# Patient Record
Sex: FEMALE | Race: WHITE | NOT HISPANIC OR LATINO | Employment: FULL TIME | URBAN - METROPOLITAN AREA
[De-identification: names, ages, dates, MRNs, and addresses within clinical notes are randomized per-mention and may not be internally consistent; named-entity substitution may affect disease eponyms.]

---

## 2017-05-10 ENCOUNTER — APPOINTMENT (OUTPATIENT)
Dept: WOMENS IMAGING | Facility: HOSPITAL | Age: 57
End: 2017-05-10

## 2017-05-10 PROCEDURE — 77067 SCR MAMMO BI INCL CAD: CPT | Performed by: RADIOLOGY

## 2018-05-30 ENCOUNTER — APPOINTMENT (OUTPATIENT)
Dept: WOMENS IMAGING | Facility: HOSPITAL | Age: 58
End: 2018-05-30

## 2018-05-30 PROCEDURE — 77063 BREAST TOMOSYNTHESIS BI: CPT | Performed by: RADIOLOGY

## 2018-05-30 PROCEDURE — 77067 SCR MAMMO BI INCL CAD: CPT | Performed by: RADIOLOGY

## 2019-06-06 ENCOUNTER — APPOINTMENT (OUTPATIENT)
Dept: WOMENS IMAGING | Facility: HOSPITAL | Age: 59
End: 2019-06-06

## 2019-06-06 PROCEDURE — 77067 SCR MAMMO BI INCL CAD: CPT | Performed by: RADIOLOGY

## 2019-06-06 PROCEDURE — 77063 BREAST TOMOSYNTHESIS BI: CPT | Performed by: RADIOLOGY

## 2019-06-17 ENCOUNTER — APPOINTMENT (OUTPATIENT)
Dept: WOMENS IMAGING | Facility: HOSPITAL | Age: 59
End: 2019-06-17

## 2019-06-17 PROCEDURE — 77066 DX MAMMO INCL CAD BI: CPT | Performed by: RADIOLOGY

## 2019-06-17 PROCEDURE — 76641 ULTRASOUND BREAST COMPLETE: CPT | Performed by: RADIOLOGY

## 2019-06-17 PROCEDURE — G0279 TOMOSYNTHESIS, MAMMO: HCPCS | Performed by: RADIOLOGY

## 2019-06-17 PROCEDURE — 77062 BREAST TOMOSYNTHESIS BI: CPT | Performed by: RADIOLOGY

## 2019-12-20 ENCOUNTER — APPOINTMENT (OUTPATIENT)
Dept: WOMENS IMAGING | Facility: HOSPITAL | Age: 59
End: 2019-12-20

## 2019-12-20 PROCEDURE — 76641 ULTRASOUND BREAST COMPLETE: CPT | Performed by: RADIOLOGY

## 2020-06-22 ENCOUNTER — APPOINTMENT (OUTPATIENT)
Dept: WOMENS IMAGING | Facility: HOSPITAL | Age: 60
End: 2020-06-22

## 2020-06-22 PROCEDURE — 77066 DX MAMMO INCL CAD BI: CPT | Performed by: RADIOLOGY

## 2020-06-22 PROCEDURE — 76641 ULTRASOUND BREAST COMPLETE: CPT | Performed by: RADIOLOGY

## 2020-06-22 PROCEDURE — G0279 TOMOSYNTHESIS, MAMMO: HCPCS | Performed by: RADIOLOGY

## 2020-06-22 PROCEDURE — 77062 BREAST TOMOSYNTHESIS BI: CPT | Performed by: RADIOLOGY

## 2020-07-09 ENCOUNTER — APPOINTMENT (OUTPATIENT)
Dept: WOMENS IMAGING | Facility: HOSPITAL | Age: 60
End: 2020-07-09

## 2020-11-25 ENCOUNTER — HOSPITAL ENCOUNTER (OUTPATIENT)
Dept: CARDIOLOGY | Facility: HOSPITAL | Age: 60
Discharge: HOME | End: 2020-11-25

## 2020-11-25 VITALS
HEIGHT: 67 IN | BODY MASS INDEX: 20.25 KG/M2 | DIASTOLIC BLOOD PRESSURE: 83 MMHG | WEIGHT: 129 LBS | TEMPERATURE: 97.7 F | OXYGEN SATURATION: 98 % | SYSTOLIC BLOOD PRESSURE: 130 MMHG | HEART RATE: 62 BPM

## 2020-11-25 DIAGNOSIS — I48.0 PAF (PAROXYSMAL ATRIAL FIBRILLATION) (HCC): ICD-10-CM

## 2020-11-25 DIAGNOSIS — R06.02 SHORTNESS OF BREATH: ICD-10-CM

## 2020-11-25 DIAGNOSIS — R07.9 CHEST PAIN, UNSPECIFIED TYPE: Primary | ICD-10-CM

## 2020-11-25 DIAGNOSIS — R00.2 PALPITATIONS: ICD-10-CM

## 2020-11-25 DIAGNOSIS — R07.9 CHEST PAIN, UNSPECIFIED TYPE: ICD-10-CM

## 2020-11-25 DIAGNOSIS — I73.9 PAD (PERIPHERAL ARTERY DISEASE) (HCC): ICD-10-CM

## 2020-11-25 LAB
ALBUMIN SERPL-MCNC: 4.8 G/DL (ref 3.5–5.2)
ALBUMIN/GLOB SERPL: 1.8 G/DL
ALP SERPL-CCNC: 35 U/L (ref 39–117)
ALT SERPL W P-5'-P-CCNC: 26 U/L (ref 1–33)
ANION GAP SERPL CALCULATED.3IONS-SCNC: 13.4 MMOL/L (ref 5–15)
AST SERPL-CCNC: 28 U/L (ref 1–32)
BASOPHILS # BLD AUTO: 0.08 10*3/MM3 (ref 0–0.2)
BASOPHILS NFR BLD AUTO: 0.9 % (ref 0–1.5)
BILIRUB SERPL-MCNC: 0.7 MG/DL (ref 0–1.2)
BUN SERPL-MCNC: 18 MG/DL (ref 8–23)
BUN/CREAT SERPL: 21.2 (ref 7–25)
CALCIUM SPEC-SCNC: 9.5 MG/DL (ref 8.6–10.5)
CHLORIDE SERPL-SCNC: 101 MMOL/L (ref 98–107)
CO2 SERPL-SCNC: 22.6 MMOL/L (ref 22–29)
CREAT SERPL-MCNC: 0.85 MG/DL (ref 0.57–1)
D DIMER PPP FEU-MCNC: 0.38 MCGFEU/ML (ref 0–0.49)
DEPRECATED RDW RBC AUTO: 45.7 FL (ref 37–54)
EOSINOPHIL # BLD AUTO: 0.12 10*3/MM3 (ref 0–0.4)
EOSINOPHIL NFR BLD AUTO: 1.3 % (ref 0.3–6.2)
ERYTHROCYTE [DISTWIDTH] IN BLOOD BY AUTOMATED COUNT: 12 % (ref 12.3–15.4)
GFR SERPL CREATININE-BSD FRML MDRD: 68 ML/MIN/1.73
GLOBULIN UR ELPH-MCNC: 2.6 GM/DL
GLUCOSE SERPL-MCNC: 99 MG/DL (ref 65–99)
HCT VFR BLD AUTO: 50.3 % (ref 34–46.6)
HGB BLD-MCNC: 16.7 G/DL (ref 12–15.9)
IMM GRANULOCYTES # BLD AUTO: 0.03 10*3/MM3 (ref 0–0.05)
IMM GRANULOCYTES NFR BLD AUTO: 0.3 % (ref 0–0.5)
LYMPHOCYTES # BLD AUTO: 1.92 10*3/MM3 (ref 0.7–3.1)
LYMPHOCYTES NFR BLD AUTO: 21.5 % (ref 19.6–45.3)
MCH RBC QN AUTO: 34.1 PG (ref 26.6–33)
MCHC RBC AUTO-ENTMCNC: 33.2 G/DL (ref 31.5–35.7)
MCV RBC AUTO: 102.7 FL (ref 79–97)
MONOCYTES # BLD AUTO: 0.75 10*3/MM3 (ref 0.1–0.9)
MONOCYTES NFR BLD AUTO: 8.4 % (ref 5–12)
NEUTROPHILS NFR BLD AUTO: 6.02 10*3/MM3 (ref 1.7–7)
NEUTROPHILS NFR BLD AUTO: 67.6 % (ref 42.7–76)
NRBC BLD AUTO-RTO: 0 /100 WBC (ref 0–0.2)
NT-PROBNP SERPL-MCNC: 478.5 PG/ML (ref 0–900)
PLATELET # BLD AUTO: 249 10*3/MM3 (ref 140–450)
PMV BLD AUTO: 10.6 FL (ref 6–12)
POTASSIUM SERPL-SCNC: 4.1 MMOL/L (ref 3.5–5.2)
PROT SERPL-MCNC: 7.4 G/DL (ref 6–8.5)
RBC # BLD AUTO: 4.9 10*6/MM3 (ref 3.77–5.28)
SODIUM SERPL-SCNC: 137 MMOL/L (ref 136–145)
T-UPTAKE NFR SERPL: 1.15 TBI (ref 0.8–1.3)
T4 SERPL-MCNC: 5.64 MCG/DL (ref 4.5–11.7)
TROPONIN T SERPL-MCNC: <0.01 NG/ML (ref 0–0.03)
TSH SERPL DL<=0.05 MIU/L-ACNC: 3.93 UIU/ML (ref 0.27–4.2)
WBC # BLD AUTO: 8.92 10*3/MM3 (ref 3.4–10.8)

## 2020-11-25 PROCEDURE — 93352 ADMIN ECG CONTRAST AGENT: CPT | Performed by: INTERNAL MEDICINE

## 2020-11-25 PROCEDURE — 83880 ASSAY OF NATRIURETIC PEPTIDE: CPT | Performed by: INTERNAL MEDICINE

## 2020-11-25 PROCEDURE — 93320 DOPPLER ECHO COMPLETE: CPT

## 2020-11-25 PROCEDURE — 93325 DOPPLER ECHO COLOR FLOW MAPG: CPT

## 2020-11-25 PROCEDURE — 93320 DOPPLER ECHO COMPLETE: CPT | Performed by: INTERNAL MEDICINE

## 2020-11-25 PROCEDURE — 25010000002 PERFLUTREN (DEFINITY) 8.476 MG IN SODIUM CHLORIDE 0.9 % 10 ML INJECTION: Performed by: INTERNAL MEDICINE

## 2020-11-25 PROCEDURE — 93017 CV STRESS TEST TRACING ONLY: CPT

## 2020-11-25 PROCEDURE — 93350 STRESS TTE ONLY: CPT | Performed by: INTERNAL MEDICINE

## 2020-11-25 PROCEDURE — 99244 OFF/OP CNSLTJ NEW/EST MOD 40: CPT | Performed by: INTERNAL MEDICINE

## 2020-11-25 PROCEDURE — 93005 ELECTROCARDIOGRAM TRACING: CPT | Performed by: INTERNAL MEDICINE

## 2020-11-25 PROCEDURE — 84436 ASSAY OF TOTAL THYROXINE: CPT

## 2020-11-25 PROCEDURE — 93350 STRESS TTE ONLY: CPT

## 2020-11-25 PROCEDURE — 93010 ELECTROCARDIOGRAM REPORT: CPT | Performed by: INTERNAL MEDICINE

## 2020-11-25 PROCEDURE — 94760 N-INVAS EAR/PLS OXIMETRY 1: CPT

## 2020-11-25 PROCEDURE — 93325 DOPPLER ECHO COLOR FLOW MAPG: CPT | Performed by: INTERNAL MEDICINE

## 2020-11-25 PROCEDURE — 85025 COMPLETE CBC W/AUTO DIFF WBC: CPT

## 2020-11-25 PROCEDURE — 93018 CV STRESS TEST I&R ONLY: CPT | Performed by: INTERNAL MEDICINE

## 2020-11-25 PROCEDURE — 85379 FIBRIN DEGRADATION QUANT: CPT | Performed by: INTERNAL MEDICINE

## 2020-11-25 PROCEDURE — 80053 COMPREHEN METABOLIC PANEL: CPT

## 2020-11-25 PROCEDURE — 84484 ASSAY OF TROPONIN QUANT: CPT | Performed by: INTERNAL MEDICINE

## 2020-11-25 PROCEDURE — 84479 ASSAY OF THYROID (T3 OR T4): CPT

## 2020-11-25 PROCEDURE — 93016 CV STRESS TEST SUPVJ ONLY: CPT | Performed by: INTERNAL MEDICINE

## 2020-11-25 PROCEDURE — 84443 ASSAY THYROID STIM HORMONE: CPT

## 2020-11-25 PROCEDURE — 36415 COLL VENOUS BLD VENIPUNCTURE: CPT | Performed by: NURSE PRACTITIONER

## 2020-11-25 RX ORDER — ALBUTEROL SULFATE 90 UG/1
AEROSOL, METERED RESPIRATORY (INHALATION) AS NEEDED
COMMUNITY

## 2020-11-25 RX ORDER — GUAIFENESIN 600 MG/1
TABLET, EXTENDED RELEASE ORAL AS NEEDED
COMMUNITY

## 2020-11-25 RX ORDER — ALPHA-1-PROTEINASE INHIBITOR HUMAN 1000 MG
KIT INTRAVENOUS WEEKLY
COMMUNITY

## 2020-11-25 RX ORDER — NITROGLYCERIN 0.4 MG/1
0.4 TABLET SUBLINGUAL
Status: DISCONTINUED | OUTPATIENT
Start: 2020-11-25 | End: 2021-08-12

## 2020-11-25 RX ORDER — ESOMEPRAZOLE MAGNESIUM 40 MG/1
40 CAPSULE, DELAYED RELEASE ORAL DAILY
COMMUNITY

## 2020-11-25 RX ORDER — ESTRADIOL 0.07 MG/D
1 FILM, EXTENDED RELEASE TRANSDERMAL 2 TIMES WEEKLY
COMMUNITY

## 2020-11-25 RX ORDER — ASPIRIN 325 MG
325 TABLET ORAL DAILY
Qty: 30 TABLET | Refills: 11 | COMMUNITY
End: 2020-12-18

## 2020-11-25 RX ORDER — TRETINOIN 1 MG/G
CREAM TOPICAL AS NEEDED
COMMUNITY

## 2020-11-25 RX ORDER — DILTIAZEM HYDROCHLORIDE 120 MG/1
120 CAPSULE, COATED, EXTENDED RELEASE ORAL DAILY
Qty: 30 CAPSULE | Refills: 11 | Status: SHIPPED | OUTPATIENT
Start: 2020-11-25 | End: 2021-01-21 | Stop reason: SDUPTHER

## 2020-11-25 RX ORDER — EPINEPHRINE 0.3 MG/.3ML
INJECTION SUBCUTANEOUS AS NEEDED
COMMUNITY

## 2020-11-25 RX ORDER — MELOXICAM 15 MG/1
15 TABLET ORAL AS NEEDED
COMMUNITY
End: 2020-12-18

## 2020-11-25 RX ORDER — SODIUM CHLORIDE 0.9 % (FLUSH) 0.9 %
10 SYRINGE (ML) INJECTION AS NEEDED
Status: DISCONTINUED | OUTPATIENT
Start: 2020-11-25 | End: 2021-08-12

## 2020-11-25 RX ORDER — DESVENLAFAXINE SUCCINATE 50 MG/1
12.5 TABLET, EXTENDED RELEASE ORAL AS NEEDED
COMMUNITY

## 2020-11-25 RX ADMIN — PERFLUTREN 3 ML: 6.52 INJECTION, SUSPENSION INTRAVENOUS at 15:49

## 2020-11-30 LAB
BH CV ECHO MEAS - ACS: 1.6 CM
BH CV ECHO MEAS - AO MAX PG (FULL): 2.2 MMHG
BH CV ECHO MEAS - AO MAX PG: 7.1 MMHG
BH CV ECHO MEAS - AO MEAN PG (FULL): 1.9 MMHG
BH CV ECHO MEAS - AO MEAN PG: 4.4 MMHG
BH CV ECHO MEAS - AO ROOT AREA (BSA CORRECTED): 1.6
BH CV ECHO MEAS - AO ROOT AREA: 5.7 CM^2
BH CV ECHO MEAS - AO ROOT DIAM: 2.7 CM
BH CV ECHO MEAS - AO V2 MAX: 133 CM/SEC
BH CV ECHO MEAS - AO V2 MEAN: 100.5 CM/SEC
BH CV ECHO MEAS - AO V2 VTI: 32.9 CM
BH CV ECHO MEAS - AVA(I,A): 2.2 CM^2
BH CV ECHO MEAS - AVA(I,D): 2.2 CM^2
BH CV ECHO MEAS - AVA(V,A): 2.4 CM^2
BH CV ECHO MEAS - AVA(V,D): 2.4 CM^2
BH CV ECHO MEAS - BSA(HAYCOCK): 1.7 M^2
BH CV ECHO MEAS - BSA: 1.7 M^2
BH CV ECHO MEAS - BZI_BMI: 20.2 KILOGRAMS/M^2
BH CV ECHO MEAS - BZI_METRIC_HEIGHT: 170.2 CM
BH CV ECHO MEAS - BZI_METRIC_WEIGHT: 58.5 KG
BH CV ECHO MEAS - EDV(MOD-SP2): 48 ML
BH CV ECHO MEAS - EDV(MOD-SP4): 74 ML
BH CV ECHO MEAS - EDV(TEICH): 59.3 ML
BH CV ECHO MEAS - EF(CUBED): 76.8 %
BH CV ECHO MEAS - EF(MOD-BP): 71 %
BH CV ECHO MEAS - EF(MOD-SP2): 79.2 %
BH CV ECHO MEAS - EF(MOD-SP4): 78.4 %
BH CV ECHO MEAS - EF(TEICH): 69.7 %
BH CV ECHO MEAS - ESV(MOD-SP2): 10 ML
BH CV ECHO MEAS - ESV(MOD-SP4): 16 ML
BH CV ECHO MEAS - ESV(TEICH): 17.9 ML
BH CV ECHO MEAS - FS: 38.6 %
BH CV ECHO MEAS - IVS/LVPW: 1.1
BH CV ECHO MEAS - IVSD: 1.2 CM
BH CV ECHO MEAS - LAT PEAK E' VEL: 8.8 CM/SEC
BH CV ECHO MEAS - LV DIASTOLIC VOL/BSA (35-75): 44.1 ML/M^2
BH CV ECHO MEAS - LV MASS(C)D: 133.7 GRAMS
BH CV ECHO MEAS - LV MASS(C)DI: 79.7 GRAMS/M^2
BH CV ECHO MEAS - LV MAX PG: 4.9 MMHG
BH CV ECHO MEAS - LV MEAN PG: 2.5 MMHG
BH CV ECHO MEAS - LV SYSTOLIC VOL/BSA (12-30): 9.5 ML/M^2
BH CV ECHO MEAS - LV V1 MAX: 110.5 CM/SEC
BH CV ECHO MEAS - LV V1 MEAN: 73.5 CM/SEC
BH CV ECHO MEAS - LV V1 VTI: 24.6 CM
BH CV ECHO MEAS - LVIDD: 3.7 CM
BH CV ECHO MEAS - LVIDS: 2.3 CM
BH CV ECHO MEAS - LVLD AP2: 5.9 CM
BH CV ECHO MEAS - LVLD AP4: 6.8 CM
BH CV ECHO MEAS - LVLS AP2: 4.8 CM
BH CV ECHO MEAS - LVLS AP4: 5.3 CM
BH CV ECHO MEAS - LVOT AREA (M): 2.8 CM^2
BH CV ECHO MEAS - LVOT AREA: 2.9 CM^2
BH CV ECHO MEAS - LVOT DIAM: 1.9 CM
BH CV ECHO MEAS - LVPWD: 1.1 CM
BH CV ECHO MEAS - MED PEAK E' VEL: 6.9 CM/SEC
BH CV ECHO MEAS - MV A DUR: 0.1 SEC
BH CV ECHO MEAS - MV A MAX VEL: 55.1 CM/SEC
BH CV ECHO MEAS - MV DEC SLOPE: 493.4 CM/SEC^2
BH CV ECHO MEAS - MV DEC TIME: 0.18 SEC
BH CV ECHO MEAS - MV E MAX VEL: 79 CM/SEC
BH CV ECHO MEAS - MV E/A: 1.4
BH CV ECHO MEAS - MV MAX PG: 4 MMHG
BH CV ECHO MEAS - MV MEAN PG: 1.8 MMHG
BH CV ECHO MEAS - MV P1/2T MAX VEL: 95 CM/SEC
BH CV ECHO MEAS - MV P1/2T: 56.4 MSEC
BH CV ECHO MEAS - MV V2 MAX: 100.4 CM/SEC
BH CV ECHO MEAS - MV V2 MEAN: 64.5 CM/SEC
BH CV ECHO MEAS - MV V2 VTI: 33.2 CM
BH CV ECHO MEAS - MVA P1/2T LCG: 2.3 CM^2
BH CV ECHO MEAS - MVA(P1/2T): 3.9 CM^2
BH CV ECHO MEAS - MVA(VTI): 2.1 CM^2
BH CV ECHO MEAS - PA ACC TIME: 0.08 SEC
BH CV ECHO MEAS - PA MAX PG (FULL): 2.9 MMHG
BH CV ECHO MEAS - PA MAX PG: 5.6 MMHG
BH CV ECHO MEAS - PA PR(ACCEL): 41 MMHG
BH CV ECHO MEAS - PA V2 MAX: 118.1 CM/SEC
BH CV ECHO MEAS - PULM A REVS DUR: 0.1 SEC
BH CV ECHO MEAS - PULM A REVS VEL: 44.6 CM/SEC
BH CV ECHO MEAS - PULM DIAS VEL: 31.3 CM/SEC
BH CV ECHO MEAS - PULM S/D: 1.3
BH CV ECHO MEAS - PULM SYS VEL: 40.7 CM/SEC
BH CV ECHO MEAS - PVA(V,A): 2 CM^2
BH CV ECHO MEAS - PVA(V,D): 2 CM^2
BH CV ECHO MEAS - QP/QS: 0.8
BH CV ECHO MEAS - RAP SYSTOLE: 3 MMHG
BH CV ECHO MEAS - RV MAX PG: 2.7 MMHG
BH CV ECHO MEAS - RV MEAN PG: 1.6 MMHG
BH CV ECHO MEAS - RV V1 MAX: 82.3 CM/SEC
BH CV ECHO MEAS - RV V1 MEAN: 59.6 CM/SEC
BH CV ECHO MEAS - RV V1 VTI: 19.5 CM
BH CV ECHO MEAS - RVOT AREA: 2.9 CM^2
BH CV ECHO MEAS - RVOT DIAM: 1.9 CM
BH CV ECHO MEAS - RVSP: 26 MMHG
BH CV ECHO MEAS - SI(AO): 111.7 ML/M^2
BH CV ECHO MEAS - SI(CUBED): 23.8 ML/M^2
BH CV ECHO MEAS - SI(LVOT): 42.4 ML/M^2
BH CV ECHO MEAS - SI(MOD-SP2): 22.6 ML/M^2
BH CV ECHO MEAS - SI(MOD-SP4): 34.6 ML/M^2
BH CV ECHO MEAS - SI(TEICH): 24.6 ML/M^2
BH CV ECHO MEAS - SUP REN AO DIAM: 2.1 CM
BH CV ECHO MEAS - SV(AO): 187.5 ML
BH CV ECHO MEAS - SV(CUBED): 39.9 ML
BH CV ECHO MEAS - SV(LVOT): 71.2 ML
BH CV ECHO MEAS - SV(MOD-SP2): 38 ML
BH CV ECHO MEAS - SV(MOD-SP4): 58 ML
BH CV ECHO MEAS - SV(RVOT): 57.1 ML
BH CV ECHO MEAS - SV(TEICH): 41.3 ML
BH CV ECHO MEAS - TAPSE (>1.6): 2.5 CM
BH CV ECHO MEAS - TR MAX VEL: 238.9 CM/SEC
BH CV ECHO MEASUREMENTS AVERAGE E/E' RATIO: 10.06
BH CV STRESS BP STAGE 1: NORMAL
BH CV STRESS BP STAGE 2: NORMAL
BH CV STRESS BP STAGE 3: NORMAL
BH CV STRESS DURATION MIN STAGE 1: 3
BH CV STRESS DURATION MIN STAGE 2: 3
BH CV STRESS DURATION MIN STAGE 3: 2
BH CV STRESS DURATION SEC STAGE 1: 0
BH CV STRESS DURATION SEC STAGE 2: 0
BH CV STRESS DURATION SEC STAGE 3: 30
BH CV STRESS ECHO POST STRESS EJECTION FRACTION EF: 79 %
BH CV STRESS GRADE STAGE 1: 10
BH CV STRESS GRADE STAGE 2: 12
BH CV STRESS GRADE STAGE 3: 14
BH CV STRESS HR STAGE 1: 103
BH CV STRESS HR STAGE 2: 125
BH CV STRESS HR STAGE 3: 138
BH CV STRESS METS STAGE 1: 5
BH CV STRESS METS STAGE 2: 7.5
BH CV STRESS METS STAGE 3: 10
BH CV STRESS PROTOCOL 1: NORMAL
BH CV STRESS SPEED STAGE 1: 1.7
BH CV STRESS SPEED STAGE 2: 2.5
BH CV STRESS SPEED STAGE 3: 3.4
BH CV STRESS STAGE 1: 1
BH CV STRESS STAGE 2: 2
BH CV STRESS STAGE 3: 3
BH CV VAS BP RIGHT ARM: NORMAL MMHG
BH CV XLRA - RV BASE: 3.1 CM
BH CV XLRA - RV LENGTH: 7.6 CM
BH CV XLRA - RV MID: 2.8 CM
BH CV XLRA - TDI S': 11.1 CM/SEC
LEFT ATRIUM VOLUME INDEX: 15 ML/M2
LV EF 2D ECHO EST: 71 %
MAXIMAL PREDICTED HEART RATE: 158 BPM
PERCENT MAX PREDICTED HR: 87.34 %
SINUS: 2.6 CM
STJ: 2.9 CM
STRESS BASELINE BP: NORMAL MMHG
STRESS BASELINE HR: 75 BPM
STRESS PERCENT HR: 103 %
STRESS POST ESTIMATED WORKLOAD: 10 METS
STRESS POST EXERCISE DUR MIN: 8 MIN
STRESS POST EXERCISE DUR SEC: 30 SEC
STRESS POST PEAK BP: NORMAL MMHG
STRESS POST PEAK HR: 138 BPM
STRESS TARGET HR: 134 BPM

## 2020-12-17 ENCOUNTER — TELEPHONE (OUTPATIENT)
Dept: CARDIOLOGY | Facility: CLINIC | Age: 60
End: 2020-12-17

## 2020-12-17 NOTE — TELEPHONE ENCOUNTER
Per Dr Auguste-monitor showed afib early this AM.  She will need to start Eliquis 5mg BID.  Verify no abdominal pain, no bleeding, and no HA.  Have pt stop ASA, no NSAIDs and if any if issues please call.    Brigid pt and LMM re: call to speak with ON CALL about this matter.  Advised her she will need to start a blood thinner.

## 2020-12-18 RX ORDER — METOPROLOL SUCCINATE 25 MG/1
25 TABLET, EXTENDED RELEASE ORAL DAILY
Qty: 90 TABLET | Refills: 4 | Status: SHIPPED | OUTPATIENT
Start: 2020-12-18 | End: 2021-01-21

## 2020-12-18 RX ORDER — METOPROLOL SUCCINATE 25 MG/1
25 TABLET, EXTENDED RELEASE ORAL DAILY
Qty: 30 TABLET | Refills: 1 | Status: CANCELLED | OUTPATIENT
Start: 2020-12-18

## 2020-12-18 RX ORDER — METOPROLOL SUCCINATE 25 MG/1
25 TABLET, EXTENDED RELEASE ORAL DAILY
Qty: 30 TABLET | Refills: 11 | Status: SHIPPED | OUTPATIENT
Start: 2020-12-18 | End: 2020-12-18 | Stop reason: SDUPTHER

## 2020-12-18 NOTE — TELEPHONE ENCOUNTER
Zakiya can you help answer this question? Will there be any updated info from [employer] and if so when?  NS

## 2020-12-18 NOTE — TELEPHONE ENCOUNTER
Spoke with pt.  Verbalized understanding.  No abd pain, no CP, no HA, no bleeding, she is not taking ASA and no NSAIDs.  She said she does not check BP but does watch her Pulse which has been running in the 70s.  She is ok with starting Eliquis and Metoprolol.  Strongly advised her she needs to monitor BP 1 hour after taking the Metoprolol and she can call next week with an update to make sure no other adjustments were needed.      Anahi-Can you please send rx.  I will pending these if you can sign off.  Thank you!     Dr Auguste-JOSÉ MIGUEL HOGAN.  I have reached the pt.  (done)

## 2020-12-18 NOTE — TELEPHONE ENCOUNTER
Called multiple times to reach patient unsuccessfully and contacted Dr. Root who will try to reach the patient.  We will have her start Eliquis 5 mg twice daily and Toprol-XL 25 mg day.  We will also prevented us if she broke from the episode of atrial fibrillation dated 12/14/2020 11:04 PM and what was the duration.Bushra, please have pacemaker check with [facility] regarding this

## 2021-01-13 ENCOUNTER — TELEPHONE (OUTPATIENT)
Dept: CARDIOLOGY | Facility: CLINIC | Age: 61
End: 2021-01-13

## 2021-01-14 NOTE — TELEPHONE ENCOUNTER
Please let the pt know she had few episodes of atrial fibrillation and to keep appointment with reta in 2 weeks

## 2021-01-14 NOTE — TELEPHONE ENCOUNTER
Called,  No one answered, unable to leave a vm will continue to try and reach the pt  Thanks  Shelly Hawkins RN  Triage nurse

## 2021-01-15 NOTE — TELEPHONE ENCOUNTER
Health Maintenance Summary     Topic Due On Due Status Completed On    MAMMOGRAM - BREAST CANCER SCREENING Feb 4, 2019 Not Due Feb 4, 2017    Colorectal Cancer Screening - Colonoscopy Aug 4, 2021 Not Due Aug 4, 2011    Immunization - TDAP Pregnancy  Hidden     IMMUNIZATION - DTaP/Tdap/Td Aug 24, 2019 Not Due Aug 24, 2009    Immunization-Influenza  Completed Jan 8, 2018    Hepatitis C Screening Jun 23, 2007 Overdue           Patient is due for topics as listed above, she wishes to discuss with provider .           Several attempts to reach the pt. sending a letter  Thanks  Shelly Hawkins RN  Triage nurse

## 2021-01-16 NOTE — TELEPHONE ENCOUNTER
They are aware of atrial fib nit she needs to f/p. The pcp office knows how to contact her. Please let them knwo we hve been trying and can they reach out to her to call back to arrange f/p. carlotta

## 2021-01-19 NOTE — TELEPHONE ENCOUNTER
Patient notified of results and recommendations and verbalized understanding  Shelly Hawkins RN  Triage nurse

## 2021-01-21 ENCOUNTER — OFFICE VISIT (OUTPATIENT)
Dept: CARDIOLOGY | Facility: CLINIC | Age: 61
End: 2021-01-21

## 2021-01-21 VITALS
BODY MASS INDEX: 20.84 KG/M2 | DIASTOLIC BLOOD PRESSURE: 88 MMHG | HEIGHT: 67 IN | SYSTOLIC BLOOD PRESSURE: 140 MMHG | WEIGHT: 132.8 LBS | HEART RATE: 56 BPM

## 2021-01-21 DIAGNOSIS — Z79.01 CHRONIC ANTICOAGULATION: ICD-10-CM

## 2021-01-21 DIAGNOSIS — I48.0 PAF (PAROXYSMAL ATRIAL FIBRILLATION) (HCC): Primary | ICD-10-CM

## 2021-01-21 PROCEDURE — 93000 ELECTROCARDIOGRAM COMPLETE: CPT | Performed by: NURSE PRACTITIONER

## 2021-01-21 PROCEDURE — 99214 OFFICE O/P EST MOD 30 MIN: CPT | Performed by: NURSE PRACTITIONER

## 2021-01-21 RX ORDER — DILTIAZEM HYDROCHLORIDE 180 MG/1
180 CAPSULE, COATED, EXTENDED RELEASE ORAL DAILY
Qty: 30 CAPSULE | Refills: 0 | Status: SHIPPED | OUTPATIENT
Start: 2021-01-21 | End: 2021-01-21

## 2021-01-21 RX ORDER — DILTIAZEM HYDROCHLORIDE 180 MG/1
180 CAPSULE, COATED, EXTENDED RELEASE ORAL DAILY
Qty: 90 CAPSULE | Refills: 0 | Status: SHIPPED | OUTPATIENT
Start: 2021-01-21 | End: 2021-03-24

## 2021-01-21 RX ORDER — METOPROLOL SUCCINATE 25 MG/1
25 TABLET, EXTENDED RELEASE ORAL DAILY
Qty: 30 TABLET | Refills: 5 | Status: CANCELLED | OUTPATIENT
Start: 2021-01-21

## 2021-01-21 RX ORDER — DILTIAZEM HYDROCHLORIDE 120 MG/1
120 CAPSULE, COATED, EXTENDED RELEASE ORAL DAILY
Qty: 30 CAPSULE | Refills: 11 | Status: CANCELLED | OUTPATIENT
Start: 2021-01-21

## 2021-01-21 NOTE — PROGRESS NOTES
Date of Office Visit: 2021  Encounter Provider: Claudia Villalobos, ROSEMARY, APRN  Place of Service: [facility] CARDIOLOGY  Patient Name: Radha Block  :1960        Subjective:     Chief Complaint:  Paroxysmal atrial fibrillation, chronic anticoagulation      History of Present Illness:  Radha Block is a 60 y.o. female patient of Dr. Auguste.  This patient is new to me and I have reviewed her records.    Patient has a history of paroxysmal atrial fibrillation, chronic anticoagulation, leiden 5 deficiency, alpha protein deficiency, DVT.    Patient was seen in the office 2020 by Dr. Auguste for evaluation of palpitations lasting up to an hour in duration.  Sometimes it would wake her up from sleep.  At times she would notice rapid heart rate up to 160 bpm.  She has some readings on her apple watch suggesting atrial fibrillation and strips reviewed by MD during visit did appear to be atrial fibrillation.  She was consuming modest amounts of alcohol but no caffeine.  Stress echo showed normal LV systolic function with hyperdynamic EF at 71%, normal wall thickness, trace tricuspid regurgitation with normal RVSP, no EKG or echocardiographic evidence of myocardial ischemia considered normal stress test.  She was started on aspirin 325 mg daily and diltiazem.  Subsequent monitor study showed 2 episodes of sustained atrial fibrillation totaling 24 minutes.  She was changed to Eliquis 5 mg twice daily and aspirin was stopped.  She was also instructed to stop Mobic.      Patient presents to office today for follow-up appointment.  Patient reports she is doing well since last visit.  She stays active hiking and doing various other activities and denies exertional symptoms or concerns.  Denies chest pain or discomfort, shortness of breath, shortness of breath with exertion, lower extremity edema, dizziness, syncope, falls, or abnormal bleeding.  She gets a rare brief palpitation  and did have one episode of palpitations on and off the night of 1/10/2021 in the setting of dehydration and 2 glasses of iced tea which she does not usually drink caffeine but otherwise no sustained episodes.  Unfortunately the metoprolol is giving her a significant amount of fatigue as well as weird dreams.  We discussed trying to increase diltiazem dose instead to see if palpitations remain controlled with this.  She reports blood pressure has been a little high recently at home, about the same as today.  Discussed blood pressure and heart rate goals and monitoring.  She did get some vaginal bleeding after starting the Eliquis but this resolved and has not had any recurrence since.  She reports she has had this intermittently over the last few years but has been almost a year since the last episode and has an upcoming GYN appointment where she will discuss further.        Past Medical History:   Diagnosis Date   • Arrhythmia    • Clotting disorder (CMS/[facility])     Factor 5 Liden   • COPD (chronic obstructive pulmonary disease) (CMS/[facility])    • Deep vein thrombosis (CMS/[facility])    • Mitral valve prolapse    • PAF (paroxysmal atrial fibrillation) (CMS/[facility]) 11/25/2020     Past Surgical History:   Procedure Laterality Date   • HAND SURGERY      Carpal metacarpal hand surgery      Outpatient Medications Prior to Visit   Medication Sig Dispense Refill   • Acetaminophen 325 MG capsule Take 2 capsules by mouth 4 (Four) Times a Day As Needed.     • albuterol sulfate  (90 Base) MCG/ACT inhaler As Needed for Wheezing.     • alpha1-proteinase inhibitor (Zemaira) 1000 MG injection 1 (One) Time Per Week.     • apixaban (ELIQUIS) 5 MG tablet tablet Take 1 tablet by mouth Every 12 (Twelve) Hours. 60 tablet 11   • desvenlafaxine (Pristiq) 50 MG 24 hr tablet Take 12.5 mg by mouth As Needed.     • EPINEPHrine (EpiPen 2-Shashi) 0.3 MG/0.3ML solution auto-injector injection As Needed.     • esomeprazole (nexIUM) 40 MG capsule Take 40 mg  by mouth Daily.     • estradiol (Minivelle) 0.075 MG/24HR patch Place 1 patch on the skin as directed by provider 2 (Two) Times a Week.     • Fluticasone-Umeclidin-Vilant (Trelegy Ellipta) 100-62.5-25 MCG/INH aerosol powder  Inhale Daily.     • guaiFENesin (MUCINEX) 600 MG 12 hr tablet As Needed.     • progesterone (PROMETRIUM) 100 MG capsule Take 100 mg by mouth Daily.     • tretinoin (RETIN-A) 0.1 % cream As Needed.     • dilTIAZem CD (CARDIZEM CD) 120 MG 24 hr capsule Take 1 capsule by mouth Daily. 30 capsule 11   • metoprolol succinate XL (TOPROL-XL) 25 MG 24 hr tablet TAKE 1 TABLET BY MOUTH DAILY (Patient taking differently: Take 25 mg by mouth Daily. Currently taking 1/2 tab daily) 90 tablet 4     Facility-Administered Medications Prior to Visit   Medication Dose Route Frequency Provider Last Rate Last Admin   • nitroglycerin (NITROSTAT) SL tablet 0.4 mg  0.4 mg Sublingual Q5 Min PRN Ijeoma Auguste MD       • sodium chloride 0.9 % flush 10 mL  10 mL Intravenous PRN Ijeoma Auguste MD           Allergies as of 01/21/2021   • (No Known Allergies)     Social History     Socioeconomic History   • Marital status: Unknown     Spouse name: Not on file   • Number of children: Not on file   • Years of education: Not on file   • Highest education level: Not on file   Tobacco Use   • Smoking status: Never Smoker   • Smokeless tobacco: Never Used   Substance and Sexual Activity   • Alcohol use: Yes     Alcohol/week: 3.0 standard drinks     Types: 3 Glasses of wine per week     Comment: 2 times a week    • Drug use: Never   • Sexual activity: Defer     Family History   Adopted: Yes   Family history unknown: Yes       Review of Systems   Constitution: Negative for chills, fever, malaise/fatigue, weight gain and weight loss.   HENT: Negative for ear pain, hearing loss, nosebleeds and sore throat.    Eyes: Negative for blurred vision, double vision, redness, vision loss in left eye, vision loss in right eye and visual disturbance.  "  Cardiovascular:        SEE HPI   Respiratory: Negative for cough, shortness of breath, snoring and wheezing.    Endocrine: Negative for cold intolerance and heat intolerance.   Skin: Positive for itching. Negative for rash and suspicious lesions.   Musculoskeletal: Negative for joint pain, joint swelling and myalgias.   Gastrointestinal: Negative for abdominal pain, diarrhea, hematemesis, melena, nausea and vomiting.   Genitourinary: Negative for dysuria, frequency and hematuria.   Neurological: Negative for dizziness, headaches, numbness, paresthesias and seizures.   Psychiatric/Behavioral: Negative for altered mental status and depression. The patient is not nervous/anxious.           Objective:     Vitals:    01/21/21 1126   BP: 140/88   BP Location: Right arm   Cuff Size: Adult   Pulse: 56   Weight: 60.2 kg (132 lb 12.8 oz)   Height: 170.2 cm (67\")     Body mass index is 20.8 kg/m².      PHYSICAL EXAM:  Constitutional:       General: Not in acute distress.     Appearance: Well-developed. Not diaphoretic.   Eyes:      Pupils: Pupils are equal, round, and reactive to light.   HENT:      Head: Normocephalic and atraumatic.   Neck:      Musculoskeletal: Neck supple.      Vascular: No carotid bruit or JVD.   Pulmonary:      Effort: Pulmonary effort is normal. No respiratory distress.      Breath sounds: Normal breath sounds. No wheezing. No rales.   Cardiovascular:      Normal rate. Regular rhythm.      Murmurs: There is no murmur.      No gallop. No click. No rub.   Edema:     Peripheral edema absent.   Abdominal:      General: Bowel sounds are normal. There is no distension.      Palpations: Abdomen is soft.   Musculoskeletal:         General: No tenderness or deformity.   Skin:     General: Skin is warm and dry.      Findings: No erythema or rash.   Neurological:      Mental Status: Alert and oriented to person, place, and time.   Psychiatric:         Behavior: Behavior normal.         Judgment: Judgment normal. "             ECG 12 Lead    Date/Time: 1/21/2021 11:36 AM  Performed by: Claudia Villalobos DNP, KLARISSA  Authorized by: Claudia Villalobos DNP, KLARISSA   Comparison: compared with previous ECG from 11/25/2020  Rhythm: sinus rhythm  BPM: 56  Comments: No significant changes from previous EKG              Assessment:       Diagnosis Plan   1. PAF (paroxysmal atrial fibrillation) (CMS/[facility])     2. Chronic anticoagulation           Plan:     1. Paroxysmal atrial fibrillation: [id number] score of 3.  On diltiazem, metoprolol XL, and anticoagulated with Eliquis.  She feels that metoprolol XL is giving her a lot of fatigue, even with a half a tablet at night.  Instead we will try stopping this and increasing diltiazem dose and see if blood pressure and palpitations remain controlled with this.  She will call with an update in 1 week.  2. Elevated blood pressure reading: Blood pressure has been a little high outside the office recently, about the same as today.  She is going to avoid high salt foods, stay active, and will call if heart rate and blood pressure outside of desired ranges (parameters given) or if palpitations not well controlled on increased dose.  3. Chronic anticoagulation therapy: Denies falls.  Did have some vaginal bleeding after first starting Eliquis but this is something that she has had intermittently over the past few years.  Hard to tell if it was related to the Eliquis or not though had gone almost a year without bleeding prior to this most recent episode.  It lasted approximately 1 week and then resolved, was not overly heavy, and has not recurred since.  She has an upcoming appointment with GYN and she will discuss further.  She reports she has had a recent pelvic ultrasound as she continued to have uterine bleeding into her 60s and everything looked fine but regardless she will discuss further with GYN and notify office if she develops any abnormal bleeding.  4. History of DVT  5. Leiden 5  mutation  6. Alpha antitrypsin antibody deficiency  7. History of asthma    Patient to follow-up with Dr. Auguste in 6 months or sooner if needed for any new, recurrent, or worsening symptoms or other issues or concerns.        Greater than 30 minutes spent on office visit including more than half the visit spent counseling patient on blood pressure and heart rate goals and monitoring, medications and potential side effects, need to follow-up with our office in outside offices, as well as signs/symptoms that warrant sooner call or follow-up to the office or ER visit.  Records reviewed including but not limited to 11/2020 office note, 11/2020 event monitor, 11/2020 stress echo, 11/2020 EKG.           Your medication list          Accurate as of January 21, 2021 12:01 PM. If you have any questions, ask your nurse or doctor.            CHANGE how you take these medications      Instructions Last Dose Given Next Dose Due   dilTIAZem  MG 24 hr capsule  Commonly known as: CARDIZEM CD  What changed:   · medication strength  · how much to take  Changed by: Claudia Villalobos, DNP, APRN      Take 1 capsule by mouth Daily.          CONTINUE taking these medications      Instructions Last Dose Given Next Dose Due   Acetaminophen 325 MG capsule      Take 2 capsules by mouth 4 (Four) Times a Day As Needed.       albuterol sulfate  (90 Base) MCG/ACT inhaler  Commonly known as: PROVENTIL HFA;VENTOLIN HFA;PROAIR HFA      As Needed for Wheezing.       apixaban 5 MG tablet tablet  Commonly known as: ELIQUIS      Take 1 tablet by mouth Every 12 (Twelve) Hours.       EpiPen 2-Shashi 0.3 MG/0.3ML solution auto-injector injection  Generic drug: EPINEPHrine      As Needed.       guaiFENesin 600 MG 12 hr tablet  Commonly known as: MUCINEX      As Needed.       Minivelle 0.075 MG/24HR patch  Generic drug: estradiol      Place 1 patch on the skin as directed by provider 2 (Two) Times a Week.       nexIUM 40 MG capsule  Generic drug:  esomeprazole      Take 40 mg by mouth Daily.       Pristiq 50 MG 24 hr tablet  Generic drug: desvenlafaxine      Take 12.5 mg by mouth As Needed.       progesterone 100 MG capsule  Commonly known as: PROMETRIUM      Take 100 mg by mouth Daily.       Trelegy Ellipta 100-62.5-25 MCG/INH aerosol powder   Generic drug: Fluticasone-Umeclidin-Vilant      Inhale Daily.       tretinoin 0.1 % cream  Commonly known as: RETIN-A      As Needed.       Zemaira 1000 MG injection  Generic drug: alpha1-proteinase inhibitor      1 (One) Time Per Week.          STOP taking these medications    metoprolol succinate XL 25 MG 24 hr tablet  Commonly known as: TOPROL-XL  Stopped by: Claudia Villalobos DNP, APRN              Where to Get Your Medications      Information about where to get these medications is not yet available    Ask your nurse or doctor about these medications  · dilTIAZem  MG 24 hr capsule         The above medication changes may not have been made by this provider.  Patient's medication list was updated to reflect medications they are currently taking including medication changes made by other providers.            Thanks,    Claudia Villalobos DNP, APRN  01/21/2021       Dictated utilizing Dragon dictation

## 2021-03-24 RX ORDER — DILTIAZEM HYDROCHLORIDE 180 MG/1
CAPSULE, COATED, EXTENDED RELEASE ORAL
Qty: 60 CAPSULE | Refills: 0 | Status: SHIPPED | OUTPATIENT
Start: 2021-03-24 | End: 2021-06-21

## 2021-06-21 RX ORDER — DILTIAZEM HYDROCHLORIDE 180 MG/1
CAPSULE, COATED, EXTENDED RELEASE ORAL
Qty: 60 CAPSULE | Refills: 5 | Status: SHIPPED | OUTPATIENT
Start: 2021-06-21 | End: 2022-07-06

## 2021-08-03 ENCOUNTER — APPOINTMENT (OUTPATIENT)
Dept: WOMENS IMAGING | Facility: HOSPITAL | Age: 61
End: 2021-08-03

## 2021-08-04 ENCOUNTER — APPOINTMENT (OUTPATIENT)
Dept: WOMENS IMAGING | Facility: HOSPITAL | Age: 61
End: 2021-08-04

## 2021-08-04 PROCEDURE — 77063 BREAST TOMOSYNTHESIS BI: CPT | Performed by: RADIOLOGY

## 2021-08-04 PROCEDURE — 77067 SCR MAMMO BI INCL CAD: CPT | Performed by: RADIOLOGY

## 2021-08-12 ENCOUNTER — TELEPHONE (OUTPATIENT)
Dept: CARDIOLOGY | Facility: CLINIC | Age: 61
End: 2021-08-12

## 2021-08-12 ENCOUNTER — OFFICE VISIT (OUTPATIENT)
Dept: CARDIOLOGY | Facility: CLINIC | Age: 61
End: 2021-08-12

## 2021-08-12 VITALS
DIASTOLIC BLOOD PRESSURE: 78 MMHG | SYSTOLIC BLOOD PRESSURE: 130 MMHG | BODY MASS INDEX: 19.78 KG/M2 | HEART RATE: 49 BPM | WEIGHT: 126 LBS | HEIGHT: 67 IN

## 2021-08-12 DIAGNOSIS — I73.9 PAD (PERIPHERAL ARTERY DISEASE) (HCC): ICD-10-CM

## 2021-08-12 DIAGNOSIS — I48.0 PAF (PAROXYSMAL ATRIAL FIBRILLATION) (HCC): Primary | ICD-10-CM

## 2021-08-12 DIAGNOSIS — I10 ESSENTIAL HYPERTENSION: ICD-10-CM

## 2021-08-12 PROCEDURE — 93000 ELECTROCARDIOGRAM COMPLETE: CPT | Performed by: INTERNAL MEDICINE

## 2021-08-12 PROCEDURE — 99214 OFFICE O/P EST MOD 30 MIN: CPT | Performed by: INTERNAL MEDICINE

## 2021-08-12 NOTE — PROGRESS NOTES
Date of Office Visit: 2021  Encounter Provider: Ijeoma Auguste MD  Place of Service: [facility] CARDIOLOGY  Patient Name: Radha Block  :1960    Chief complaint  Paroxysmal atrial fibrillation    History of Present Illness  Patient is a 61-year-old female with history of COPD, of arterial disease, deep venous thrombosis, Leiden 5 deficiency, alpha protein deficiency and atrial fibrillation.  And on  she was noted to have palpitations with Apple Watch indicating episodes of atrial fibrillation.  She decreased the modest amounts of caffeinated use.  She had a stress echocardiogram that showed normal systolic function hyperdynamic left ventricle trivial valve regurgitation normal right-sided pressures.  There was no ischemia.  She was started on aspirin and diltiazem.  Subsequent Holter showed 2 episodes of atrial fibrillation totaling 24 minutes.  She was placed on Eliquis and aspirin was discontinued.  Follow-up in 2021 metoprolol was decreased due to fatigue.      Since the last visit she is walking 2 to 3 miles intermittently during the week also riding her bicycle and playing golf she is doing aerobic exercise 5-6 times a week.  She has had 2 episodes of palpitations in the past 4 months associated with Apple Watch findings of atrial fibrillation.  She has minimal dyspnea on exertion she has minimal dependent left ankle edema.  Her blood pressure at home has been lower than it is today typically in the 1 teens she has not had any further recurrent bleeding of any significance though she has had mild intermittent vaginal bleeding.  She had an abnormal ultrasound the imaging was difficult and she is to have a repeat help with ultrasound with her obstetrician in the near future    Past Medical History:   Diagnosis Date   • Arrhythmia    • Clotting disorder (CMS/[facility])     Factor 5 Liden   • COPD (chronic obstructive pulmonary disease) (CMS/[facility])    • Deep vein  thrombosis (CMS/[facility])    • Essential hypertension 8/24/2021   • Mitral valve prolapse    • PAD (peripheral artery disease) (CMS/[facility])    • PAF (paroxysmal atrial fibrillation) (CMS/[facility]) 11/25/2020     Past Surgical History:   Procedure Laterality Date   • HAND SURGERY      Carpal metacarpal hand surgery      Outpatient Medications Prior to Visit   Medication Sig Dispense Refill   • Acetaminophen 325 MG capsule Take 2 capsules by mouth 4 (Four) Times a Day As Needed.     • albuterol sulfate  (90 Base) MCG/ACT inhaler As Needed for Wheezing.     • alpha1-proteinase inhibitor (Zemaira) 1000 MG injection 1 (One) Time Per Week.     • apixaban (ELIQUIS) 5 MG tablet tablet Take 1 tablet by mouth Every 12 (Twelve) Hours. 60 tablet 11   • desvenlafaxine (Pristiq) 50 MG 24 hr tablet Take 12.5 mg by mouth As Needed.     • dilTIAZem CD (CARDIZEM CD) 180 MG 24 hr capsule TAKE ONE CAPSULE BY MOUTH ONCE DAILY 60 capsule 5   • EPINEPHrine (EpiPen 2-Shashi) 0.3 MG/0.3ML solution auto-injector injection As Needed.     • esomeprazole (nexIUM) 40 MG capsule Take 40 mg by mouth Daily.     • estradiol (Minivelle) 0.075 MG/24HR patch Place 1 patch on the skin as directed by provider 2 (Two) Times a Week.     • Fluticasone-Umeclidin-Vilant (Trelegy Ellipta) 100-62.5-25 MCG/INH aerosol powder  Inhale Daily.     • guaiFENesin (MUCINEX) 600 MG 12 hr tablet As Needed.     • progesterone (PROMETRIUM) 100 MG capsule Take 100 mg by mouth Daily.     • tretinoin (RETIN-A) 0.1 % cream As Needed.     • nitroglycerin (NITROSTAT) SL tablet 0.4 mg      • sodium chloride 0.9 % flush 10 mL        No facility-administered medications prior to visit.       Allergies as of 08/12/2021   • (No Known Allergies)     Social History     Socioeconomic History   • Marital status: Unknown     Spouse name: Not on file   • Number of children: Not on file   • Years of education: Not on file   • Highest education level: Not on file   Tobacco Use   • Smoking status:  "Never Smoker   • Smokeless tobacco: Never Used   Substance and Sexual Activity   • Alcohol use: Yes     Alcohol/week: 3.0 standard drinks     Types: 3 Glasses of wine per week     Comment: 2 times a week    • Drug use: Never   • Sexual activity: Defer     Family History   Adopted: Yes   Family history unknown: Yes     Review of Systems   Constitutional: Negative for chills, fever, weight gain and weight loss.   Cardiovascular: Positive for leg swelling.   Respiratory: Negative for cough, snoring and wheezing.    Hematologic/Lymphatic: Negative for bleeding problem. Bruises/bleeds easily.   Skin: Negative for color change.   Musculoskeletal: Positive for joint pain and myalgias. Negative for falls.   Gastrointestinal: Negative for melena.   Genitourinary: Negative for hematuria.   Neurological: Negative for excessive daytime sleepiness.   Psychiatric/Behavioral: Positive for depression. The patient is nervous/anxious.         Objective:     Vitals:    08/12/21 1230   BP: 130/78   Pulse: (!) 49   Weight: 57.2 kg (126 lb)   Height: 170.2 cm (67\")     Body mass index is 19.73 kg/m².    Vitals reviewed.   Constitutional:       Appearance: Well-developed.   Eyes:      General: No scleral icterus.        Right eye: No discharge.      Conjunctiva/sclera: Conjunctivae normal.      Pupils: Pupils are equal, round, and reactive to light.   HENT:      Head: Normocephalic.      Nose: Nose normal.   Neck:      Thyroid: No thyromegaly.      Vascular: No JVD.   Pulmonary:      Effort: Pulmonary effort is normal. No respiratory distress.      Breath sounds: Normal breath sounds. No wheezing. No rales.   Cardiovascular:      Normal rate. Regular rhythm. Normal S1. Normal S2.      Murmurs: There is no murmur.      No gallop.   Pulses:     Intact distal pulses.   Edema:     Peripheral edema absent.   Abdominal:      General: Bowel sounds are normal. There is no distension.      Palpations: Abdomen is soft.      Tenderness: There is no " abdominal tenderness. There is no rebound.   Musculoskeletal: Normal range of motion.         General: No tenderness.      Cervical back: Normal range of motion and neck supple. Skin:     General: Skin is warm and dry.      Findings: No erythema or rash.   Neurological:      Mental Status: Alert and oriented to person, place, and time.   Psychiatric:         Behavior: Behavior normal.         Thought Content: Thought content normal.         Judgment: Judgment normal.       Lab Review:     ECG 12 Lead    Date/Time: 8/12/2021 12:43 PM  Performed by: Ijeoma Auguste MD  Authorized by: Ijeoma Auguste MD   Comparison: compared with previous ECG   Similar to previous ECG  Rhythm: sinus rhythm  Other findings: non-specific ST-T wave changes    Clinical impression: abnormal EKG          Assessment:       Diagnosis Plan   1. PAF (paroxysmal atrial fibrillation) (CMS/HCC)  ECG 12 Lead   2. PAD (peripheral artery disease) (CMS/HCC)     3. Essential hypertension       Plan:       1.  Paroxysmal atrial fibrillation.  Maintaining sinus rhythm on diltiazem.  Would continue with Eliquis for now.  Her CHADs Vasc score is at least 2 in addition she has hypercoagulable state.  I once again reviewed with her the risks and benefits.  I reminded her to avoid all nonsteroidals including ibuprofen.  She is also wishing to take costochondritin and turmeric.  We will run this by pharmacy in terms of cross-reaction  2.  Hypertension, overall fairly well controlled   3.  Vaginal bleeding. Ultrasound and  last week. US small mass (poor visualization),  Blood work ok. Repeat US to be done in 4 weeks.  Additional recommendations per gynecology  4.  History of DVT with Leiden 5 mutation  5.  Alpha antitrypsin antibody deficiency,followed by Dr Ceja  6.  History of asthma    Time Spent: I spent 30 minutes caring for Radha on this date of service. This time includes time spent by me in the following activities: preparing for the visit,  reviewing tests, obtaining and/or reviewing a separately obtained history, performing a medically appropriate examination and/or evaluation, ordering medications, tests, or procedures, documenting information in the medical record and independently interpreting results and communicating that information with the patient/family/caregiver.   I spent 1 minutes on the separately reported service of ECG. This time is not included in the time used to support the E/M service also reported today.        Your medication list          Accurate as of August 12, 2021 11:59 PM. If you have any questions, ask your nurse or doctor.            CONTINUE taking these medications      Instructions Last Dose Given Next Dose Due   Acetaminophen 325 MG capsule      Take 2 capsules by mouth 4 (Four) Times a Day As Needed.       albuterol sulfate  (90 Base) MCG/ACT inhaler  Commonly known as: PROVENTIL HFA;VENTOLIN HFA;PROAIR HFA      As Needed for Wheezing.       apixaban 5 MG tablet tablet  Commonly known as: ELIQUIS      Take 1 tablet by mouth Every 12 (Twelve) Hours.       dilTIAZem  MG 24 hr capsule  Commonly known as: CARDIZEM CD      TAKE ONE CAPSULE BY MOUTH ONCE DAILY       EpiPen 2-Shashi 0.3 MG/0.3ML solution auto-injector injection  Generic drug: EPINEPHrine      As Needed.       guaiFENesin 600 MG 12 hr tablet  Commonly known as: MUCINEX      As Needed.       Minivelle 0.075 MG/24HR patch  Generic drug: estradiol      Place 1 patch on the skin as directed by provider 2 (Two) Times a Week.       nexIUM 40 MG capsule  Generic drug: esomeprazole      Take 40 mg by mouth Daily.       Pristiq 50 MG 24 hr tablet  Generic drug: desvenlafaxine      Take 12.5 mg by mouth As Needed.       progesterone 100 MG capsule  Commonly known as: PROMETRIUM      Take 100 mg by mouth Daily.       Trelegy Ellipta 100-62.5-25 MCG/INH inhaler  Generic drug: Fluticasone-Umeclidin-Vilant      Inhale Daily.       tretinoin 0.1 % cream  Commonly  known as: RETIN-A      As Needed.       Zemaira 1000 MG injection  Generic drug: alpha1-proteinase inhibitor      1 (One) Time Per Week.              Patient is no longer taking -.  I corrected the med list to reflect this.  I did not stop these medications.      Dictated utilizing Dragon dictation

## 2021-08-12 NOTE — TELEPHONE ENCOUNTER
Please check with pharmacy if ok to use costachondroitin or tuneric ok with Eliquis and please get me a copy of labs from Dr Santa last week (need renal labs and lipid panel).  carlotta

## 2021-08-18 NOTE — TELEPHONE ENCOUNTER
Spoke with Cheli Villar with [facility] @ [phone] re: Any intereaction with the pt using tumeric or glucosamine with Eliquis.  He said there are no contraindications.      Also requested labs from Dr Bosler.

## 2021-08-22 NOTE — TELEPHONE ENCOUNTER
Please let the patient know labs look good lipids included.  Glucose slightly borderline elevated.  Have her watch carbohydrates and decrease amount slightly

## 2021-08-24 PROBLEM — I10 ESSENTIAL HYPERTENSION: Status: ACTIVE | Noted: 2021-08-24

## 2021-09-21 RX ORDER — APIXABAN 5 MG/1
TABLET, FILM COATED ORAL
Qty: 180 TABLET | Refills: 0 | Status: SHIPPED | OUTPATIENT
Start: 2021-09-21 | End: 2021-12-20

## 2021-12-20 DIAGNOSIS — I48.0 PAF (PAROXYSMAL ATRIAL FIBRILLATION) (HCC): Primary | ICD-10-CM

## 2021-12-20 RX ORDER — APIXABAN 5 MG/1
TABLET, FILM COATED ORAL
Qty: 60 TABLET | Refills: 0 | Status: SHIPPED | OUTPATIENT
Start: 2021-12-20 | End: 2022-04-05 | Stop reason: SDUPTHER

## 2021-12-20 NOTE — TELEPHONE ENCOUNTER
Please let the patient know that I will need a CBC and a BMP to continue refill this medicine.  I have placed orders for both and will refill this medicine for 1 month.  Please have her get this done in the time

## 2022-02-17 ENCOUNTER — OFFICE VISIT (OUTPATIENT)
Dept: CARDIOLOGY | Facility: CLINIC | Age: 62
End: 2022-02-17

## 2022-02-17 VITALS
HEIGHT: 67 IN | BODY MASS INDEX: 20.25 KG/M2 | SYSTOLIC BLOOD PRESSURE: 110 MMHG | DIASTOLIC BLOOD PRESSURE: 80 MMHG | WEIGHT: 129 LBS | HEART RATE: 61 BPM

## 2022-02-17 DIAGNOSIS — I48.0 PAF (PAROXYSMAL ATRIAL FIBRILLATION): Primary | ICD-10-CM

## 2022-02-17 PROCEDURE — 93000 ELECTROCARDIOGRAM COMPLETE: CPT | Performed by: NURSE PRACTITIONER

## 2022-02-17 PROCEDURE — 99213 OFFICE O/P EST LOW 20 MIN: CPT | Performed by: NURSE PRACTITIONER

## 2022-02-17 RX ORDER — DICLOFENAC SODIUM 75 MG/1
TABLET, DELAYED RELEASE ORAL EVERY 12 HOURS SCHEDULED
COMMUNITY
End: 2022-08-16

## 2022-02-17 RX ORDER — DICLOFENAC EPOLAMINE 0.01 G/1
SYSTEM TOPICAL AS NEEDED
COMMUNITY

## 2022-02-17 RX ORDER — PROGESTERONE 200 MG/1
CAPSULE ORAL DAILY
COMMUNITY

## 2022-02-17 RX ORDER — VALACYCLOVIR HYDROCHLORIDE 1 G/1
TABLET, FILM COATED ORAL AS NEEDED
COMMUNITY
Start: 2021-12-20

## 2022-02-17 RX ORDER — LIDOCAINE AND PRILOCAINE 25; 25 MG/G; MG/G
CREAM TOPICAL AS NEEDED
COMMUNITY
Start: 2022-01-19

## 2022-02-17 RX ORDER — DESONIDE 0.5 MG/G
CREAM TOPICAL AS NEEDED
COMMUNITY
Start: 2021-12-07

## 2022-02-17 RX ORDER — ONDANSETRON 4 MG/1
TABLET, FILM COATED ORAL AS NEEDED
COMMUNITY

## 2022-02-17 NOTE — PROGRESS NOTES
[facility] Cardiology   [address], [address]  [place], [place], [address]    [phone]  [email or web address]           OUTPATIENT CLINIC FOLLOW UP NOTE    Patient Care Team:  Patient Care Team:  Bosler, James William III, MD as PCP - General (Internal Medicine)  Raisa Cummings MD as Consulting Physician (Obstetrics and Gynecology)    Subjective:      Chief Complaint   Patient presents with   • Atrial Fibrillation       HPI:    Radha Block is a 61 y.o. female.  Problem list:  1. Paroxysmal atrial fibrillation  a. Initially noted 11/2020 on her apple watch.  b. Stress echocardiogram 2020: Normal systolic function, hyperdynamic left ventricle, trivial valve regurgitation, normal right-sided pressures.  No evidence of ischemia.  c. Holter monitor 2020: 2 episodes of atrial fibrillation totaling 24 minutes.  d. Metoprolol was previously discontinued due to fatigue.  2. COPD  3. DVT  4. Factor V Leiden  5. Alpha protein deficiency followed by Dr. Ceja  6. Vaginal bleeding followed by Dr. Cummings    She presents today for follow-up.  Her primary cardiologist is Dr. Auguste.    Since the patient was last seen she reports that she has been doing well from a cardiac standpoint.  She reports only 4-5 episodes of palpitations over the past 6 months.  These were typically transient in nature, except for 2 instances that were longer.  She was able to increase her hydration with improvement in her symptoms.  She also has complaints of chronic stable dyspnea that is unchanged.  She denies chest pain, lower extremity edema, lightheadedness, syncope, orthopnea, or PND.  Denies bleeding diatheses on Eliquis.  Review of Systems:  Positive for palpitations, dyspnea with exertion  Negative for exertional chest pain, lower extremity edema, syncope.     PFSH:  Patient Active Problem List   Diagnosis   • Shortness of breath   • Chest pain   • Palpitations   • PAD (peripheral artery disease) (HCC)   • PAF  (paroxysmal atrial fibrillation) (HCC)   • Essential hypertension         Current Outpatient Medications:   •  Acetaminophen 325 MG capsule, Take 2 capsules by mouth 4 (Four) Times a Day As Needed., Disp: , Rfl:   •  albuterol sulfate  (90 Base) MCG/ACT inhaler, As Needed for Wheezing., Disp: , Rfl:   •  alpha1-proteinase inhibitor (Zemaira) 1000 MG injection, 1 (One) Time Per Week., Disp: , Rfl:   •  desonide (DESOWEN) 0.05 % cream, As Needed., Disp: , Rfl:   •  desvenlafaxine (Pristiq) 50 MG 24 hr tablet, Take 12.5 mg by mouth As Needed., Disp: , Rfl:   •  diclofenac (VOLTAREN) 75 MG EC tablet, Every 12 (Twelve) Hours., Disp: , Rfl:   •  Diclofenac Epolamine (FLECTOR) 1.3 % patch patch, As Needed., Disp: , Rfl:   •  dilTIAZem CD (CARDIZEM CD) 180 MG 24 hr capsule, TAKE ONE CAPSULE BY MOUTH ONCE DAILY, Disp: 60 capsule, Rfl: 5  •  Eliquis 5 MG tablet tablet, TAKE ONE TABLET BY MOUTH TWICE A DAY, Disp: 60 tablet, Rfl: 0  •  EPINEPHrine (EpiPen 2-Shashi) 0.3 MG/0.3ML solution auto-injector injection, As Needed., Disp: , Rfl:   •  esomeprazole (nexIUM) 40 MG capsule, Take 40 mg by mouth Daily., Disp: , Rfl:   •  estradiol (Minivelle) 0.075 MG/24HR patch, Place 1 patch on the skin as directed by provider 2 (Two) Times a Week., Disp: , Rfl:   •  Fluticasone-Umeclidin-Vilant (Trelegy Ellipta) 100-62.5-25 MCG/INH aerosol powder , Inhale Daily., Disp: , Rfl:   •  guaiFENesin (MUCINEX) 600 MG 12 hr tablet, As Needed., Disp: , Rfl:   •  hydrocortisone 2.5 % cream, As Needed., Disp: , Rfl:   •  lidocaine-prilocaine (EMLA) 2.5-2.5 % cream, As Needed., Disp: , Rfl:   •  ondansetron (ZOFRAN) 4 MG tablet, As Needed., Disp: , Rfl:   •  Progesterone (PROMETRIUM) 200 MG capsule, Daily., Disp: , Rfl:   •  tretinoin (RETIN-A) 0.1 % cream, As Needed., Disp: , Rfl:   •  valACYclovir (VALTREX) 1000 MG tablet, As Needed., Disp: , Rfl:     No Known Allergies     reports that she has never smoked. She has never used smokeless  "tobacco.      Objective:   Physical exam:  /80   Pulse 61   Ht 170.2 cm (67\")   Wt 58.5 kg (129 lb)   BMI 20.20 kg/m²   CONSTITUTIONAL: No acute distress  RESPIRATORY: Normal effort. Clear to auscultation bilaterally without wheezing or rales  CARDIOVASCULAR: Carotids with normal upstrokes without bruits.  Regular rate and rhythm with normal S1 and S2. Without murmur, gallop or rub. Normal radial pulse. There is no lower extremity edema bilaterally.    Labs:    BUN   Date Value Ref Range Status   11/25/2020 18 8 - 23 mg/dL Final     Creatinine   Date Value Ref Range Status   11/25/2020 0.85 0.57 - 1.00 mg/dL Final     Potassium   Date Value Ref Range Status   11/25/2020 4.1 3.5 - 5.2 mmol/L Final     ALT (SGPT)   Date Value Ref Range Status   11/25/2020 26 1 - 33 U/L Final     AST (SGOT)   Date Value Ref Range Status   11/25/2020 28 1 - 32 U/L Final       No results found for: CHOL  No results found for: TRIG  No results found for: HDL  No results found for: LDL  No components found for: LDLDIRECTC    Outside labs 12/29/2021  -Total cholesterol 199, HDL 92, triglycerides 61, LDL 92, creatinine 0.84, sodium 134, potassium 3.9, AST 24, ALT 25, TSH 2.83    Diagnostic Data:      ECG 12 Lead    Date/Time: 2/17/2022 2:39 PM  Performed by: Berenice Cramer APRN  Authorized by: Berenice Cramer APRN   Comparison: compared with previous ECG from 8/12/2021  Comparison to previous ECG: Bradycardia no longer present  Rhythm: sinus rhythm  Rate: normal  BPM: 61  Comments: QRS 80 ms,  ms            Results for orders placed during the hospital encounter of 11/25/20    Adult Stress Echo W/ Cont or Stress Agent if Necessary Per Protocol    Interpretation Summary  · Calculated left ventricular EF = 71% Estimated left ventricular EF = 71% Estimated left ventricular EF was in agreement with the calculated left ventricular EF. Left ventricular systolic function is hyperdynamic (EF > 70%). Normal left ventricular cavity " size and wall thickness noted. All left ventricular wall segments contract normally. Left ventricular diastolic function was normal.  · Trace tricuspid valve regurgitation is present. Estimated right ventricular systolic pressure from tricuspid regurgitation is normal (<35 mmHg). Calculated right ventricular systolic pressure from tricuspid regurgitation is 26 mmHg.  · Stress ECG was interpretable and is consistent with a normal stress ECG.  · Normal stress echo with no significant echocardiographic evidence for myocardial ischemia.      Assessment and Plan:   Diagnoses and all orders for this visit:    PAF (paroxysmal atrial fibrillation) (HCC) (Primary)  -Currently maintaining sinus rhythm.  Has had approximately 5 breakthrough episodes over the past 6 months.  Typically self-limiting, 2 significant episodes that improved with increasing hydration.  -[id number] = 2  -Metoprolol previously discontinued due to fatigue.  -Continue Eliquis and diltiazem    - Return in about 6 months (around 8/17/2022) for Next scheduled follow up with Dr. Auguste.    Electronically signed by KLARISSA Mason, 02/17/22, 2:36 PM EST.

## 2022-04-05 NOTE — TELEPHONE ENCOUNTER
Next appointment with Dr Auguste: 8/16/2022  Last appointment with Berenice: 2/17/2022    Pt's med list flagged for Voltaren and Eliquis use.    LMM with pt to verify info.  Dr Auguste's last said NO NSAIDs with Eliquis.

## 2022-07-06 RX ORDER — DILTIAZEM HYDROCHLORIDE 180 MG/1
CAPSULE, COATED, EXTENDED RELEASE ORAL
Qty: 90 CAPSULE | Refills: 11 | Status: SHIPPED | OUTPATIENT
Start: 2022-07-06 | End: 2022-08-16 | Stop reason: SDUPTHER

## 2022-08-12 ENCOUNTER — APPOINTMENT (OUTPATIENT)
Dept: WOMENS IMAGING | Facility: HOSPITAL | Age: 62
End: 2022-08-12

## 2022-08-12 PROCEDURE — 77067 SCR MAMMO BI INCL CAD: CPT | Performed by: RADIOLOGY

## 2022-08-12 PROCEDURE — 77063 BREAST TOMOSYNTHESIS BI: CPT | Performed by: RADIOLOGY

## 2022-08-16 ENCOUNTER — OFFICE VISIT (OUTPATIENT)
Dept: CARDIOLOGY | Facility: CLINIC | Age: 62
End: 2022-08-16

## 2022-08-16 VITALS
SYSTOLIC BLOOD PRESSURE: 114 MMHG | HEIGHT: 67 IN | WEIGHT: 126.4 LBS | BODY MASS INDEX: 19.84 KG/M2 | HEART RATE: 59 BPM | DIASTOLIC BLOOD PRESSURE: 80 MMHG

## 2022-08-16 DIAGNOSIS — I48.0 PAF (PAROXYSMAL ATRIAL FIBRILLATION): Primary | ICD-10-CM

## 2022-08-16 DIAGNOSIS — R00.2 PALPITATIONS: ICD-10-CM

## 2022-08-16 DIAGNOSIS — Z86.718 HISTORY OF DVT (DEEP VEIN THROMBOSIS): ICD-10-CM

## 2022-08-16 DIAGNOSIS — D68.51 FACTOR V LEIDEN: ICD-10-CM

## 2022-08-16 PROCEDURE — 93000 ELECTROCARDIOGRAM COMPLETE: CPT | Performed by: INTERNAL MEDICINE

## 2022-08-16 PROCEDURE — 99214 OFFICE O/P EST MOD 30 MIN: CPT | Performed by: INTERNAL MEDICINE

## 2022-08-16 RX ORDER — DILTIAZEM HYDROCHLORIDE 180 MG/1
180 CAPSULE, COATED, EXTENDED RELEASE ORAL DAILY
Qty: 90 CAPSULE | Refills: 3 | Status: SHIPPED | OUTPATIENT
Start: 2022-08-16 | End: 2023-02-23 | Stop reason: SDUPTHER

## 2022-08-16 NOTE — PROGRESS NOTES
Date of Office Visit: 2022  Encounter Provider: Ijeoma Auguste MD  Place of Service: [facility]  Patient Name: Radha Block  :1960    Chief complaint  Paroxysmal atrial fibrillation    History of Present Illness  Patient is a 62-year-old female with history of COPD, of arterial disease, deep venous thrombosis, Leiden 5 deficiency, alpha protein deficiency and atrial fibrillation.  And on  she was noted to have palpitations with Apple Watch indicating episodes of atrial fibrillation.  She decreased the modest amounts of caffeinated use.  She had a stress echocardiogram that showed normal systolic function hyperdynamic left ventricle trivial valve regurgitation normal right-sided pressures.  There was no ischemia.  She was started on aspirin and diltiazem.  Subsequent Holter showed 2 episodes of atrial fibrillation totaling 24 minutes.  She was placed on Eliquis and aspirin was discontinued.  Follow-up in 2021 metoprolol was decreased due to fatigue.      Since last visit she has had no edema dizziness chest pain.  Has chronic dyspnea on exertion.  She believes she has had 6 episodes of atrial fibrillation lasting several hours at a time since 2020.  She is tolerating anticoagulation well and is avoiding nonsteroidals.  She has not taking Voltaren.      Past Medical History:   Diagnosis Date   • Arrhythmia    • Clotting disorder ([facility])     Factor 5 Liden   • COPD (chronic obstructive pulmonary disease) ([facility])    • Deep vein thrombosis ([facility])    • Essential hypertension 2021   • Mitral valve prolapse    • PAD (peripheral artery disease) ([facility])    • PAF (paroxysmal atrial fibrillation) ([facility]) 2020     Past Surgical History:   Procedure Laterality Date   • HAND SURGERY      Carpal metacarpal hand surgery      Outpatient Medications Prior to Visit   Medication Sig Dispense Refill   • Acetaminophen 325 MG capsule Take 2 capsules by mouth 4 (Four)  Times a Day As Needed.     • albuterol sulfate  (90 Base) MCG/ACT inhaler As Needed for Wheezing.     • alpha1-proteinase inhibitor (Zemaira) 1000 MG injection 1 (One) Time Per Week.     • desonide (DESOWEN) 0.05 % cream As Needed.     • desvenlafaxine (PRISTIQ) 50 MG 24 hr tablet Take 12.5 mg by mouth As Needed.     • Diclofenac Epolamine (FLECTOR) 1.3 % patch patch As Needed.     • EPINEPHrine (EPIPEN) 0.3 MG/0.3ML solution auto-injector injection As Needed.     • esomeprazole (nexIUM) 40 MG capsule Take 40 mg by mouth Daily.     • estradiol (MINIVELLE, VIVELLE-DOT) 0.075 MG/24HR patch Place 1 patch on the skin as directed by provider 2 (Two) Times a Week.     • Fluticasone-Umeclidin-Vilant (Trelegy Ellipta) 100-62.5-25 MCG/INH aerosol powder  Inhale Daily.     • guaiFENesin (MUCINEX) 600 MG 12 hr tablet As Needed.     • hydrocortisone 2.5 % cream As Needed.     • lidocaine-prilocaine (EMLA) 2.5-2.5 % cream As Needed.     • ondansetron (ZOFRAN) 4 MG tablet As Needed.     • Progesterone (PROMETRIUM) 200 MG capsule Daily.     • tretinoin (RETIN-A) 0.1 % cream As Needed.     • valACYclovir (VALTREX) 1000 MG tablet As Needed.     • apixaban (Eliquis) 5 MG tablet tablet Take 1 tablet by mouth 2 (Two) Times a Day. 60 tablet 5   • diclofenac (VOLTAREN) 75 MG EC tablet Every 12 (Twelve) Hours.     • dilTIAZem CD (CARDIZEM CD) 180 MG 24 hr capsule TAKE ONE CAPSULE BY MOUTH ONCE DAILY 90 capsule 11     No facility-administered medications prior to visit.       Allergies as of 08/16/2022   • (No Known Allergies)     Social History     Socioeconomic History   • Marital status:    Tobacco Use   • Smoking status: Never Smoker   • Smokeless tobacco: Never Used   Substance and Sexual Activity   • Alcohol use: Yes     Alcohol/week: 3.0 standard drinks     Types: 3 Glasses of wine per week     Comment: 2 times a week    • Drug use: Never   • Sexual activity: Defer     Family History   Adopted: Yes   Family history  "unknown: Yes     Review of Systems   Constitutional: Negative for chills, fever, weight gain and weight loss.   Cardiovascular: Negative for leg swelling.   Respiratory: Negative for cough, snoring and wheezing.    Hematologic/Lymphatic: Negative for bleeding problem. Does not bruise/bleed easily.   Skin: Negative for color change.   Musculoskeletal: Positive for joint pain. Negative for falls and myalgias.   Gastrointestinal: Negative for melena.   Genitourinary: Negative for hematuria.   Neurological: Negative for excessive daytime sleepiness.   Psychiatric/Behavioral: Negative for depression. The patient is not nervous/anxious.         Objective:     Vitals:    08/16/22 1247   BP: 114/80   Pulse: 59   Weight: 57.3 kg (126 lb 6.4 oz)   Height: 170.2 cm (67\")     Body mass index is 19.8 kg/m².    Vitals reviewed.   Constitutional:       Appearance: Well-developed.   Eyes:      General: No scleral icterus.        Right eye: No discharge.      Conjunctiva/sclera: Conjunctivae normal.      Pupils: Pupils are equal, round, and reactive to light.   HENT:      Head: Normocephalic.      Nose: Nose normal.   Neck:      Thyroid: No thyromegaly.      Vascular: No JVD.   Pulmonary:      Effort: Pulmonary effort is normal. No respiratory distress.      Breath sounds: Normal breath sounds. No wheezing. No rales.   Cardiovascular:      Normal rate. Regular rhythm. Normal S1. Normal S2.      Murmurs: There is no murmur.      No gallop.   Pulses:     Intact distal pulses.   Edema:     Peripheral edema absent.   Abdominal:      General: Bowel sounds are normal. There is no distension.      Palpations: Abdomen is soft.      Tenderness: There is no abdominal tenderness. There is no rebound.   Musculoskeletal: Normal range of motion.         General: No tenderness.      Cervical back: Normal range of motion and neck supple. Skin:     General: Skin is warm and dry.      Findings: No erythema or rash.   Neurological:      Mental Status: " Alert and oriented to person, place, and time.   Psychiatric:         Behavior: Behavior normal.         Thought Content: Thought content normal.         Judgment: Judgment normal.       Lab Review:     ECG 12 Lead    Date/Time: 8/16/2022 12:48 PM  Performed by: Ijeoma Auguste MD  Authorized by: Ijeoma Auguste MD   Comparison: compared with previous ECG   Similar to previous ECG  Rhythm: sinus rhythm    Clinical impression: normal ECG          Assessment:       Diagnosis Plan   1. PAF (paroxysmal atrial fibrillation) ([facility])  ECG 12 Lead    Ambulatory Referral to Cardiac Electrophysiology    Adult Transthoracic Echo Complete w/ Color, Spectral and Contrast if Necessary Per Protocol   2. Factor V Leiden ([facility])  Ambulatory Referral to Hematology / Oncology   3. History of DVT (deep vein thrombosis)  Ambulatory Referral to Hematology / Oncology   4. Palpitations  Adult Transthoracic Echo Complete w/ Color, Spectral and Contrast if Necessary Per Protocol     Plan:       1.  Paroxysmal atrial fibrillation. Her CHADs Vasc score is at least 2 in addition she has hypercoagulable state.  She is tolerating diltiazem and Eliquis.  We will continue with this for now.  Blood pressure and heart rate too low to increase further to address the symptomatic breakthrough episodes.  With this in mind I recommended EP consultation for additional treatment options.  In addition as these episodes have increased, will check an echocardiogram.  2.  Hypertension, overall fairly well controlled   3.  Vaginal bleeding.  Resolved  4.  History of DVT with Leiden 5 mutation.  She states she has not seen hematology in the past.  I recommended she do so to determine if she needs long-term anticoagulation especially as this may impact on treatment options for atrial fibrillation such as ablation and long-term use of anticoagulation.    5.  Alpha antitrypsin antibody deficiency,followed by Dr Ceja  6.  History of asthma     Time Spent: I spent 35  minutes caring for Radha on this date of service. This time includes time spent by me in the following activities: preparing for the visit, reviewing tests, obtaining and/or reviewing a separately obtained history, performing a medically appropriate examination and/or evaluation, counseling and educating the patient/family/caregiver, ordering medications, tests, or procedures, documenting information in the medical record and independently interpreting results and communicating that information with the patient/family/caregiver.   I spent 1 minutes on the separately reported service of ECG. This time is not included in the time used to support the E/M service also reported today.        Your medication list          Accurate as of August 16, 2022 11:59 PM. If you have any questions, ask your nurse or doctor.            CONTINUE taking these medications      Instructions Last Dose Given Next Dose Due   Acetaminophen 325 MG capsule      Take 2 capsules by mouth 4 (Four) Times a Day As Needed.       albuterol sulfate  (90 Base) MCG/ACT inhaler  Commonly known as: PROVENTIL HFA;VENTOLIN HFA;PROAIR HFA      As Needed for Wheezing.       apixaban 5 MG tablet tablet  Commonly known as: Eliquis      Take 1 tablet by mouth 2 (Two) Times a Day.       desonide 0.05 % cream  Commonly known as: DESOWEN      As Needed.       desvenlafaxine 50 MG 24 hr tablet  Commonly known as: PRISTIQ      Take 12.5 mg by mouth As Needed.       Diclofenac Epolamine 1.3 % patch patch  Commonly known as: FLECTOR      As Needed.       dilTIAZem  MG 24 hr capsule  Commonly known as: CARDIZEM CD      Take 1 capsule by mouth Daily.       EPINEPHrine 0.3 MG/0.3ML solution auto-injector injection  Commonly known as: EPIPEN      As Needed.       esomeprazole 40 MG capsule  Commonly known as: nexIUM      Take 40 mg by mouth Daily.       estradiol 0.075 MG/24HR patch  Commonly known as: EDUARDO MACKEY      Place 1 patch on the skin as  directed by provider 2 (Two) Times a Week.       guaiFENesin 600 MG 12 hr tablet  Commonly known as: MUCINEX      As Needed.       hydrocortisone 2.5 % cream      As Needed.       lidocaine-prilocaine 2.5-2.5 % cream  Commonly known as: EMLA      As Needed.       ondansetron 4 MG tablet  Commonly known as: ZOFRAN      As Needed.       Progesterone 200 MG capsule  Commonly known as: PROMETRIUM      Daily.       Trelegy Ellipta 100-62.5-25 MCG/INH inhaler  Generic drug: Fluticasone-Umeclidin-Vilant      Inhale Daily.       tretinoin 0.1 % cream  Commonly known as: RETIN-A      As Needed.       valACYclovir 1000 MG tablet  Commonly known as: VALTREX      As Needed.       Zemaira 1000 MG injection  Generic drug: alpha1-proteinase inhibitor      1 (One) Time Per Week.          STOP taking these medications    diclofenac 75 MG EC tablet  Commonly known as: VOLTAREN  Stopped by: Ijeoma Auguste MD              Where to Get Your Medications      These medications were sent to [facility] [place] - [address] [place] [address] [phone] [facility] [phone] [address] [place] [address]    Phone: [phone]   · apixaban 5 MG tablet tablet  · dilTIAZem  MG 24 hr capsule         Patient is no longer taking -.  I corrected the med list to reflect this.  I did not stop these medications.      Dictated utilizing Dragon dictation

## 2022-08-18 ENCOUNTER — OFFICE VISIT (OUTPATIENT)
Dept: NEUROSURGERY | Facility: CLINIC | Age: 62
End: 2022-08-18

## 2022-08-18 VITALS
HEIGHT: 67 IN | SYSTOLIC BLOOD PRESSURE: 129 MMHG | HEART RATE: 94 BPM | TEMPERATURE: 98 F | WEIGHT: 126.4 LBS | BODY MASS INDEX: 19.84 KG/M2 | DIASTOLIC BLOOD PRESSURE: 80 MMHG | OXYGEN SATURATION: 97 %

## 2022-08-18 DIAGNOSIS — M48.062 SPINAL STENOSIS OF LUMBAR REGION WITH NEUROGENIC CLAUDICATION: Primary | ICD-10-CM

## 2022-08-18 PROCEDURE — 99204 OFFICE O/P NEW MOD 45 MIN: CPT | Performed by: NEUROLOGICAL SURGERY

## 2022-08-18 RX ORDER — DEXAMETHASONE 4 MG/1
8 TABLET ORAL TAKE AS DIRECTED
Qty: 2 TABLET | Refills: 0 | Status: SHIPPED | OUTPATIENT
Start: 2022-08-18 | End: 2022-09-15 | Stop reason: HOSPADM

## 2022-08-18 NOTE — PROGRESS NOTES
"Subjective   Patient ID: Radha Block is a 62 y.o. female is being seen for consultation today at the request of Christoph Ceja, * for lumbar stenosis. Patient had a MRI Lumbar on 04/11/2022, and a XR Lumbar on 04/05/2022 at [facility]    Treatment: CARLOS    Today patient states that she has low back that radiates to B/L legs with the R leg being the worst. Patient states that she has N/T in B/L leg.     Patient, Provider, and MA are all wearing a mask in our office today.     History of Present Illness    This patient began having pain in her lower back toward the end of December.  She was picking up something heavy at home when she felt a pop in her back and has been having pain in her back with radiation down her right leg ever since.  She has had 2 epidural blocks which helped the leg pain some but she still has numbness and tingling in both of her legs.  It is still worse on the right than the left.  She has no difficulty with bowel bladder control.  She has had no other treatment so far.    The following portions of the patient's history were reviewed and updated as appropriate: allergies, current medications, past family history, past medical history, past social history, past surgical history and problem list.    Review of Systems   Constitutional: Negative for chills and fever.   HENT: Negative for congestion.    Genitourinary: Negative for difficulty urinating and dysuria.   Musculoskeletal: Positive for back pain and myalgias. Negative for gait problem.   Neurological: Positive for weakness and numbness.       I have reviewed the review of systems as documented by my MA.      Objective     Vitals:    08/18/22 1446   BP: 129/80   Cuff Size: Adult   Pulse: 94   Temp: 98 °F (36.7 °C)   SpO2: 97%   Weight: 57.3 kg (126 lb 6.4 oz)   Height: 170.2 cm (67\")     Body mass index is 19.8 kg/m².      Physical Exam  Constitutional:       Appearance: She is well-developed.   HENT:      Head: " Normocephalic and atraumatic.   Eyes:      Extraocular Movements: EOM normal.      Conjunctiva/sclera: Conjunctivae normal.      Pupils: Pupils are equal, round, and reactive to light.   Neck:      Vascular: No carotid bruit.   Neurological:      Mental Status: She is oriented to person, place, and time.      Coordination: Finger-Nose-Finger Test and Heel to Shin Test normal.      Gait: Gait is intact.      Deep Tendon Reflexes:      Reflex Scores:       Tricep reflexes are 2+ on the right side and 2+ on the left side.       Bicep reflexes are 2+ on the right side and 2+ on the left side.       Brachioradialis reflexes are 2+ on the right side and 2+ on the left side.       Patellar reflexes are 2+ on the right side and 2+ on the left side.       Achilles reflexes are 2+ on the right side and 2+ on the left side.  Psychiatric:         Speech: Speech normal.       Neurologic Exam     Mental Status   Oriented to person, place, and time.   Registration of memory: Good recent and remote memory.   Attention: normal. Concentration: normal.   Speech: speech is normal   Level of consciousness: alert  Knowledge: consistent with education.     Cranial Nerves     CN II   Visual fields full to confrontation.   Visual acuity: normal    CN III, IV, VI   Pupils are equal, round, and reactive to light.  Extraocular motions are normal.     CN V   Facial sensation intact.   Right corneal reflex: normal  Left corneal reflex: normal    CN VII   Facial expression full, symmetric.   Right facial weakness: none  Left facial weakness: none    CN VIII   Hearing: intact    CN IX, X   Palate: symmetric    CN XI   Right sternocleidomastoid strength: normal  Left sternocleidomastoid strength: normal    CN XII   Tongue: not atrophic  Tongue deviation: none    Motor Exam   Muscle bulk: normal  Right arm tone: normal  Left arm tone: normal  Right leg tone: normal  Left leg tone: normal    Strength   Strength 5/5 except as noted.     Sensory Exam    Light touch normal.     Gait, Coordination, and Reflexes     Gait  Gait: normal    Coordination   Finger to nose coordination: normal  Heel to shin coordination: normal    Reflexes   Right brachioradialis: 2+  Left brachioradialis: 2+  Right biceps: 2+  Left biceps: 2+  Right triceps: 2+  Left triceps: 2+  Right patellar: 2+  Left patellar: 2+  Right achilles: 2+  Left achilles: 2+  Right : 2+  Left : 2+          Assessment & Plan   Independent Review of Radiographic Studies:      I personally reviewed the images from the following studies.    I reviewed an MRI of the lumbar spine done on 11 April of this year.  This does seem to show a fairly large disc herniation at L3-4 with significant stenosis.  On the axial images L5-S1 looks okay.  L4-5 shows pretty severe stenosis and L3-4 shows no CSF at all.  L2-3 is fairly open as is L1 to and the conus ends above that level.    Medical Decision Making:      I told the patient about the imaging.  I told her that the narrowing at L3-4 is somewhat concerning.  I recommended that we proceed with a lumbar myelogram.  I told the patient what a myelogram involves.  I explained that there is a 50% chance of developing a bad headache and nausea as a result of the test.  I explained that there is also a very small chance of infection, seizures, and bleeding.  I explained how we would treat a post myelogram headache including bedrest, caffeinated fluids, steroids, and blood patch.  The patient does ask to proceed.    Diagnoses and all orders for this visit:    1. Spinal stenosis of lumbar region with neurogenic claudication (Primary)  -     IR Myelogram Lumbar Spine; Future  -     CT Lumbar Spine With Intrathecal Contrast; Future  -     XR Spine Lumbar Complete With Flex & Ext; Future  -     dexamethasone (DECADRON) 4 MG tablet; Take 2 tablets by mouth Take As Directed. Take both tablets by mouth 2 hours before myelogram  Dispense: 2 tablet; Refill: 0      Return for  After radiology test.

## 2022-09-10 NOTE — PROGRESS NOTES
09/15/22 0002   Pre-Procedure Phone Call   Procedure Time Verified Yes   Arrival Time 0600  (9/15/2022)   Procedure Location Verified Yes   Medical History Reviewed No   NPO Status Reinforced Yes   Ride and Caregiver Arranged Yes   Phone Number for Ride/Caregiver Working on someone to come with her or they will pick her up.   Patient Knows to Bring Current Medications Yes  (Patient holding her Eliquis as of now; she took it 9/9/2022. She sees Electrophysiologist Tuesday to discuss possible ablation. Patient aware to hold it 2 days only for procedure.)   Bring Outside Films Requested No

## 2022-09-13 ENCOUNTER — OFFICE VISIT (OUTPATIENT)
Dept: CARDIOLOGY | Facility: CLINIC | Age: 62
End: 2022-09-13

## 2022-09-13 VITALS
BODY MASS INDEX: 21.19 KG/M2 | DIASTOLIC BLOOD PRESSURE: 90 MMHG | HEIGHT: 67 IN | WEIGHT: 135 LBS | SYSTOLIC BLOOD PRESSURE: 142 MMHG | HEART RATE: 60 BPM

## 2022-09-13 DIAGNOSIS — J43.9 PULMONARY EMPHYSEMA, UNSPECIFIED EMPHYSEMA TYPE: ICD-10-CM

## 2022-09-13 DIAGNOSIS — I48.0 PAF (PAROXYSMAL ATRIAL FIBRILLATION): Primary | ICD-10-CM

## 2022-09-13 PROCEDURE — 93000 ELECTROCARDIOGRAM COMPLETE: CPT | Performed by: INTERNAL MEDICINE

## 2022-09-13 PROCEDURE — 99204 OFFICE O/P NEW MOD 45 MIN: CPT | Performed by: INTERNAL MEDICINE

## 2022-09-13 NOTE — PROGRESS NOTES
Date of Office Visit: 2022  Encounter Provider: Venkatesh Iqbal MD  Place of Service: [facility] CARDIOLOGY  Patient Name: Radha Block  :1960    Chief Complaint   Patient presents with   • paroxysmal AFIB     New Patient Consult   :     HPI: Radha Block is a 62 y.o. female who presents today for paroxysmal atrial fibrillation.     Episodes first began in     She has had 7-8 episodes of atrial fibrillation, lasting up to hours    She has symptoms of chest fluttering, feeling fatigue, and just generally feeling off.     She is on anticoagulation with eliquis.   Her CHADS-VASC score is 2.    She has a history of COPD secondary to alpha-1 antitrypsin deficiency.  She reports her PFT's have been she follows with the pulmonary group here..           Past Medical History:   Diagnosis Date   • Arrhythmia    • Clotting disorder ([facility])     Factor 5 Liden   • COPD (chronic obstructive pulmonary disease) ([facility])    • Deep vein thrombosis ([facility])    • Essential hypertension 2021   • Mitral valve prolapse    • PAD (peripheral artery disease) ([facility])    • PAF (paroxysmal atrial fibrillation) ([facility]) 2020       Past Surgical History:   Procedure Laterality Date   • BLADDER SURGERY      E/O polyp   • BREAST SURGERY      E/O benign neuroma   • COLONOSCOPY  2016   • HAND SURGERY Right     Carpal metacarpal hand surgery    • SINUS SURGERY  2017   • TONSILLECTOMY         Social History     Socioeconomic History   • Marital status:    Tobacco Use   • Smoking status: Never Smoker   • Smokeless tobacco: Never Used   Vaping Use   • Vaping Use: Never used   Substance and Sexual Activity   • Alcohol use: Yes     Alcohol/week: 3.0 standard drinks     Types: 3 Glasses of wine per week     Comment: 2 times a week    • Drug use: Never   • Sexual activity: Defer       Family History   Adopted: Yes   Family history unknown: Yes       Review of Systems   Constitutional:  Negative.   Cardiovascular: Negative.    Respiratory: Negative.    Gastrointestinal: Negative.        No Known Allergies      Current Outpatient Medications:   •  Acetaminophen 325 MG capsule, Take 2 capsules by mouth 4 (Four) Times a Day As Needed., Disp: , Rfl:   •  albuterol sulfate  (90 Base) MCG/ACT inhaler, As Needed for Wheezing., Disp: , Rfl:   •  alpha1-proteinase inhibitor (Zemaira) 1000 MG injection, 1 (One) Time Per Week., Disp: , Rfl:   •  apixaban (Eliquis) 5 MG tablet tablet, Take 1 tablet by mouth 2 (Two) Times a Day., Disp: 180 tablet, Rfl: 3  •  desonide (DESOWEN) 0.05 % cream, As Needed., Disp: , Rfl:   •  desvenlafaxine (PRISTIQ) 50 MG 24 hr tablet, Take 12.5 mg by mouth As Needed., Disp: , Rfl:   •  dexamethasone (DECADRON) 4 MG tablet, Take 2 tablets by mouth Take As Directed. Take both tablets by mouth 2 hours before myelogram, Disp: 2 tablet, Rfl: 0  •  Diclofenac Epolamine (FLECTOR) 1.3 % patch patch, As Needed., Disp: , Rfl:   •  dilTIAZem CD (CARDIZEM CD) 180 MG 24 hr capsule, Take 1 capsule by mouth Daily., Disp: 90 capsule, Rfl: 3  •  EPINEPHrine (EPIPEN) 0.3 MG/0.3ML solution auto-injector injection, As Needed., Disp: , Rfl:   •  esomeprazole (nexIUM) 40 MG capsule, Take 40 mg by mouth Daily., Disp: , Rfl:   •  estradiol (MINIVELLE, VIVELLE-DOT) 0.075 MG/24HR patch, Place 1 patch on the skin as directed by provider 2 (Two) Times a Week., Disp: , Rfl:   •  Fluticasone-Umeclidin-Vilant (Trelegy Ellipta) 100-62.5-25 MCG/INH aerosol powder , Inhale Daily., Disp: , Rfl:   •  guaiFENesin (MUCINEX) 600 MG 12 hr tablet, As Needed., Disp: , Rfl:   •  hydrocortisone 2.5 % cream, As Needed., Disp: , Rfl:   •  lidocaine-prilocaine (EMLA) 2.5-2.5 % cream, As Needed., Disp: , Rfl:   •  ondansetron (ZOFRAN) 4 MG tablet, As Needed., Disp: , Rfl:   •  Progesterone (PROMETRIUM) 200 MG capsule, Daily., Disp: , Rfl:   •  tretinoin (RETIN-A) 0.1 % cream, As Needed., Disp: , Rfl:   •  valACYclovir  "(VALTREX) 1000 MG tablet, As Needed., Disp: , Rfl:       Objective:     Vitals:    09/13/22 1407   BP: 142/90   Pulse: 60   Weight: 61.2 kg (135 lb)   Height: 170.2 cm (67\")     Body mass index is 21.14 kg/m².    PHYSICAL EXAM:    Vitals and nursing note reviewed.   Constitutional:       Appearance: Healthy appearance.   Cardiovascular:      Normal rate. Regular rhythm.   Edema:     Peripheral edema absent.   Skin:     General: Skin is warm.   Neurological:      General: No focal deficit present.      Mental Status: Alert, oriented to person, place, and time and oriented to person, place and time.   Psychiatric:         Attention and Perception: Attention and perception normal.         Mood and Affect: Mood and affect normal.             ECG 12 Lead    Date/Time: 9/13/2022 2:35 PM  Performed by: Venkatesh Iqbal MD  Authorized by: Venkatesh Iqbal MD   Comparison: compared with previous ECG from 2/16/2022  Similar to previous ECG  Rhythm: sinus rhythm              Assessment:       Diagnosis Plan   1. PAF (paroxysmal atrial fibrillation) ([facility])     2. Pulmonary emphysema, unspecified emphysema type ([facility])            Plan:       The patient has symptomatic paroxysmal atrial fibrillation.  We discussed rhythm control with ablation vs antiarrhythmics.  After discussion, we are going to pursue rhythm control with ablation.  We decided on this route for superior efficacy compared to medication and possibly not having to be on ongoing medication.  We discussed the risk including stroke, cardiac and vascular injury, and death.     As always, it has been a pleasure to participate in your patient's care.      Sincerely,         Venkatesh Iqbal MD  "

## 2022-09-15 ENCOUNTER — HOSPITAL ENCOUNTER (OUTPATIENT)
Dept: CT IMAGING | Facility: HOSPITAL | Age: 62
Discharge: HOME | End: 2022-09-15

## 2022-09-15 ENCOUNTER — HOSPITAL ENCOUNTER (OUTPATIENT)
Dept: GENERAL RADIOLOGY | Facility: HOSPITAL | Age: 62
Discharge: HOME | End: 2022-09-15

## 2022-09-15 VITALS
HEART RATE: 69 BPM | DIASTOLIC BLOOD PRESSURE: 87 MMHG | OXYGEN SATURATION: 97 % | SYSTOLIC BLOOD PRESSURE: 136 MMHG | RESPIRATION RATE: 18 BRPM

## 2022-09-15 DIAGNOSIS — M48.062 SPINAL STENOSIS OF LUMBAR REGION WITH NEUROGENIC CLAUDICATION: ICD-10-CM

## 2022-09-15 PROCEDURE — 72114 X-RAY EXAM L-S SPINE BENDING: CPT

## 2022-09-15 PROCEDURE — 0 LIDOCAINE 1 % SOLUTION: Performed by: NEUROLOGICAL SURGERY

## 2022-09-15 PROCEDURE — 62304 MYELOGRAPHY LUMBAR INJECTION: CPT

## 2022-09-15 PROCEDURE — 62284 INJECTION FOR MYELOGRAM: CPT | Performed by: NEUROLOGICAL SURGERY

## 2022-09-15 PROCEDURE — 72132 CT LUMBAR SPINE W/DYE: CPT

## 2022-09-15 PROCEDURE — 72240 MYELOGRAPHY NECK SPINE: CPT

## 2022-09-15 PROCEDURE — 0 IOPAMIDOL 41 % SOLUTION: Performed by: NEUROLOGICAL SURGERY

## 2022-09-15 RX ORDER — LIDOCAINE HYDROCHLORIDE 10 MG/ML
10 INJECTION, SOLUTION INFILTRATION; PERINEURAL ONCE
Status: COMPLETED | OUTPATIENT
Start: 2022-09-15 | End: 2022-09-15

## 2022-09-15 RX ORDER — HYDROCODONE BITARTRATE AND ACETAMINOPHEN 5; 325 MG/1; MG/1
1 TABLET ORAL EVERY 4 HOURS PRN
Status: DISCONTINUED | OUTPATIENT
Start: 2022-09-15 | End: 2022-09-16 | Stop reason: HOSPADM

## 2022-09-15 RX ORDER — ACETAMINOPHEN 325 MG/1
650 TABLET ORAL EVERY 4 HOURS PRN
Status: DISCONTINUED | OUTPATIENT
Start: 2022-09-15 | End: 2022-09-16 | Stop reason: HOSPADM

## 2022-09-15 RX ADMIN — LIDOCAINE HYDROCHLORIDE 4 ML: 10 INJECTION, SOLUTION INFILTRATION; PERINEURAL at 07:07

## 2022-09-15 RX ADMIN — IOPAMIDOL 20 ML: 408 INJECTION, SOLUTION INTRATHECAL at 07:07

## 2022-09-15 NOTE — NURSING NOTE
Pt arrived to Radiology triage [place] [facility] for Lumbar Myelogram.   Pt wearing a mask as well as this RN for any bedside care.

## 2022-09-15 NOTE — DISCHARGE INSTRUCTIONS
EDUCATION /DISCHARGE INSTRUCTIONS:    A myelogram is a special radiology procedure of the spinal cord, spinal nerves and other related structures.  You will be awake during the examination.  An area of your lower back will be cleansed with an antiseptic solution.  The physician will inject a numbing medication in your lower back.  While your back is numb, a needle will be placed in the lower back area.  A small amount of spinal fluid may be withdrawn and sent to the lab if ordered by your physician. While the needle is in the back, an injection of a contrast material (xray dye) will be given through the needle.  The contrast material will allow the physician to see the spinal cord and spinal nerves.  Once injected, the needle will be removed and a band aid will be placed over the injection site.  The table will be tilted during the process to allow the contrast material to flow to particular areas in the spine.  Following the injection and xrays, you will be taken to the CT scanner where more pictures will be taken. After the procedure is finished, the contrast material will be absorbed by your body and eliminated through your kidneys.  The radiologist will study and interpret your myelogram and send the results to your physician.  Procedure risks of a myelogram include, but are not limited to:  *  Bleeding   *  Seizure  *  Infection   *  Headache, possibly severe requiring a blood patch  *  Contrast reaction  *  Nerve or cord injury  *  Paralysis and death    Benefits of the procedure:  *  Best examination for delineating pathology related to spinal cord compression from a disc and/or nerve root compression  Alternatives to the procedure:  MRI - a non invasive procedure requiring intravenous contrast injection.  Cannot be done on patients with certain pacemakers or metal in the body.  MRI risks include possible reaction to the contrast material, movement of metal located in the body.Benefit to MRI:  Non-invasive  and usually painless procedure.  THIS EDUCATION INFORMATION WAS REVIEWED PRIOR TO PROCEDURE AND CONSENT. Patient initials__ASL__Time____0620___    24 hour rest period ends tomorrow, September 16th after 1000 am.    Important information following your myelogram:  * ACTIVITY:   *  You may sit up in the car to go home.  *  When you get home, remain on bed rest (flat on your back or on your side) for 24 hours. You may place a rolled up towel under your neck for support  * You may get up to the bathroom and sit up to eat and drink then lie back down  * Drink additional fluids for 24 hours after the myelogram.   * Continue to drink additional fluids for the next 2-3 days. Water and caffeinated beverages are encouraged.  * Remain less active for the next two to three days.  * Do not drive for 24 hours following a myelogram.  * You may remove the bandage and shower in the morning.  *Resume taking Eliquis today.    CALL YOUR PHYSICIAN FOR THE FOLLOWING:  * Pain at the injection site  * Redness, swelling, bruising or drainage at the injection site.  * A fever by mouth of 101.0 or any new symptoms  Headaches are a common side effect after a myelogram.  If you get a headache, you should stay flat in bed and drink plenty of fluids. If the headache persists and does not go away with rest/medication, CALL Dr. Mathur at [phone].

## 2022-09-16 ENCOUNTER — TELEPHONE (OUTPATIENT)
Dept: INTERVENTIONAL RADIOLOGY/VASCULAR | Facility: HOSPITAL | Age: 62
End: 2022-09-16

## 2022-09-20 ENCOUNTER — OFFICE VISIT (OUTPATIENT)
Dept: NEUROSURGERY | Facility: CLINIC | Age: 62
End: 2022-09-20

## 2022-09-20 ENCOUNTER — PREP FOR SURGERY (OUTPATIENT)
Dept: OTHER | Facility: HOSPITAL | Age: 62
End: 2022-09-20

## 2022-09-20 VITALS
SYSTOLIC BLOOD PRESSURE: 129 MMHG | DIASTOLIC BLOOD PRESSURE: 84 MMHG | OXYGEN SATURATION: 96 % | TEMPERATURE: 98 F | BODY MASS INDEX: 21.19 KG/M2 | WEIGHT: 135 LBS | HEIGHT: 67 IN | HEART RATE: 86 BPM

## 2022-09-20 DIAGNOSIS — M48.062 SPINAL STENOSIS OF LUMBAR REGION WITH NEUROGENIC CLAUDICATION: Primary | ICD-10-CM

## 2022-09-20 PROCEDURE — 99214 OFFICE O/P EST MOD 30 MIN: CPT | Performed by: NEUROLOGICAL SURGERY

## 2022-09-20 RX ORDER — CEFAZOLIN SODIUM 2 G/100ML
2 INJECTION, SOLUTION INTRAVENOUS ONCE
Status: CANCELLED | OUTPATIENT
Start: 2022-12-30 | End: 2022-09-20

## 2022-09-20 NOTE — PROGRESS NOTES
"Subjective   Patient ID: Radha Gonzalez is a 62 y.o. female is here today for follow-up with Lumbar Myelogram done on 09/15/2022 for low back and leg pain.    Today patient's symptoms are worsening. Patient states that she has low back pain that radiates to B/L legs with R leg being the worst. Patient states that she has N/T in B/L legs    Patient, Provider, and MA are all wearing a mask in our officer today.     History of Present Illness     This patient returns today.  She continues with pain in her lower back.  She has numbness and tingling in both of her legs worse on the right than the left.  She has been treated with physical therapy and epidural blocks.    The following portions of the patient's history were reviewed and updated as appropriate: allergies, current medications, past family history, past medical history, past social history, past surgical history and problem list.    Review of Systems   Constitutional: Negative for chills and fever.   HENT: Negative for congestion.    Genitourinary: Negative for difficulty urinating and dysuria.   Musculoskeletal: Positive for back pain and myalgias.   Neurological: Positive for weakness and numbness.       I have reviewed the review of systems as documented by my MA.      Objective     Vitals:    09/20/22 1450   BP: 129/84   Cuff Size: Adult   Pulse: 86   Temp: 98 °F (36.7 °C)   SpO2: 96%   Weight: 61.2 kg (135 lb)   Height: 170.2 cm (67\")     Body mass index is 21.14 kg/m².      Physical Exam  Neurological:      Mental Status: She is alert and oriented to person, place, and time.       Neurologic Exam     Mental Status   Oriented to person, place, and time.           Assessment & Plan   Independent Review of Radiographic Studies:      I personally reviewed the images from the following studies.    I reviewed her plain films, myelogram, and CT scan myself.  The plain films show some lumbar scoliosis.  There is multilevel degenerative disease with a " grade 1 spondylolisthesis of L4-5.  There is no evidence of abnormal movement on flexion and extension.  On the myelogram itself there does appear to be some stenosis at both L3-4 and L4-5.  On the post myelographic CT scan the lower thoracic spine down to L2 looks okay.  At L3-4 there is pretty severe central stenosis with almost complete effacement of the thecal sac.  There is a right-sided foraminal disc herniation.  L4-5 also shows severe stenosis.  L5-S1 is widely patent.     I think if any surgery were done on this patient she will require an L3-L5 laminectomy and fusion.    Medical Decision Making:      I had a long discussion with the patient about the situation.  I explained to her that I thought it was reasonable to consider surgery at this point.  I told the patient about the risks, complications and expected outcome of the lumbar surgery.  I explained that there was an 80% chance of getting rid of the pain in the leg.  I explained that there would still be back pain after the surgery.  Initially this will be quite severe but will improve over time.  There is a 2 or 3% chance of infection, bleeding, CSF leak, damage to the nerve as a result of surgery, paralysis, as well as anesthetic risk.  There is a 10% chance of recurrent problems.  There is a 10% chance of nonunion or failure of the instrumentation.  We discussed the postoperative hospital and home course.  The patient does ask to proceed.    She will need to be scheduled for a: Lumbar 3 to lumbar 4 and lumbar 4 to lumbar 5 laminectomy with fusion and instrumentation      Diagnoses and all orders for this visit:    1. Spinal stenosis of lumbar region with neurogenic claudication (Primary)      Return for 2-3 week post op.

## 2022-09-21 ENCOUNTER — TELEPHONE (OUTPATIENT)
Dept: CARDIOLOGY | Facility: CLINIC | Age: 62
End: 2022-09-21

## 2022-09-21 NOTE — TELEPHONE ENCOUNTER
Patient saw Dr. Rigo Mathur yesterday, 9/20, and discussed possible back surgery.    She is scheduled for ablation with Dr. Iqbal on 10/10/22.    How soon after ablation would she be able to proceed with surgery?

## 2022-09-26 ENCOUNTER — LAB (OUTPATIENT)
Dept: LAB | Facility: HOSPITAL | Age: 62
End: 2022-09-26

## 2022-09-26 ENCOUNTER — CONSULT (OUTPATIENT)
Dept: ONCOLOGY | Facility: CLINIC | Age: 62
End: 2022-09-26

## 2022-09-26 VITALS
TEMPERATURE: 97.3 F | DIASTOLIC BLOOD PRESSURE: 81 MMHG | OXYGEN SATURATION: 97 % | RESPIRATION RATE: 8 BRPM | HEIGHT: 67 IN | HEART RATE: 71 BPM | BODY MASS INDEX: 21.14 KG/M2 | SYSTOLIC BLOOD PRESSURE: 165 MMHG

## 2022-09-26 DIAGNOSIS — D68.51 FACTOR V LEIDEN MUTATION: Primary | ICD-10-CM

## 2022-09-26 DIAGNOSIS — I48.0 PAF (PAROXYSMAL ATRIAL FIBRILLATION): ICD-10-CM

## 2022-09-26 DIAGNOSIS — D68.51 FACTOR V LEIDEN: Primary | ICD-10-CM

## 2022-09-26 DIAGNOSIS — Z86.718 HISTORY OF DVT OF LOWER EXTREMITY: ICD-10-CM

## 2022-09-26 DIAGNOSIS — M48.062 SPINAL STENOSIS OF LUMBAR REGION WITH NEUROGENIC CLAUDICATION: ICD-10-CM

## 2022-09-26 LAB
BASOPHILS # BLD AUTO: 0.1 10*3/MM3 (ref 0–0.2)
BASOPHILS NFR BLD AUTO: 0.7 % (ref 0–1.5)
DEPRECATED RDW RBC AUTO: 46.5 FL (ref 37–54)
EOSINOPHIL # BLD AUTO: 0.33 10*3/MM3 (ref 0–0.4)
EOSINOPHIL NFR BLD AUTO: 2.3 % (ref 0.3–6.2)
ERYTHROCYTE [DISTWIDTH] IN BLOOD BY AUTOMATED COUNT: 12.8 % (ref 12.3–15.4)
HCT VFR BLD AUTO: 43.8 % (ref 34–46.6)
HGB BLD-MCNC: 15.2 G/DL (ref 12–15.9)
IMM GRANULOCYTES # BLD AUTO: 0.07 10*3/MM3 (ref 0–0.05)
IMM GRANULOCYTES NFR BLD AUTO: 0.5 % (ref 0–0.5)
LYMPHOCYTES # BLD AUTO: 2.57 10*3/MM3 (ref 0.7–3.1)
LYMPHOCYTES NFR BLD AUTO: 18.2 % (ref 19.6–45.3)
MCH RBC QN AUTO: 34.5 PG (ref 26.6–33)
MCHC RBC AUTO-ENTMCNC: 34.7 G/DL (ref 31.5–35.7)
MCV RBC AUTO: 99.5 FL (ref 79–97)
MONOCYTES # BLD AUTO: 1.25 10*3/MM3 (ref 0.1–0.9)
MONOCYTES NFR BLD AUTO: 8.8 % (ref 5–12)
NEUTROPHILS NFR BLD AUTO: 69.5 % (ref 42.7–76)
NEUTROPHILS NFR BLD AUTO: 9.82 10*3/MM3 (ref 1.7–7)
NRBC BLD AUTO-RTO: 0 /100 WBC (ref 0–0.2)
PLATELET # BLD AUTO: 249 10*3/MM3 (ref 140–450)
PMV BLD AUTO: 10.3 FL (ref 6–12)
RBC # BLD AUTO: 4.4 10*6/MM3 (ref 3.77–5.28)
WBC NRBC COR # BLD: 14.14 10*3/MM3 (ref 3.4–10.8)

## 2022-09-26 PROCEDURE — 36415 COLL VENOUS BLD VENIPUNCTURE: CPT

## 2022-09-26 PROCEDURE — 85025 COMPLETE CBC W/AUTO DIFF WBC: CPT

## 2022-09-26 PROCEDURE — 99244 OFF/OP CNSLTJ NEW/EST MOD 40: CPT | Performed by: INTERNAL MEDICINE

## 2022-09-26 NOTE — PROGRESS NOTES
Subjective     REASON FOR CONSULTATION: Factor 5 Leiden mutation with previous DVT.  Patient now on Eliquis due to atrial fibrillation but hopes to come off anticoagulation after an ablation procedure.  Provide an opinion on any further workup or treatment                             REQUESTING PHYSICIAN: Ijeoma Auguste MD    RECORDS OBTAINED:  Records of the patients history including those obtained from the referring provider were reviewed and summarized in detail.    HISTORY OF PRESENT ILLNESS:  The patient is a 62 y.o. year old female who is here for an opinion about the above issue.  She recently was diagnosed with paroxysmal atrial fibrillation and has been taking Eliquis for stroke prevention.  She has been evaluated by electrophysiology and they are planning an ablation procedure in the future.    She does have a history of factor V Leiden mutation with a previous DVT in the left lower extremity.  The DVT was 12 years ago following a skiing injury therefore the clot was provoked.  She was on anticoagulation with Coumadin at that time and reports that Coumadin was discontinued after about 2 years of therapy.  She has not had any other history of thrombosis prior for since this injury.    Unfortunately, she also has significant degenerative disease of the spine with spinal stenosis and disc bulges.  She is currently undergoing evaluation with Dr. Rigo Mathur of neurosurgery and eventual surgery has been recommended.  Currently she plans to pursue the cardiac ablation procedure prior to undergoing surgery on her back.    She also has a history of alpha 1 antitrypsin deficiency leading to COPD and is undergoing treatment for this by Dr. Cristino Ceja of pulmonology.    History of Present Illness     Past Medical History:   Diagnosis Date   • Arrhythmia    • Clotting disorder (HCC)     Factor 5 Liden   • COPD (chronic obstructive pulmonary disease) (HCC)    • Deep vein thrombosis (HCC)    • Essential hypertension  8/24/2021   • Mitral valve prolapse    • PAD (peripheral artery disease) ([facility])    • PAF (paroxysmal atrial fibrillation) ([facility]) 11/25/2020        Past Surgical History:   Procedure Laterality Date   • BLADDER SURGERY  2013    E/O polyp   • BREAST SURGERY      E/O benign neuroma   • COLONOSCOPY  2016   • HAND SURGERY Right     Carpal metacarpal hand surgery    • SINUS SURGERY  2017   • TONSILLECTOMY          Current Outpatient Medications on File Prior to Visit   Medication Sig Dispense Refill   • Acetaminophen 325 MG capsule Take 2 capsules by mouth 4 (Four) Times a Day As Needed.     • albuterol sulfate  (90 Base) MCG/ACT inhaler As Needed for Wheezing.     • alpha1-proteinase inhibitor (Zemaira) 1000 MG injection 1 (One) Time Per Week.     • apixaban (Eliquis) 5 MG tablet tablet Take 1 tablet by mouth 2 (Two) Times a Day. 180 tablet 3   • desonide (DESOWEN) 0.05 % cream As Needed.     • desvenlafaxine (PRISTIQ) 50 MG 24 hr tablet Take 12.5 mg by mouth As Needed.     • Diclofenac Epolamine (FLECTOR) 1.3 % patch patch As Needed.     • dilTIAZem CD (CARDIZEM CD) 180 MG 24 hr capsule Take 1 capsule by mouth Daily. 90 capsule 3   • EPINEPHrine (EPIPEN) 0.3 MG/0.3ML solution auto-injector injection As Needed.     • esomeprazole (nexIUM) 40 MG capsule Take 40 mg by mouth Daily.     • estradiol (MINIVELLE, VIVELLE-DOT) 0.075 MG/24HR patch Place 1 patch on the skin as directed by provider 2 (Two) Times a Week.     • Fluticasone-Umeclidin-Vilant (Trelegy Ellipta) 100-62.5-25 MCG/INH aerosol powder  Inhale Daily.     • guaiFENesin (MUCINEX) 600 MG 12 hr tablet As Needed.     • hydrocortisone 2.5 % cream As Needed.     • lidocaine-prilocaine (EMLA) 2.5-2.5 % cream As Needed.     • ondansetron (ZOFRAN) 4 MG tablet As Needed.     • Progesterone (PROMETRIUM) 200 MG capsule Daily.     • tretinoin (RETIN-A) 0.1 % cream As Needed.     • valACYclovir (VALTREX) 1000 MG tablet As Needed.       No current facility-administered  "medications on file prior to visit.        ALLERGIES:  No Known Allergies     Social History     Socioeconomic History   • Marital status:    Tobacco Use   • Smoking status: Never Smoker   • Smokeless tobacco: Never Used   Vaping Use   • Vaping Use: Never used   Substance and Sexual Activity   • Alcohol use: Yes     Alcohol/week: 3.0 standard drinks     Types: 3 Glasses of wine per week     Comment: 2 times a week    • Drug use: Never   • Sexual activity: Defer        Family History   Adopted: Yes   Family history unknown: Yes        Review of Systems   Constitutional: Negative for activity change, chills, fatigue and fever.   HENT: Negative for mouth sores, trouble swallowing and voice change.    Eyes: Negative for pain and visual disturbance.   Respiratory: Negative for cough, shortness of breath and wheezing.    Cardiovascular: Positive for palpitations. Negative for chest pain.   Gastrointestinal: Negative for abdominal pain, constipation, diarrhea, nausea and vomiting.   Genitourinary: Negative for difficulty urinating, frequency and urgency.   Musculoskeletal: Positive for back pain and gait problem. Negative for arthralgias and joint swelling.   Skin: Negative for rash.   Neurological: Negative for dizziness, seizures, weakness and headaches.   Hematological: Negative for adenopathy. Does not bruise/bleed easily.   Psychiatric/Behavioral: Negative for behavioral problems and confusion. The patient is not nervous/anxious.         Objective     Vitals:    09/26/22 1359   BP: 165/81   Pulse: 71   Resp: 8   Temp: 97.3 °F (36.3 °C)   SpO2: 97%   Weight: Comment: Pt. Refused   Height: 170.2 cm (67\")   PainSc: 0-No pain     Current Status 9/26/2022   ECOG score 0       Physical Exam  Constitutional:       General: She is not in acute distress.     Appearance: She is well-developed.   HENT:      Head: Normocephalic.   Eyes:      General: No scleral icterus.     Conjunctiva/sclera: Conjunctivae normal.      " Pupils: Pupils are equal, round, and reactive to light.   Neck:      Thyroid: No thyromegaly.      Vascular: No JVD.   Cardiovascular:      Rate and Rhythm: Normal rate. Rhythm irregular.      Heart sounds: No murmur heard.    No friction rub. No gallop.   Pulmonary:      Effort: Pulmonary effort is normal.      Breath sounds: Normal breath sounds. No wheezing or rales.   Abdominal:      General: There is no distension.      Palpations: Abdomen is soft. There is no mass.      Tenderness: There is no abdominal tenderness.   Musculoskeletal:         General: No deformity. Normal range of motion.      Cervical back: Normal range of motion and neck supple.   Lymphadenopathy:      Cervical: No cervical adenopathy.   Skin:     General: Skin is warm and dry.      Findings: No erythema or rash.   Neurological:      Mental Status: She is alert and oriented to person, place, and time.      Cranial Nerves: No cranial nerve deficit.      Gait: Gait abnormal.      Deep Tendon Reflexes: Reflexes are normal and symmetric.      Comments: Antalgic gait secondary to lumbar radiculopathy   Psychiatric:         Behavior: Behavior normal.         Judgment: Judgment normal.           RECENT LABS:  Hematology WBC   Date Value Ref Range Status   09/26/2022 14.14 (H) 3.40 - 10.80 10*3/mm3 Final     RBC   Date Value Ref Range Status   09/26/2022 4.40 3.77 - 5.28 10*6/mm3 Final     Hemoglobin   Date Value Ref Range Status   09/26/2022 15.2 12.0 - 15.9 g/dL Final     Hematocrit   Date Value Ref Range Status   09/26/2022 43.8 34.0 - 46.6 % Final     Platelets   Date Value Ref Range Status   09/26/2022 249 140 - 450 10*3/mm3 Final          Assessment & Plan     1.  Factor V Leiden mutation (presumably heterozygous) with a personal history of provoked DVT in the left lower extremity following a skiing accident 12 years ago.  Patient has not had any other thrombotic issues prior or since.  2.  Paroxysmal atrial fibrillation.  She currently is on  Eliquis anticoagulation for stroke prevention but is hoping to undergo an ablation procedure which might allow her to get off anticoagulation in the future.  3.  Degenerative disease of the spine with spinal stenosis and some bulging disks.  Neurosurgery is planned at some point in the future once her heart issue is dealt with.  4.  Alpha-1 antitrypsin deficiency undergoing replacement therapy with Dr. Cristino Ceja    Recommendations  1.  After reviewing the circumstances surrounding her previous DVT 12 years ago by think it would be okay for her not to be on long-term anticoagulation from the standpoint of DVT and factor V Leiden mutation.  Therefore she could potentially come off anticoagulation if her ablation procedure is successful.  2.  We did discuss today also that she may need some thromboprophylaxis following her upcoming back surgery which she would be expected to be somewhat immobile for a time.  3.  This was not discussed specifically on the visit but when she has her back surgery it also would likely be a good idea to discontinue her hormone replacement therapy (estradiol patch and oral progesterone).  4.  We have not scheduled routine follow-up in our office but certainly we would be happy to see this nice patient anytime in the future if new questions or concerns arise.  Likewise we will be happy to see her in consultation if she requires hospitalization following her ablation procedure or after her back surgery.    Thanks for allowing us to see this nice patient in consultation.

## 2022-09-27 ENCOUNTER — TELEPHONE (OUTPATIENT)
Dept: NEUROSURGERY | Facility: CLINIC | Age: 62
End: 2022-09-27

## 2022-09-27 NOTE — TELEPHONE ENCOUNTER
Patient called and is wanting to go ahead and get scheduled with the surgery.Patient and Dr. liu have already discussed plan of care. Please call and schedule surgery . Please call cell phone .

## 2022-09-28 ENCOUNTER — TELEPHONE (OUTPATIENT)
Dept: NEUROSURGERY | Facility: CLINIC | Age: 62
End: 2022-09-28

## 2022-10-05 ENCOUNTER — TRANSCRIBE ORDERS (OUTPATIENT)
Dept: CARDIOLOGY | Facility: CLINIC | Age: 62
End: 2022-10-05

## 2022-10-05 DIAGNOSIS — Z01.810 PRE-OPERATIVE CARDIOVASCULAR EXAMINATION: Primary | ICD-10-CM

## 2022-10-05 DIAGNOSIS — Z13.6 SCREENING FOR ISCHEMIC HEART DISEASE: ICD-10-CM

## 2022-10-07 ENCOUNTER — LAB (OUTPATIENT)
Dept: LAB | Facility: HOSPITAL | Age: 62
End: 2022-10-07

## 2022-10-07 DIAGNOSIS — Z13.6 SCREENING FOR ISCHEMIC HEART DISEASE: ICD-10-CM

## 2022-10-07 DIAGNOSIS — Z01.810 PRE-OPERATIVE CARDIOVASCULAR EXAMINATION: ICD-10-CM

## 2022-10-07 LAB
ANION GAP SERPL CALCULATED.3IONS-SCNC: 9.2 MMOL/L (ref 5–15)
BASOPHILS # BLD AUTO: 0.07 10*3/MM3 (ref 0–0.2)
BASOPHILS NFR BLD AUTO: 0.6 % (ref 0–1.5)
BUN SERPL-MCNC: 14 MG/DL (ref 8–23)
BUN/CREAT SERPL: 18.2 (ref 7–25)
CALCIUM SPEC-SCNC: 8.6 MG/DL (ref 8.6–10.5)
CHLORIDE SERPL-SCNC: 102 MMOL/L (ref 98–107)
CO2 SERPL-SCNC: 24.8 MMOL/L (ref 22–29)
CREAT SERPL-MCNC: 0.77 MG/DL (ref 0.57–1)
DEPRECATED RDW RBC AUTO: 44.2 FL (ref 37–54)
EGFRCR SERPLBLD CKD-EPI 2021: 86.3 ML/MIN/1.73
EOSINOPHIL # BLD AUTO: 0.25 10*3/MM3 (ref 0–0.4)
EOSINOPHIL NFR BLD AUTO: 2.2 % (ref 0.3–6.2)
ERYTHROCYTE [DISTWIDTH] IN BLOOD BY AUTOMATED COUNT: 11.8 % (ref 12.3–15.4)
GLUCOSE SERPL-MCNC: 95 MG/DL (ref 65–99)
HCT VFR BLD AUTO: 41.6 % (ref 34–46.6)
HGB BLD-MCNC: 14.2 G/DL (ref 12–15.9)
IMM GRANULOCYTES # BLD AUTO: 0.04 10*3/MM3 (ref 0–0.05)
IMM GRANULOCYTES NFR BLD AUTO: 0.4 % (ref 0–0.5)
LYMPHOCYTES # BLD AUTO: 2.29 10*3/MM3 (ref 0.7–3.1)
LYMPHOCYTES NFR BLD AUTO: 20.1 % (ref 19.6–45.3)
MCH RBC QN AUTO: 35.1 PG (ref 26.6–33)
MCHC RBC AUTO-ENTMCNC: 34.1 G/DL (ref 31.5–35.7)
MCV RBC AUTO: 102.7 FL (ref 79–97)
MONOCYTES # BLD AUTO: 1.02 10*3/MM3 (ref 0.1–0.9)
MONOCYTES NFR BLD AUTO: 9 % (ref 5–12)
NEUTROPHILS NFR BLD AUTO: 67.7 % (ref 42.7–76)
NEUTROPHILS NFR BLD AUTO: 7.71 10*3/MM3 (ref 1.7–7)
NRBC BLD AUTO-RTO: 0 /100 WBC (ref 0–0.2)
PLATELET # BLD AUTO: 264 10*3/MM3 (ref 140–450)
PMV BLD AUTO: 10.5 FL (ref 6–12)
POTASSIUM SERPL-SCNC: 4 MMOL/L (ref 3.5–5.2)
RBC # BLD AUTO: 4.05 10*6/MM3 (ref 3.77–5.28)
SODIUM SERPL-SCNC: 136 MMOL/L (ref 136–145)
WBC NRBC COR # BLD: 11.38 10*3/MM3 (ref 3.4–10.8)

## 2022-10-07 PROCEDURE — 85025 COMPLETE CBC W/AUTO DIFF WBC: CPT

## 2022-10-07 PROCEDURE — 36415 COLL VENOUS BLD VENIPUNCTURE: CPT

## 2022-10-07 PROCEDURE — 80048 BASIC METABOLIC PNL TOTAL CA: CPT

## 2022-10-09 ENCOUNTER — ANESTHESIA EVENT (OUTPATIENT)
Dept: CARDIOLOGY | Facility: HOSPITAL | Age: 62
End: 2022-10-09

## 2022-10-09 RX ORDER — SODIUM CHLORIDE 0.9 % (FLUSH) 0.9 %
3-10 SYRINGE (ML) INJECTION AS NEEDED
Status: CANCELLED | OUTPATIENT
Start: 2022-10-09

## 2022-10-09 RX ORDER — FENTANYL CITRATE 50 UG/ML
50 INJECTION, SOLUTION INTRAMUSCULAR; INTRAVENOUS
Status: CANCELLED | OUTPATIENT
Start: 2022-10-09

## 2022-10-09 RX ORDER — SODIUM CHLORIDE 0.9 % (FLUSH) 0.9 %
3 SYRINGE (ML) INJECTION EVERY 12 HOURS SCHEDULED
Status: CANCELLED | OUTPATIENT
Start: 2022-10-09

## 2022-10-09 RX ORDER — LIDOCAINE HYDROCHLORIDE 10 MG/ML
0.5 INJECTION, SOLUTION EPIDURAL; INFILTRATION; INTRACAUDAL; PERINEURAL ONCE AS NEEDED
Status: CANCELLED | OUTPATIENT
Start: 2022-10-09

## 2022-10-09 RX ORDER — SODIUM CHLORIDE, SODIUM LACTATE, POTASSIUM CHLORIDE, CALCIUM CHLORIDE 600; 310; 30; 20 MG/100ML; MG/100ML; MG/100ML; MG/100ML
9 INJECTION, SOLUTION INTRAVENOUS CONTINUOUS
Status: CANCELLED | OUTPATIENT
Start: 2022-10-09

## 2022-10-10 ENCOUNTER — ANESTHESIA (OUTPATIENT)
Dept: CARDIOLOGY | Facility: HOSPITAL | Age: 62
End: 2022-10-10

## 2022-10-10 ENCOUNTER — HOSPITAL ENCOUNTER (OUTPATIENT)
Facility: HOSPITAL | Age: 62
Setting detail: HOSPITAL OUTPATIENT SURGERY
Discharge: HOME | End: 2022-10-10
Attending: INTERNAL MEDICINE | Admitting: INTERNAL MEDICINE

## 2022-10-10 VITALS
HEIGHT: 67 IN | WEIGHT: 128 LBS | BODY MASS INDEX: 20.09 KG/M2 | SYSTOLIC BLOOD PRESSURE: 126 MMHG | DIASTOLIC BLOOD PRESSURE: 80 MMHG | HEART RATE: 66 BPM | TEMPERATURE: 97.5 F | RESPIRATION RATE: 16 BRPM | OXYGEN SATURATION: 92 %

## 2022-10-10 DIAGNOSIS — J43.9 PULMONARY EMPHYSEMA, UNSPECIFIED EMPHYSEMA TYPE: ICD-10-CM

## 2022-10-10 DIAGNOSIS — I48.0 PAF (PAROXYSMAL ATRIAL FIBRILLATION): ICD-10-CM

## 2022-10-10 LAB
ACT BLD: 254 SECONDS (ref 82–152)
ACT BLD: 283 SECONDS (ref 82–152)
QT INTERVAL: 408 MS
QT INTERVAL: 443 MS

## 2022-10-10 PROCEDURE — C1894 INTRO/SHEATH, NON-LASER: HCPCS | Performed by: INTERNAL MEDICINE

## 2022-10-10 PROCEDURE — 93005 ELECTROCARDIOGRAM TRACING: CPT | Performed by: INTERNAL MEDICINE

## 2022-10-10 PROCEDURE — C1733 CATH, EP, OTHR THAN COOL-TIP: HCPCS | Performed by: INTERNAL MEDICINE

## 2022-10-10 PROCEDURE — 85347 COAGULATION TIME ACTIVATED: CPT

## 2022-10-10 PROCEDURE — C1759 CATH, INTRA ECHOCARDIOGRAPHY: HCPCS | Performed by: INTERNAL MEDICINE

## 2022-10-10 PROCEDURE — 93010 ELECTROCARDIOGRAM REPORT: CPT | Performed by: INTERNAL MEDICINE

## 2022-10-10 PROCEDURE — 25010000002 MAGNESIUM SULFATE PER 500 MG OF MAGNESIUM: Performed by: ANESTHESIOLOGY

## 2022-10-10 PROCEDURE — 25010000002 MIDAZOLAM PER 1 MG: Performed by: ANESTHESIOLOGY

## 2022-10-10 PROCEDURE — C1732 CATH, EP, DIAG/ABL, 3D/VECT: HCPCS | Performed by: INTERNAL MEDICINE

## 2022-10-10 PROCEDURE — 25010000002 HEPARIN (PORCINE) PER 1000 UNITS: Performed by: INTERNAL MEDICINE

## 2022-10-10 PROCEDURE — 25010000002 DEXAMETHASONE PER 1 MG: Performed by: ANESTHESIOLOGY

## 2022-10-10 PROCEDURE — 25010000002 NEOSTIGMINE 5 MG/10ML SOLUTION: Performed by: ANESTHESIOLOGY

## 2022-10-10 PROCEDURE — 0 IOPAMIDOL PER 1 ML: Performed by: INTERNAL MEDICINE

## 2022-10-10 PROCEDURE — 93656 COMPRE EP EVAL ABLTJ ATR FIB: CPT | Performed by: INTERNAL MEDICINE

## 2022-10-10 PROCEDURE — C1760 CLOSURE DEV, VASC: HCPCS | Performed by: INTERNAL MEDICINE

## 2022-10-10 PROCEDURE — C1766 INTRO/SHEATH,STRBLE,NON-PEEL: HCPCS | Performed by: INTERNAL MEDICINE

## 2022-10-10 PROCEDURE — 25010000002 PROPOFOL 10 MG/ML EMULSION: Performed by: ANESTHESIOLOGY

## 2022-10-10 PROCEDURE — 25010000002 ONDANSETRON PER 1 MG: Performed by: ANESTHESIOLOGY

## 2022-10-10 RX ORDER — ACETAMINOPHEN 325 MG/1
650 TABLET ORAL EVERY 4 HOURS PRN
Status: DISCONTINUED | OUTPATIENT
Start: 2022-10-10 | End: 2022-10-10 | Stop reason: HOSPADM

## 2022-10-10 RX ORDER — MAGNESIUM SULFATE HEPTAHYDRATE 500 MG/ML
INJECTION, SOLUTION INTRAMUSCULAR; INTRAVENOUS AS NEEDED
Status: DISCONTINUED | OUTPATIENT
Start: 2022-10-10 | End: 2022-10-10 | Stop reason: SURG

## 2022-10-10 RX ORDER — ONDANSETRON 2 MG/ML
4 INJECTION INTRAMUSCULAR; INTRAVENOUS EVERY 6 HOURS PRN
Status: DISCONTINUED | OUTPATIENT
Start: 2022-10-10 | End: 2022-10-10 | Stop reason: HOSPADM

## 2022-10-10 RX ORDER — SODIUM CHLORIDE 0.9 % (FLUSH) 0.9 %
10 SYRINGE (ML) INJECTION EVERY 12 HOURS SCHEDULED
Status: DISCONTINUED | OUTPATIENT
Start: 2022-10-10 | End: 2022-10-10 | Stop reason: HOSPADM

## 2022-10-10 RX ORDER — ONDANSETRON 2 MG/ML
INJECTION INTRAMUSCULAR; INTRAVENOUS AS NEEDED
Status: DISCONTINUED | OUTPATIENT
Start: 2022-10-10 | End: 2022-10-10 | Stop reason: SURG

## 2022-10-10 RX ORDER — LABETALOL HYDROCHLORIDE 5 MG/ML
5 INJECTION, SOLUTION INTRAVENOUS
Status: DISCONTINUED | OUTPATIENT
Start: 2022-10-10 | End: 2022-10-10 | Stop reason: HOSPADM

## 2022-10-10 RX ORDER — ONDANSETRON 2 MG/ML
4 INJECTION INTRAMUSCULAR; INTRAVENOUS ONCE AS NEEDED
Status: DISCONTINUED | OUTPATIENT
Start: 2022-10-10 | End: 2022-10-10 | Stop reason: HOSPADM

## 2022-10-10 RX ORDER — FAMOTIDINE 10 MG/ML
20 INJECTION, SOLUTION INTRAVENOUS ONCE
Status: COMPLETED | OUTPATIENT
Start: 2022-10-10 | End: 2022-10-10

## 2022-10-10 RX ORDER — MIDAZOLAM HYDROCHLORIDE 1 MG/ML
1 INJECTION INTRAMUSCULAR; INTRAVENOUS
Status: DISCONTINUED | OUTPATIENT
Start: 2022-10-10 | End: 2022-10-10 | Stop reason: HOSPADM

## 2022-10-10 RX ORDER — IBUPROFEN 600 MG/1
600 TABLET ORAL ONCE AS NEEDED
Status: DISCONTINUED | OUTPATIENT
Start: 2022-10-10 | End: 2022-10-10 | Stop reason: HOSPADM

## 2022-10-10 RX ORDER — PROPOFOL 10 MG/ML
VIAL (ML) INTRAVENOUS AS NEEDED
Status: DISCONTINUED | OUTPATIENT
Start: 2022-10-10 | End: 2022-10-10 | Stop reason: SURG

## 2022-10-10 RX ORDER — ROCURONIUM BROMIDE 10 MG/ML
INJECTION, SOLUTION INTRAVENOUS AS NEEDED
Status: DISCONTINUED | OUTPATIENT
Start: 2022-10-10 | End: 2022-10-10 | Stop reason: SURG

## 2022-10-10 RX ORDER — NALOXONE HCL 0.4 MG/ML
0.4 VIAL (ML) INJECTION
Status: DISCONTINUED | OUTPATIENT
Start: 2022-10-10 | End: 2022-10-10 | Stop reason: HOSPADM

## 2022-10-10 RX ORDER — HYDRALAZINE HYDROCHLORIDE 20 MG/ML
5 INJECTION INTRAMUSCULAR; INTRAVENOUS
Status: DISCONTINUED | OUTPATIENT
Start: 2022-10-10 | End: 2022-10-10 | Stop reason: HOSPADM

## 2022-10-10 RX ORDER — HYDROMORPHONE HYDROCHLORIDE 1 MG/ML
0.5 INJECTION, SOLUTION INTRAMUSCULAR; INTRAVENOUS; SUBCUTANEOUS
Status: DISCONTINUED | OUTPATIENT
Start: 2022-10-10 | End: 2022-10-10 | Stop reason: HOSPADM

## 2022-10-10 RX ORDER — FENTANYL CITRATE 50 UG/ML
50 INJECTION, SOLUTION INTRAMUSCULAR; INTRAVENOUS
Status: DISCONTINUED | OUTPATIENT
Start: 2022-10-10 | End: 2022-10-10 | Stop reason: HOSPADM

## 2022-10-10 RX ORDER — GLYCOPYRROLATE 0.2 MG/ML
INJECTION INTRAMUSCULAR; INTRAVENOUS AS NEEDED
Status: DISCONTINUED | OUTPATIENT
Start: 2022-10-10 | End: 2022-10-10 | Stop reason: SURG

## 2022-10-10 RX ORDER — EPHEDRINE SULFATE 50 MG/ML
5 INJECTION, SOLUTION INTRAVENOUS ONCE AS NEEDED
Status: DISCONTINUED | OUTPATIENT
Start: 2022-10-10 | End: 2022-10-10 | Stop reason: HOSPADM

## 2022-10-10 RX ORDER — SODIUM CHLORIDE 9 MG/ML
75 INJECTION, SOLUTION INTRAVENOUS CONTINUOUS
Status: DISCONTINUED | OUTPATIENT
Start: 2022-10-10 | End: 2022-10-10 | Stop reason: HOSPADM

## 2022-10-10 RX ORDER — DIPHENHYDRAMINE HYDROCHLORIDE 50 MG/ML
12.5 INJECTION INTRAMUSCULAR; INTRAVENOUS
Status: DISCONTINUED | OUTPATIENT
Start: 2022-10-10 | End: 2022-10-10 | Stop reason: HOSPADM

## 2022-10-10 RX ORDER — OXYCODONE AND ACETAMINOPHEN 7.5; 325 MG/1; MG/1
1 TABLET ORAL EVERY 4 HOURS PRN
Status: DISCONTINUED | OUTPATIENT
Start: 2022-10-10 | End: 2022-10-10 | Stop reason: HOSPADM

## 2022-10-10 RX ORDER — NALOXONE HCL 0.4 MG/ML
0.2 VIAL (ML) INJECTION AS NEEDED
Status: DISCONTINUED | OUTPATIENT
Start: 2022-10-10 | End: 2022-10-10 | Stop reason: HOSPADM

## 2022-10-10 RX ORDER — DIPHENHYDRAMINE HCL 25 MG
25 CAPSULE ORAL
Status: DISCONTINUED | OUTPATIENT
Start: 2022-10-10 | End: 2022-10-10 | Stop reason: HOSPADM

## 2022-10-10 RX ORDER — HEPARIN SODIUM 10000 [USP'U]/100ML
INJECTION, SOLUTION INTRAVENOUS
Status: DISCONTINUED | OUTPATIENT
Start: 2022-10-10 | End: 2022-10-10 | Stop reason: HOSPADM

## 2022-10-10 RX ORDER — PROMETHAZINE HYDROCHLORIDE 12.5 MG/1
25 TABLET ORAL ONCE AS NEEDED
Status: DISCONTINUED | OUTPATIENT
Start: 2022-10-10 | End: 2022-10-10 | Stop reason: HOSPADM

## 2022-10-10 RX ORDER — NEOSTIGMINE METHYLSULFATE 0.5 MG/ML
INJECTION, SOLUTION INTRAVENOUS AS NEEDED
Status: DISCONTINUED | OUTPATIENT
Start: 2022-10-10 | End: 2022-10-10 | Stop reason: SURG

## 2022-10-10 RX ORDER — HYDROCODONE BITARTRATE AND ACETAMINOPHEN 7.5; 325 MG/1; MG/1
1 TABLET ORAL ONCE AS NEEDED
Status: DISCONTINUED | OUTPATIENT
Start: 2022-10-10 | End: 2022-10-10 | Stop reason: HOSPADM

## 2022-10-10 RX ORDER — LIDOCAINE HYDROCHLORIDE 20 MG/ML
INJECTION, SOLUTION INFILTRATION; PERINEURAL AS NEEDED
Status: DISCONTINUED | OUTPATIENT
Start: 2022-10-10 | End: 2022-10-10 | Stop reason: SURG

## 2022-10-10 RX ORDER — DEXAMETHASONE SODIUM PHOSPHATE 10 MG/ML
INJECTION INTRAMUSCULAR; INTRAVENOUS AS NEEDED
Status: DISCONTINUED | OUTPATIENT
Start: 2022-10-10 | End: 2022-10-10 | Stop reason: SURG

## 2022-10-10 RX ORDER — FLUMAZENIL 0.1 MG/ML
0.2 INJECTION INTRAVENOUS AS NEEDED
Status: DISCONTINUED | OUTPATIENT
Start: 2022-10-10 | End: 2022-10-10 | Stop reason: HOSPADM

## 2022-10-10 RX ORDER — PROMETHAZINE HYDROCHLORIDE 25 MG/1
25 SUPPOSITORY RECTAL ONCE AS NEEDED
Status: DISCONTINUED | OUTPATIENT
Start: 2022-10-10 | End: 2022-10-10 | Stop reason: HOSPADM

## 2022-10-10 RX ORDER — SODIUM CHLORIDE 0.9 % (FLUSH) 0.9 %
10 SYRINGE (ML) INJECTION AS NEEDED
Status: DISCONTINUED | OUTPATIENT
Start: 2022-10-10 | End: 2022-10-10 | Stop reason: HOSPADM

## 2022-10-10 RX ORDER — HEPARIN SODIUM 1000 [USP'U]/ML
INJECTION, SOLUTION INTRAVENOUS; SUBCUTANEOUS
Status: DISCONTINUED | OUTPATIENT
Start: 2022-10-10 | End: 2022-10-10 | Stop reason: HOSPADM

## 2022-10-10 RX ADMIN — NEOSTIGMINE METHYLSULFATE 2 MG: 0.5 INJECTION INTRAVENOUS at 09:54

## 2022-10-10 RX ADMIN — PROPOFOL 100 MG: 10 INJECTION, EMULSION INTRAVENOUS at 08:07

## 2022-10-10 RX ADMIN — GLYCOPYRROLATE 0.2 MG: 0.2 INJECTION INTRAMUSCULAR; INTRAVENOUS at 08:07

## 2022-10-10 RX ADMIN — GLYCOPYRROLATE 0.3 MG: 0.2 INJECTION INTRAMUSCULAR; INTRAVENOUS at 09:54

## 2022-10-10 RX ADMIN — MAGNESIUM SULFATE HEPTAHYDRATE 1 G: 500 INJECTION, SOLUTION INTRAMUSCULAR; INTRAVENOUS at 08:07

## 2022-10-10 RX ADMIN — LIDOCAINE HYDROCHLORIDE 80 MG: 20 INJECTION, SOLUTION INFILTRATION; PERINEURAL at 08:07

## 2022-10-10 RX ADMIN — ROCURONIUM BROMIDE 20 MG: 50 INJECTION INTRAVENOUS at 08:07

## 2022-10-10 RX ADMIN — ONDANSETRON 4 MG: 2 INJECTION INTRAMUSCULAR; INTRAVENOUS at 09:51

## 2022-10-10 RX ADMIN — FAMOTIDINE 20 MG: 10 INJECTION INTRAVENOUS at 07:49

## 2022-10-10 RX ADMIN — MIDAZOLAM 1 MG: 1 INJECTION, SOLUTION INTRAMUSCULAR; INTRAVENOUS at 07:50

## 2022-10-10 RX ADMIN — DEXAMETHASONE SODIUM PHOSPHATE 6 MG: 10 INJECTION INTRAMUSCULAR; INTRAVENOUS at 08:07

## 2022-10-10 RX ADMIN — SODIUM CHLORIDE 75 ML/HR: 9 INJECTION, SOLUTION INTRAVENOUS at 07:17

## 2022-10-10 RX ADMIN — PROPOFOL 40 MG: 10 INJECTION, EMULSION INTRAVENOUS at 08:09

## 2022-10-10 NOTE — ANESTHESIA PROCEDURE NOTES
Airway  Airway not difficult    General Information and Staff    Anesthesiologist: Saud Gonzalez MD    Indications and Patient Condition    Preoxygenated: yes  Mask difficulty assessment: 1 - vent by mask    Final Airway Details  Final airway type: endotracheal airway      Successful airway: ETT  Cuffed: yes   Successful intubation technique: direct laryngoscopy  Endotracheal tube insertion site: oral  Blade: Taylor  Blade size: 2  ETT size (mm): 7.0  Cormack-Lehane Classification: grade I - full view of glottis  Placement verified by: chest auscultation and capnometry   Measured from: teeth  ETT/EBT  to teeth (cm): 22  Assessment: lips, teeth, and gum same as pre-op and atraumatic intubation

## 2022-10-10 NOTE — ANESTHESIA POSTPROCEDURE EVALUATION
"Patient: Radha Gonzalez    Procedure Summary     Date: 10/10/22 Room / Location: [facility] / [facility]    Anesthesia Start: 0756 Anesthesia Stop: 1009    Procedures:       Ablation atrial fibrillation CRYO      3D MAPPING CARTO EP Diagnosis:       PAF (paroxysmal atrial fibrillation) (HCC)      Pulmonary emphysema, unspecified emphysema type (HCC)      (atrial fibrillation)    Providers: Venkatesh Iqbal MD Provider: Saud Gonzalez MD    Anesthesia Type: general ASA Status: 3          Anesthesia Type: general    Vitals  Vitals Value Taken Time   /78 10/10/22 1146   Temp     Pulse 67 10/10/22 1222   Resp 16 10/10/22 1131   SpO2 95 % 10/10/22 1149   Vitals shown include unvalidated device data.        Post Anesthesia Care and Evaluation    Pain management: adequate    Airway patency: patent  Anesthetic complications: No anesthetic complications    Cardiovascular status: acceptable  Respiratory status: acceptable  Hydration status: acceptable    Comments: /80   Pulse 66   Temp 36.4 °C (97.5 °F) (Temporal)   Resp 16   Ht 170.2 cm (67\")   Wt 58.1 kg (128 lb)   SpO2 92%   BMI 20.05 kg/m²         "

## 2022-10-10 NOTE — PERIOPERATIVE NURSING NOTE
Dr. Iqbal notified that patient is complaining of burning/indigestion and is very uncomfortable. She received Pepcid preoperatively. Her one hour flat of bedrest is complete at 1050 and then I can raise her head up. MD states that she can sit up now.

## 2022-10-10 NOTE — DISCHARGE INSTRUCTIONS
[facility]  Cardiology  [address] [place] [address]  [phone]    Coronary Ablation After Care    Refer to this sheet in the next few weeks. These instructions provide you with information on caring for yourself after your procedure. Your health care provider may also give you more specific instructions. Your treatment has been planned according to current medical practices, but problems sometimes occur. Call your health care provider if you have any problems or questions after your procedure.      What to Expect After the Procedure:  After your procedure, it is typical to have the following sensations:  Minor discomfort or tenderness and a small bump at the catheter insertion site(s). The bump(s) should usually decrease in size and tenderness within 1 to 2 weeks.  Any bruising will usually fade within 2 to 4 weeks.  Home Care Instructions:  Do not apply powder or lotion to the site.  Do not take baths, swim, or use a hot tub until your health care provider approves and the site is completely healed.  Do not bend, squat, or lift anything over 20 lb (9 kg) or as directed by your health care provider. However, we recommend lifting nothing heavier than a gallon of milk.    You may shower 24 hours after the procedure. Remove the bandage (dressing) and gently wash the site with plain soap and water. Gently pat the site dry. You may apply a band aid daily for 2 days if desired.    Inspect the site at least twice daily.  Increase your fluid intake for the next 2 days.    Limit your activity for the first 48 hours.   Avoid strenuous activity for 1 week or as advised by your physician.    Follow instructions about when you can drive or return to work as directed by your physician.    Hold direct pressure over the site when you cough, sneeze, laugh or change positions.  Do this for the next 2 days.    Call Your Doctor If:  You have drainage (other than a small amount of blood on the dressing).  You  have chills or a fever > 101.  You have redness, warmth, swelling (larger than a walnut), or pain at the insertion   You develop palpitations, chest pain or shortness of breath, feel faint, or pass out.  You develop pain, discoloration, coldness, numbness, tingling, or severe bruising in the leg that held the catheter.  You develop bleeding from any other place, such as the bowels.  You have heavy bleeding from the site.  If this happens, hold pressure on the site and call 911.        Make Sure You:  Understand these instructions.  Will watch your condition.  Will get help right away if you are not doing well or get worse.

## 2022-10-10 NOTE — ANESTHESIA PREPROCEDURE EVALUATION
Anesthesia Evaluation     NPO Solid Status: > 8 hours             Airway   Mallampati: I  Dental      Pulmonary    (+) COPD, shortness of breath,   (-) sleep apnea, not a smoker    ROS comment: Negative patient screen for VÍCTOR    Cardiovascular     (+) hypertension, valvular problems/murmurs MVP, dysrhythmias Paroxysmal Atrial Fib, PVD, DVT resolved,       Neuro/Psych  GI/Hepatic/Renal/Endo    (+)  GERD,      Musculoskeletal     Abdominal    Substance History      OB/GYN          Other   blood dyscrasia,       Other Comment: Alpha 1 anti-trypsin deficiency                  Anesthesia Plan    ASA 3     general       Anesthetic plan, risks, benefits, and alternatives have been provided, discussed and informed consent has been obtained with: patient.        CODE STATUS:

## 2022-10-11 ENCOUNTER — TELEPHONE (OUTPATIENT)
Dept: CARDIOLOGY | Facility: CLINIC | Age: 62
End: 2022-10-11

## 2022-10-11 NOTE — TELEPHONE ENCOUNTER
Patient s/p afib ablation on 10/10, calling today with questions.    She would like to know if it's ok for her to get flu shot and covid booster this soon or if she should wait?    She also has a lumbar epidural coming up, and she wanted to make sure this was ok to move forward with.    Last question, ok to resume Humira injections?

## 2022-11-17 ENCOUNTER — OFFICE VISIT (OUTPATIENT)
Dept: CARDIOLOGY | Facility: CLINIC | Age: 62
End: 2022-11-17

## 2022-11-17 ENCOUNTER — HOSPITAL ENCOUNTER (OUTPATIENT)
Dept: CARDIOLOGY | Facility: HOSPITAL | Age: 62
Discharge: HOME | End: 2022-11-17
Admitting: INTERNAL MEDICINE

## 2022-11-17 VITALS
DIASTOLIC BLOOD PRESSURE: 82 MMHG | WEIGHT: 128 LBS | BODY MASS INDEX: 20.09 KG/M2 | HEIGHT: 67 IN | SYSTOLIC BLOOD PRESSURE: 130 MMHG | HEART RATE: 68 BPM

## 2022-11-17 VITALS — SYSTOLIC BLOOD PRESSURE: 128 MMHG | HEART RATE: 68 BPM | DIASTOLIC BLOOD PRESSURE: 80 MMHG

## 2022-11-17 DIAGNOSIS — I48.0 PAF (PAROXYSMAL ATRIAL FIBRILLATION): ICD-10-CM

## 2022-11-17 DIAGNOSIS — I48.0 PAF (PAROXYSMAL ATRIAL FIBRILLATION): Primary | ICD-10-CM

## 2022-11-17 DIAGNOSIS — R00.2 PALPITATIONS: ICD-10-CM

## 2022-11-17 LAB
AORTIC ARCH: 2.2 CM
ASCENDING AORTA: 2.9 CM
BH CV ECHO MEAS - ACS: 2.02 CM
BH CV ECHO MEAS - AO MAX PG: 9.4 MMHG
BH CV ECHO MEAS - AO MEAN PG: 4.1 MMHG
BH CV ECHO MEAS - AO ROOT DIAM: 3 CM
BH CV ECHO MEAS - AO V2 MAX: 152.9 CM/SEC
BH CV ECHO MEAS - AO V2 VTI: 33 CM
BH CV ECHO MEAS - AVA(I,D): 2.17 CM2
BH CV ECHO MEAS - EDV(CUBED): 72.4 ML
BH CV ECHO MEAS - EDV(MOD-SP2): 65 ML
BH CV ECHO MEAS - EDV(MOD-SP4): 86 ML
BH CV ECHO MEAS - EF(MOD-BP): 69 %
BH CV ECHO MEAS - EF(MOD-SP2): 66.2 %
BH CV ECHO MEAS - EF(MOD-SP4): 69.8 %
BH CV ECHO MEAS - ESV(CUBED): 20.4 ML
BH CV ECHO MEAS - ESV(MOD-SP2): 22 ML
BH CV ECHO MEAS - ESV(MOD-SP4): 26 ML
BH CV ECHO MEAS - FS: 34.4 %
BH CV ECHO MEAS - IVS/LVPW: 0.98 CM
BH CV ECHO MEAS - IVSD: 0.73 CM
BH CV ECHO MEAS - LAT PEAK E' VEL: 11.4 CM/SEC
BH CV ECHO MEAS - LV DIASTOLIC VOL/BSA (35-75): 53.5 CM2
BH CV ECHO MEAS - LV MASS(C)D: 89.9 GRAMS
BH CV ECHO MEAS - LV MAX PG: 6.5 MMHG
BH CV ECHO MEAS - LV MEAN PG: 3.5 MMHG
BH CV ECHO MEAS - LV SYSTOLIC VOL/BSA (12-30): 16.2 CM2
BH CV ECHO MEAS - LV V1 MAX: 127.3 CM/SEC
BH CV ECHO MEAS - LV V1 VTI: 25.4 CM
BH CV ECHO MEAS - LVIDD: 4.2 CM
BH CV ECHO MEAS - LVIDS: 2.7 CM
BH CV ECHO MEAS - LVOT AREA: 2.8 CM2
BH CV ECHO MEAS - LVOT DIAM: 1.89 CM
BH CV ECHO MEAS - LVPWD: 0.75 CM
BH CV ECHO MEAS - MED PEAK E' VEL: 8.9 CM/SEC
BH CV ECHO MEAS - MV A DUR: 0.12 SEC
BH CV ECHO MEAS - MV A MAX VEL: 67.7 CM/SEC
BH CV ECHO MEAS - MV DEC SLOPE: 427.6 CM/SEC2
BH CV ECHO MEAS - MV DEC TIME: 0.2 MSEC
BH CV ECHO MEAS - MV E MAX VEL: 99.8 CM/SEC
BH CV ECHO MEAS - MV E/A: 1.47
BH CV ECHO MEAS - MV MAX PG: 4.3 MMHG
BH CV ECHO MEAS - MV MEAN PG: 1.63 MMHG
BH CV ECHO MEAS - MV P1/2T: 70.2 MSEC
BH CV ECHO MEAS - MV V2 VTI: 31 CM
BH CV ECHO MEAS - MVA(P1/2T): 3.1 CM2
BH CV ECHO MEAS - MVA(VTI): 2.31 CM2
BH CV ECHO MEAS - PA ACC TIME: 0.11 SEC
BH CV ECHO MEAS - PA PR(ACCEL): 30.3 MMHG
BH CV ECHO MEAS - PA V2 MAX: 105.1 CM/SEC
BH CV ECHO MEAS - PULM A REVS DUR: 0.1 SEC
BH CV ECHO MEAS - PULM A REVS VEL: 41.6 CM/SEC
BH CV ECHO MEAS - PULM DIAS VEL: 46.3 CM/SEC
BH CV ECHO MEAS - PULM S/D: 0.65
BH CV ECHO MEAS - PULM SYS VEL: 30 CM/SEC
BH CV ECHO MEAS - RV MAX PG: 2.7 MMHG
BH CV ECHO MEAS - RV V1 MAX: 82.3 CM/SEC
BH CV ECHO MEAS - RV V1 VTI: 15 CM
BH CV ECHO MEAS - SI(MOD-SP2): 26.8 ML/M2
BH CV ECHO MEAS - SI(MOD-SP4): 37.3 ML/M2
BH CV ECHO MEAS - SV(LVOT): 71.5 ML
BH CV ECHO MEAS - SV(MOD-SP2): 43 ML
BH CV ECHO MEAS - SV(MOD-SP4): 60 ML
BH CV ECHO MEAS - TAPSE (>1.6): 2.5 CM
BH CV ECHO MEAS - TR MAX PG: 24 MMHG
BH CV ECHO MEAS - TR MAX VEL: 245 CM/SEC
BH CV ECHO MEASUREMENTS AVERAGE E/E' RATIO: 9.83
BH CV XLRA - RV BASE: 2.7 CM
BH CV XLRA - RV LENGTH: 6.5 CM
BH CV XLRA - RV MID: 2.19 CM
BH CV XLRA - TDI S': 13.4 CM/SEC
LEFT ATRIUM VOLUME INDEX: 26.3 ML/M2
MAXIMAL PREDICTED HEART RATE: 156 BPM
SINUS: 3 CM
STRESS TARGET HR: 133 BPM

## 2022-11-17 PROCEDURE — 93306 TTE W/DOPPLER COMPLETE: CPT | Performed by: INTERNAL MEDICINE

## 2022-11-17 PROCEDURE — 93000 ELECTROCARDIOGRAM COMPLETE: CPT | Performed by: INTERNAL MEDICINE

## 2022-11-17 PROCEDURE — 99213 OFFICE O/P EST LOW 20 MIN: CPT | Performed by: INTERNAL MEDICINE

## 2022-11-17 PROCEDURE — 93306 TTE W/DOPPLER COMPLETE: CPT

## 2022-11-17 NOTE — PROGRESS NOTES
Date of Office Visit: 2022  Encounter Provider: Venkatesh Iqbal MD  Place of Service: [facility] CARDIOLOGY  Patient Name: Radha Gonzalez  :1960    Chief Complaint   Patient presents with   • paroxysmal AFIB   • pulmonary emphysema   • s/p ablation 10/10   :     HPI: Radha Gonzalez is a 62 y.o. female who presents today for follow-up from PAF ablation    She had ablation on 10/10.  She noted elevated heart rate and one episode of atrial fibrillation in the days immediately following ablation .     Since then no issues.      No other complaints today.       Past Medical History:   Diagnosis Date   • Arrhythmia    • Arthritis    • Clotting disorder (HCC)     Factor 5 Liden   • COPD (chronic obstructive pulmonary disease) (HCC)    • Deep vein thrombosis (HCC)    • Essential hypertension 2021   • Mitral valve prolapse    • PAD (peripheral artery disease) (HCC)    • PAF (paroxysmal atrial fibrillation) ([facility]) 2020       Past Surgical History:   Procedure Laterality Date   • BLADDER SURGERY      E/O polyp   • BREAST SURGERY      E/O benign neuroma   • CARDIAC ELECTROPHYSIOLOGY PROCEDURE N/A 10/10/2022    Procedure: Ablation atrial fibrillation CRYO;  Surgeon: Venkatesh Iqbal MD;  Location: [facility] INVASIVE LOCATION;  Service: Cardiovascular;  Laterality: N/A;   • COLONOSCOPY     • HAND SURGERY Right     Carpal metacarpal hand surgery    • SINUS SURGERY  2017   • TONSILLECTOMY         Social History     Socioeconomic History   • Marital status:    • Number of children: 1   Tobacco Use   • Smoking status: Never   • Smokeless tobacco: Never   Vaping Use   • Vaping Use: Never used   Substance and Sexual Activity   • Alcohol use: Yes     Alcohol/week: 3.0 standard drinks     Types: 3 Glasses of wine per week     Comment: 2 times a week    • Drug use: Never   • Sexual activity: Defer       Family History   Adopted: Yes   Family  history unknown: Yes       Review of Systems   Constitutional: Negative.   Cardiovascular: Negative.    Respiratory: Negative.    Gastrointestinal: Negative.        No Known Allergies      Current Outpatient Medications:   •  Acetaminophen 325 MG capsule, Take 2 capsules by mouth 4 (Four) Times a Day As Needed., Disp: , Rfl:   •  albuterol sulfate  (90 Base) MCG/ACT inhaler, As Needed for Wheezing., Disp: , Rfl:   •  alpha1-proteinase inhibitor (Zemaira) 1000 MG injection, 1 (One) Time Per Week., Disp: , Rfl:   •  apixaban (Eliquis) 5 MG tablet tablet, Take 1 tablet by mouth 2 (Two) Times a Day., Disp: 180 tablet, Rfl: 3  •  desonide (DESOWEN) 0.05 % cream, As Needed., Disp: , Rfl:   •  desvenlafaxine (PRISTIQ) 50 MG 24 hr tablet, Take 12.5 mg by mouth As Needed., Disp: , Rfl:   •  Diclofenac Epolamine (FLECTOR) 1.3 % patch patch, As Needed., Disp: , Rfl:   •  dilTIAZem CD (CARDIZEM CD) 180 MG 24 hr capsule, Take 1 capsule by mouth Daily., Disp: 90 capsule, Rfl: 3  •  EPINEPHrine (EPIPEN) 0.3 MG/0.3ML solution auto-injector injection, As Needed., Disp: , Rfl:   •  esomeprazole (nexIUM) 40 MG capsule, Take 40 mg by mouth Daily., Disp: , Rfl:   •  estradiol (MINIVELLE, VIVELLE-DOT) 0.075 MG/24HR patch, Place 1 patch on the skin as directed by provider 2 (Two) Times a Week., Disp: , Rfl:   •  Fluticasone-Umeclidin-Vilant (Trelegy Ellipta) 100-62.5-25 MCG/INH aerosol powder , Inhale Daily., Disp: , Rfl:   •  guaiFENesin (MUCINEX) 600 MG 12 hr tablet, As Needed., Disp: , Rfl:   •  hydrocortisone 2.5 % cream, As Needed., Disp: , Rfl:   •  lidocaine-prilocaine (EMLA) 2.5-2.5 % cream, As Needed., Disp: , Rfl:   •  ondansetron (ZOFRAN) 4 MG tablet, As Needed., Disp: , Rfl:   •  Progesterone (PROMETRIUM) 200 MG capsule, Daily., Disp: , Rfl:   •  tretinoin (RETIN-A) 0.1 % cream, As Needed., Disp: , Rfl:   •  valACYclovir (VALTREX) 1000 MG tablet, As Needed., Disp: , Rfl:       Objective:     Vitals:    11/17/22 1442  "  BP: 130/82   Pulse: 68   Weight: 58.1 kg (128 lb)   Height: 170.2 cm (67\")     Body mass index is 20.05 kg/m².    PHYSICAL EXAM:    Vitals and nursing note reviewed.   Constitutional:       General: Not in acute distress.  Pulmonary:      Effort: Pulmonary effort is normal. No respiratory distress.   Cardiovascular:      Normal rate. Regular rhythm.   Neurological:      Mental Status: Oriented to person, place, and time.   Psychiatric:         Attention and Perception: Attention and perception normal.         Mood and Affect: Mood and affect normal.             ECG 12 Lead    Date/Time: 11/17/2022 3:20 PM  Performed by: Venkatesh Iqbal MD  Authorized by: Venkatesh Iqbal MD   Comparison: compared with previous ECG from 10/10/2022  Similar to previous ECG  Rhythm: sinus rhythm              Assessment:       Diagnosis Plan   1. PAF (paroxysmal atrial fibrillation) (HCC)               Plan:       One possible episode of PAF just after the procedure is not uncommon, still in blanking peroid    With her history of Factor 5 Leiden and previous DVT, and now also atrial fibrillation, I think she should remain on eliquis indefinitely.     Follow-up in 3 months.     As always, it has been a pleasure to participate in your patient's care.      Sincerely,         Venkatesh Iqbal MD  "

## 2022-11-18 ENCOUNTER — TELEPHONE (OUTPATIENT)
Dept: CARDIOLOGY | Facility: CLINIC | Age: 62
End: 2022-11-18

## 2022-11-18 NOTE — TELEPHONE ENCOUNTER
Echo is fairly stable but there is some calcium on the mitral and aortic valves that is new. It is not affecting valve function at this time, but it will need to be monitored. Recommendations are for BP and cholesterol control; along with healthy diet and 150 minutes of cardiovascular exercise weekly.     Thanks!  KLARISSA Narvaez

## 2022-11-21 NOTE — TELEPHONE ENCOUNTER
Notified patient of results and recommendations, patient verbalizes understanding.    Edith Degroot RN  [facility] Triage  11/21/22 13:27 EST

## 2022-11-21 NOTE — TELEPHONE ENCOUNTER
Attempted to call patient, no answer.  Left a voicemail for patient to call back.  Will continue to try to reach patient.    Edith Degroot RN  [facility] Triage  11/21/22 08:45 EST

## 2022-11-28 RX ORDER — APIXABAN 5 MG/1
TABLET, FILM COATED ORAL
Qty: 60 TABLET | Refills: 2 | Status: SHIPPED | OUTPATIENT
Start: 2022-11-28

## 2022-12-09 NOTE — H&P (VIEW-ONLY)
Subjective   Patient ID: Radha Gonzalez is a 62 y.o. female is here today via telephone for PRE-OP follow-up. Patient is considering having a Lumbar 3 to lumbar 4 and lumbar 4 to lumbar 5 laminectomy with fusion and instrumentation on 12.30.2022    You have chosen to receive care through a telephone visit. Do you consent to use a telephone visit for your medical care today? Yes    We had a telephone visit today.  The patient was at home and I was in the office.  We talked for 5 minutes.    History of Present Illness    This patient continues with pain in her back with radiation primarily into her right leg.  She has numbness and tingling in both legs but worse on the right than the left.    The following portions of the patient's history were reviewed and updated as appropriate: allergies, current medications, past family history, past medical history, past social history, past surgical history and problem list.    Review of Systems    I have reviewed the review of systems as documented by my MA.      Objective       Tobacco Use: Low Risk    • Smoking Tobacco Use: Never   • Smokeless Tobacco Use: Never   • Passive Exposure: Not on file          Physical Exam  Neurological:      Mental Status: She is alert and oriented to person, place, and time.       Neurologic Exam     Mental Status   Oriented to person, place, and time.           Assessment & Plan   Independent Review of Radiographic Studies:      I personally reviewed the images from the following studies.    I again reviewed her plain films, myelogram, and CT scan.  There is a grade 1 spondylolisthesis of L4 on L5.  On the myelogram there is stenosis at L3-4 and L4-5.  This is confirmed on the post myelographic CT scan but the other levels look okay.    Medical Decision Making:      I again discussed a lumbar decompression and fusion with the patient.  I told the patient about the risks, complications and expected outcome of the lumbar surgery.  I  explained that there was an 80% chance of getting rid of the pain in the leg.  I explained that there would still be back pain after the surgery.  Initially this will be quite severe but will improve over time.  There is a 2 or 3% chance of infection, bleeding, CSF leak, damage to the nerve as a result of surgery, paralysis, as well as anesthetic risk.  There is a 10% chance of recurrent problems.  There is a 10% chance of nonunion or failure of the instrumentation.  We discussed the postoperative hospital and home course.  The patient does ask to proceed.    She will need to be scheduled for a: Lumbar 3 to lumbar 4 and lumbar 4 to lumbar 5 laminectomy with fusion and instrumentation    Diagnoses and all orders for this visit:    1. Spinal stenosis of lumbar region with neurogenic claudication (Primary)      Return for 2-3 week post op.          No

## 2022-12-09 NOTE — PROGRESS NOTES
Subjective   Patient ID: Radha Gonzalez is a 62 y.o. female is here today via telephone for PRE-OP follow-up. Patient is considering having a Lumbar 3 to lumbar 4 and lumbar 4 to lumbar 5 laminectomy with fusion and instrumentation on 12.30.2022    You have chosen to receive care through a telephone visit. Do you consent to use a telephone visit for your medical care today? Yes    We had a telephone visit today.  The patient was at home and I was in the office.  We talked for 5 minutes.    History of Present Illness    This patient continues with pain in her back with radiation primarily into her right leg.  She has numbness and tingling in both legs but worse on the right than the left.    The following portions of the patient's history were reviewed and updated as appropriate: allergies, current medications, past family history, past medical history, past social history, past surgical history and problem list.    Review of Systems    I have reviewed the review of systems as documented by my MA.      Objective       Tobacco Use: Low Risk    • Smoking Tobacco Use: Never   • Smokeless Tobacco Use: Never   • Passive Exposure: Not on file          Physical Exam  Neurological:      Mental Status: She is alert and oriented to person, place, and time.       Neurologic Exam     Mental Status   Oriented to person, place, and time.           Assessment & Plan   Independent Review of Radiographic Studies:      I personally reviewed the images from the following studies.    I again reviewed her plain films, myelogram, and CT scan.  There is a grade 1 spondylolisthesis of L4 on L5.  On the myelogram there is stenosis at L3-4 and L4-5.  This is confirmed on the post myelographic CT scan but the other levels look okay.    Medical Decision Making:      I again discussed a lumbar decompression and fusion with the patient.  I told the patient about the risks, complications and expected outcome of the lumbar surgery.  I  explained that there was an 80% chance of getting rid of the pain in the leg.  I explained that there would still be back pain after the surgery.  Initially this will be quite severe but will improve over time.  There is a 2 or 3% chance of infection, bleeding, CSF leak, damage to the nerve as a result of surgery, paralysis, as well as anesthetic risk.  There is a 10% chance of recurrent problems.  There is a 10% chance of nonunion or failure of the instrumentation.  We discussed the postoperative hospital and home course.  The patient does ask to proceed.    She will need to be scheduled for a: Lumbar 3 to lumbar 4 and lumbar 4 to lumbar 5 laminectomy with fusion and instrumentation    Diagnoses and all orders for this visit:    1. Spinal stenosis of lumbar region with neurogenic claudication (Primary)      Return for 2-3 week post op.

## 2022-12-12 ENCOUNTER — APPOINTMENT (OUTPATIENT)
Dept: PREADMISSION TESTING | Facility: HOSPITAL | Age: 62
End: 2022-12-12

## 2022-12-13 ENCOUNTER — TELEPHONE (OUTPATIENT)
Dept: NEUROSURGERY | Facility: CLINIC | Age: 62
End: 2022-12-13

## 2022-12-13 ENCOUNTER — OFFICE VISIT (OUTPATIENT)
Dept: NEUROSURGERY | Facility: CLINIC | Age: 62
End: 2022-12-13

## 2022-12-13 DIAGNOSIS — M48.062 SPINAL STENOSIS OF LUMBAR REGION WITH NEUROGENIC CLAUDICATION: Primary | ICD-10-CM

## 2022-12-13 PROCEDURE — 99441 PR PHYS/QHP TELEPHONE EVALUATION 5-10 MIN: CPT | Performed by: NEUROLOGICAL SURGERY

## 2022-12-13 NOTE — TELEPHONE ENCOUNTER
Patient forgot to ask if she will be spending over night in the hospital. She is needing to make plans for animals.

## 2022-12-16 ENCOUNTER — PRE-ADMISSION TESTING (OUTPATIENT)
Dept: PREADMISSION TESTING | Facility: HOSPITAL | Age: 62
End: 2022-12-16

## 2022-12-16 VITALS
SYSTOLIC BLOOD PRESSURE: 144 MMHG | BODY MASS INDEX: 19.4 KG/M2 | DIASTOLIC BLOOD PRESSURE: 88 MMHG | HEART RATE: 72 BPM | OXYGEN SATURATION: 97 % | HEIGHT: 68 IN | TEMPERATURE: 98.8 F | WEIGHT: 128 LBS | RESPIRATION RATE: 16 BRPM

## 2022-12-16 LAB
ANION GAP SERPL CALCULATED.3IONS-SCNC: 10 MMOL/L (ref 5–15)
BUN SERPL-MCNC: 18 MG/DL (ref 8–23)
BUN/CREAT SERPL: 20.9 (ref 7–25)
CALCIUM SPEC-SCNC: 8.7 MG/DL (ref 8.6–10.5)
CHLORIDE SERPL-SCNC: 101 MMOL/L (ref 98–107)
CO2 SERPL-SCNC: 26 MMOL/L (ref 22–29)
CREAT SERPL-MCNC: 0.86 MG/DL (ref 0.57–1)
DEPRECATED RDW RBC AUTO: 44.4 FL (ref 37–54)
EGFRCR SERPLBLD CKD-EPI 2021: 75.5 ML/MIN/1.73
ERYTHROCYTE [DISTWIDTH] IN BLOOD BY AUTOMATED COUNT: 11.7 % (ref 12.3–15.4)
GLUCOSE SERPL-MCNC: 90 MG/DL (ref 65–99)
HCT VFR BLD AUTO: 43.7 % (ref 34–46.6)
HGB BLD-MCNC: 14.5 G/DL (ref 12–15.9)
MCH RBC QN AUTO: 34 PG (ref 26.6–33)
MCHC RBC AUTO-ENTMCNC: 33.2 G/DL (ref 31.5–35.7)
MCV RBC AUTO: 102.6 FL (ref 79–97)
PLATELET # BLD AUTO: 256 10*3/MM3 (ref 140–450)
PMV BLD AUTO: 10 FL (ref 6–12)
POTASSIUM SERPL-SCNC: 4 MMOL/L (ref 3.5–5.2)
RBC # BLD AUTO: 4.26 10*6/MM3 (ref 3.77–5.28)
SODIUM SERPL-SCNC: 137 MMOL/L (ref 136–145)
WBC NRBC COR # BLD: 10.17 10*3/MM3 (ref 3.4–10.8)

## 2022-12-16 PROCEDURE — 85027 COMPLETE CBC AUTOMATED: CPT

## 2022-12-16 PROCEDURE — 80048 BASIC METABOLIC PNL TOTAL CA: CPT

## 2022-12-16 PROCEDURE — 36415 COLL VENOUS BLD VENIPUNCTURE: CPT

## 2022-12-16 RX ORDER — CHLORHEXIDINE GLUCONATE 500 MG/1
CLOTH TOPICAL
Status: ON HOLD | COMMUNITY
End: 2022-12-30

## 2022-12-16 NOTE — DISCHARGE INSTRUCTIONS
Take the following medications the morning of surgery:  DILTIAZEM, NEXIUM    ARRIVAL TIME: 530AM      If you are on prescription narcotic pain medication to control your pain you may also take that medication the morning of surgery.    General Instructions:  Do not eat solid food after midnight the night before surgery.  You may drink clear liquids day of surgery but must stop at least one hour before your hospital arrival time.  It is beneficial for you to have a clear drink that contains carbohydrates the day of surgery.  We suggest a 12 to 20 ounce bottle of Gatorade or Powerade for non-diabetic patients or a 12 to 20 ounce bottle of G2 or Powerade Zero for diabetic patients. (Pediatric patients, are not advised to drink a 12 to 20 ounce carbohydrate drink)    Clear liquids are liquids you can see through.  Nothing red in color.     Plain water                               Sports drinks  Sodas                                   Gelatin (Jell-O)  Fruit juices without pulp such as white grape juice and apple juice  Popsicles that contain no fruit or yogurt  Tea or coffee (no cream or milk added)  Gatorade / Powerade  G2 / Powerade Zero    Patients who avoid smoking, chewing tobacco and alcohol for 4 weeks prior to surgery have a reduced risk of post-operative complications.  Quit smoking as many days before surgery as you can.  Do not smoke, use chewing tobacco or drink alcohol the day of surgery.  Bring any papers given to you in the doctor’s office.  Wear clean comfortable clothes.  Do not wear contact lenses, false eyelashes or make-up.  Bring a case for your glasses.   Bring crutches or walker if applicable.  Remove all piercings.  Leave jewelry and any other valuables at home.  Hair extensions with metal clips must be removed prior to surgery.  The Pre-Admission Testing nurse will instruct you to bring medications if unable to obtain an accurate list in Pre-Admission Testing.      Preventing a Surgical Site  Infection:  For 2 to 3 days before surgery, avoid shaving with a razor because the razor can irritate skin and make it easier to develop an infection.    Any areas of open skin can increase the risk of a post-operative wound infection by allowing bacteria to enter and travel throughout the body.  Notify your surgeon if you have any skin wounds / rashes even if it is not near the expected surgical site.  The area will need assessed to determine if surgery should be delayed until it is healed.  The night prior to surgery shower using a fresh bar of anti-bacterial soap (such as Dial) and clean washcloth.  Sleep in a clean bed with clean clothing.  Do not allow pets to sleep with you.  Shower on the morning of surgery using a fresh bar of anti-bacterial soap (such as Dial) and clean washcloth.  Dry with a clean towel and dress in clean clothing.  Ask your surgeon if you will be receiving antibiotics prior to surgery.  Make sure you, your family, and all healthcare providers clean their hands with soap and water or an alcohol based hand  before caring for you or your wound.    Day of surgery:  Your arrival time is approximately two hours before your scheduled surgery time.  Upon arrival, a Pre-op nurse and Anesthesiologist will review your health history, obtain vital signs, and answer questions you may have.  The only belongings needed at this time will be a list of your home medications and if applicable your C-PAP/BI-PAP machine.  A Pre-op nurse will start an IV and you may receive medication in preparation for surgery, including something to help you relax.     Please be aware that surgery does come with discomfort.  We want to make every effort to control your discomfort so please discuss any uncontrolled symptoms with your nurse.   Your doctor will most likely have prescribed pain medications.      If you are going home after surgery you will receive individualized written care instructions before being  discharged.  A responsible adult must drive you to and from the hospital on the day of your surgery and stay with you for 24 hours.  Discharge prescriptions can be filled by the hospital pharmacy during regular pharmacy hours.  If you are having surgery late in the day/evening your prescription may be e-prescribed to your pharmacy.  Please verify your pharmacy hours or chose a 24 hour pharmacy to avoid not having access to your prescription because your pharmacy has closed for the day.    If you are staying overnight following surgery, you will be transported to your hospital room following the recovery period.  [facility] has all private rooms.    If you have any questions please call Pre-Admission Testing at [phone].  Deductibles and co-payments are collected on the day of service. Please be prepared to pay the required co-pay, deductible or deposit on the day of service as defined by your plan.    Call your surgeon immediately if you experience any of the following symptoms:  Sore Throat  Shortness of Breath or difficulty breathing  Cough  Chills  Body soreness or muscle pain  Headache  Fever  New loss of taste or smell  Do not arrive for your surgery ill.  Your procedure will need to be rescheduled to another time.  You will need to call your physician before the day of surgery to avoid any unnecessary exposure to hospital staff as well as other patients.       CHLORHEXIDINE CLOTH INSTRUCTIONS  The morning of surgery follow these instructions using the Chlorhexidine cloths you've been given.  These steps reduce bacteria on the body.  Do not use the cloths near your eyes, ears mouth, genitalia or on open wounds.  Throw the cloths away after use but do not try to flush them down a toilet.      Open and remove one cloth at a time from the package.    Leave the cloth unfolded and begin the bathing.  Massage the skin with the cloths using gentle pressure to remove bacteria.  Do not scrub harshly.    Follow the steps below with one 2% CHG cloth per area (6 total cloths).  One cloth for neck, shoulders and chest.  One cloth for both arms, hands, fingers and underarms (do underarms last).  One cloth for the abdomen followed by groin.  One cloth for right leg and foot including between the toes.  One cloth for left leg and foot including between the toes.  The last cloth is to be used for the back of the neck, back and buttocks.    Allow the CHG to air dry 3 minutes on the skin which will give it time to work and decrease the chance of irritation.  The skin may feel sticky until it is dry.  Do not rinse with water or any other liquid or you will lose the beneficial effects of the CHG.  If mild skin irritation occurs, do rinse the skin to remove the CHG.  Report this to the nurse at time of admission.  Do not apply lotions, creams, ointments, deodorants or perfumes after using the clothes. Dress in clean clothes before coming to the hospital.

## 2022-12-22 DIAGNOSIS — M48.062 SPINAL STENOSIS OF LUMBAR REGION WITH NEUROGENIC CLAUDICATION: Primary | ICD-10-CM

## 2022-12-28 NOTE — TELEPHONE ENCOUNTER
PT CALLED [facility] SHE NEVER GOT THIS ANSWERED UPDATED. TRIED TO WT TO OFFICE BUT NOT AVAIL.     PLEASE CALL PT BACK TO [facility] OF DR. PARIKH ANSWER.     THANK YOU,

## 2022-12-30 ENCOUNTER — APPOINTMENT (OUTPATIENT)
Dept: GENERAL RADIOLOGY | Facility: HOSPITAL | Age: 62
End: 2022-12-30
Payer: COMMERCIAL

## 2022-12-30 ENCOUNTER — HOSPITAL ENCOUNTER (OUTPATIENT)
Facility: HOSPITAL | Age: 62
Discharge: HOME | End: 2023-01-02
Attending: NEUROLOGICAL SURGERY | Admitting: NEUROLOGICAL SURGERY
Payer: COMMERCIAL

## 2022-12-30 ENCOUNTER — ANESTHESIA (OUTPATIENT)
Dept: PERIOP | Facility: HOSPITAL | Age: 62
End: 2022-12-30
Payer: COMMERCIAL

## 2022-12-30 ENCOUNTER — ANESTHESIA EVENT (OUTPATIENT)
Dept: PERIOP | Facility: HOSPITAL | Age: 62
End: 2022-12-30
Payer: COMMERCIAL

## 2022-12-30 DIAGNOSIS — Z98.890 STATUS POST LAMINECTOMY: ICD-10-CM

## 2022-12-30 DIAGNOSIS — M48.062 SPINAL STENOSIS OF LUMBAR REGION WITH NEUROGENIC CLAUDICATION: Primary | ICD-10-CM

## 2022-12-30 PROCEDURE — 72100 X-RAY EXAM L-S SPINE 2/3 VWS: CPT

## 2022-12-30 PROCEDURE — 25010000002 SODIUM CHLORIDE 0.9 % WITH KCL 20 MEQ 20-0.9 MEQ/L-% SOLUTION: Performed by: NEUROLOGICAL SURGERY

## 2022-12-30 PROCEDURE — 25010000002 MORPHINE PER 10 MG: Performed by: NEUROLOGICAL SURGERY

## 2022-12-30 PROCEDURE — 22630 ARTHRD PST TQ 1NTRSPC LUM: CPT | Performed by: NEUROLOGICAL SURGERY

## 2022-12-30 PROCEDURE — 22853 INSJ BIOMECHANICAL DEVICE: CPT | Performed by: NEUROLOGICAL SURGERY

## 2022-12-30 PROCEDURE — 63052 LAM FACETC/FRMT ARTHRD LUM 1: CPT | Performed by: NEUROLOGICAL SURGERY

## 2022-12-30 PROCEDURE — C1713 ANCHOR/SCREW BN/BN,TIS/BN: HCPCS | Performed by: NEUROLOGICAL SURGERY

## 2022-12-30 PROCEDURE — 22853 INSJ BIOMECHANICAL DEVICE: CPT | Performed by: SPECIALIST/TECHNOLOGIST, OTHER

## 2022-12-30 PROCEDURE — 63053 LAM FACTC/FRMT ARTHRD LUM EA: CPT | Performed by: NEUROLOGICAL SURGERY

## 2022-12-30 PROCEDURE — 20939 BONE MARROW ASPIR BONE GRFG: CPT | Performed by: NEUROLOGICAL SURGERY

## 2022-12-30 PROCEDURE — 22842 INSERT SPINE FIXATION DEVICE: CPT | Performed by: SPECIALIST/TECHNOLOGIST, OTHER

## 2022-12-30 PROCEDURE — 20939 BONE MARROW ASPIR BONE GRFG: CPT | Performed by: SPECIALIST/TECHNOLOGIST, OTHER

## 2022-12-30 PROCEDURE — 25010000002 VANCOMYCIN 1 G RECONSTITUTED SOLUTION 1 EACH VIAL: Performed by: NEUROLOGICAL SURGERY

## 2022-12-30 PROCEDURE — 76000 FLUOROSCOPY <1 HR PHYS/QHP: CPT

## 2022-12-30 PROCEDURE — 25010000002 CEFAZOLIN IN DEXTROSE 2-4 GM/100ML-% SOLUTION: Performed by: NEUROLOGICAL SURGERY

## 2022-12-30 PROCEDURE — 22632 ARTHRD PST TQ 1NTRSPC LM EA: CPT | Performed by: SPECIALIST/TECHNOLOGIST, OTHER

## 2022-12-30 PROCEDURE — 25010000002 ONDANSETRON PER 1 MG: Performed by: STUDENT IN AN ORGANIZED HEALTH CARE EDUCATION/TRAINING PROGRAM

## 2022-12-30 PROCEDURE — 25010000002 HYDROMORPHONE PER 4 MG: Performed by: STUDENT IN AN ORGANIZED HEALTH CARE EDUCATION/TRAINING PROGRAM

## 2022-12-30 PROCEDURE — 22842 INSERT SPINE FIXATION DEVICE: CPT | Performed by: NEUROLOGICAL SURGERY

## 2022-12-30 PROCEDURE — 63052 LAM FACETC/FRMT ARTHRD LUM 1: CPT | Performed by: SPECIALIST/TECHNOLOGIST, OTHER

## 2022-12-30 PROCEDURE — 22630 ARTHRD PST TQ 1NTRSPC LUM: CPT | Performed by: SPECIALIST/TECHNOLOGIST, OTHER

## 2022-12-30 PROCEDURE — 25010000002 PROPOFOL 10 MG/ML EMULSION: Performed by: STUDENT IN AN ORGANIZED HEALTH CARE EDUCATION/TRAINING PROGRAM

## 2022-12-30 PROCEDURE — 63053 LAM FACTC/FRMT ARTHRD LUM EA: CPT | Performed by: SPECIALIST/TECHNOLOGIST, OTHER

## 2022-12-30 PROCEDURE — 25010000002 DEXAMETHASONE SODIUM PHOSPHATE 20 MG/5ML SOLUTION: Performed by: STUDENT IN AN ORGANIZED HEALTH CARE EDUCATION/TRAINING PROGRAM

## 2022-12-30 PROCEDURE — 25010000002 FENTANYL CITRATE (PF) 50 MCG/ML SOLUTION: Performed by: STUDENT IN AN ORGANIZED HEALTH CARE EDUCATION/TRAINING PROGRAM

## 2022-12-30 PROCEDURE — 25010000002 NEOSTIGMINE 5 MG/10ML SOLUTION: Performed by: STUDENT IN AN ORGANIZED HEALTH CARE EDUCATION/TRAINING PROGRAM

## 2022-12-30 PROCEDURE — 22632 ARTHRD PST TQ 1NTRSPC LM EA: CPT | Performed by: NEUROLOGICAL SURGERY

## 2022-12-30 PROCEDURE — 25010000002 HEPARIN (PORCINE) PER 1000 UNITS: Performed by: NEUROLOGICAL SURGERY

## 2022-12-30 PROCEDURE — 25010000002 MIDAZOLAM PER 1 MG: Performed by: ANESTHESIOLOGY

## 2022-12-30 PROCEDURE — 25010000002 MAGNESIUM SULFATE PER 500 MG OF MAGNESIUM: Performed by: STUDENT IN AN ORGANIZED HEALTH CARE EDUCATION/TRAINING PROGRAM

## 2022-12-30 DEVICE — SET SCREW 5440030 4.75 TI NS BRK OFF
Type: IMPLANTABLE DEVICE | Site: SPINE LUMBAR | Status: FUNCTIONAL
Brand: CD HORIZON® SPINAL SYSTEM

## 2022-12-30 DEVICE — SPACER 6068073 CATALYFT PL SHORT 7MM
Type: IMPLANTABLE DEVICE | Site: SPINE LUMBAR | Status: FUNCTIONAL
Brand: CATALYFT PL EXPANDABLE INTERBODY SYSTEM

## 2022-12-30 DEVICE — ADHERUS AUTOSPRAY DURAL SEALANT IS A STERILE, SINGLE-USE, ELECTROMECHANICAL, BATTERY OPERATED, DEVICE WITH INTERNAL SYSTEM COMPONENTS THAT PROVIDE AIR FLOW TO AID IN THE DELIVERY OF A SYNTHETIC, ABSORBABLE, TWO-COMPONENT HYDROGEL SEALANT SYSTEM AND ALLOW DELIVERY TO BE INTERRUPTED WITHOUT CLOGGING.
Type: IMPLANTABLE DEVICE | Site: SPINE LUMBAR | Status: FUNCTIONAL
Brand: ADHERUS AUTOSPRAY DURAL SEALANT

## 2022-12-30 DEVICE — FLOSEAL WITH RECOTHROM - 5ML
Type: IMPLANTABLE DEVICE | Site: SPINE LUMBAR | Status: FUNCTIONAL
Brand: FLOSEAL HEMOSTATIC MATRIX

## 2022-12-30 DEVICE — DBM T42210 2.5CMX5CM 2 EACH GRAFTON MATR
Type: IMPLANTABLE DEVICE | Site: SPINE LUMBAR | Status: FUNCTIONAL
Brand: GRAFTON®AND GRAFTON PLUS®DEMINERALIZED BONE MATRIX (DBM)

## 2022-12-30 DEVICE — SSC BONE WAX
Type: IMPLANTABLE DEVICE | Site: SPINE LUMBAR | Status: FUNCTIONAL
Brand: SSC BONE WAX

## 2022-12-30 RX ORDER — DILTIAZEM HYDROCHLORIDE 180 MG/1
180 CAPSULE, COATED, EXTENDED RELEASE ORAL DAILY
Status: DISCONTINUED | OUTPATIENT
Start: 2022-12-30 | End: 2023-01-02 | Stop reason: HOSPADM

## 2022-12-30 RX ORDER — ONDANSETRON 2 MG/ML
4 INJECTION INTRAMUSCULAR; INTRAVENOUS EVERY 6 HOURS PRN
Status: DISCONTINUED | OUTPATIENT
Start: 2022-12-30 | End: 2023-01-02 | Stop reason: HOSPADM

## 2022-12-30 RX ORDER — CEFAZOLIN SODIUM 2 G/100ML
2 INJECTION, SOLUTION INTRAVENOUS ONCE
Status: COMPLETED | OUTPATIENT
Start: 2022-12-30 | End: 2022-12-30

## 2022-12-30 RX ORDER — DIPHENHYDRAMINE HYDROCHLORIDE 50 MG/ML
12.5 INJECTION INTRAMUSCULAR; INTRAVENOUS
Status: DISCONTINUED | OUTPATIENT
Start: 2022-12-30 | End: 2022-12-30 | Stop reason: HOSPADM

## 2022-12-30 RX ORDER — MORPHINE SULFATE 2 MG/ML
2 INJECTION, SOLUTION INTRAMUSCULAR; INTRAVENOUS EVERY 4 HOURS PRN
Status: DISCONTINUED | OUTPATIENT
Start: 2022-12-30 | End: 2023-01-02 | Stop reason: HOSPADM

## 2022-12-30 RX ORDER — SODIUM CHLORIDE, SODIUM LACTATE, POTASSIUM CHLORIDE, CALCIUM CHLORIDE 600; 310; 30; 20 MG/100ML; MG/100ML; MG/100ML; MG/100ML
9 INJECTION, SOLUTION INTRAVENOUS CONTINUOUS
Status: DISCONTINUED | OUTPATIENT
Start: 2022-12-30 | End: 2022-12-30

## 2022-12-30 RX ORDER — PROMETHAZINE HYDROCHLORIDE 25 MG/1
25 SUPPOSITORY RECTAL ONCE AS NEEDED
Status: DISCONTINUED | OUTPATIENT
Start: 2022-12-30 | End: 2022-12-30 | Stop reason: HOSPADM

## 2022-12-30 RX ORDER — FLUMAZENIL 0.1 MG/ML
0.2 INJECTION INTRAVENOUS AS NEEDED
Status: DISCONTINUED | OUTPATIENT
Start: 2022-12-30 | End: 2022-12-30 | Stop reason: HOSPADM

## 2022-12-30 RX ORDER — NALOXONE HCL 0.4 MG/ML
0.2 VIAL (ML) INJECTION AS NEEDED
Status: DISCONTINUED | OUTPATIENT
Start: 2022-12-30 | End: 2022-12-30 | Stop reason: HOSPADM

## 2022-12-30 RX ORDER — OXYCODONE AND ACETAMINOPHEN 7.5; 325 MG/1; MG/1
1 TABLET ORAL EVERY 4 HOURS PRN
Status: DISCONTINUED | OUTPATIENT
Start: 2022-12-30 | End: 2022-12-30 | Stop reason: HOSPADM

## 2022-12-30 RX ORDER — ALBUTEROL SULFATE 90 UG/1
1 AEROSOL, METERED RESPIRATORY (INHALATION) EVERY 4 HOURS PRN
Status: DISCONTINUED | OUTPATIENT
Start: 2022-12-30 | End: 2023-01-02 | Stop reason: HOSPADM

## 2022-12-30 RX ORDER — GLYCOPYRROLATE 0.2 MG/ML
INJECTION INTRAMUSCULAR; INTRAVENOUS AS NEEDED
Status: DISCONTINUED | OUTPATIENT
Start: 2022-12-30 | End: 2022-12-30 | Stop reason: SURG

## 2022-12-30 RX ORDER — EPHEDRINE SULFATE 50 MG/ML
5 INJECTION, SOLUTION INTRAVENOUS ONCE AS NEEDED
Status: DISCONTINUED | OUTPATIENT
Start: 2022-12-30 | End: 2022-12-30 | Stop reason: HOSPADM

## 2022-12-30 RX ORDER — HYDROMORPHONE HYDROCHLORIDE 1 MG/ML
0.5 INJECTION, SOLUTION INTRAMUSCULAR; INTRAVENOUS; SUBCUTANEOUS
Status: DISCONTINUED | OUTPATIENT
Start: 2022-12-30 | End: 2022-12-30 | Stop reason: HOSPADM

## 2022-12-30 RX ORDER — HYDRALAZINE HYDROCHLORIDE 20 MG/ML
5 INJECTION INTRAMUSCULAR; INTRAVENOUS
Status: DISCONTINUED | OUTPATIENT
Start: 2022-12-30 | End: 2022-12-30 | Stop reason: HOSPADM

## 2022-12-30 RX ORDER — ONDANSETRON 2 MG/ML
INJECTION INTRAMUSCULAR; INTRAVENOUS AS NEEDED
Status: DISCONTINUED | OUTPATIENT
Start: 2022-12-30 | End: 2022-12-30 | Stop reason: SURG

## 2022-12-30 RX ORDER — NALOXONE HCL 0.4 MG/ML
0.4 VIAL (ML) INJECTION
Status: DISCONTINUED | OUTPATIENT
Start: 2022-12-30 | End: 2023-01-02 | Stop reason: HOSPADM

## 2022-12-30 RX ORDER — MIDAZOLAM HYDROCHLORIDE 1 MG/ML
1 INJECTION INTRAMUSCULAR; INTRAVENOUS
Status: DISCONTINUED | OUTPATIENT
Start: 2022-12-30 | End: 2022-12-30 | Stop reason: HOSPADM

## 2022-12-30 RX ORDER — PROPOFOL 10 MG/ML
VIAL (ML) INTRAVENOUS AS NEEDED
Status: DISCONTINUED | OUTPATIENT
Start: 2022-12-30 | End: 2022-12-30 | Stop reason: SURG

## 2022-12-30 RX ORDER — SODIUM CHLORIDE 9 MG/ML
40 INJECTION, SOLUTION INTRAVENOUS AS NEEDED
Status: DISCONTINUED | OUTPATIENT
Start: 2022-12-30 | End: 2023-01-02 | Stop reason: HOSPADM

## 2022-12-30 RX ORDER — DIPHENHYDRAMINE HCL 25 MG
25 CAPSULE ORAL
Status: DISCONTINUED | OUTPATIENT
Start: 2022-12-30 | End: 2022-12-30 | Stop reason: HOSPADM

## 2022-12-30 RX ORDER — LIDOCAINE HYDROCHLORIDE 20 MG/ML
INJECTION, SOLUTION INFILTRATION; PERINEURAL AS NEEDED
Status: DISCONTINUED | OUTPATIENT
Start: 2022-12-30 | End: 2022-12-30 | Stop reason: SURG

## 2022-12-30 RX ORDER — MAGNESIUM SULFATE HEPTAHYDRATE 500 MG/ML
INJECTION, SOLUTION INTRAMUSCULAR; INTRAVENOUS AS NEEDED
Status: DISCONTINUED | OUTPATIENT
Start: 2022-12-30 | End: 2022-12-30 | Stop reason: SURG

## 2022-12-30 RX ORDER — PANTOPRAZOLE SODIUM 40 MG/1
40 TABLET, DELAYED RELEASE ORAL EVERY MORNING
Status: DISCONTINUED | OUTPATIENT
Start: 2022-12-31 | End: 2023-01-02 | Stop reason: HOSPADM

## 2022-12-30 RX ORDER — SODIUM CHLORIDE 0.9 % (FLUSH) 0.9 %
10 SYRINGE (ML) INJECTION AS NEEDED
Status: DISCONTINUED | OUTPATIENT
Start: 2022-12-30 | End: 2023-01-02 | Stop reason: HOSPADM

## 2022-12-30 RX ORDER — SODIUM CHLORIDE AND POTASSIUM CHLORIDE 150; 900 MG/100ML; MG/100ML
100 INJECTION, SOLUTION INTRAVENOUS CONTINUOUS
Status: DISCONTINUED | OUTPATIENT
Start: 2022-12-30 | End: 2023-01-02 | Stop reason: HOSPADM

## 2022-12-30 RX ORDER — ROCURONIUM BROMIDE 10 MG/ML
INJECTION, SOLUTION INTRAVENOUS AS NEEDED
Status: DISCONTINUED | OUTPATIENT
Start: 2022-12-30 | End: 2022-12-30 | Stop reason: SURG

## 2022-12-30 RX ORDER — DEXAMETHASONE SODIUM PHOSPHATE 4 MG/ML
INJECTION, SOLUTION INTRA-ARTICULAR; INTRALESIONAL; INTRAMUSCULAR; INTRAVENOUS; SOFT TISSUE AS NEEDED
Status: DISCONTINUED | OUTPATIENT
Start: 2022-12-30 | End: 2022-12-30 | Stop reason: SURG

## 2022-12-30 RX ORDER — FAMOTIDINE 10 MG/ML
20 INJECTION, SOLUTION INTRAVENOUS ONCE
Status: COMPLETED | OUTPATIENT
Start: 2022-12-30 | End: 2022-12-30

## 2022-12-30 RX ORDER — HYDROCODONE BITARTRATE AND ACETAMINOPHEN 5; 325 MG/1; MG/1
1 TABLET ORAL EVERY 4 HOURS PRN
Status: DISCONTINUED | OUTPATIENT
Start: 2022-12-30 | End: 2023-01-02 | Stop reason: HOSPADM

## 2022-12-30 RX ORDER — PROMETHAZINE HYDROCHLORIDE 25 MG/1
25 TABLET ORAL ONCE AS NEEDED
Status: DISCONTINUED | OUTPATIENT
Start: 2022-12-30 | End: 2022-12-30 | Stop reason: HOSPADM

## 2022-12-30 RX ORDER — FENTANYL CITRATE 50 UG/ML
50 INJECTION, SOLUTION INTRAMUSCULAR; INTRAVENOUS
Status: DISCONTINUED | OUTPATIENT
Start: 2022-12-30 | End: 2022-12-30 | Stop reason: HOSPADM

## 2022-12-30 RX ORDER — SODIUM CHLORIDE 0.9 % (FLUSH) 0.9 %
3 SYRINGE (ML) INJECTION EVERY 12 HOURS SCHEDULED
Status: DISCONTINUED | OUTPATIENT
Start: 2022-12-30 | End: 2022-12-30 | Stop reason: HOSPADM

## 2022-12-30 RX ORDER — HYDROCODONE BITARTRATE AND ACETAMINOPHEN 7.5; 325 MG/1; MG/1
1 TABLET ORAL ONCE AS NEEDED
Status: DISCONTINUED | OUTPATIENT
Start: 2022-12-30 | End: 2022-12-30 | Stop reason: HOSPADM

## 2022-12-30 RX ORDER — ONDANSETRON 4 MG/1
4 TABLET, FILM COATED ORAL EVERY 6 HOURS PRN
Status: DISCONTINUED | OUTPATIENT
Start: 2022-12-30 | End: 2023-01-02 | Stop reason: HOSPADM

## 2022-12-30 RX ORDER — NEOSTIGMINE METHYLSULFATE 0.5 MG/ML
INJECTION, SOLUTION INTRAVENOUS AS NEEDED
Status: DISCONTINUED | OUTPATIENT
Start: 2022-12-30 | End: 2022-12-30 | Stop reason: SURG

## 2022-12-30 RX ORDER — CEFAZOLIN SODIUM 2 G/100ML
2 INJECTION, SOLUTION INTRAVENOUS EVERY 8 HOURS
Status: COMPLETED | OUTPATIENT
Start: 2022-12-30 | End: 2022-12-30

## 2022-12-30 RX ORDER — FENTANYL CITRATE 50 UG/ML
INJECTION, SOLUTION INTRAMUSCULAR; INTRAVENOUS AS NEEDED
Status: DISCONTINUED | OUTPATIENT
Start: 2022-12-30 | End: 2022-12-30 | Stop reason: SURG

## 2022-12-30 RX ORDER — SODIUM CHLORIDE 0.9 % (FLUSH) 0.9 %
3-10 SYRINGE (ML) INJECTION AS NEEDED
Status: DISCONTINUED | OUTPATIENT
Start: 2022-12-30 | End: 2022-12-30 | Stop reason: HOSPADM

## 2022-12-30 RX ORDER — ONDANSETRON 2 MG/ML
4 INJECTION INTRAMUSCULAR; INTRAVENOUS ONCE AS NEEDED
Status: DISCONTINUED | OUTPATIENT
Start: 2022-12-30 | End: 2022-12-30 | Stop reason: HOSPADM

## 2022-12-30 RX ORDER — SODIUM CHLORIDE 0.9 % (FLUSH) 0.9 %
3 SYRINGE (ML) INJECTION EVERY 12 HOURS SCHEDULED
Status: DISCONTINUED | OUTPATIENT
Start: 2022-12-30 | End: 2023-01-02 | Stop reason: HOSPADM

## 2022-12-30 RX ORDER — SODIUM CHLORIDE 9 MG/ML
INJECTION, SOLUTION INTRAVENOUS AS NEEDED
Status: DISCONTINUED | OUTPATIENT
Start: 2022-12-30 | End: 2022-12-30 | Stop reason: HOSPADM

## 2022-12-30 RX ORDER — LIDOCAINE HYDROCHLORIDE 10 MG/ML
0.5 INJECTION, SOLUTION EPIDURAL; INFILTRATION; INTRACAUDAL; PERINEURAL ONCE AS NEEDED
Status: DISCONTINUED | OUTPATIENT
Start: 2022-12-30 | End: 2022-12-30 | Stop reason: HOSPADM

## 2022-12-30 RX ORDER — WOUND DRESSING ADHESIVE - LIQUID
LIQUID MISCELLANEOUS AS NEEDED
Status: DISCONTINUED | OUTPATIENT
Start: 2022-12-30 | End: 2022-12-30 | Stop reason: HOSPADM

## 2022-12-30 RX ORDER — LABETALOL HYDROCHLORIDE 5 MG/ML
5 INJECTION, SOLUTION INTRAVENOUS
Status: DISCONTINUED | OUTPATIENT
Start: 2022-12-30 | End: 2022-12-30 | Stop reason: HOSPADM

## 2022-12-30 RX ADMIN — FENTANYL CITRATE 25 MCG: 50 INJECTION, SOLUTION INTRAMUSCULAR; INTRAVENOUS at 07:56

## 2022-12-30 RX ADMIN — HYDROMORPHONE HYDROCHLORIDE 0.5 MG: 1 INJECTION, SOLUTION INTRAMUSCULAR; INTRAVENOUS; SUBCUTANEOUS at 11:32

## 2022-12-30 RX ADMIN — CEFAZOLIN SODIUM 2 G: 2 INJECTION, SOLUTION INTRAVENOUS at 14:28

## 2022-12-30 RX ADMIN — CEFAZOLIN SODIUM 2 G: 2 INJECTION, SOLUTION INTRAVENOUS at 07:26

## 2022-12-30 RX ADMIN — SODIUM CHLORIDE, POTASSIUM CHLORIDE, SODIUM LACTATE AND CALCIUM CHLORIDE 9 ML/HR: 600; 310; 30; 20 INJECTION, SOLUTION INTRAVENOUS at 06:32

## 2022-12-30 RX ADMIN — FENTANYL CITRATE 25 MCG: 50 INJECTION, SOLUTION INTRAMUSCULAR; INTRAVENOUS at 10:55

## 2022-12-30 RX ADMIN — ROCURONIUM BROMIDE 10 MG: 50 INJECTION, SOLUTION INTRAVENOUS at 09:54

## 2022-12-30 RX ADMIN — ROCURONIUM BROMIDE 50 MG: 50 INJECTION, SOLUTION INTRAVENOUS at 07:36

## 2022-12-30 RX ADMIN — MORPHINE SULFATE 2 MG: 2 INJECTION, SOLUTION INTRAMUSCULAR; INTRAVENOUS at 21:05

## 2022-12-30 RX ADMIN — Medication 3 ML: at 21:08

## 2022-12-30 RX ADMIN — HYDROCODONE BITARTRATE AND ACETAMINOPHEN 1 TABLET: 5; 325 TABLET ORAL at 12:57

## 2022-12-30 RX ADMIN — DILTIAZEM HYDROCHLORIDE 180 MG: 180 CAPSULE, COATED, EXTENDED RELEASE ORAL at 14:28

## 2022-12-30 RX ADMIN — ONDANSETRON 4 MG: 2 INJECTION INTRAMUSCULAR; INTRAVENOUS at 10:42

## 2022-12-30 RX ADMIN — Medication 3 ML: at 14:41

## 2022-12-30 RX ADMIN — FAMOTIDINE 20 MG: 10 INJECTION INTRAVENOUS at 06:33

## 2022-12-30 RX ADMIN — ROCURONIUM BROMIDE 20 MG: 50 INJECTION, SOLUTION INTRAVENOUS at 08:32

## 2022-12-30 RX ADMIN — POTASSIUM CHLORIDE AND SODIUM CHLORIDE 100 ML/HR: 900; 150 INJECTION, SOLUTION INTRAVENOUS at 14:28

## 2022-12-30 RX ADMIN — NEOSTIGMINE METHYLSULFATE 3 MG: 0.5 INJECTION INTRAVENOUS at 11:07

## 2022-12-30 RX ADMIN — HYDROCODONE BITARTRATE AND ACETAMINOPHEN 1 TABLET: 5; 325 TABLET ORAL at 17:07

## 2022-12-30 RX ADMIN — MIDAZOLAM 1 MG: 1 INJECTION INTRAMUSCULAR; INTRAVENOUS at 06:33

## 2022-12-30 RX ADMIN — MAGNESIUM SULFATE HEPTAHYDRATE 1 G: 500 INJECTION, SOLUTION INTRAMUSCULAR; INTRAVENOUS at 09:54

## 2022-12-30 RX ADMIN — ROCURONIUM BROMIDE 15 MG: 50 INJECTION, SOLUTION INTRAVENOUS at 10:33

## 2022-12-30 RX ADMIN — SODIUM CHLORIDE, POTASSIUM CHLORIDE, SODIUM LACTATE AND CALCIUM CHLORIDE: 600; 310; 30; 20 INJECTION, SOLUTION INTRAVENOUS at 10:33

## 2022-12-30 RX ADMIN — CEFAZOLIN SODIUM 2 G: 2 INJECTION, SOLUTION INTRAVENOUS at 21:05

## 2022-12-30 RX ADMIN — GLYCOPYRROLATE 0.4 MCG: 1 INJECTION INTRAMUSCULAR; INTRAVENOUS at 11:07

## 2022-12-30 RX ADMIN — LIDOCAINE HYDROCHLORIDE 100 MG: 20 INJECTION, SOLUTION INFILTRATION; PERINEURAL at 07:35

## 2022-12-30 RX ADMIN — FENTANYL CITRATE 25 MCG: 50 INJECTION, SOLUTION INTRAMUSCULAR; INTRAVENOUS at 08:05

## 2022-12-30 RX ADMIN — DEXAMETHASONE SODIUM PHOSPHATE 8 MG: 4 INJECTION, SOLUTION INTRAMUSCULAR; INTRAVENOUS at 07:45

## 2022-12-30 RX ADMIN — ROCURONIUM BROMIDE 20 MG: 50 INJECTION, SOLUTION INTRAVENOUS at 09:07

## 2022-12-30 RX ADMIN — PROPOFOL 160 MG: 10 INJECTION, EMULSION INTRAVENOUS at 07:35

## 2022-12-30 RX ADMIN — FENTANYL CITRATE 25 MCG: 50 INJECTION, SOLUTION INTRAMUSCULAR; INTRAVENOUS at 11:10

## 2022-12-30 NOTE — ANESTHESIA PREPROCEDURE EVALUATION
" Anesthesia Evaluation     Patient summary reviewed and Nursing notes reviewed   no history of anesthetic complications:  NPO Solid Status: > 8 hours  NPO Liquid Status: > 2 hours           Airway   Mallampati: II  TM distance: >3 FB  Neck ROM: full  No difficulty expected  Dental    (+) implants        Pulmonary - normal exam   (+) COPD, shortness of breath,   (-) sleep apnea, wheezes, not a smoker    ROS comment: Negative patient screen for VÍCTOR    Cardiovascular - normal exam    ECG reviewed  PT is on anticoagulation therapy  Rhythm: regular  Rate: normal    (+) hypertension, valvular problems/murmurs MVP, dysrhythmias (s/p ablation this year, 1 episode of A fib following ablation, none since. remains on eliquis) Paroxysmal Atrial Fib, PVD, DVT resolved,       Neuro/Psych  GI/Hepatic/Renal/Endo    (+)  GERD,      Musculoskeletal     Abdominal    Substance History      OB/GYN          Other   blood dyscrasia,       Other Comment: Alpha 1 anti-trypsin deficiency      Phys Exam Other: Previous grade I view                Anesthesia Plan    ASA 3     general     (I have reviewed the patient's history and chart with the patient, including all pertinent laboratory results and imaging. I have explained the risks of anesthesia including but not limited to dental damage, corneal abrasion, nerve injury, MI, stroke, aspiration, and death. Patient has agreed to proceed.     /84 (BP Location: Left arm, Patient Position: Lying)   Pulse 67   Temp 36.9 °C (98.5 °F) (Oral)   Resp 18   Ht 170.2 cm (67\")   Wt 59.5 kg (131 lb 2.8 oz)   SpO2 100%   BMI 20.54 kg/m²   )  intravenous induction     Anesthetic plan, risks, benefits, and alternatives have been provided, discussed and informed consent has been obtained with: patient.        CODE STATUS:       "

## 2022-12-30 NOTE — OP NOTE
Preoperative diagnosis: Lumbar stenosis L3-4 and L4-5 with neurogenic claudication    Postoperative diagnosis: same    Procedure performed: A bilateral L3-4 and L4-5 laminectomy, medial facetectomy and foraminotomies with a bilateral discectomy and a posterior lumbar interbody fusion with instrumentation    Spinal Surgery Levels Completed:2 Levels     Surgeon: Rigo Mathur M.D.    Assistant: Raissa Degroot CFA who was instrumental in helping with hemostasis, visualization of neural structures, and retraction of neural structures.  Her skilled assistance was necessary for the success of this case    Indications for procedure: This patient has been having severe pain in the legs and the back.  Imaging shows severe stenosis at L3-4 and severe stenosis with a grade 1 spondylolisthesis at L4-5.  There has been no improvement with nonsurgical treatment and the patient elected to proceed with surgery.    Operative summary: After induction of general anesthesia with intravenous agents patient was intubated and placed on the operating table in the prone position.  All pressure points were padded including peripheral points of entrapment.  The back was then prepped with ChloraPrep.  After a 3 minute drying time the back was draped with Ioban, towels, half sheets and a split sheet.    An incision was made in the posterior iliac crest ridge and a Jamshidi needle was placed into the marrow cavity and 15 mL of marrow were aspirated and placed over Mastergraft.  Following this a pin was placed into the iliac crest to anchor the Mazor robot.    An incision was then made in the skin in the midline.  The dissection was carried down to the thoracolumbar fascia which was opened in the midline. The muscles were stripped off the L3 and L4 laminar arch and spinous process and the L5 laminar arch and spinous process.  Following this the entire spinous process of L3 and L4 was removed as well as the superior spinous process of L5.  Next  the [employer] robot was referenced to the case using C arm and the previously obtained CT scan.  We had previously planned placement of the screws and then 6.5 mm by 40 mm screws were placed into the lateral mass of L3 and L4 bilaterally and 6.5 mm by 40 mm screws were placed bilaterally at L5.   Following this the laminar arch of L3 and L4 were removed by thinning them down with the Midas Jose Eduardo drill and cracking them directly back off the dura.  The superior spinous process and laminar arch of L5 were also removed.  Once the dura was exposed the remainder of the operation was done under the high power magnification of the operating microscope using microsurgical technique and microsurgical instrumentation.  The [place] 4 was used to dissect the dura and the nerve root off of the medial pedicle of L3 and L4 and L5 bilaterally.  A combination of the Kerrison's and the Midas Jose Eduardo were used to open the lateral recess out to the level of the medial pedicle.  This was both at the top and the lower decompressed level.  Once this was done the medial aspect of the facet was removed in order to open the superior foramen.  Following this each of the 4 nerve roots were checked to be sure they were decompressed.  Once the nerve roots were thoroughly decompressed the dural sac was then mobilized medially and the disc was exposed.  The disc was incised and disc material was removed from the disc space using the down-biting curettes and the pituitary ronguers.  Once the disc was emptied as much as possible tawny were inserted into the disc space to clear the cartilaginous endplate completely and make a space for the cages.  I was then able to take 2 Medtronic catalyft cages measuring  23 mm in length and place them into the disc spaces at each level.  The Mastergraft and marrow as well as locally harvested bone were also placed into the disc space.  The cages were then expanded until they were tight in the disc space and had produced  a lordosis.    There was a small pinhole right in the midline at L4-5.  It looks like it was from a previous LP.  It was leaking some spinal fluid so I put a stitch and overlaid it with a fat graft.  At the end of the procedure the entire area was coated with Adherus.    Finally intraoperative x-ray confirmed good placement of the hardware and the screws were then connected with a vanessa.  Following this the area was copiously irrigated with antibiotic solution and then another dose of Kefzol was given prior to closure.  The paraspinous musculature was injected with 100 cc of 1/8% Marcaine with 1:200,000 epinephrine solution.  A drain was placed in the epidural space and tunneled subcutaneously.  The incision was then closed in layers bedrest and the patient was taken to the recovery room in stable condition.  There were no apparent complications and sponge instrument and needle counts were correct at the end of the procedure.

## 2022-12-30 NOTE — PLAN OF CARE
Goal Outcome Evaluation:   Aox4, POD 0 L3-5 Lami w/ fusion and instrumentation, pain managed with PRN PO pain meds,  family at bedside, HOB flat.

## 2022-12-30 NOTE — BRIEF OP NOTE
LUMBAR FUSION POSTERIOR AND INSTRUMENTATION WITH NEURO ROBOT  Progress Note    Radha HANDY Lisa  12/30/2022    Pre-op Diagnosis:   Spinal stenosis of lumbar region with neurogenic claudication [M48.062]       Post-Op Diagnosis Codes:     * Spinal stenosis of lumbar region with neurogenic claudication [M48.062]    Procedure/CPT® Codes:        Procedure(s):  Lumbar 3 to lumbar 4 and lumbar 4 to lumbar 5 laminectomy with fusion and instrumentation        Surgeon(s):  Rigo Mathur MD    Anesthesia: General    Staff:   Cell Saver : Reshma Newsome  Circulator: Julia Hdez RN  Scrub Person: Farideh Booth  Vendor Representative: Rosales Padilla Ben  Assistant: Raissa Degroot CSA  Assistant: Raissa Degroot CSA      Estimated Blood Loss: 400 mL    Urine Voided: 625 mL    Specimens:                None          Drains:   Closed/Suction Drain 1 Midline Back Bulb 10 Fr. (Active)   Dressing Status Clean;Dry;Intact 12/30/22 1054       Urethral Catheter Silicone 16 Fr. (Active)       Findings: Severe stenosis L3-4 and L4-5        Complications: None      Rigo Mathur MD     Date: 12/30/2022  Time: 11:04 EST

## 2022-12-30 NOTE — ANESTHESIA PROCEDURE NOTES
Airway  Urgency: elective    Date/Time: 12/30/2022 7:39 AM  Airway not difficult    General Information and Staff    Patient location during procedure: OR  Anesthesiologist: Justice Bauer MD  CRNA/CAA: Hua Jenkins CRNA    Indications and Patient Condition  Indications for airway management: airway protection    Preoxygenated: yes  MILS not maintained throughout  Mask difficulty assessment: 1 - vent by mask    Final Airway Details  Final airway type: endotracheal airway      Successful airway: ETT  Cuffed: yes   Successful intubation technique: direct laryngoscopy  Endotracheal tube insertion site: oral  Blade: Tino  Blade size: 3  ETT size (mm): 7.0  Cormack-Lehane Classification: grade IIa - partial view of glottis  Placement verified by: chest auscultation and capnometry   Cuff volume (mL): 7  Measured from: lips  ETT/EBT  to lips (cm): 21  Number of attempts at approach: 1  Assessment: lips, teeth, and gum same as pre-op and atraumatic intubation

## 2022-12-30 NOTE — ANESTHESIA POSTPROCEDURE EVALUATION
"Patient: Radha Gonzalez    Procedure Summary     Date: 12/30/22 Room / Location: [facility] OR [address] [place] [address] MAIN OR    Anesthesia Start: 0730 Anesthesia Stop: 1121    Procedure: Lumbar 3 to lumbar 4 and lumbar 4 to lumbar 5 laminectomy with fusion and instrumentation (Spine Lumbar) Diagnosis:       Spinal stenosis of lumbar region with neurogenic claudication      (Spinal stenosis of lumbar region with neurogenic claudication [M48.062])    Surgeons: Rigo Mathur MD Provider: Kassie Green MD    Anesthesia Type: general ASA Status: 3          Anesthesia Type: general    Vitals  Vitals Value Taken Time   /67 12/30/22 1145   Temp 37.1 °C (98.8 °F) 12/30/22 1118   Pulse 70 12/30/22 1145   Resp 16 12/30/22 1145   SpO2 95 % 12/30/22 1145           Post Anesthesia Care and Evaluation    Patient location during evaluation: bedside  Patient participation: complete - patient participated  Level of consciousness: awake and alert  Pain management: adequate    Airway patency: patent  Anesthetic complications: No anesthetic complications  PONV Status: none  Cardiovascular status: acceptable  Respiratory status: acceptable  Hydration status: acceptable    Comments: /67 (BP Location: Right arm, Patient Position: Lying)   Pulse 70   Temp 37.1 °C (98.8 °F) (Oral)   Resp 16   Ht 170.2 cm (67\")   Wt 59.5 kg (131 lb 2.8 oz)   SpO2 95%   BMI 20.54 kg/m²         "

## 2022-12-31 ENCOUNTER — APPOINTMENT (OUTPATIENT)
Dept: GENERAL RADIOLOGY | Facility: HOSPITAL | Age: 62
End: 2022-12-31
Payer: COMMERCIAL

## 2022-12-31 LAB
BASOPHILS # BLD AUTO: 0.03 10*3/MM3 (ref 0–0.2)
BASOPHILS NFR BLD AUTO: 0.2 % (ref 0–1.5)
DEPRECATED RDW RBC AUTO: 45 FL (ref 37–54)
EOSINOPHIL # BLD AUTO: 0.01 10*3/MM3 (ref 0–0.4)
EOSINOPHIL NFR BLD AUTO: 0.1 % (ref 0.3–6.2)
ERYTHROCYTE [DISTWIDTH] IN BLOOD BY AUTOMATED COUNT: 11.8 % (ref 12.3–15.4)
HCT VFR BLD AUTO: 37 % (ref 34–46.6)
HGB BLD-MCNC: 12.3 G/DL (ref 12–15.9)
IMM GRANULOCYTES # BLD AUTO: 0.13 10*3/MM3 (ref 0–0.05)
IMM GRANULOCYTES NFR BLD AUTO: 0.7 % (ref 0–0.5)
LYMPHOCYTES # BLD AUTO: 1.05 10*3/MM3 (ref 0.7–3.1)
LYMPHOCYTES NFR BLD AUTO: 5.4 % (ref 19.6–45.3)
MCH RBC QN AUTO: 34.1 PG (ref 26.6–33)
MCHC RBC AUTO-ENTMCNC: 33.2 G/DL (ref 31.5–35.7)
MCV RBC AUTO: 102.5 FL (ref 79–97)
MONOCYTES # BLD AUTO: 1.91 10*3/MM3 (ref 0.1–0.9)
MONOCYTES NFR BLD AUTO: 9.8 % (ref 5–12)
NEUTROPHILS NFR BLD AUTO: 16.35 10*3/MM3 (ref 1.7–7)
NEUTROPHILS NFR BLD AUTO: 83.8 % (ref 42.7–76)
NRBC BLD AUTO-RTO: 0 /100 WBC (ref 0–0.2)
PLATELET # BLD AUTO: 199 10*3/MM3 (ref 140–450)
PMV BLD AUTO: 10 FL (ref 6–12)
RBC # BLD AUTO: 3.61 10*6/MM3 (ref 3.77–5.28)
WBC NRBC COR # BLD: 19.48 10*3/MM3 (ref 3.4–10.8)

## 2022-12-31 PROCEDURE — 25010000002 SODIUM CHLORIDE 0.9 % WITH KCL 20 MEQ 20-0.9 MEQ/L-% SOLUTION: Performed by: NEUROLOGICAL SURGERY

## 2022-12-31 PROCEDURE — 25010000002 MORPHINE PER 10 MG: Performed by: NEUROLOGICAL SURGERY

## 2022-12-31 PROCEDURE — G0378 HOSPITAL OBSERVATION PER HR: HCPCS

## 2022-12-31 PROCEDURE — 72100 X-RAY EXAM L-S SPINE 2/3 VWS: CPT

## 2022-12-31 PROCEDURE — 63710000001 ONDANSETRON PER 8 MG: Performed by: NEUROLOGICAL SURGERY

## 2022-12-31 PROCEDURE — 25010000002 ONDANSETRON PER 1 MG: Performed by: NEUROLOGICAL SURGERY

## 2022-12-31 PROCEDURE — 85025 COMPLETE CBC W/AUTO DIFF WBC: CPT | Performed by: NEUROLOGICAL SURGERY

## 2022-12-31 PROCEDURE — 99024 POSTOP FOLLOW-UP VISIT: CPT | Performed by: NEUROLOGICAL SURGERY

## 2022-12-31 PROCEDURE — 97161 PT EVAL LOW COMPLEX 20 MIN: CPT

## 2022-12-31 PROCEDURE — 97530 THERAPEUTIC ACTIVITIES: CPT

## 2022-12-31 RX ORDER — ACETAMINOPHEN 325 MG/1
650 TABLET ORAL EVERY 4 HOURS PRN
Status: DISCONTINUED | OUTPATIENT
Start: 2022-12-31 | End: 2023-01-02 | Stop reason: HOSPADM

## 2022-12-31 RX ORDER — DOCUSATE SODIUM 100 MG/1
100 CAPSULE, LIQUID FILLED ORAL 2 TIMES DAILY
Status: DISCONTINUED | OUTPATIENT
Start: 2022-12-31 | End: 2023-01-02 | Stop reason: HOSPADM

## 2022-12-31 RX ADMIN — DILTIAZEM HYDROCHLORIDE 180 MG: 180 CAPSULE, COATED, EXTENDED RELEASE ORAL at 17:12

## 2022-12-31 RX ADMIN — Medication 3 ML: at 21:57

## 2022-12-31 RX ADMIN — ONDANSETRON 4 MG: 2 INJECTION INTRAMUSCULAR; INTRAVENOUS at 07:47

## 2022-12-31 RX ADMIN — MORPHINE SULFATE 2 MG: 2 INJECTION, SOLUTION INTRAMUSCULAR; INTRAVENOUS at 01:23

## 2022-12-31 RX ADMIN — POTASSIUM CHLORIDE AND SODIUM CHLORIDE 100 ML/HR: 900; 150 INJECTION, SOLUTION INTRAVENOUS at 01:22

## 2022-12-31 RX ADMIN — ONDANSETRON HYDROCHLORIDE 4 MG: 4 TABLET, FILM COATED ORAL at 21:57

## 2022-12-31 RX ADMIN — Medication 3 ML: at 08:23

## 2022-12-31 RX ADMIN — HYDROCODONE BITARTRATE AND ACETAMINOPHEN 1 TABLET: 5; 325 TABLET ORAL at 15:08

## 2022-12-31 RX ADMIN — PANTOPRAZOLE SODIUM 40 MG: 40 TABLET, DELAYED RELEASE ORAL at 06:28

## 2022-12-31 RX ADMIN — ONDANSETRON HYDROCHLORIDE 4 MG: 4 TABLET, FILM COATED ORAL at 15:08

## 2022-12-31 RX ADMIN — HYDROCODONE BITARTRATE AND ACETAMINOPHEN 1 TABLET: 5; 325 TABLET ORAL at 21:57

## 2022-12-31 RX ADMIN — DOCUSATE SODIUM 100 MG: 100 CAPSULE, LIQUID FILLED ORAL at 21:57

## 2022-12-31 RX ADMIN — POTASSIUM CHLORIDE AND SODIUM CHLORIDE 100 ML/HR: 900; 150 INJECTION, SOLUTION INTRAVENOUS at 13:21

## 2022-12-31 NOTE — PROGRESS NOTES
LOS: 0 days   Patient Care Team:  Bosler, James William III, MD as PCP - General (Internal Medicine)  Raisa Cummings MD as Consulting Physician (Obstetrics and Gynecology)  Jj Dodson MD as Consulting Physician (Hematology and Oncology)  Bosler, James William III, MD as Referring Physician (Internal Medicine)    Chief Complaint: Postop lumbar fusion    Subjective     The patient is feeling pretty good today.  She has some back pain as expected but her legs feel better.  She has no headache.    Interval History:     History taken from: patient chart    Objective      She has good movement in both lower extremities.  I also looked at her drainage which is still fairly bloody.    Vital Signs  Temp:  [97.6 °F (36.4 °C)-99 °F (37.2 °C)] 98.2 °F (36.8 °C)  Heart Rate:  [63-73] 63  Resp:  [16-17] 17  BP: ()/(55-90) 121/63       Results Review:     I reviewed the patient's new clinical results.  She is afebrile.  Her white count is 19.5.  She says that with previous surgery she has had an elevated white count as well.      Assessment & Plan       Spinal stenosis of lumbar region with neurogenic claudication      I told the patient that we will leave the drain in 1 more day.  I will plan to check another CBC in the morning.  She is up and walking and as soon as we can get the drain out she can be discharged.  She is in agreement with that plan.      Rigo Mathur MD  12/31/22  11:09 EST

## 2022-12-31 NOTE — PLAN OF CARE
Goal Outcome Evaluation:      Aox4, VSS, intermittent nausea today resolved with zofran, standby assist to toilet and up in chair for meals.

## 2022-12-31 NOTE — THERAPY EVALUATION
Patient Name: Radha Gonzalez  : 1960    MRN: [id number]                              Today's Date: 2022       Admit Date: 2022    Visit Dx:     ICD-10-CM ICD-9-CM   1. Spinal stenosis of lumbar region with neurogenic claudication  M48.062 724.03     Patient Active Problem List   Diagnosis   • Shortness of breath   • Chest pain   • Palpitations   • PAD (peripheral artery disease) ([facility])   • PAF (paroxysmal atrial fibrillation) ([facility])   • Essential hypertension   • Spinal stenosis of lumbar region with neurogenic claudication   • COPD (chronic obstructive pulmonary disease) ([facility])   • Factor V Leiden mutation ([facility])   • History of DVT of lower extremity     Past Medical History:   Diagnosis Date   • Arrhythmia    • Arthritis    • Clotting disorder ([facility])     Factor 5 Liden   • COPD (chronic obstructive pulmonary disease) ([facility])    • Deep vein thrombosis ([facility])    • Essential hypertension 2021   • Mitral valve prolapse    • PAD (peripheral artery disease) ([facility])    • PAF (paroxysmal atrial fibrillation) ([facility]) 2020     Past Surgical History:   Procedure Laterality Date   • BLADDER SURGERY      E/O polyp   • BREAST SURGERY      E/O benign neuroma   • CARDIAC ELECTROPHYSIOLOGY PROCEDURE N/A 10/10/2022    Procedure: Ablation atrial fibrillation CRYO;  Surgeon: Venkatesh Iqbal MD;  Location: [facility] INVASIVE LOCATION;  Service: Cardiovascular;  Laterality: N/A;   • COLONOSCOPY  2016   • HAND SURGERY Right     Carpal metacarpal hand surgery    • SINUS SURGERY  2017   • TONSILLECTOMY        General Information     Row Name 22 1156          Physical Therapy Time and Intention    Document Type evaluation  -CHERI     Mode of Treatment individual therapy;physical therapy  -CHERI     Row Name 22 1156          General Information    Patient Profile Reviewed yes  -CHERI     Prior Level of Function independent:;all household mobility;gait;transfer;ADL's  -CHREI     Existing  Precautions/Restrictions spinal  -     Barriers to Rehab none identified  -     Row Name 12/31/22 1156          Living Environment    People in Home alone  -     Row Name 12/31/22 1156          Home Main Entrance    Number of Stairs, Main Entrance one  -CHERI     Stair Railings, Main Entrance none  -     Row Name 12/31/22 1156          Stairs Within Home, Primary    Number of Stairs, Within Home, Primary twelve  -CHERI     Stairs Comment, Within Home, Primary all needs on main floor  -     Row Name 12/31/22 1156          Cognition    Orientation Status (Cognition) oriented x 3  -     Row Name 12/31/22 1156          Safety Issues, Functional Mobility    Safety Issues Affecting Function (Mobility) awareness of need for assistance;problem-solving;positioning of assistive device;safety precautions follow-through/compliance  -     Impairments Affecting Function (Mobility) balance;endurance/activity tolerance;pain;range of motion (ROM);strength  -           User Key  (r) = Recorded By, (t) = Taken By, (c) = Cosigned By    Initials Name Provider Type    Lali Palacios PT Physical Therapist               Mobility     Row Name 12/31/22 1157          Bed Mobility    Comment, (Bed Mobility) [facility]IC upon arrival  -     Row Name 12/31/22 1157          Transfers    Comment, (Transfers) cues for hand placement prior to standing to assist with abdominal pain  -     Row Name 12/31/22 1157          Bed-Chair Transfer    Bed-Chair [place] (Transfers) not tested  -     Comment, (Bed-Chair Transfer) UIC  -     Row Name 12/31/22 1157          Sit-Stand Transfer    Sit-Stand [place] (Transfers) minimum assist (75% patient effort);1 person assist  -     Assistive Device (Sit-Stand Transfers) walker, front-wheeled  -     Comment, (Sit-Stand Transfer) with abdominal bracing  -     Row Name 12/31/22 1157          Gait/Stairs (Locomotion)    [place] Level (Gait) minimum assist (75% patient  effort);1 person assist  -CHERI     Assistive Device (Gait) walker, front-wheeled  -CHERI     Distance in Feet (Gait) 30'  -CHERI     Deviations/Abnormal Patterns (Gait) bilateral deviations;base of support, narrow;gait speed decreased;scissoring;stride length decreased  -CHERI     Bilateral Gait Deviations forward flexed posture;heel strike decreased  -     Circle Level (Stairs) not tested  -CHERI     Comment, (Gait/Stairs) patient demo NBOS with intermittent crossing of feet. improves with cues to widen MELECIO  -CHERI           User Key  (r) = Recorded By, (t) = Taken By, (c) = Cosigned By    Initials Name Provider Type    Lali Palacios, MOLLY Physical Therapist               Obj/Interventions     Row Name 12/31/22 1159          Range of Motion Comprehensive    Comment, General Range of Motion lumbar ROM limited  -     Row Name 12/31/22 1159          Strength Comprehensive (MMT)    Comment, General Manual Muscle Testing (MMT) Assessment generalized weakness  -     Row Name 12/31/22 1159          Motor Skills    Therapeutic Exercise other (see comments)  LAQ/MARCH/AP X 10  -     Row Name 12/31/22 1159          Balance    Comment, Balance mild unsteadiness during ambulation due to NBOS  -     Row Name 12/31/22 1159          Sensory Assessment (Somatosensory)    Sensory Assessment (Somatosensory) not tested  -           User Key  (r) = Recorded By, (t) = Taken By, (c) = Cosigned By    Initials Name Provider Type    Lali Palacois, PT Physical Therapist               Goals/Plan     Row Name 12/31/22 1204          Bed Mobility Goal 1 (PT)    Activity/Assistive Device (Bed Mobility Goal 1, PT) bed mobility activities, all  -CHERI     Circle Level/Cues Needed (Bed Mobility Goal 1, PT) standby assist  -CHERI     Time Frame (Bed Mobility Goal 1, PT) 5 days  -     Row Name 12/31/22 1204          Transfer Goal 1 (PT)    Activity/Assistive Device (Transfer Goal 1, PT) transfers, all  -CHERI     Circle  Level/Cues Needed (Transfer Goal 1, PT) standby assist;modified independence  -CHERI     Time Frame (Transfer Goal 1, PT) 5 days  -CHERI     Row Name 12/31/22 1204          Gait Training Goal 1 (PT)    Activity/Assistive Device (Gait Training Goal 1, PT) gait (walking locomotion)  -CHERI     [place] Level (Gait Training Goal 1, PT) modified independence;standby assist  -CHERI     Distance (Gait Training Goal 1, PT) 250'  -CHERI     Time Frame (Gait Training Goal 1, PT) 5 days  -CHERI     Row Name 12/31/22 1204          Stairs Goal 1 (PT)    Activity/Assistive Device (Stairs Goal 1, PT) stairs, all skills  -CHERI     [place] Level/Cues Needed (Stairs Goal 1, PT) contact guard required  -CHERI     Number of Stairs (Stairs Goal 1, PT) 2  -CHERI     Time Frame (Stairs Goal 1, PT) 5 days  -CHERI     Row Name 12/31/22 1204          Therapy Assessment/Plan (PT)    Planned Therapy Interventions (PT) balance training;bed mobility training;gait training;home exercise program;lumbar stabilization;postural re-education;patient/family education;neuromuscular re-education;ROM (range of motion);stair training;strengthening;transfer training  -CHERI           User Key  (r) = Recorded By, (t) = Taken By, (c) = Cosigned By    Initials Name Provider Type    Lali Palacios, PT Physical Therapist               Clinical Impression     Row Name 12/31/22 1157          Pain    Pretreatment Pain Rating 7/10  -CHERI     Posttreatment Pain Rating 7/10  -CHERI     Pain Location - Side/Orientation Bilateral  -CHERI     Pain Location anterior  -CHERI     Pain Location - abdomen  -CHERI     Pain Intervention(s) Repositioned;Ambulation/increased activity  -     Row Name 12/31/22 6435          Plan of Care Review    Plan of Care Reviewed With patient  -CHERI     Progress improving  -     Outcome Evaluation Ms. Gonzalez is a 63 y/o F who is POD#1 for L3-5 laminectomy with fusion and instrumentation. She is [facility] upon arrival and complaining of abdominal pain and nausea.  Patient agreeable for PT eval this AM. She reports she lives alone in 2 story home with all needs on main floor. She has 1 ANTONIO and was IADLs prior to hospital admission. She required Filipe for sit-stand from chair secondary to abdominal/back incision pain. She then ambulated 30' with RWx with Filipe and cues to widen MELECIO due to intermittent episodes of scissoring gait pattern. She was returned to chair. Ms. Rojas would benefit from skilled PT to address functional limitations prior to her recommended d/c home with HH.  -     Row Name 12/31/22 1159          Therapy Assessment/Plan (PT)    Rehab Potential (PT) good, to achieve stated therapy goals  -     Criteria for Skilled Interventions Met (PT) yes;meets criteria;skilled treatment is necessary  -     Therapy Frequency (PT) daily  -     Row Name 12/31/22 1159          Vital Signs    O2 Delivery Pre Treatment room air  -CHERI     O2 Delivery Intra Treatment room air  -CHERI     O2 Delivery Post Treatment room air  -CHERI     Pre Patient Position Sitting  -CHERI     Intra Patient Position Standing  -CHERI     Post Patient Position Sitting  -     Row Name 12/31/22 1159          Positioning and Restraints    Pre-Treatment Position sitting in chair/recliner  -CHERI     Post Treatment Position chair  -CHERI     In Chair notified nsg;sitting;call light within reach;encouraged to call for assist;exit alarm on  -           User Key  (r) = Recorded By, (t) = Taken By, (c) = Cosigned By    Initials Name Provider Type    Lali Palacios, PT Physical Therapist               Outcome Measures     Row Name 12/31/22 1204 12/31/22 0800       How much help from another person do you currently need...    Turning from your back to your side while in flat bed without using bedrails? 3  -CHERI 3  -LR    Moving from lying on back to sitting on the side of a flat bed without bedrails? 3  -CHERI 2  -LR    Moving to and from a bed to a chair (including a wheelchair)? 3  -CHERI 2  -LR    Standing  up from a chair using your arms (e.g., wheelchair, bedside chair)? 3  -CHERI 2  -LR    Climbing 3-5 steps with a railing? 2  -CHERI 2  -LR    To walk in hospital room? 3  -CHERI 2  -LR    AM-PAC 6 Clicks Score (PT) 17  -CHERI 13  -LR    Highest level of mobility 5 --> Static standing  -CHERI 4 --> Transferred to chair/commode  -LR    Row Name 12/31/22 1204          Functional Assessment    Outcome Measure Options AM-PAC 6 Clicks Basic Mobility (PT)  -           User Key  (r) = Recorded By, (t) = Taken By, (c) = Cosigned By    Initials Name Provider Type    Lali Palacios, MOLLY Physical Therapist    LR Deepthi Ford RN Registered Nurse                             Physical Therapy Education     Title: PT OT SLP Therapies (Done)     Topic: Physical Therapy (Done)     Point: Mobility training (Done)     Learning Progress Summary           Patient Acceptance, E,TB, VU,DU by CHERI at 12/31/2022 1205                   Point: Home exercise program (Done)     Learning Progress Summary           Patient Acceptance, E,TB, VU,DU by CHERI at 12/31/2022 1205                   Point: Body mechanics (Done)     Learning Progress Summary           Patient Acceptance, E,TB, VU,DU by CHERI at 12/31/2022 1205                   Point: Precautions (Done)     Learning Progress Summary           Patient Acceptance, E,TB, VU,DU by CHERI at 12/31/2022 1205                               User Key     Initials Effective Dates Name Provider Type Discipline    CHERI 05/19/21 -  Lali Iglesias, PT Physical Therapist PT              PT Recommendation and Plan  Planned Therapy Interventions (PT): balance training, bed mobility training, gait training, home exercise program, lumbar stabilization, postural re-education, patient/family education, neuromuscular re-education, ROM (range of motion), stair training, strengthening, transfer training  Plan of Care Reviewed With: patient  Progress: improving  Outcome Evaluation: Ms. Gonzalez is a 63 y/o F who  is POD#1 for L3-5 laminectomy with fusion and instrumentation. She is [facility] upon arrival and complaining of abdominal pain and nausea. Patient agreeable for PT eval this AM. She reports she lives alone in 2 story home with all needs on main floor. She has 1 ANTONIO and was IADLs prior to hospital admission. She required Filipe for sit-stand from chair secondary to abdominal/back incision pain. She then ambulated 30' with RWx with Filipe and cues to widen MELECIO due to intermittent episodes of scissoring gait pattern. She was returned to chair. Ms. Rojas would benefit from skilled PT to address functional limitations prior to her recommended d/c home with HH.     Time Calculation:    PT Charges     Row Name 12/31/22 1205             Time Calculation    Start Time 0840  -CHERI      Stop Time 0904  -CHERI      Time Calculation (min) 24 min  -CHERI      PT Received On 12/31/22  -CHERI      PT - Next Appointment 01/01/23  -CHERI      PT Goal Re-Cert Due Date 01/05/23  -CHERI            User Key  (r) = Recorded By, (t) = Taken By, (c) = Cosigned By    Initials Name Provider Type    Lali Palacios, PT Physical Therapist              Therapy Charges for Today     Code Description Service Date Service Provider Modifiers Qty    07758010774 HC PT EVAL LOW COMPLEXITY 1 12/31/2022 Lali Iglesias, PT GP 1    08663726041 HC PT THERAPEUTIC ACT EA 15 MIN 12/31/2022 Lali Iglesias PT GP 1          PT G-Codes  Outcome Measure Options: AM-PAC 6 Clicks Basic Mobility (PT)  AM-PAC 6 Clicks Score (PT): 17  PT Discharge Summary  Anticipated Discharge Disposition (PT): home with home health, home with assist    Lali Iglesias PT  12/31/2022

## 2022-12-31 NOTE — PLAN OF CARE
Goal Outcome Evaluation:  Plan of Care Reviewed With: patient        Progress: improving  Outcome Evaluation: Ms. Gonzalez is a 63 y/o F who is POD#1 for L3-5 laminectomy with fusion and instrumentation. She is [facility] upon arrival and complaining of abdominal pain and nausea. Patient agreeable for PT eval this AM. She reports she lives alone in 2 story home with all needs on main floor. She has 1 ANTONIO and was IADLs prior to hospital admission. She required Filipe for sit-stand from chair secondary to abdominal/back incision pain. She then ambulated 30' with RWx with Filipe and cues to widen MELECIO due to intermittent episodes of scissoring gait pattern. She was returned to chair. Ms. Rojas would benefit from skilled PT to address functional limitations prior to her recommended d/c home with HH.

## 2023-01-01 LAB
BASOPHILS # BLD AUTO: 0.05 10*3/MM3 (ref 0–0.2)
BASOPHILS NFR BLD AUTO: 0.3 % (ref 0–1.5)
DEPRECATED RDW RBC AUTO: 43.2 FL (ref 37–54)
EOSINOPHIL # BLD AUTO: 0.06 10*3/MM3 (ref 0–0.4)
EOSINOPHIL NFR BLD AUTO: 0.4 % (ref 0.3–6.2)
ERYTHROCYTE [DISTWIDTH] IN BLOOD BY AUTOMATED COUNT: 11.6 % (ref 12.3–15.4)
HCT VFR BLD AUTO: 39.6 % (ref 34–46.6)
HGB BLD-MCNC: 13.4 G/DL (ref 12–15.9)
IMM GRANULOCYTES # BLD AUTO: 0.07 10*3/MM3 (ref 0–0.05)
IMM GRANULOCYTES NFR BLD AUTO: 0.4 % (ref 0–0.5)
LYMPHOCYTES # BLD AUTO: 1.88 10*3/MM3 (ref 0.7–3.1)
LYMPHOCYTES NFR BLD AUTO: 11.8 % (ref 19.6–45.3)
MCH RBC QN AUTO: 34.5 PG (ref 26.6–33)
MCHC RBC AUTO-ENTMCNC: 33.8 G/DL (ref 31.5–35.7)
MCV RBC AUTO: 102.1 FL (ref 79–97)
MONOCYTES # BLD AUTO: 1.84 10*3/MM3 (ref 0.1–0.9)
MONOCYTES NFR BLD AUTO: 11.6 % (ref 5–12)
NEUTROPHILS NFR BLD AUTO: 12.03 10*3/MM3 (ref 1.7–7)
NEUTROPHILS NFR BLD AUTO: 75.5 % (ref 42.7–76)
NRBC BLD AUTO-RTO: 0.1 /100 WBC (ref 0–0.2)
PLATELET # BLD AUTO: 205 10*3/MM3 (ref 140–450)
PMV BLD AUTO: 10.2 FL (ref 6–12)
RBC # BLD AUTO: 3.88 10*6/MM3 (ref 3.77–5.28)
WBC NRBC COR # BLD: 15.93 10*3/MM3 (ref 3.4–10.8)

## 2023-01-01 PROCEDURE — 85025 COMPLETE CBC W/AUTO DIFF WBC: CPT | Performed by: NEUROLOGICAL SURGERY

## 2023-01-01 PROCEDURE — 97110 THERAPEUTIC EXERCISES: CPT

## 2023-01-01 PROCEDURE — 25010000002 SODIUM CHLORIDE 0.9 % WITH KCL 20 MEQ 20-0.9 MEQ/L-% SOLUTION: Performed by: NEUROLOGICAL SURGERY

## 2023-01-01 PROCEDURE — 99024 POSTOP FOLLOW-UP VISIT: CPT | Performed by: NEUROLOGICAL SURGERY

## 2023-01-01 PROCEDURE — G0378 HOSPITAL OBSERVATION PER HR: HCPCS

## 2023-01-01 RX ADMIN — HYDROCODONE BITARTRATE AND ACETAMINOPHEN 1 TABLET: 5; 325 TABLET ORAL at 18:21

## 2023-01-01 RX ADMIN — DILTIAZEM HYDROCHLORIDE 180 MG: 180 CAPSULE, COATED, EXTENDED RELEASE ORAL at 18:21

## 2023-01-01 RX ADMIN — Medication 3 ML: at 22:08

## 2023-01-01 RX ADMIN — HYDROCODONE BITARTRATE AND ACETAMINOPHEN 1 TABLET: 5; 325 TABLET ORAL at 12:23

## 2023-01-01 RX ADMIN — HYDROCODONE BITARTRATE AND ACETAMINOPHEN 1 TABLET: 5; 325 TABLET ORAL at 03:38

## 2023-01-01 RX ADMIN — PANTOPRAZOLE SODIUM 40 MG: 40 TABLET, DELAYED RELEASE ORAL at 06:45

## 2023-01-01 RX ADMIN — HYDROCODONE BITARTRATE AND ACETAMINOPHEN 1 TABLET: 5; 325 TABLET ORAL at 08:23

## 2023-01-01 RX ADMIN — DOCUSATE SODIUM 100 MG: 100 CAPSULE, LIQUID FILLED ORAL at 20:51

## 2023-01-01 RX ADMIN — POTASSIUM CHLORIDE AND SODIUM CHLORIDE 100 ML/HR: 900; 150 INJECTION, SOLUTION INTRAVENOUS at 02:07

## 2023-01-01 RX ADMIN — Medication 3 ML: at 08:24

## 2023-01-01 RX ADMIN — DOCUSATE SODIUM 100 MG: 100 CAPSULE, LIQUID FILLED ORAL at 08:23

## 2023-01-01 NOTE — PLAN OF CARE
Goal Outcome Evaluation:  Plan of Care Reviewed With: patient        Progress: improving  Outcome Evaluation: A&Ox4. VSS. CHERI draing removed and dressing changed to pressure dressing, JAY improving since. Stand by assist to BRP. Pain controlled. D/C plans for home in 1-2 days pending neurosx. Education provided.

## 2023-01-01 NOTE — PLAN OF CARE
Goal Outcome Evaluation:  Plan of Care Reviewed With: patient        Progress: no change  Outcome Evaluation: Ms. Gonzalez is supine in bed upon arrival and complaining of headache that increases to 9/10 upon sitting up. NSG request bed exercises only today due to patient getting up to walk to restroom approximately every 45 minutes and due to onset of spinal headache. Patient performed bed exercises x 15 with complaint of mild increase in LBP and no change in headache. Encouraged patient to remain active while in bed and to continue to ambulate for BB needs. Patient verbalized understanding.  remains a candidate for skilled PT.

## 2023-01-01 NOTE — PROGRESS NOTES
LOS: 0 days   Patient Care Team:  Bosler, James William III, MD as PCP - General (Internal Medicine)  Raisa Cummings MD as Consulting Physician (Obstetrics and Gynecology)  Jj Dodson MD as Consulting Physician (Hematology and Oncology)  Bosler, James William III, MD as Referring Physician (Internal Medicine)    Chief Complaint: Postop lumbar fusion    Subjective     This patient is feeling pretty good overall.  She still has some back pain but no pain in her legs.  She does have a bit of a headache.    Interval History:     History taken from: patient chart RN    Objective      She has good movement of both lower extremities.  The drainage in her drain is only lightly tinted with blood.    Vital Signs  Temp:  [97.7 °F (36.5 °C)-99.2 °F (37.3 °C)] 98.7 °F (37.1 °C)  Heart Rate:  [65-74] 72  Resp:  [16-17] 16  BP: (114-134)/(61-84) 126/77       Results Review:     I reviewed the patient's new clinical results.  She is afebrile with a white count of 15.9 which is lower than yesterday.      Assessment & Plan       Spinal stenosis of lumbar region with neurogenic claudication      I told the patient we will go ahead and take the drain out.  We will put a pressure dressing on her incision and the drain site.  She will continue with walking every 2 hours.  She can sit up in a chair as well.      Rigo Mathur MD  01/01/23  10:39 EST

## 2023-01-01 NOTE — THERAPY TREATMENT NOTE
Patient Name: Radha Gonzalez  : 1960    MRN: [id number]                              Today's Date: 2023       Admit Date: 2022    Visit Dx:     ICD-10-CM ICD-9-CM   1. Spinal stenosis of lumbar region with neurogenic claudication  M48.062 724.03     Patient Active Problem List   Diagnosis   • Shortness of breath   • Chest pain   • Palpitations   • PAD (peripheral artery disease) ([facility])   • PAF (paroxysmal atrial fibrillation) ([facility])   • Essential hypertension   • Spinal stenosis of lumbar region with neurogenic claudication   • COPD (chronic obstructive pulmonary disease) ([facility])   • Factor V Leiden mutation ([facility])   • History of DVT of lower extremity     Past Medical History:   Diagnosis Date   • Arrhythmia    • Arthritis    • Clotting disorder ([facility])     Factor 5 Liden   • COPD (chronic obstructive pulmonary disease) ([facility])    • Deep vein thrombosis ([facility])    • Essential hypertension 2021   • Mitral valve prolapse    • PAD (peripheral artery disease) ([facility])    • PAF (paroxysmal atrial fibrillation) ([facility]) 2020     Past Surgical History:   Procedure Laterality Date   • BLADDER SURGERY      E/O polyp   • BREAST SURGERY      E/O benign neuroma   • CARDIAC ELECTROPHYSIOLOGY PROCEDURE N/A 10/10/2022    Procedure: Ablation atrial fibrillation CRYO;  Surgeon: Venkatesh Iqbal MD;  Location: [facility] INVASIVE LOC[facility];  Service: Cardiovascular;  Laterality: N/A;   • COLONOSCOPY  2016   • HAND SURGERY Right     Carpal metacarpal hand surgery    • SINUS SURGERY  2017   • TONSILLECTOMY        General Information     Row Name 23          Physical Therapy Time and Intention    Document Type therapy note (daily note)  -CHERI     Mode of Treatment individual therapy;physical therapy  -CHERI     Row Name 23 09          General Information    Patient Profile Reviewed yes  -CHERI     Existing Precautions/Restrictions spinal  -CHERI     Row Name 23 09          Cognition     Orientation Status (Cognition) oriented x 4  -CHERI     Row Name 01/01/23 0923          Safety Issues, Functional Mobility    Impairments Affecting Function (Mobility) balance;endurance/activity tolerance;pain;range of motion (ROM);strength  -           User Key  (r) = Recorded By, (t) = Taken By, (c) = Cosigned By    Initials Name Provider Type    Lali Palacios PT Physical Therapist               Mobility     Row Name 01/01/23 0923          Bed Mobility    Comment, (Bed Mobility) patient unable to perform any transfers this date due to spinal headache. bed exercises performed only  -     Row Name 01/01/23 0923          Bed-Chair Transfer    Bed-Chair [place] (Transfers) not tested  -     Row Name 01/01/23 0923          Sit-Stand Transfer    Sit-Stand [place] (Transfers) not tested  -     Row Name 01/01/23 0923          Gait/Stairs (Locomotion)    [place] Level (Gait) not tested  -CHERI     Comment, (Gait/Stairs) Held on gait due to spinal headache  -           User Key  (r) = Recorded By, (t) = Taken By, (c) = Cosigned By    Initials Name Provider Type    Lali Palacios PT Physical Therapist               Obj/Interventions     Row Name 01/01/23 0925          Motor Skills    Therapeutic Exercise other (see comments)  AP/QS/GS/HS/HIP ABD-ADD/SAQ/SLR X 10  -CHERI           User Key  (r) = Recorded By, (t) = Taken By, (c) = Cosigned By    Initials Name Provider Type    Lali Palacios PT Physical Therapist               Goals/Plan    No documentation.                Clinical Impression     Row Name 01/01/23 0925          Pain    Pretreatment Pain Rating 2/10  -CHREI     Posttreatment Pain Rating 2/10  -CHERI     Pain Location - Side/Orientation Bilateral  -CHERI     Pain Location incisional  -CHERI     Pain Location - back  -CHERI     Pre/Posttreatment Pain Comment headache 3/10 that increases to 9/10 upon sitting up  -CHERI     Pain Intervention(s) Repositioned  -     Row Name  01/01/23 0925          Plan of Care Review    Progress no change  -     Outcome Evaluation Ms. Gonzalez is supine in bed upon arrival and complaining of headache that increases to 9/10 upon sitting up. NSG request bed exercises only today due to patient getting up to walk to restroom approximately every 45 minutes and due to onset of spinal headache. Patient performed bed exercises x 15 with complaint of mild increase in LBP and no change in headache. Encouraged patient to remain active while in bed and to continue to ambulate for BB needs. Patient verbalized understanding.  remains a candidate for skilled PT.  -     Row Name 01/01/23 0925          Therapy Assessment/Plan (PT)    Rehab Potential (PT) good, to achieve stated therapy goals  -     Criteria for Skilled Interventions Met (PT) yes;meets criteria;skilled treatment is necessary  -     Therapy Frequency (PT) daily  -     Row Name 01/01/23 0925          Vital Signs    O2 Delivery Pre Treatment room air  -CHERI     O2 Delivery Intra Treatment room air  -CHERI     O2 Delivery Post Treatment room air  -CHERI     Pre Patient Position Supine  -CHERI     Intra Patient Position Supine  -CHERI     Post Patient Position Supine  -CHERI     Row Name 01/01/23 0925          Positioning and Restraints    In Chair notified nsg;call light within reach;encouraged to call for assist;exit alarm on  -           User Key  (r) = Recorded By, (t) = Taken By, (c) = Cosigned By    Initials Name Provider Type    Lali Palacios, PT Physical Therapist               Outcome Measures     Row Name 01/01/23 0931          How much help from another person do you currently need...    Turning from your back to your side while in flat bed without using bedrails? 3  -CHERI     Moving from lying on back to sitting on the side of a flat bed without bedrails? 3  -CHERI     Moving to and from a bed to a chair (including a wheelchair)? 3  -CHERI     Standing up from a chair using  your arms (e.g., wheelchair, bedside chair)? 3  -CHERI     Climbing 3-5 steps with a railing? 3  -CHERI     To walk in hospital room? 3  -CHERI     AM-PAC 6 Clicks Score (PT) 18  -CHERI     Highest level of mobility 6 --> Walked 10 steps or more  -CHERI           User Key  (r) = Recorded By, (t) = Taken By, (c) = Cosigned By    Initials Name Provider Type    Lali Palacios, PT Physical Therapist                             Physical Therapy Education     Title: PT OT SLP Therapies (Done)     Topic: Physical Therapy (Done)     Point: Mobility training (Done)     Learning Progress Summary           Patient Acceptance, E,TB, VU by CHERI at 1/1/2023 0931    Acceptance, E,TB, VU,DU by CHEIR at 12/31/2022 1205                   Point: Home exercise program (Done)     Learning Progress Summary           Patient Acceptance, E,TB, VU by CHERI at 1/1/2023 0931    Acceptance, E,TB, VU,DU by CHERI at 12/31/2022 1205                   Point: Body mechanics (Done)     Learning Progress Summary           Patient Acceptance, E,TB, VU by CHERI at 1/1/2023 0931    Acceptance, E,TB, VU,DU by CHERI at 12/31/2022 1205                   Point: Precautions (Done)     Learning Progress Summary           Patient Acceptance, E,TB, VU by CHERI at 1/1/2023 0931    Acceptance, E,TB, VU,DU by CHERI at 12/31/2022 1205                               User Key     Initials Effective Dates Name Provider Type Discipline    CHERI 05/19/21 -  Lali Iglesias, PT Physical Therapist PT              PT Recommendation and Plan  Planned Therapy Interventions (PT): balance training, bed mobility training, gait training, home exercise program, lumbar stabilization, postural re-education, patient/family education, neuromuscular re-education, ROM (range of motion), stair training, strengthening, transfer training  Plan of Care Reviewed With: patient  Progress: no change  Outcome Evaluation: Ms. Gonzalez is supine in bed upon arrival and complaining of headache that increases to  9/10 upon sitting up. NSG request bed exercises only today due to patient getting up to walk to restroom approximately every 45 minutes and due to onset of spinal headache. Patient performed bed exercises x 15 with complaint of mild increase in LBP and no change in headache. Encouraged patient to remain active while in bed and to continue to ambulate for BB needs. Patient verbalized understanding.  remains a candidate for skilled PT.     Time Calculation:    PT Charges     Row Name 01/01/23 0932             Time Calculation    Start Time 0920  -CHERI      Stop Time 0943  -CHERI      Time Calculation (min) 23 min  -CHERI      PT Received On 01/01/23  -CHERI      PT - Next Appointment 01/02/23  -CHERI      PT Goal Re-Cert Due Date 01/05/23  -CHERI         Timed Charges    40515 - PT Therapeutic Exercise Minutes 23  -CHERI         Total Minutes    Timed Charges Total Minutes 23  -CHERI       Total Minutes 23  -CHERI            User Key  (r) = Recorded By, (t) = Taken By, (c) = Cosigned By    Initials Name Provider Type    Lali Palacios, PT Physical Therapist              Therapy Charges for Today     Code Description Service Date Service Provider Modifiers Qty    45368504827 HC PT EVAL LOW COMPLEXITY 1 12/31/2022 Lali Iglesias, PT GP 1    35816131887 HC PT THERAPEUTIC ACT EA 15 MIN 12/31/2022 Lali Iglesias, PT GP 1    81140774514 HC PT THER PROC EA 15 MIN 1/1/2023 Lali Iglesias, PT GP 2          PT G-Codes  Outcome Measure Options: AM-PAC 6 Clicks Basic Mobility (PT)  AM-PAC 6 Clicks Score (PT): 18  PT Discharge Summary  Anticipated Discharge Disposition (PT): home with home health, home with assist    Lali Iglesias, PT  1/1/2023

## 2023-01-02 VITALS
HEART RATE: 86 BPM | BODY MASS INDEX: 20.59 KG/M2 | OXYGEN SATURATION: 98 % | DIASTOLIC BLOOD PRESSURE: 74 MMHG | HEIGHT: 67 IN | WEIGHT: 131.17 LBS | TEMPERATURE: 98.3 F | SYSTOLIC BLOOD PRESSURE: 115 MMHG | RESPIRATION RATE: 16 BRPM

## 2023-01-02 LAB
BASOPHILS # BLD AUTO: 0.07 10*3/MM3 (ref 0–0.2)
BASOPHILS NFR BLD AUTO: 0.5 % (ref 0–1.5)
DEPRECATED RDW RBC AUTO: 43.2 FL (ref 37–54)
EOSINOPHIL # BLD AUTO: 0.24 10*3/MM3 (ref 0–0.4)
EOSINOPHIL NFR BLD AUTO: 1.7 % (ref 0.3–6.2)
ERYTHROCYTE [DISTWIDTH] IN BLOOD BY AUTOMATED COUNT: 11.7 % (ref 12.3–15.4)
HCT VFR BLD AUTO: 39.6 % (ref 34–46.6)
HGB BLD-MCNC: 13.4 G/DL (ref 12–15.9)
HOLD SPECIMEN: NORMAL
IMM GRANULOCYTES # BLD AUTO: 0.07 10*3/MM3 (ref 0–0.05)
IMM GRANULOCYTES NFR BLD AUTO: 0.5 % (ref 0–0.5)
LYMPHOCYTES # BLD AUTO: 1.62 10*3/MM3 (ref 0.7–3.1)
LYMPHOCYTES NFR BLD AUTO: 11.3 % (ref 19.6–45.3)
MCH RBC QN AUTO: 34.4 PG (ref 26.6–33)
MCHC RBC AUTO-ENTMCNC: 33.8 G/DL (ref 31.5–35.7)
MCV RBC AUTO: 101.5 FL (ref 79–97)
MONOCYTES # BLD AUTO: 1.68 10*3/MM3 (ref 0.1–0.9)
MONOCYTES NFR BLD AUTO: 11.7 % (ref 5–12)
NEUTROPHILS NFR BLD AUTO: 10.68 10*3/MM3 (ref 1.7–7)
NEUTROPHILS NFR BLD AUTO: 74.3 % (ref 42.7–76)
NRBC BLD AUTO-RTO: 0 /100 WBC (ref 0–0.2)
PLATELET # BLD AUTO: 205 10*3/MM3 (ref 140–450)
PMV BLD AUTO: 10.2 FL (ref 6–12)
RBC # BLD AUTO: 3.9 10*6/MM3 (ref 3.77–5.28)
WBC NRBC COR # BLD: 14.36 10*3/MM3 (ref 3.4–10.8)

## 2023-01-02 PROCEDURE — G0378 HOSPITAL OBSERVATION PER HR: HCPCS

## 2023-01-02 PROCEDURE — 99024 POSTOP FOLLOW-UP VISIT: CPT | Performed by: PHYSICIAN ASSISTANT

## 2023-01-02 PROCEDURE — 85025 COMPLETE CBC W/AUTO DIFF WBC: CPT | Performed by: NEUROLOGICAL SURGERY

## 2023-01-02 PROCEDURE — 97530 THERAPEUTIC ACTIVITIES: CPT

## 2023-01-02 RX ORDER — HYDROCODONE BITARTRATE AND ACETAMINOPHEN 5; 325 MG/1; MG/1
1 TABLET ORAL EVERY 4 HOURS PRN
Qty: 40 TABLET | Refills: 0 | Status: SHIPPED | OUTPATIENT
Start: 2023-01-02 | End: 2023-01-09

## 2023-01-02 RX ORDER — CEPHALEXIN 500 MG/1
500 CAPSULE ORAL 4 TIMES DAILY
Qty: 20 CAPSULE | Refills: 0 | Status: SHIPPED | OUTPATIENT
Start: 2023-01-02 | End: 2023-01-07

## 2023-01-02 RX ADMIN — HYDROCODONE BITARTRATE AND ACETAMINOPHEN 1 TABLET: 5; 325 TABLET ORAL at 07:51

## 2023-01-02 RX ADMIN — PANTOPRAZOLE SODIUM 40 MG: 40 TABLET, DELAYED RELEASE ORAL at 06:43

## 2023-01-02 RX ADMIN — DOCUSATE SODIUM 100 MG: 100 CAPSULE, LIQUID FILLED ORAL at 08:04

## 2023-01-02 RX ADMIN — HYDROCODONE BITARTRATE AND ACETAMINOPHEN 1 TABLET: 5; 325 TABLET ORAL at 00:00

## 2023-01-02 RX ADMIN — Medication 3 ML: at 08:04

## 2023-01-02 NOTE — THERAPY TREATMENT NOTE
Patient Name: Radha Gonzalez  : 1960    MRN: [id number]                              Today's Date: 2023       Admit Date: 2022    Visit Dx:     ICD-10-CM ICD-9-CM   1. Status post laminectomy  Z98.890 V45.89   2. Spinal stenosis of lumbar region with neurogenic claudication  M48.062 724.03     Patient Active Problem List   Diagnosis   • Shortness of breath   • Chest pain   • Palpitations   • PAD (peripheral artery disease) ([facility])   • PAF (paroxysmal atrial fibrillation) ([facility])   • Essential hypertension   • Spinal stenosis of lumbar region with neurogenic claudication   • COPD (chronic obstructive pulmonary disease) ([facility])   • Factor V Leiden mutation ([facility])   • History of DVT of lower extremity     Past Medical History:   Diagnosis Date   • Arrhythmia    • Arthritis    • Clotting disorder ([facility])     Factor 5 Liden   • COPD (chronic obstructive pulmonary disease) ([facility])    • Deep vein thrombosis ([facility])    • Essential hypertension 2021   • Mitral valve prolapse    • PAD (peripheral artery disease) ([facility])    • PAF (paroxysmal atrial fibrillation) ([facility]) 2020     Past Surgical History:   Procedure Laterality Date   • BLADDER SURGERY      E/O polyp   • BREAST SURGERY      E/O benign neuroma   • CARDIAC ELECTROPHYSIOLOGY PROCEDURE N/A 10/10/2022    Procedure: Ablation atrial fibrillation CRYO;  Surgeon: Venkatesh Iqbal MD;  Location: [facility] INVASIVE LOCATION;  Service: Cardiovascular;  Laterality: N/A;   • COLONOSCOPY     • HAND SURGERY Right     Carpal metacarpal hand surgery    • LUMBAR FUSION N/A 2022    Procedure: Lumbar 3 to lumbar 4 and lumbar 4 to lumbar 5 laminectomy with fusion and instrumentation;  Surgeon: Rigo Mathur MD;  Location: [facility] OR;  Service: Robotics - Neuro;  Laterality: N/A;   • SINUS SURGERY  2017   • TONSILLECTOMY        General Information     Row Name 23 0918          Physical Therapy Time and Intention    Document Type  therapy note (daily note)  -CHERI     Mode of Treatment individual therapy;physical therapy  -     Row Name 01/02/23 0918          General Information    Patient Profile Reviewed yes  -CHERI     Existing Precautions/Restrictions spinal  -CHERI     Row Name 01/02/23 0918          Cognition    Orientation Status (Cognition) oriented x 4  -CHERI     Row Name 01/02/23 0918          Safety Issues, Functional Mobility    Impairments Affecting Function (Mobility) balance;endurance/activity tolerance;pain;range of motion (ROM);strength  -     Comment, Safety Issues/Impairments (Mobility) gait belt and non-slip socks used throughout treatment  -           User Key  (r) = Recorded By, (t) = Taken By, (c) = Cosigned By    Initials Name Provider Type    Lali Palacios, PT Physical Therapist               Mobility     Row Name 01/02/23 0926          Bed Mobility    Bed Mobility supine-sit  -     Supine-Sit Blair (Bed Mobility) contact guard;1 person assist  -     Assistive Device (Bed Mobility) bed rails;head of bed elevated  -     Comment, (Bed Mobility) cues for log roll technique  -     Row Name 01/02/23 0926          Transfers    Comment, (Transfers) cues for hand placement prior to standing  -     Row Name 01/02/23 0926          Bed-Chair Transfer    Bed-Chair Blair (Transfers) not tested  -     Comment, (Bed-Chair Transfer) patient declined transfer to chair  -     Row Name 01/02/23 0926          Sit-Stand Transfer    Sit-Stand Blair (Transfers) contact guard;standby assist;1 person assist  -     Assistive Device (Sit-Stand Transfers) walker, front-wheeled  -     Row Name 01/02/23 0926          Gait/Stairs (Locomotion)    Blair Level (Gait) contact guard;minimum assist (75% patient effort);1 person assist  -     Assistive Device (Gait) other (see comments)  no AD  -CHERI     Distance in Feet (Gait) 100'  -CHERI     Deviations/Abnormal Patterns (Gait) bilateral deviations;base  of support, narrow;gait speed decreased;scissoring;stride length decreased  -CHERI     Bilateral Gait Deviations forward flexed posture;heel strike decreased  -CHERI     [place] Level (Stairs) not tested  -CHERI     Comment, (Gait/Stairs) Patient with mild unsteadiness that improves with cues to reduce speed and quality of steps  -CHERI           User Key  (r) = Recorded By, (t) = Taken By, (c) = Cosigned By    Initials Name Provider Type    Lali Palacios, MOLLY Physical Therapist               Obj/Interventions     Row Name 01/02/23 0928          Motor Skills    Therapeutic Exercise other (see comments)  AP/GS/LAQ/MARCHES X 10  -CHERI     Row Name 01/02/23 0928          Balance    Balance Assessment standing dynamic balance  -CHERI     Dynamic Standing Balance minimal assist;contact guard  -CHERI     Position/Device Used, Standing Balance supported  -CHERI     Comment, Balance Mild unsteadiness due to NBOS  -CHERI           User Key  (r) = Recorded By, (t) = Taken By, (c) = Cosigned By    Initials Name Provider Type    Lali Palacios, MOLLY Physical Therapist               Goals/Plan    No documentation.                Clinical Impression     Row Name 01/02/23 0929          Pain    Additional Documentation Pain Scale: FACES Pre/Post-Treatment (Group)  -     Row Name 01/02/23 0929          Pain Scale: FACES Pre/Post-Treatment    Pain: FACES Scale, Pretreatment 2-->hurts little bit  -CHERI     Posttreatment Pain Rating 4-->hurts little more  -CHERI     Pain Location - Side/Orientation Bilateral  -CHERI     Pain Location anterior  -CHERI     Pain Location - abdomen  -CHERI     Row Name 01/02/23 0929          Plan of Care Review    Plan of Care Reviewed With patient  -CHERI     Progress improving  -CHERI     Outcome Evaluation Ms. Gonzalez is agreeable for skilled PT this AM. She continues to report HA that increases with positional changes. She demo improved activity tolerance due to less assist needed with all functional tasks this  date. She benefits from cues to improve sequence of movements with log roll when getting out of bed. She required SBA for supine-sit and sit-stand with use of RWx. Once standing, she ambulated 100' without AD and required CGA/Lorena due to NBOS and mild unsteadiness. Patient left sitting EOB. Ms. Landeros remains a candidate for skilled PT.  -     Row Name 01/02/23 0929          Therapy Assessment/Plan (PT)    Rehab Potential (PT) good, to achieve stated therapy goals  -     Criteria for Skilled Interventions Met (PT) yes;meets criteria;skilled treatment is necessary  -CHERI     Therapy Frequency (PT) daily  -     Row Name 01/02/23 0929          Vital Signs    O2 Delivery Pre Treatment room air  -CHERI     O2 Delivery Intra Treatment room air  -CHERI     O2 Delivery Post Treatment room air  -CHERI     Pre Patient Position Supine  -CHERI     Intra Patient Position Standing  -CHERI     Post Patient Position Sitting  -     Row Name 01/02/23 0929          Positioning and Restraints    Pre-Treatment Position in bed  -CHERI     Post Treatment Position other  -CHERI           User Key  (r) = Recorded By, (t) = Taken By, (c) = Cosigned By    Initials Name Provider Type    Lali Palacios, PT Physical Therapist               Outcome Measures     Row Name 01/02/23 0932 01/02/23 0751       How much help from another person do you currently need...    Turning from your back to your side while in flat bed without using bedrails? 4  -CHERI 4  -LD    Moving from lying on back to sitting on the side of a flat bed without bedrails? 4  -CHERI 3  -LD    Moving to and from a bed to a chair (including a wheelchair)? 3  -CHERI 3  -LD    Standing up from a chair using your arms (e.g., wheelchair, bedside chair)? 3  -CHERI 3  -LD    Climbing 3-5 steps with a railing? 3  -CHERI 3  -LD    To walk in hospital room? 3  -CHERI 3  -LD    AM-PAC 6 Clicks Score (PT) 20  -CHERI 19  -LD    Highest level of mobility 6 --> Walked 10 steps or more  -CHERI 6 --> Walked 10 steps  or more  -LD    Row Name 01/02/23 0932          Functional Assessment    Outcome Measure Options AM-PAC 6 Clicks Basic Mobility (PT)  -           User Key  (r) = Recorded By, (t) = Taken By, (c) = Cosigned By    Initials Name Provider Type    Lali Palacios, MOLLY Physical Therapist    Shelly Sierra, RN Registered Nurse                             Physical Therapy Education     Title: PT OT SLP Therapies (Done)     Topic: Physical Therapy (Done)     Point: Mobility training (Done)     Learning Progress Summary           Patient Acceptance, E,TB, VU,DU by CHERI at 1/2/2023 0933    Acceptance, E,TB, VU by CHERI at 1/1/2023 0931    Acceptance, E,TB, VU,DU by CHERI at 12/31/2022 1205                   Point: Home exercise program (Done)     Learning Progress Summary           Patient Acceptance, E,TB, VU,DU by CHERI at 1/2/2023 0933    Acceptance, E,TB, VU by CHERI at 1/1/2023 0931    Acceptance, E,TB, VU,DU by CHERI at 12/31/2022 1205                   Point: Body mechanics (Done)     Learning Progress Summary           Patient Acceptance, E,TB, VU,DU by CHERI at 1/2/2023 0933    Acceptance, E,TB, VU by CHERI at 1/1/2023 0931    Acceptance, E,TB, VU,DU by CHERI at 12/31/2022 1205                   Point: Precautions (Done)     Learning Progress Summary           Patient Acceptance, E,TB, VU,DU by CHERI at 1/2/2023 0933    Acceptance, E,TB, VU by CHERI at 1/1/2023 0931    Acceptance, E,TB, VU,DU by CHERI at 12/31/2022 1205                               User Key     Initials Effective Dates Name Provider Type Discipline    CHERI 05/19/21 -  Lali Iglesias, MOLLY Physical Therapist PT              PT Recommendation and Plan  Planned Therapy Interventions (PT): balance training, bed mobility training, gait training, home exercise program, lumbar stabilization, postural re-education, patient/family education, neuromuscular re-education, ROM (range of motion), stair training, strengthening, transfer training  Plan of Care Reviewed With:  patient  Progress: improving  Outcome Evaluation: Ms. Gonzalez is agreeable for skilled PT this AM. She continues to report HA that increases with positional changes. She demo improved activity tolerance due to less assist needed with all functional tasks this date. She benefits from cues to improve sequence of movements with log roll when getting out of bed. She required SBA for supine-sit and sit-stand with use of RWx. Once standing, she ambulated 100' without AD and required CGA/Lorena due to NBOS and mild unsteadiness. Patient left sitting EOB. Ms. Landeros remains a candidate for skilled PT.     Time Calculation:    PT Charges     Row Name 01/02/23 0933             Time Calculation    Start Time 0912  -CHERI      Stop Time 0925  -CHERI      Time Calculation (min) 13 min  -CHERI      PT - Next Appointment 01/03/23  -CHERI      PT Goal Re-Cert Due Date 01/05/23  -CHERI            User Key  (r) = Recorded By, (t) = Taken By, (c) = Cosigned By    Initials Name Provider Type    Lali Palacios, MOLLY Physical Therapist              Therapy Charges for Today     Code Description Service Date Service Provider Modifiers Qty    03119470232  PT THER PROC EA 15 MIN 1/1/2023 Lali Iglesias, PT GP 2    41294503103 HC PT THERAPEUTIC ACT EA 15 MIN 1/2/2023 Lali Iglesias PT GP 1          PT G-Codes  Outcome Measure Options: AM-PAC 6 Clicks Basic Mobility (PT)  AM-PAC 6 Clicks Score (PT): 20  PT Discharge Summary  Anticipated Discharge Disposition (PT): home with home health, home with assist    Lali Iglesias PT  1/2/2023

## 2023-01-02 NOTE — DISCHARGE SUMMARY
NEUROSURGERY DISCHARGE SUMMARY    Radha Gonzalez  1960    Patient Care Team:  Bosler, James William III, MD as PCP - General (Internal Medicine)  Raisa Cummings MD as Consulting Physician (Obstetrics and Gynecology)  Jj Dodson MD as Consulting Physician (Hematology and Oncology)  Bosler, James William III, MD as Referring Physician (Internal Medicine)    Date of Admit: 12/30/2022    Date of Discharge:  1/2/2023    Discharge Diagnosis:   Spinal stenosis of lumbar region with neurogenic claudication      Procedures Performed  Procedure(s):  Lumbar 3 to lumbar 4 and lumbar 4 to lumbar 5 laminectomy with fusion and instrumentation     Complications: intraoperative CSF leak (likely secondary to previous lumbar puncture) - it was sutured and overlaid with a fat graft intraoperatively and covered with Adherus    Consultants:   Consults     No orders found from 12/1/2022 to 12/31/2022.          Condition on Discharge: stable    Discharge Disposition: home    Pertinent Test Results:   Study Result    Narrative & Impression   XR SPINE LUMBAR 2 OR 3 VW-     INDICATIONS: Postoperative evaluation     TECHNIQUE: 3 views of the lumbar spine     COMPARISON: 09/15/2022     FINDINGS:     Intact appearing posterior vanessa and screw fusion hardware is seen at L3,  L4, L5, with intervertebral disc spacer. Alignment is in range of  normal. No acute fracture is identified. Disc spaces otherwise appear  unremarkable. Gas-filled loops of bowel may reflect postoperative ileus.     IMPRESSION:     Postsurgical changes.     This report was finalized on 12/31/2022 11:03 AM by Dr. Perez Gómez M.D.     Brief HPI: Patient evaluated in office for complaints of lower back pain and right lower extremity pain, as well as numbness and tingling in the bilateral lower extremities. Imaging revealed a grade 1 spondylolisthesis of L4 over L5 as well as spinal canal stenosis at L3-4 and L4-5. RBAs of treatment were discussed  including the above procedure. Patient consented to above procedure.    Hospital Course: Patient admitted for above procedure. The procedure itself was with complication.  During surgery, it was noted that the patient had a small pinhole of the dura just right of midline at L4-5.  This was thought to be from a previous lumbar puncture.  However, it was leaking some spinal fluid.  It was sutured and overlaid with a fat graft intraoperatively.  It was then coated with Adherus.  The patient was transferred to [facility] following recovery.  The patient has had a headache postoperatively, but it is unclear as to whether this is truly a spinal low pressure headache.  She is not confident that the headache is increased when she is sitting up or standing.  However, she has been instructed to lay flat and drink caffeine as needed.  The patient has a pressure dressing over her lumbar incision.  She has been instructed to keep a pressure dressing on for the next week.  She has also been instructed to wear an abdominal binder.  The patient has participated well with physical therapy and is independently getting in/out of bed and ambulating around her hospital room.  She is passing gas and has had a bowel movement.  She is being prepared for discharge home today.  She will be discharged home with a prescription for Keflex 500 mg 4 times daily for 5 days, as well as pain medication.  She states that she already has a telehealth visit scheduled for 01/10/2023.    Discharge Physical Exam:  Mental Status: The patient is awake, alert and oriented x 3. Recent and remote memory functions are normal. The patient is attentive with normal concentration.  Language is fluent. Speech is clear. The speech is non-dysarthric. Fund of knowledge is normal.  Cranial Nerve I: Not tested.  Cranial Nerve II: The pupils are 3 mm, equally round and reactive to light. Visual fields are full to confrontation.  Cranial Nerves III, IV, VI: The extraocular  movements are full in all directions of gaze without nystagmus.  Cranial Nerve V: Facial sensation is intact to light touch.  Cranial Nerve VII: Facial movements are symmetric  Cranial Nerve VIII: Audition is intact to finger rub bilaterally.  Cranial Nerves IX, X: Uvula and palate elevate symmetrically.  Cranial Nerve XI: Sternocleidomastoid strength is 5/5 bilaterally with normal shoulder shrug.  Cranial Nerve XII: Midline tongue protrusion without atrophy or fasiculations.  Motor: Tone is normal in all four extremities without fasciculations, atrophy, or myoclonus. There are no involuntary movements.   Muscle Strength: 5/5 muscle strength in bilateral upper and bilateral lower extremities.   Incision: Dressing overlying the patient's lumbar incision is clean and dry.      Temp:  [98 °F (36.7 °C)-99.1 °F (37.3 °C)] 98.3 °F (36.8 °C)  Heart Rate:  [74-86] 86  Resp:  [16-18] 16  BP: (113-139)/(71-84) 115/74    Current labs:  Lab Results (last 24 hours)     Procedure Component Value Units Date/Time    CBC & Differential [[id number]]  (Abnormal) Collected: 01/02/23 0432    Specimen: Blood Updated: 01/02/23 0538    Narrative:      The following orders were created for panel order CBC & Differential.  Procedure                               Abnormality         Status                     ---------                               -----------         ------                     CBC Auto Differential[[id number]]        Abnormal            Final result                 Please view results for these tests on the individual orders.    CBC Auto Differential [[id number]]  (Abnormal) Collected: 01/02/23 0432    Specimen: Blood Updated: 01/02/23 0538     WBC 14.36 10*3/mm3      RBC 3.90 10*6/mm3      Hemoglobin 13.4 g/dL      Hematocrit 39.6 %      .5 fL      MCH 34.4 pg      MCHC 33.8 g/dL      RDW 11.7 %      RDW-SD 43.2 fl      MPV 10.2 fL      Platelets 205 10*3/mm3      Neutrophil % 74.3 %      Lymphocyte % 11.3 %       Monocyte % 11.7 %      Eosinophil % 1.7 %      Basophil % 0.5 %      Immature Grans % 0.5 %      Neutrophils, Absolute 10.68 10*3/mm3      Lymphocytes, Absolute 1.62 10*3/mm3      Monocytes, Absolute 1.68 10*3/mm3      Eosinophils, Absolute 0.24 10*3/mm3      Basophils, Absolute 0.07 10*3/mm3      Immature Grans, Absolute 0.07 10*3/mm3      nRBC 0.0 /100 WBC           Discharge Medications:     Your medication list      START taking these medications      Instructions Last Dose Given Next Dose Due   cephalexin 500 MG capsule  Commonly known as: KEFLEX      Take 1 capsule by mouth 4 (Four) Times a Day for 5 days.       HYDROcodone-acetaminophen 5-325 MG per tablet  Commonly known as: NORCO      Take 1 tablet by mouth Every 4 (Four) Hours As Needed for Moderate Pain for up to 7 days.          CHANGE how you take these medications      Instructions Last Dose Given Next Dose Due   Eliquis 5 MG tablet tablet  Generic drug: apixaban  What changed:   · how much to take  · how to take this  · when to take this  · additional instructions      TAKE ONE TABLET BY MOUTH TWICE A DAY          CONTINUE taking these medications      Instructions Last Dose Given Next Dose Due   Acetaminophen 325 MG capsule      Take 2 capsules by mouth 4 (Four) Times a Day As Needed.       albuterol sulfate  (90 Base) MCG/ACT inhaler  Commonly known as: PROVENTIL HFA;VENTOLIN HFA;PROAIR HFA      As Needed for Wheezing.       desonide 0.05 % cream  Commonly known as: DESOWEN      As Needed.       desvenlafaxine 50 MG 24 hr tablet  Commonly known as: PRISTIQ      Take 12.5 mg by mouth As Needed.       Diclofenac Epolamine 1.3 % patch patch  Commonly known as: FLECTOR      As Needed.       dilTIAZem  MG 24 hr capsule  Commonly known as: CARDIZEM CD      Take 1 capsule by mouth Daily.       EPINEPHrine 0.3 MG/0.3ML solution auto-injector injection  Commonly known as: EPIPEN      As Needed.       esomeprazole 40 MG capsule  Commonly known  as: nexIUM      Take 40 mg by mouth Daily.       estradiol 0.075 MG/24HR patch  Commonly known as: BLAIR MACKEY-JENNIFER      Place 1 patch on the skin as directed by provider 2 (Two) Times a Week.       guaiFENesin 600 MG 12 hr tablet  Commonly known as: MUCINEX      As Needed.       hydrocortisone 2.5 % cream      As Needed.       lidocaine-prilocaine 2.5-2.5 % cream  Commonly known as: EMLA      As Needed.       ondansetron 4 MG tablet  Commonly known as: ZOFRAN      As Needed.       Progesterone 200 MG capsule  Commonly known as: PROMETRIUM      Daily.       Trelegy Ellipta 100-62.5-25 MCG/ACT inhaler  Generic drug: Fluticasone-Umeclidin-Vilant      Inhale Daily.       tretinoin 0.1 % cream  Commonly known as: RETIN-A      As Needed.       valACYclovir 1000 MG tablet  Commonly known as: VALTREX      As Needed.       Zemaira 1000 MG injection  Generic drug: alpha1-proteinase inhibitor      1 (One) Time Per Week.             Where to Get Your Medications      These medications were sent to [place] [facility] [id number]    Hours: 7:00 AM-6:00 PM Mon-Fri, 8:00 AM-4:30 PM Sat-Sun (Closed 12-12:30PM) Phone: [phone]   · cephalexin 500 MG capsule  · HYDROcodone-acetaminophen 5-325 MG per tablet         Discharge Diet:    Diet Order   Procedures   • Diet: Regular/House Diet; Texture: Regular Texture (IDDSI 7); Fluid Consistency: Thin (IDDSI 0)       Activity at Discharge: see discharge instructions provided    Call for: questions or concerns    Follow-up Appointments:  Future Appointments   Date Time Provider Department Center   1/10/2023  8:30 AM Rigo Mathur MD [facility]   2/23/2023 12:40 PM Ijeoma Auguste MD [facility]      Follow-up Information     Bosler, James William III, MD .    Specialty: Internal Medicine  Contact information:  [id number] [address] [address] [phone]                         Test Results Pending at Discharge:     None    I  discussed the discharge instructions with patient    KALEB Alford  01/02/23  10:25 EST    35 min spent in reviewing records, discussion and examination of the patient and discussion with other members of the patient's medical team.    \"Dictated utilizing Dragon dictation\".

## 2023-01-02 NOTE — PLAN OF CARE
Goal Outcome Evaluation:  Plan of Care Reviewed With: patient        Progress: improving  Outcome Evaluation: Ms. Gonzalez is agreeable for skilled PT this AM. She continues to report HA that increases with positional changes. She demo improved activity tolerance due to less assist needed with all functional tasks this date. She benefits from cues to improve sequence of movements with log roll when getting out of bed. She required SBA for supine-sit and sit-stand with use of RWx. Once standing, she ambulated 100' without AD and required CGA/Lorena due to NBOS and mild unsteadiness. Patient left sitting EOB. Ms. Landeros remains a candidate for skilled PT.

## 2023-01-02 NOTE — DISCHARGE INSTRUCTIONS
[facility] Neurological Surgery  [address] [facility], [address]  [place] [address]  Phone:  [phone]  Fax:  [phone]      Rigo Mathur M.D., F.A.C.S.    CARE AND INSTRUCTIONS AFTER LUMBAR FUSION    KEEP A PRESSURE DRESSING OVER YOUR INCISION FOR THE NEXT WEEK.  WEAR THE ABDOMINAL BINDER OVER YOUR INCISION TO REINFORCE THE PRESSURE DRESSING.  IF YOU EXPERIENCE A POSITIONAL HEADACHE (ONE THAT WORSENS WITH SITTING OR STANDING, THEN LAY FLAT AS MUCH AS POSSIBLE AND DRINK CAFFEINE FOR 2-3 DAYS). IF YOUR HEADACHE DOES NOT IMPROVE AFTER 2-3 DAYS OF THIS TREATMENT, NOTIFY OUR OFFICE.    Wear your brace whenever you are out of bed. Put your brace on and take it off while lying down.    No sitting except on the commode. You may lie on a firm couch BUT no waterbed or in a recliner. Either lie on your side or stand at a counter for meals.    No driving. You can ride short distances in a car in the front passenger’s seat that reclines, or you may lie down in the back seat.     Don’t lift anything heavier than a coffee cup or paperback book.     Gradually increase your activity each day. You should be out of bed every hour during the day. Walk outside as soon as you feel up to doing so. Walk short distances frequently rather than making a long trip. Your goal is to be walking 1 to 3 miles per day when you return for your post-operative office visit. (NEVER DO THIS IN ONE TRIP)    You may climb stairs. BUT take them slowly.    Keep your incision clean and dry. If you notice any redness, swelling or drainage call the office @ [phone]. There are steri-strips across the incision, after 10 days please remove the strips gently.     You may shower five (5) days after surgery. Take your brace off before you shower. NO bending while your brace is off. Don’t let the water hit directly on the incision, gently pat dry.    You should have a post-operative appointment scheduled for 2 to 2 ½ weeks after surgery with our Physician  Assistant or Nurse Practitioner. If you do not, please call the office when you return home from the hospital to schedule this appointment.    Your prescription for pain medication may be refilled for only half the original amount prior to your return office visit. Due to changes in Federal Law in order to have this medication refilled you must contact the office four days prior to the due date and make arrangements to pick the prescription up in the office. This prescription refill cannot be called in to the pharmacy. Your prescription will be ready for pick-up the day the refill is due.     Don’t be alarmed if you continue to experience some of your pre-operative symptoms after going home. This is not un-common and usually improves within a few days but could last longer. If you have any questions please call our office at [phone].

## 2023-01-03 NOTE — CASE MANAGEMENT/SOCIAL WORK
Case Management Discharge Note      Final Note: Home.         Selected Continued Care - Discharged on 1/2/2023 Admission date: 12/30/2022 - Discharge disposition: Home or Self Care    Destination    No services have been selected for the patient.              Durable Medical Equipment    No services have been selected for the patient.              Dialysis/Infusion    No services have been selected for the patient.              Home Medical Care    No services have been selected for the patient.              Therapy    No services have been selected for the patient.              Community Resources    No services have been selected for the patient.              Community & DME    No services have been selected for the patient.                       Final Discharge Disposition Code: 01 - home or self-care

## 2023-01-04 ENCOUNTER — TELEPHONE (OUTPATIENT)
Dept: NEUROSURGERY | Facility: CLINIC | Age: 63
End: 2023-01-04
Payer: COMMERCIAL

## 2023-01-04 NOTE — PROGRESS NOTES
Subjective   Patient ID: Radha Gonzalez is a 62 y.o. female is here today via telephone for 2 week PO follow-up. Patient had a Lumbar 3 to lumbar 4 and lumbar 4 to lumbar 5 laminectomy with fusion and instrumentation on 12.30.2022    You have chosen to receive care through a telephone visit. Do you consent to use a telephone visit for your medical care today? Yes    We had a telephone visit today.  The patient was at home and I was in the office.  We talked for 5 minutes.    History of Present Illness     I talked to this patient today on the phone.  She is doing well.  She has no pain in her legs.  She does still have some back pain but it seems to be improving.  She says her incision looks good.    The following portions of the patient's history were reviewed and updated as appropriate: allergies, current medications, past family history, past medical history, past social history, past surgical history and problem list.    Review of Systems    I have reviewed the review of systems as documented by my MA.      Objective       Tobacco Use: Low Risk    • Smoking Tobacco Use: Never   • Smokeless Tobacco Use: Never   • Passive Exposure: Not on file          Physical Exam  Neurological:      Mental Status: She is alert and oriented to person, place, and time.       Neurologic Exam     Mental Status   Oriented to person, place, and time.           Assessment & Plan   Independent Review of Radiographic Studies:      I personally reviewed the images from the following studies.    There is no new imaging to review but I reviewed her postop films which look okay.    Medical Decision Making:      I told the patient that from my point of view it is too early to start physical therapy.  She can come out of her binder.  I told her to keep up with her walking program and call the office if any problems develop.  I told her still not to do a lot of bending or lifting.  We will see her in the office in a month with an  x-ray.    Diagnoses and all orders for this visit:    1. Follow-up examination following surgery (Primary)  -     XR Spine Lumbar 2 or 3 View; Future      Return in about 4 weeks (around 2/7/2023).

## 2023-01-10 ENCOUNTER — OFFICE VISIT (OUTPATIENT)
Dept: NEUROSURGERY | Facility: CLINIC | Age: 63
End: 2023-01-10
Payer: COMMERCIAL

## 2023-01-10 DIAGNOSIS — Z09 FOLLOW-UP EXAMINATION FOLLOWING SURGERY: Primary | ICD-10-CM

## 2023-01-10 PROCEDURE — 99024 POSTOP FOLLOW-UP VISIT: CPT | Performed by: NEUROLOGICAL SURGERY

## 2023-01-16 ENCOUNTER — TELEPHONE (OUTPATIENT)
Dept: NEUROSURGERY | Facility: CLINIC | Age: 63
End: 2023-01-16
Payer: COMMERCIAL

## 2023-01-16 DIAGNOSIS — M79.652 ACUTE PAIN OF LEFT THIGH: Primary | ICD-10-CM

## 2023-01-16 NOTE — TELEPHONE ENCOUNTER
I contacted the patient this evening, 1/16/2023 at 6:30 PM.  I did not get an answer but left a message on voicemail instructing the patient to call our office in the morning.  I will forward this message to Gracie. Please make sure she is aware. As you know, I will be out until Friday, 1/20.

## 2023-01-16 NOTE — TELEPHONE ENCOUNTER
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

## 2023-01-16 NOTE — TELEPHONE ENCOUNTER
Patient was seen 1/10/23    She had a Lumbar 3 to lumbar 4 and lumbar 4 to lumbar 5 laminectomy with fusion and instrumentation on 12.30.2022

## 2023-01-17 ENCOUNTER — HOSPITAL ENCOUNTER (OUTPATIENT)
Dept: CARDIOLOGY | Facility: HOSPITAL | Age: 63
Discharge: HOME | End: 2023-01-17
Admitting: NURSE PRACTITIONER
Payer: COMMERCIAL

## 2023-01-17 DIAGNOSIS — M79.652 ACUTE PAIN OF LEFT THIGH: ICD-10-CM

## 2023-01-17 LAB
BH CV LOWER VASCULAR LEFT COMMON FEMORAL AUGMENT: NORMAL
BH CV LOWER VASCULAR LEFT COMMON FEMORAL COMPETENT: NORMAL
BH CV LOWER VASCULAR LEFT COMMON FEMORAL COMPRESS: NORMAL
BH CV LOWER VASCULAR LEFT COMMON FEMORAL PHASIC: NORMAL
BH CV LOWER VASCULAR LEFT COMMON FEMORAL SPONT: NORMAL
BH CV LOWER VASCULAR LEFT DISTAL FEMORAL COMPRESS: NORMAL
BH CV LOWER VASCULAR LEFT GASTRONEMIUS COMPRESS: NORMAL
BH CV LOWER VASCULAR LEFT GREATER SAPH AK COMPRESS: NORMAL
BH CV LOWER VASCULAR LEFT GREATER SAPH BK COMPRESS: NORMAL
BH CV LOWER VASCULAR LEFT LESSER SAPH COMPRESS: NORMAL
BH CV LOWER VASCULAR LEFT MID FEMORAL AUGMENT: NORMAL
BH CV LOWER VASCULAR LEFT MID FEMORAL COMPETENT: NORMAL
BH CV LOWER VASCULAR LEFT MID FEMORAL COMPRESS: NORMAL
BH CV LOWER VASCULAR LEFT MID FEMORAL PHASIC: NORMAL
BH CV LOWER VASCULAR LEFT MID FEMORAL SPONT: NORMAL
BH CV LOWER VASCULAR LEFT PERONEAL COMPRESS: NORMAL
BH CV LOWER VASCULAR LEFT POPLITEAL AUGMENT: NORMAL
BH CV LOWER VASCULAR LEFT POPLITEAL COMPETENT: NORMAL
BH CV LOWER VASCULAR LEFT POPLITEAL COMPRESS: NORMAL
BH CV LOWER VASCULAR LEFT POPLITEAL PHASIC: NORMAL
BH CV LOWER VASCULAR LEFT POPLITEAL SPONT: NORMAL
BH CV LOWER VASCULAR LEFT POSTERIOR TIBIAL COMPRESS: NORMAL
BH CV LOWER VASCULAR LEFT PROFUNDA FEMORAL COMPRESS: NORMAL
BH CV LOWER VASCULAR LEFT PROXIMAL FEMORAL COMPRESS: NORMAL
BH CV LOWER VASCULAR LEFT SAPHENOFEMORAL JUNCTION COMPRESS: NORMAL
BH CV LOWER VASCULAR LEFT SOLEAL COMPRESS: NORMAL
BH CV LOWER VASCULAR RIGHT COMMON FEMORAL AUGMENT: NORMAL
BH CV LOWER VASCULAR RIGHT COMMON FEMORAL COMPETENT: NORMAL
BH CV LOWER VASCULAR RIGHT COMMON FEMORAL COMPRESS: NORMAL
BH CV LOWER VASCULAR RIGHT COMMON FEMORAL PHASIC: NORMAL
BH CV LOWER VASCULAR RIGHT COMMON FEMORAL SPONT: NORMAL
MAXIMAL PREDICTED HEART RATE: 156 BPM
STRESS TARGET HR: 133 BPM

## 2023-01-17 PROCEDURE — 93971 EXTREMITY STUDY: CPT

## 2023-01-17 NOTE — TELEPHONE ENCOUNTER
Doppler lower ext ordered stat at [facility] with hold and call( KK's # given)  Pt to come at 3:15pm today. She will call me back for RTW note.

## 2023-01-17 NOTE — TELEPHONE ENCOUNTER
Please contact patient and find out exactly where she is having discomfort and ask about swelling, redness, warmth. What type of pain, etc? Dr. Mathur saw her on 1/10.  His note indicates she denied any leg pain but she is saying this started on the 6th?  get some clarification please. Also, did she resume her Eliquis postop?

## 2023-01-17 NOTE — TELEPHONE ENCOUNTER
Duplex negative for DVT.  Patient cleared to leave from vascular lab.  We will contact her tomorrow for follow-up.

## 2023-01-17 NOTE — TELEPHONE ENCOUNTER
I am going to order a stat Doppler lower extremity.  This needs to be a hold and call.  Please get it scheduled today.      Also, she has some muscle relaxer at home that she is going to try as well as some hamstring stretches.  She is scheduled to have this work trip but I do not think it is a good idea.  I advised her it would be best for her not to travel or do extensive meetings where she is having to sit all day until her next follow-up appointment.  She can do some work from home but otherwise should be off work.  I also advised her when doing activity at home to avoid heavy lifting, squatting, bending, or extensively long walks.  She should break up her walking activity into shorter segments a couple times a day.  No impact.  If she needs a work note, please supply that.    I would like to touch base with her tomorrow to see if the muscle relaxant is helping.  We will consider short dose of steroid if needed.  I did advise her to continue her Eliquis and not stop and take ibuprofen products.

## 2023-01-17 NOTE — PROGRESS NOTES
Preliminary for left lower venous is negative for DVT. A preliminary was given to KLARISSA Bowman. She said she will be calling her tomorrow with instructions.

## 2023-01-17 NOTE — TELEPHONE ENCOUNTER
Spoke with pt. She is c/o L upper thigh pain that is possibly muscular in nature with no redness.edema, etc.  It hurts when sitting and walking.  Pt had resumed Eliquis after surgery, but has not taken x 3 days so she could take Ibuprofen.  She also has concerns about a work trip that is 5 days of sitting for 10 hrs a day.  Please advise.

## 2023-01-18 NOTE — TELEPHONE ENCOUNTER
Patient reports that she is feeling much better. She says this is the best she has felt within the last 12 or so days. She was unable to take the muscle relaxer as she could not find it and wants to know if there was one that was non drowsy that could be prescribed

## 2023-01-18 NOTE — TELEPHONE ENCOUNTER
Can you please call and see how she is feeling? Reiterate to her that ultrasound negative for DVT. Did she try the muscle relaxer?

## 2023-01-18 NOTE — TELEPHONE ENCOUNTER
All muscle relaxants can cause drowsiness.  I recommended that if she is feeling that much better and did not take any muscle relaxer, we just hold off on prescribing anything unless she needs it.  Tell her to continue stretching her hamstrings and to use some heat on the back of her legs if she needs to.  Otherwise, keep scheduled follow-up as previously planned.

## 2023-02-07 ENCOUNTER — HOSPITAL ENCOUNTER (OUTPATIENT)
Dept: GENERAL RADIOLOGY | Facility: HOSPITAL | Age: 63
Discharge: HOME | End: 2023-02-07
Admitting: NEUROLOGICAL SURGERY
Payer: COMMERCIAL

## 2023-02-07 DIAGNOSIS — Z09 FOLLOW-UP EXAMINATION FOLLOWING SURGERY: ICD-10-CM

## 2023-02-07 PROCEDURE — 72100 X-RAY EXAM L-S SPINE 2/3 VWS: CPT

## 2023-02-09 ENCOUNTER — OFFICE VISIT (OUTPATIENT)
Dept: NEUROSURGERY | Facility: CLINIC | Age: 63
End: 2023-02-09
Payer: COMMERCIAL

## 2023-02-09 VITALS
HEIGHT: 67 IN | TEMPERATURE: 98.8 F | BODY MASS INDEX: 20.59 KG/M2 | SYSTOLIC BLOOD PRESSURE: 128 MMHG | DIASTOLIC BLOOD PRESSURE: 80 MMHG | WEIGHT: 131.18 LBS

## 2023-02-09 DIAGNOSIS — Z09 FOLLOW-UP EXAMINATION FOLLOWING SURGERY: Primary | ICD-10-CM

## 2023-02-09 PROCEDURE — 99024 POSTOP FOLLOW-UP VISIT: CPT | Performed by: NEUROLOGICAL SURGERY

## 2023-02-09 NOTE — PROGRESS NOTES
"Subjective   Patient ID: Radha Gonzalez is a 62 y.o. female is here today for 4 week PO follow-up with a new XR L-spine done on 02.07.2023. Patient had a Lumbar 3 to lumbar 4 and lumbar 4 to lumbar 5 laminectomy with fusion and instrumentation on 12.30.2023    Today patient states that she feels well overall, patient denies low back pain    Patient, Provider, and MA are all wearing a mask in our office today    History of Present Illness    This patient returns today.  She is doing pretty well.  She has almost no pain.    The following portions of the patient's history were reviewed and updated as appropriate: allergies, current medications, past family history, past medical history, past social history, past surgical history and problem list.    Review of Systems    I have reviewed the review of systems as documented by my MA.      Objective     Vitals:    02/09/23 1138   BP: 128/80   Cuff Size: Adult   Temp: 98.8 °F (37.1 °C)   Weight: 59.5 kg (131 lb 2.8 oz)   Height: 170.2 cm (67\")     Body mass index is 20.54 kg/m².    Tobacco Use: Low Risk    • Smoking Tobacco Use: Never   • Smokeless Tobacco Use: Never   • Passive Exposure: Not on file          Physical Exam  Neurological:      Mental Status: She is alert and oriented to person, place, and time.       Neurologic Exam     Mental Status   Oriented to person, place, and time.           Assessment & Plan   Independent Review of Radiographic Studies:      I personally reviewed the images from the following studies.    I reviewed her x-rays which were done 2 days ago.  This shows good alignment of the construct.    Medical Decision Making:      I told the patient we will go ahead and start her on physical therapy.  I will see her back in about 6 weeks with an x-ray.    Diagnoses and all orders for this visit:    1. Follow-up examination following surgery (Primary)  -     Ambulatory Referral to Physical Therapy      Return in about 6 weeks (around " 3/23/2023).         Answers for HPI/ROS submitted by the patient on 2/7/2023  Please describe your symptoms.: Post surgery visit  Have you had these symptoms before?: No  How long have you been having these symptoms?: Greater than 2 weeks  Please list any medications you are currently taking for this condition.: Listed  Please describe any probable cause for these symptoms. : Surgery  What is the primary reason for your visit?: Other

## 2023-02-22 NOTE — PROGRESS NOTES
Date of Office Visit: 2023  Encounter Provider: Ijeoma Auguste MD  Place of Service: [facility] CARDIOLOGY  Patient Name: Radha Gonzalez  :1960    Chief complaint  Paroxysmal atrial fibrillation.    History of Present Illness  Patient is a 62-year-old female with history of COPD, of arterial disease, deep venous thrombosis, Leiden 5 deficiency, alpha protein deficiency and atrial fibrillation.  And on  she was noted to have palpitations with Apple Watch indicating episodes of atrial fibrillation.  She decreased the modest amounts of caffeinated use.  She had a stress echocardiogram that showed normal systolic function hyperdynamic left ventricle trivial valve regurgitation normal right-sided pressures.  There was no ischemia.  She was started on aspirin and diltiazem.  Subsequent Holter showed 2 episodes of atrial fibrillation totaling 24 minutes.  She was placed on Eliquis and aspirin was discontinued.  Follow-up in 2021 metoprolol was decreased due to fatigue.  On 10/10/2022 she underwent atrial fibrillation by Dr. Iqbal.  Echo performed on 2022 showed size systolic function aortic valve calcification without stenosis or regurgitation.  There is no significant pulmonary hypertension.    Since last visit she is planning golf and walking daily up to 5 miles.  She is also using a stationary bike.  She denies any chest pain, shortness of breath, palpitations, syncope near syncope.  She underwent laminectomy on 2022 and has recovered nicely from this.  She had blood tests elsewhere and believes lipids were under good control.      Past Medical History:   Diagnosis Date   • Arrhythmia    • Arthritis    • Clotting disorder (HCC)     Factor 5 Liden   • COPD (chronic obstructive pulmonary disease) ([facility])    • Deep vein thrombosis ([facility])    • Essential hypertension 2021   • Mitral valve prolapse    • PAD (peripheral artery disease) ([facility])    • PAF (paroxysmal  atrial fibrillation) ([facility]) 11/25/2020     Past Surgical History:   Procedure Laterality Date   • BLADDER SURGERY  2013    E/O polyp   • BREAST SURGERY      E/O benign neuroma   • CARDIAC ELECTROPHYSIOLOGY PROCEDURE N/A 10/10/2022    Procedure: Ablation atrial fibrillation CRYO;  Surgeon: Venkatesh Iqbal MD;  Location: [facility] CATH INVASIVE LOCATION;  Service: Cardiovascular;  Laterality: N/A;   • COLONOSCOPY  2016   • HAND SURGERY Right     Carpal metacarpal hand surgery    • LUMBAR FUSION N/A 12/30/2022    Procedure: Lumbar 3 to lumbar 4 and lumbar 4 to lumbar 5 laminectomy with fusion and instrumentation;  Surgeon: Rigo Mathur MD;  Location: [facility] MAIN OR;  Service: Robotics - Neuro;  Laterality: N/A;   • SINUS SURGERY  2017   • TONSILLECTOMY       Outpatient Medications Prior to Visit   Medication Sig Dispense Refill   • Acetaminophen 325 MG capsule Take 2 capsules by mouth 4 (Four) Times a Day As Needed.     • albuterol sulfate  (90 Base) MCG/ACT inhaler As Needed for Wheezing.     • alpha1-proteinase inhibitor (Zemaira) 1000 MG injection 1 (One) Time Per Week.     • desonide (DESOWEN) 0.05 % cream As Needed.     • desvenlafaxine (PRISTIQ) 50 MG 24 hr tablet Take 12.5 mg by mouth As Needed.     • Diclofenac Epolamine (FLECTOR) 1.3 % patch patch As Needed.     • Eliquis 5 MG tablet tablet TAKE ONE TABLET BY MOUTH TWICE A DAY (Patient taking differently: 5 mg Every 12 (Twelve) Hours. HOLD PER MD INSTRUCTION) 60 tablet 2   • EPINEPHrine (EPIPEN) 0.3 MG/0.3ML solution auto-injector injection As Needed.     • esomeprazole (nexIUM) 40 MG capsule Take 40 mg by mouth Daily.     • estradiol (MINIVELLE, VIVELLE-DOT) 0.075 MG/24HR patch Place 1 patch on the skin as directed by provider 2 (Two) Times a Week.     • Fluticasone-Umeclidin-Vilant (Trelegy Ellipta) 100-62.5-25 MCG/INH aerosol powder  Inhale Daily.     • guaiFENesin (MUCINEX) 600 MG 12 hr tablet As Needed.     • hydrocortisone 2.5 % cream As  "Needed.     • lidocaine-prilocaine (EMLA) 2.5-2.5 % cream As Needed.     • ondansetron (ZOFRAN) 4 MG tablet As Needed.     • Progesterone (PROMETRIUM) 200 MG capsule Daily.     • tretinoin (RETIN-A) 0.1 % cream As Needed.     • valACYclovir (VALTREX) 1000 MG tablet As Needed.     • dilTIAZem CD (CARDIZEM CD) 180 MG 24 hr capsule Take 1 capsule by mouth Daily. 90 capsule 3     No facility-administered medications prior to visit.       Allergies as of 02/23/2023   • (No Known Allergies)     Social History     Socioeconomic History   • Marital status:    • Number of children: 1   Tobacco Use   • Smoking status: Never   • Smokeless tobacco: Never   Vaping Use   • Vaping Use: Never used   Substance and Sexual Activity   • Alcohol use: Yes     Alcohol/week: 3.0 standard drinks     Types: 3 Glasses of wine per week     Comment: 2 times a week    • Drug use: Never   • Sexual activity: Defer     Family History   Adopted: Yes   Problem Relation Age of Onset   • Malig Hyperthermia Neg Hx      Review of Systems   Constitutional: Negative for chills, fever, weight gain and weight loss.   Cardiovascular: Negative for leg swelling.   Respiratory: Negative for cough, snoring and wheezing.    Hematologic/Lymphatic: Negative for bleeding problem. Does not bruise/bleed easily.   Skin: Negative for color change.   Musculoskeletal: Negative for falls, joint pain and myalgias.   Gastrointestinal: Negative for melena.   Genitourinary: Negative for hematuria.   Neurological: Negative for excessive daytime sleepiness.   Psychiatric/Behavioral: Negative for depression. The patient is not nervous/anxious.         Objective:     Vitals:    02/23/23 1251   BP: 100/70   Pulse: 68   Weight: 58.5 kg (129 lb)   Height: 170.2 cm (67\")     Body mass index is 20.2 kg/m².    Vitals reviewed.   Constitutional:       Appearance: Well-developed.   Eyes:      General: No scleral icterus.        Right eye: No discharge.      Conjunctiva/sclera: " Conjunctivae normal.      Pupils: Pupils are equal, round, and reactive to light.   HENT:      Head: Normocephalic.      Nose: Nose normal.   Neck:      Thyroid: No thyromegaly.      Vascular: No JVD.   Pulmonary:      Effort: Pulmonary effort is normal. No respiratory distress.      Breath sounds: Normal breath sounds. No wheezing. No rales.   Cardiovascular:      Normal rate. Regular rhythm. Normal S1. Normal S2.      No gallop.   Pulses:     Intact distal pulses.   Edema:     Peripheral edema absent.   Abdominal:      General: Bowel sounds are normal. There is no distension.      Palpations: Abdomen is soft.      Tenderness: There is no abdominal tenderness. There is no rebound.   Musculoskeletal: Normal range of motion.         General: No tenderness.      Cervical back: Normal range of motion and neck supple. Skin:     General: Skin is warm and dry.      Findings: No erythema or rash.   Neurological:      Mental Status: Alert and oriented to person, place, and time.   Psychiatric:         Behavior: Behavior normal.         Thought Content: Thought content normal.         Judgment: Judgment normal.       Lab Review:     ECG 12 Lead    Date/Time: 2/23/2023 11:28 PM  Performed by: Ijeoma Auguste MD  Authorized by: Ijeoma Auguste MD   Comparison: compared with previous ECG   Similar to previous ECG  Rhythm: sinus rhythm    Clinical impression: normal ECG          Assessment:       Diagnosis Plan   1. PAF (paroxysmal atrial fibrillation) ([facility])        2. PAD (peripheral artery disease) ([facility])        3. Essential hypertension          Plan:       1.  Paroxysmal atrial fibrillation, s/p A-fib ablation.  She questions the need to continue with anticoagulant therapy.  She thought she was to see Dr. Iqbal again though there is no future appointment set up.  Dr. Iqbal had recommended lifelong anticoagulant therapy she would like to address further.  She thought she was to see several months following her last visit.  2.   Hypertension, controlled and borderline low.  Home readings are slightly higher than it is today but she plans on increasing exercise what I suspect blood pressure continue.  With this in mind we will decrease Cardizem CD to 120 mg a day  3.  History of DVT with Leiden 5 mutation.  She was seen by Dr. Dodson in 9/2022.  He did not feel she needed long-term anticoagulation for hypercoagulable state may need thromboprophylaxis for surgical procedures (at that time back surgery).  He also recommended she consider discontinuing estradiol patch and oral progesterone.  5.  Alpha antitrypsin antibody deficiency,followed by Dr Ceja  6.  History of asthma       Time Spent: I spent 35 minutes caring for Radha on this date of service. This time includes time spent by me in the following activities: preparing for the visit, reviewing tests, obtaining and/or reviewing a separately obtained history, performing a medically appropriate examination and/or evaluation, counseling and educating the patient/family/caregiver, ordering medications, tests, or procedures, documenting information in the medical record and independently interpreting results and communicating that information with the patient/family/caregiver.   I spent 1 minutes on the separately reported service of ECG. This time is not included in the time used to support the E/M service also reported today.        Your medication list          Accurate as of February 23, 2023 11:59 PM. If you have any questions, ask your nurse or doctor.            CHANGE how you take these medications      Instructions Last Dose Given Next Dose Due   dilTIAZem  MG 24 hr capsule  Commonly known as: CARDIZEM CD  What changed:   · medication strength  · how much to take  Changed by: Ijeoma Auguste MD      Take 1 capsule by mouth Daily.       Eliquis 5 MG tablet tablet  Generic drug: apixaban  What changed:   · how much to take  · how to take this  · when to take this  · additional  instructions      TAKE ONE TABLET BY MOUTH TWICE A DAY          CONTINUE taking these medications      Instructions Last Dose Given Next Dose Due   Acetaminophen 325 MG capsule      Take 2 capsules by mouth 4 (Four) Times a Day As Needed.       albuterol sulfate  (90 Base) MCG/ACT inhaler  Commonly known as: PROVENTIL HFA;VENTOLIN HFA;PROAIR HFA      As Needed for Wheezing.       desonide 0.05 % cream  Commonly known as: DESOWEN      As Needed.       desvenlafaxine 50 MG 24 hr tablet  Commonly known as: PRISTIQ      Take 12.5 mg by mouth As Needed.       Diclofenac Epolamine 1.3 % patch patch  Commonly known as: FLECTOR      As Needed.       EPINEPHrine 0.3 MG/0.3ML solution auto-injector injection  Commonly known as: EPIPEN      As Needed.       esomeprazole 40 MG capsule  Commonly known as: nexIUM      Take 40 mg by mouth Daily.       estradiol 0.075 MG/24HR patch  Commonly known as: MINIBLAIR GUEVARA-DOT      Place 1 patch on the skin as directed by provider 2 (Two) Times a Week.       guaiFENesin 600 MG 12 hr tablet  Commonly known as: MUCINEX      As Needed.       hydrocortisone 2.5 % cream      As Needed.       lidocaine-prilocaine 2.5-2.5 % cream  Commonly known as: EMLA      As Needed.       ondansetron 4 MG tablet  Commonly known as: ZOFRAN      As Needed.       Progesterone 200 MG capsule  Commonly known as: PROMETRIUM      Daily.       Trelegy Ellipta 100-62.5-25 MCG/ACT inhaler  Generic drug: Fluticasone-Umeclidin-Vilant      Inhale Daily.       tretinoin 0.1 % cream  Commonly known as: RETIN-A      As Needed.       valACYclovir 1000 MG tablet  Commonly known as: VALTREX      As Needed.       Zemaira 1000 MG injection  Generic drug: alpha1-proteinase inhibitor      1 (One) Time Per Week.             Where to Get Your Medications      These medications were sent to [facility] [id number] [place], [place] - [address] [place] [address] - [phone]  - [phone] [place] [address]  [facility] [place] [address]    Phone: [phone]   · dilTIAZem  MG 24 hr capsule         Patient is no longer taking -.  I corrected the med list to reflect this.  I did not stop these medications.      Dictated utilizing Dragon dictation

## 2023-02-23 ENCOUNTER — OFFICE VISIT (OUTPATIENT)
Dept: CARDIOLOGY | Facility: CLINIC | Age: 63
End: 2023-02-23
Payer: COMMERCIAL

## 2023-02-23 VITALS
WEIGHT: 129 LBS | SYSTOLIC BLOOD PRESSURE: 100 MMHG | HEIGHT: 67 IN | BODY MASS INDEX: 20.25 KG/M2 | HEART RATE: 68 BPM | DIASTOLIC BLOOD PRESSURE: 70 MMHG

## 2023-02-23 DIAGNOSIS — I10 ESSENTIAL HYPERTENSION: ICD-10-CM

## 2023-02-23 DIAGNOSIS — I73.9 PAD (PERIPHERAL ARTERY DISEASE): ICD-10-CM

## 2023-02-23 DIAGNOSIS — I48.0 PAF (PAROXYSMAL ATRIAL FIBRILLATION): Primary | ICD-10-CM

## 2023-02-23 PROCEDURE — 99214 OFFICE O/P EST MOD 30 MIN: CPT | Performed by: INTERNAL MEDICINE

## 2023-02-23 PROCEDURE — 93000 ELECTROCARDIOGRAM COMPLETE: CPT | Performed by: INTERNAL MEDICINE

## 2023-02-23 RX ORDER — DILTIAZEM HYDROCHLORIDE 120 MG/1
120 CAPSULE, COATED, EXTENDED RELEASE ORAL DAILY
Qty: 90 CAPSULE | Refills: 3 | Status: SHIPPED | OUTPATIENT
Start: 2023-02-23

## 2023-03-17 ENCOUNTER — TELEPHONE (OUTPATIENT)
Dept: NEUROSURGERY | Facility: CLINIC | Age: 63
End: 2023-03-17
Payer: COMMERCIAL

## 2023-03-17 DIAGNOSIS — M48.062 SPINAL STENOSIS OF LUMBAR REGION WITH NEUROGENIC CLAUDICATION: Primary | ICD-10-CM

## 2023-03-21 ENCOUNTER — OFFICE VISIT (OUTPATIENT)
Dept: NEUROSURGERY | Facility: CLINIC | Age: 63
End: 2023-03-21
Payer: COMMERCIAL

## 2023-03-21 ENCOUNTER — HOSPITAL ENCOUNTER (OUTPATIENT)
Dept: GENERAL RADIOLOGY | Facility: HOSPITAL | Age: 63
Discharge: HOME | End: 2023-03-21
Admitting: NEUROLOGICAL SURGERY
Payer: COMMERCIAL

## 2023-03-21 DIAGNOSIS — M48.062 SPINAL STENOSIS OF LUMBAR REGION WITH NEUROGENIC CLAUDICATION: ICD-10-CM

## 2023-03-21 DIAGNOSIS — Z09 FOLLOW-UP EXAMINATION FOLLOWING SURGERY: Primary | ICD-10-CM

## 2023-03-21 PROCEDURE — 72100 X-RAY EXAM L-S SPINE 2/3 VWS: CPT

## 2023-03-21 PROCEDURE — 99024 POSTOP FOLLOW-UP VISIT: CPT | Performed by: NEUROLOGICAL SURGERY

## 2023-03-21 NOTE — PROGRESS NOTES
Subjective   Patient ID: Radha Gonzalez is a 62 y.o. female is here today for 6 week PO follow-up with a new XR L-spine done on 03/21/2023. Patient had Lumbar 3 to lumbar 4 and lumbar 4 to lumbar 5 laminectomy with fusion and instrumentation    Today patient states that she has R hip pain, patient denies low back pain    Patient, Provider, and MA are all wearing a mask in our office today    History of Present Illness     This patient returns today.  She is doing well.  She has almost no pain at all.  She is finished physical therapy.    The following portions of the patient's history were reviewed and updated as appropriate: allergies, current medications, past family history, past medical history, past social history, past surgical history and problem list.    Review of Systems   Constitutional: Negative for chills and fever.   HENT: Negative for congestion.    Genitourinary: Negative for difficulty urinating and dysuria.   Musculoskeletal: Positive for myalgias. Negative for back pain and gait problem.        R hip pain   Neurological: Negative for weakness and numbness.       I reviewed the review of systems listed by the patient and discussed by my MA    Objective     There were no vitals filed for this visit.  There is no height or weight on file to calculate BMI.    Tobacco Use: Low Risk    • Smoking Tobacco Use: Never   • Smokeless Tobacco Use: Never   • Passive Exposure: Not on file          Physical Exam  Neurological:      Mental Status: She is alert and oriented to person, place, and time.       Neurologic Exam     Mental Status   Oriented to person, place, and time.           Assessment & Plan   Independent Review of Radiographic Studies:      I personally reviewed the images from the following studies.    I reviewed her x-rays which were done today.  There is not yet a report.  They look okay.    Medical Decision Making:      I told the patient that from my point of view she can gradually start  returning to normal activities.  She is to call if any problems develop.  Otherwise I will see her back in the office in about 6 months with an x-ray.    Diagnoses and all orders for this visit:    1. Follow-up examination following surgery (Primary)  -     XR Spine Lumbar Complete With Flex & Ext; Future      Return in about 6 months (around 9/21/2023).

## 2023-03-23 ENCOUNTER — OFFICE VISIT (OUTPATIENT)
Dept: CARDIOLOGY | Facility: CLINIC | Age: 63
End: 2023-03-23
Payer: COMMERCIAL

## 2023-03-23 VITALS
HEART RATE: 78 BPM | SYSTOLIC BLOOD PRESSURE: 138 MMHG | BODY MASS INDEX: 20.4 KG/M2 | WEIGHT: 130 LBS | DIASTOLIC BLOOD PRESSURE: 86 MMHG | HEIGHT: 67 IN

## 2023-03-23 DIAGNOSIS — I48.0 PAF (PAROXYSMAL ATRIAL FIBRILLATION): Primary | ICD-10-CM

## 2023-03-23 PROCEDURE — 93000 ELECTROCARDIOGRAM COMPLETE: CPT

## 2023-03-23 PROCEDURE — 99214 OFFICE O/P EST MOD 30 MIN: CPT

## 2023-03-23 NOTE — PROGRESS NOTES
Date of Office Visit: 2023  Encounter Provider: KLARISSA Stapleton  Place of Service: [facility] CARDIOLOGY  Patient Name: Radha Gonzalez  :1960    Chief Complaint   Patient presents with   • paroxysmal AFIB     3 month f/u   :     HPI: Radha Gonzalez is a 62 y.o. female who fpllows with Dr. Auguste, referred to Dr. Iqbal for paroxsymal atrial fibrillation---s/p PVI (10/10/2022).     She saw Dr. Iqbal in November of last year. She had one episode of AF the week after the ablation but no recurrence since then. Overall she was doing well. Recommended indefinite AC due to [id number] and hx of factor 5.                     She presents today for follow up appt.     Since ablation last October she has done great. She had one episode the week after but none since.     No recurrent AF. Denies any chest pain, dyspnea, palpitations, or syncope.     EKG shows NSR.     On diltaizem for rate control.     She is active and wants to get back to hiking but is concerned being on AC and falling.     Currently on apixaban.   [id number] is 4.      Past Medical History:   Diagnosis Date   • Arrhythmia    • Arthritis    • Clotting disorder (HCC)     Factor 5 Liden   • COPD (chronic obstructive pulmonary disease) ([facility])    • Deep vein thrombosis (HCC)    • Essential hypertension 2021   • Mitral valve prolapse    • PAD (peripheral artery disease) ([facility])    • PAF (paroxysmal atrial fibrillation) ([facility]) 2020       Past Surgical History:   Procedure Laterality Date   • BLADDER SURGERY      E/O polyp   • BREAST SURGERY      E/O benign neuroma   • CARDIAC ELECTROPHYSIOLOGY PROCEDURE N/A 10/10/2022    Procedure: Ablation atrial fibrillation CRYO;  Surgeon: Venkatesh Iqbal MD;  Location: [facility] INVASIVE LOCATION;  Service: Cardiovascular;  Laterality: N/A;   • COLONOSCOPY     • HAND SURGERY Right     Carpal metacarpal hand surgery    • LUMBAR FUSION N/A  12/30/2022    Procedure: Lumbar 3 to lumbar 4 and lumbar 4 to lumbar 5 laminectomy with fusion and instrumentation;  Surgeon: Rigo Mathur MD;  Location: [facility];  Service: Robotics - Neuro;  Laterality: N/A;   • SINUS SURGERY  2017   • TONSILLECTOMY         Social History     Socioeconomic History   • Marital status:    • Number of children: 1   Tobacco Use   • Smoking status: Never   • Smokeless tobacco: Never   Vaping Use   • Vaping Use: Never used   Substance and Sexual Activity   • Alcohol use: Yes     Alcohol/week: 3.0 standard drinks     Types: 3 Glasses of wine per week     Comment: 2 times a week    • Drug use: Never   • Sexual activity: Defer       Family History   Adopted: Yes   Problem Relation Age of Onset   • Malig Hyperthermia Neg Hx        Review of Systems   Constitutional: Negative for chills, fever and malaise/fatigue.   Cardiovascular: Negative for chest pain, dyspnea on exertion, leg swelling, near-syncope, orthopnea, palpitations, paroxysmal nocturnal dyspnea and syncope.   Respiratory: Negative for cough and shortness of breath.    Hematologic/Lymphatic: Negative.    Musculoskeletal: Negative for joint pain, joint swelling and myalgias.   Gastrointestinal: Negative for abdominal pain, diarrhea, melena, nausea and vomiting.   Genitourinary: Negative for frequency and hematuria.   Neurological: Negative for light-headedness, numbness, paresthesias and seizures.   Allergic/Immunologic: Negative.    All other systems reviewed and are negative.      No Known Allergies      Current Outpatient Medications:   •  Acetaminophen 325 MG capsule, Take 2 capsules by mouth 4 (Four) Times a Day As Needed., Disp: , Rfl:   •  albuterol sulfate  (90 Base) MCG/ACT inhaler, As Needed for Wheezing., Disp: , Rfl:   •  alpha1-proteinase inhibitor (Zemaira) 1000 MG injection, 1 (One) Time Per Week., Disp: , Rfl:   •  desonide (DESOWEN) 0.05 % cream, As Needed., Disp: , Rfl:   •  desvenlafaxine  "(PRISTIQ) 50 MG 24 hr tablet, Take 12.5 mg by mouth As Needed., Disp: , Rfl:   •  Diclofenac Epolamine (FLECTOR) 1.3 % patch patch, As Needed., Disp: , Rfl:   •  dilTIAZem CD (CARDIZEM CD) 120 MG 24 hr capsule, Take 1 capsule by mouth Daily., Disp: 90 capsule, Rfl: 3  •  Eliquis 5 MG tablet tablet, TAKE ONE TABLET BY MOUTH TWICE A DAY, Disp: 60 tablet, Rfl: 2  •  EPINEPHrine (EPIPEN) 0.3 MG/0.3ML solution auto-injector injection, As Needed., Disp: , Rfl:   •  esomeprazole (nexIUM) 40 MG capsule, Take 1 capsule by mouth Daily., Disp: , Rfl:   •  estradiol (MINIVELLE, VIVELLE-DOT) 0.075 MG/24HR patch, Place 1 patch on the skin as directed by provider 2 (Two) Times a Week., Disp: , Rfl:   •  Fluticasone-Umeclidin-Vilant (Trelegy Ellipta) 100-62.5-25 MCG/INH aerosol powder , Inhale Daily., Disp: , Rfl:   •  guaiFENesin (MUCINEX) 600 MG 12 hr tablet, As Needed., Disp: , Rfl:   •  hydrocortisone 2.5 % cream, As Needed., Disp: , Rfl:   •  lidocaine-prilocaine (EMLA) 2.5-2.5 % cream, As Needed., Disp: , Rfl:   •  ondansetron (ZOFRAN) 4 MG tablet, As Needed., Disp: , Rfl:   •  Progesterone (PROMETRIUM) 200 MG capsule, Daily., Disp: , Rfl:   •  tretinoin (RETIN-A) 0.1 % cream, As Needed., Disp: , Rfl:   •  valACYclovir (VALTREX) 1000 MG tablet, As Needed., Disp: , Rfl:       Objective:     Vitals:    03/23/23 1431   BP: 138/86   Pulse: 78   Weight: 59 kg (130 lb)   Height: 170.2 cm (67\")     Body mass index is 20.36 kg/m².    PHYSICAL EXAM:    Vitals Reviewed.   General Appearance: No acute distress, well developed and well nourished.   Eyes: Conjunctiva and lids: No erythema, swelling, or discharge. Sclera non-icteric.   HENT: Atraumatic, normocephalic. External eyes, ears, and nose normal.   Respiratory: No signs of respiratory distress. Respiration rhythm and depth normal.   Clear to auscultation. No rales, crackles, rhonchi, or wheezing auscultated.   Cardiovascular:  Heart Rate and Rhythm: Normal, Heart Sounds: Normal S1 " and S2. No S3 or S4 noted.  Gastrointestinal:  Abdomen soft, non-distended, non-tender.   Musculoskeletal: Normal movement of extremities  Skin: Warm and dry.   Psychiatric: Patient alert and oriented to person, place, and time. Speech and behavior appropriate. Normal mood and affect.       ECG 12 Lead    Date/Time: 3/23/2023 4:28 PM  Performed by: Ashu Demarco APRN  Authorized by: Ashu Demarco APRN   Comparison: compared with previous ECG   Similar to previous ECG  Rhythm: sinus rhythm  BPM: 76                Assessment:       Diagnosis Plan   1. PAF (paroxysmal atrial fibrillation) ([facility])               Plan:   1. PAF--s/p PVI (10/2022)--- since ablation she has not had any recurrent AF. She is maintaining NSR. She is on diltaizem for BP and rate control. Currently on apixaban for AC. [id number] is 4.     She would like to stop AC due to wanting to hike and concern for falling and bleeding. With her elevated [id number], history of DVT, and factor V leiden, I recommended that she stay on anticoagulation indefinitely.       She is going to follow up as needed if she has recurrent AF.      As always, it has been a pleasure to participate in your patient's care.      Sincerely,         KLARISSA Stapleton

## 2023-03-30 ENCOUNTER — TELEPHONE (OUTPATIENT)
Dept: NEUROSURGERY | Facility: CLINIC | Age: 63
End: 2023-03-30
Payer: COMMERCIAL

## 2023-03-30 NOTE — TELEPHONE ENCOUNTER
[facility] received a fax that requires your attention. The document has been indexed to the patient’s chart for your review.      Reason for sending: FORM NEEDS TO BE REVIEW AND SIGNED AND FAXED BACK     Documents Description: PHY THERAPY PLAN OF CARE_KORT    Name of Sender: SHANITA REMYIEST    Date Indexed: 03/30/23    Notes (if needed):   PLEASE SIGN AND RETURN.

## 2023-08-31 ENCOUNTER — OFFICE VISIT (OUTPATIENT)
Dept: CARDIOLOGY | Facility: CLINIC | Age: 63
End: 2023-08-31
Payer: COMMERCIAL

## 2023-08-31 VITALS
WEIGHT: 128 LBS | SYSTOLIC BLOOD PRESSURE: 120 MMHG | HEIGHT: 67 IN | HEART RATE: 55 BPM | DIASTOLIC BLOOD PRESSURE: 84 MMHG | BODY MASS INDEX: 20.09 KG/M2

## 2023-08-31 DIAGNOSIS — I10 ESSENTIAL HYPERTENSION: ICD-10-CM

## 2023-08-31 DIAGNOSIS — I48.0 PAF (PAROXYSMAL ATRIAL FIBRILLATION): Primary | ICD-10-CM

## 2023-08-31 DIAGNOSIS — J43.9 PULMONARY EMPHYSEMA, UNSPECIFIED EMPHYSEMA TYPE: ICD-10-CM

## 2023-08-31 DIAGNOSIS — I73.9 PAD (PERIPHERAL ARTERY DISEASE): ICD-10-CM

## 2023-08-31 PROCEDURE — 99214 OFFICE O/P EST MOD 30 MIN: CPT | Performed by: INTERNAL MEDICINE

## 2023-08-31 PROCEDURE — 93000 ELECTROCARDIOGRAM COMPLETE: CPT | Performed by: INTERNAL MEDICINE

## 2023-08-31 RX ORDER — BETAMETHASONE DIPROPIONATE 0.5 MG/G
CREAM TOPICAL
COMMUNITY

## 2023-08-31 RX ORDER — HYDROCODONE BITARTRATE AND ACETAMINOPHEN 7.5; 325 MG/1; MG/1
TABLET ORAL SEE ADMIN INSTRUCTIONS
COMMUNITY

## 2023-08-31 RX ORDER — CEFDINIR 300 MG/1
CAPSULE ORAL
COMMUNITY
Start: 2023-08-23

## 2023-08-31 RX ORDER — AMOXICILLIN AND CLAVULANATE POTASSIUM 875; 125 MG/1; MG/1
TABLET, FILM COATED ORAL
COMMUNITY
Start: 2023-08-24

## 2023-08-31 NOTE — PROGRESS NOTES
Date of Office Visit: 2023  Encounter Provider: Ijeoma Auguste MD  Place of Service: [facility] CARDIOLOGY  Patient Name: Radha Block  :1960    Chief complaint  Paroxysmal atrial fibrillation    History of Present Illness  Patient is a 63-year-old female with history of COPD, of arterial disease, deep venous thrombosis, Leiden 5 deficiency, alpha protein deficiency and atrial fibrillation.  And on  she was noted to have palpitations with Apple Watch indicating episodes of atrial fibrillation.  She decreased the modest amounts of caffeinated use.  She had a stress echocardiogram that showed normal systolic function hyperdynamic left ventricle trivial valve regurgitation normal right-sided pressures.  There was no ischemia.  She was started on aspirin and diltiazem.  Subsequent Holter showed 2 episodes of atrial fibrillation totaling 24 minutes.  She was placed on Eliquis and aspirin was discontinued.  Follow-up in 2021 metoprolol was decreased due to fatigue.  On 10/10/2022 she underwent atrial fibrillation by Dr. Iqbal.  Echo performed on 2022 showed size systolic function aortic valve calcification without stenosis or regurgitation.  There is no significant pulmonary hypertension.  She saw Dr. Iqbal on 2022 and he recommended stay on Eliquis indefinitely given history of prior DVT and Leiden 5 deficiency.  I will follow-up on 3/2023 with EP service patient again brought up the issue of stopping anticoagulant therapy and was again instructed that the benefits were outweighing the risk.  Of note patient had been previously instructed by Dr. Dodson to stop estradiol due to thrombotic risk however she continues to take this    Since last visit patient's had no chest pain palpitations syncope near syncope.  She is walking daily riding her bike and playing golf.    Past Medical History:   Diagnosis Date    Arrhythmia     Arthritis     Clotting disorder      Factor 5 Liden    COPD (chronic obstructive pulmonary disease)     Deep vein thrombosis     Essential hypertension 08/24/2021    Mitral valve prolapse     PAD (peripheral artery disease)     PAF (paroxysmal atrial fibrillation) 11/25/2020     Past Surgical History:   Procedure Laterality Date    BLADDER SURGERY  2013    E/O polyp    BREAST SURGERY      E/O benign neuroma    CARDIAC ELECTROPHYSIOLOGY PROCEDURE N/A 10/10/2022    Procedure: Ablation atrial fibrillation CRYO;  Surgeon: Venkatesh Iqbal MD;  Location: [facility] CATH INVASIVE LOCATION;  Service: Cardiovascular;  Laterality: N/A;    COLONOSCOPY  2016    HAND SURGERY Right     Carpal metacarpal hand surgery     LUMBAR FUSION N/A 12/30/2022    Procedure: Lumbar 3 to lumbar 4 and lumbar 4 to lumbar 5 laminectomy with fusion and instrumentation;  Surgeon: Rigo Mathur MD;  Location: [facility] MAIN OR;  Service: Robotics - Neuro;  Laterality: N/A;    SINUS SURGERY  2017    TONSILLECTOMY       Outpatient Medications Prior to Visit   Medication Sig Dispense Refill    Acetaminophen 325 MG capsule Take 2 capsules by mouth 4 (Four) Times a Day As Needed.      albuterol sulfate  (90 Base) MCG/ACT inhaler As Needed for Wheezing.      alpha1-proteinase inhibitor (Zemaira) 1000 MG injection 1 (One) Time Per Week.      amoxicillin-clavulanate (AUGMENTIN) 875-125 MG per tablet       betamethasone, augmented, (DIPROLENE) 0.05 % cream Apply 1 application twice a day by topical route as directed.      cefdinir (OMNICEF) 300 MG capsule TAKE 2 CAPSULES EVERY DAY BY MOUTH FOR 14 DAYS      desonide (DESOWEN) 0.05 % cream As Needed.      desvenlafaxine (PRISTIQ) 50 MG 24 hr tablet Take 12.5 mg by mouth As Needed.      Diclofenac Epolamine (FLECTOR) 1.3 % patch patch As Needed.      dilTIAZem CD (CARDIZEM CD) 120 MG 24 hr capsule Take 1 capsule by mouth Daily. 90 capsule 1    EPINEPHrine (EPIPEN) 0.3 MG/0.3ML solution auto-injector injection As Needed.       esomeprazole (nexIUM) 40 MG capsule Take 1 capsule by mouth Daily.      estradiol (MINIVELLE, VIVELLE-DOT) 0.075 MG/24HR patch Place 1 patch on the skin as directed by provider 2 (Two) Times a Week.      Fluticasone-Umeclidin-Vilant (Trelegy Ellipta) 100-62.5-25 MCG/INH aerosol powder  Inhale Daily.      guaiFENesin (MUCINEX) 600 MG 12 hr tablet As Needed.      HYDROcodone-acetaminophen (NORCO) 7.5-325 MG per tablet Take  by mouth See Admin Instructions.      hydrocortisone 2.5 % cream As Needed.      lidocaine-prilocaine (EMLA) 2.5-2.5 % cream As Needed.      ondansetron (ZOFRAN) 4 MG tablet As Needed.      Progesterone (PROMETRIUM) 200 MG capsule Daily.      tretinoin (RETIN-A) 0.1 % cream As Needed.      valACYclovir (VALTREX) 1000 MG tablet As Needed.      Eliquis 5 MG tablet tablet TAKE ONE TABLET BY MOUTH TWICE A DAY 60 tablet 2     No facility-administered medications prior to visit.       Allergies as of 08/31/2023    (No Known Allergies)     Social History     Socioeconomic History    Marital status:     Number of children: 1   Tobacco Use    Smoking status: Never    Smokeless tobacco: Never   Vaping Use    Vaping Use: Never used   Substance and Sexual Activity    Alcohol use: Yes     Alcohol/week: 3.0 standard drinks     Types: 3 Glasses of wine per week     Comment: 2 times a week     Drug use: Never    Sexual activity: Defer     Family History   Adopted: Yes   Problem Relation Age of Onset    Malig Hyperthermia Neg Hx      Review of Systems   Constitutional: Negative for chills, fever, weight gain and weight loss.   Cardiovascular:  Negative for leg swelling.   Respiratory:  Negative for cough, snoring and wheezing.    Hematologic/Lymphatic: Negative for bleeding problem. Does not bruise/bleed easily.   Skin:  Negative for color change.   Musculoskeletal:  Negative for falls, joint pain and myalgias.   Gastrointestinal:  Negative for melena.   Genitourinary:  Negative for hematuria.  "  Neurological:  Negative for excessive daytime sleepiness.   Psychiatric/Behavioral:  Negative for depression. The patient is not nervous/anxious.       Objective:     Vitals:    08/31/23 1101   BP: 120/84   BP Location: Left arm   Patient Position: Sitting   Cuff Size: Adult   Pulse: 55   Weight: 58.1 kg (128 lb)   Height: 170.2 cm (67.01\")     Body mass index is 20.04 kg/m².    Vitals reviewed.   Constitutional:       Appearance: Well-developed.   Eyes:      General: No scleral icterus.        Right eye: No discharge.      Conjunctiva/sclera: Conjunctivae normal.      Pupils: Pupils are equal, round, and reactive to light.   HENT:      Head: Normocephalic.      Nose: Nose normal.   Neck:      Thyroid: No thyromegaly.      Vascular: No JVD.   Pulmonary:      Effort: Pulmonary effort is normal. No respiratory distress.      Breath sounds: Normal breath sounds. No wheezing. No rales.   Cardiovascular:      Normal rate. Regular rhythm. Normal S1. Normal S2.       Murmurs: There is no murmur.      No gallop.    Pulses:     Intact distal pulses.      Carotid: 2+ bilaterally.     Radial: 2+ bilaterally.     Femoral: 2+ bilaterally.     Popliteal: 2+ bilaterally.     Dorsalis pedis: 2+ bilaterally.     Posterior tibial: 2+ bilaterally.  Edema:     Peripheral edema absent.   Abdominal:      General: Bowel sounds are normal. There is no distension.      Palpations: Abdomen is soft.      Tenderness: There is no abdominal tenderness. There is no rebound.   Musculoskeletal: Normal range of motion.         General: No tenderness.      Cervical back: Normal range of motion and neck supple. Skin:     General: Skin is warm and dry.      Findings: No erythema or rash.   Neurological:      Mental Status: Alert and oriented to person, place, and time.   Psychiatric:         Behavior: Behavior normal.         Thought Content: Thought content normal.         Judgment: Judgment normal.     Lab Review:   Lab Results - Last 18 Months "   Lab Units 01/02/23  0432 01/01/23  0343   WBC 10*3/mm3 14.36* 15.93*   RBC 10*6/mm3 3.90 3.88   HEMOGLOBIN g/dL 13.4 13.4   HEMATOCRIT % 39.6 39.6   MCV fL 101.5* 102.1*   MCH pg 34.4* 34.5*   MCHC g/dL 33.8 33.8   RDW % 11.7* 11.6*   PLATELETS 10*3/mm3 205 205   NEUTROPHIL % % 74.3 75.5   LYMPHOCYTE % % 11.3* 11.8*   MONOCYTES % % 11.7 11.6   EOSINOPHIL % % 1.7 0.4   BASOPHIL % % 0.5 0.3   NEUTROS ABS 10*3/mm3 10.68* 12.03*   LYMPHS ABS 10*3/mm3 1.62 1.88   MONOS ABS 10*3/mm3 1.68* 1.84*   EOS ABS 10*3/mm3 0.24 0.06   BASOS ABS 10*3/mm3 0.07 0.05   RDW-SD fl 43.2 43.2   MPV fL 10.2 10.2       Lab Results - Last 18 Months   Lab Units 12/16/22  1522 10/07/22  1434   GLUCOSE mg/dL 90 95   BUN mg/dL 18 14   CREATININE mg/dL 0.86 0.77   SODIUM mmol/L 137 136   POTASSIUM mmol/L 4.0 4.0   CHLORIDE mmol/L 101 102   CO2 mmol/L 26.0 24.8   CALCIUM mg/dL 8.7 8.6   BUN / CREAT RATIO  20.9 18.2   ANION GAP mmol/L 10.0 9.2   EGFR mL/min/1.73 75.5 86.3         ECG 12 Lead    Date/Time: 8/31/2023 12:36 AM  Performed by: Ijeoma Auguste MD  Authorized by: Ijeoma Auguste MD   Comparison: compared with previous ECG   Similar to previous ECG  Rhythm: sinus rhythm    Clinical impression: normal ECG      Assessment:       Diagnosis Plan   1. PAF (paroxysmal atrial fibrillation)  ECG 12 Lead      2. PAD (peripheral artery disease)        3. Essential hypertension        4. Pulmonary emphysema, unspecified emphysema type          Plan:       1.  Paroxysmal atrial fibrillation, s/p A-fib ablation.  Maintaining sinus rhythm with plans to stay on Eliquis.  She seems agreeable to this.  2.  Hypertension, controlled.  3.  History of DVT with Leiden 5 mutation.  She was seen by Dr. Dodson in 9/2022.  He did not feel she needed long-term anticoagulation for hypercoagulable state may need thromboprophylaxis for surgical procedures (at that time back surgery).  As before despite multiple discussions she continues to take estradiol.  5.  Alpha  antitrypsin antibody deficiency,followed by Dr Ceja  6.  History of asthma   7.  Leukocytosis.  She will follow-up with hematology regarding this.    Time Spent: I spent 35 minutes caring for Radha on this date of service. This time includes time spent by me in the following activities: preparing for the visit, reviewing tests, obtaining and/or reviewing a separately obtained history, performing a medically appropriate examination and/or evaluation, counseling and educating the patient/family/caregiver, ordering medications, tests, or procedures, documenting information in the medical record, and independently interpreting results and communicating that information with the patient/family/caregiver.   I spent 1 minutes on the separately reported service of ECG. This time is not included in the time used to support the E/M service also reported today.        Your medication list            Accurate as of August 31, 2023 11:59 PM. If you have any questions, ask your nurse or doctor.                CONTINUE taking these medications        Instructions Last Dose Given Next Dose Due   Acetaminophen 325 MG capsule      Take 2 capsules by mouth 4 (Four) Times a Day As Needed.       albuterol sulfate  (90 Base) MCG/ACT inhaler  Commonly known as: PROVENTIL HFA;VENTOLIN HFA;PROAIR HFA      As Needed for Wheezing.       amoxicillin-clavulanate 875-125 MG per tablet  Commonly known as: AUGMENTIN           betamethasone (augmented) 0.05 % cream  Commonly known as: DIPROLENE      Apply 1 application twice a day by topical route as directed.       cefdinir 300 MG capsule  Commonly known as: OMNICEF      TAKE 2 CAPSULES EVERY DAY BY MOUTH FOR 14 DAYS       desonide 0.05 % cream  Commonly known as: DESOWEN      As Needed.       desvenlafaxine 50 MG 24 hr tablet  Commonly known as: PRISTIQ      Take 12.5 mg by mouth As Needed.       Diclofenac Epolamine 1.3 % patch patch  Commonly known as: FLECTOR      As Needed.        dilTIAZem  MG 24 hr capsule  Commonly known as: CARDIZEM CD      Take 1 capsule by mouth Daily.       Eliquis 5 MG tablet tablet  Generic drug: apixaban      TAKE ONE TABLET BY MOUTH TWICE A DAY       EPINEPHrine 0.3 MG/0.3ML solution auto-injector injection  Commonly known as: EPIPEN      As Needed.       esomeprazole 40 MG capsule  Commonly known as: nexIUM      Take 1 capsule by mouth Daily.       estradiol 0.075 MG/24HR patch  Commonly known as: EDUARDO MACKEY      Place 1 patch on the skin as directed by provider 2 (Two) Times a Week.       guaiFENesin 600 MG 12 hr tablet  Commonly known as: MUCINEX      As Needed.       HYDROcodone-acetaminophen 7.5-325 MG per tablet  Commonly known as: NORCO      Take  by mouth See Admin Instructions.       hydrocortisone 2.5 % cream      As Needed.       lidocaine-prilocaine 2.5-2.5 % cream  Commonly known as: EMLA      As Needed.       ondansetron 4 MG tablet  Commonly known as: ZOFRAN      As Needed.       Progesterone 200 MG capsule  Commonly known as: PROMETRIUM      Daily.       Trelegy Ellipta 100-62.5-25 MCG/ACT inhaler  Generic drug: Fluticasone-Umeclidin-Vilant      Inhale Daily.       tretinoin 0.1 % cream  Commonly known as: RETIN-A      As Needed.       valACYclovir 1000 MG tablet  Commonly known as: VALTREX      As Needed.       Zemaira 1000 MG injection  Generic drug: alpha1-proteinase inhibitor      1 (One) Time Per Week.                Patient is no longer taking -.  I corrected the med list to reflect this.  I did not stop these medications.      Dictated utilizing Dragon dictation

## 2023-09-05 RX ORDER — APIXABAN 5 MG/1
TABLET, FILM COATED ORAL
Qty: 180 TABLET | Refills: 3 | Status: SHIPPED | OUTPATIENT
Start: 2023-09-05

## 2023-09-22 ENCOUNTER — HOSPITAL ENCOUNTER (OUTPATIENT)
Dept: GENERAL RADIOLOGY | Facility: HOSPITAL | Age: 63
Discharge: HOME | End: 2023-09-22
Admitting: NEUROLOGICAL SURGERY
Payer: COMMERCIAL

## 2023-09-22 DIAGNOSIS — Z09 FOLLOW-UP EXAMINATION FOLLOWING SURGERY: ICD-10-CM

## 2023-09-22 PROCEDURE — 72114 X-RAY EXAM L-S SPINE BENDING: CPT

## 2023-09-26 ENCOUNTER — OFFICE VISIT (OUTPATIENT)
Dept: NEUROSURGERY | Facility: CLINIC | Age: 63
End: 2023-09-26
Payer: COMMERCIAL

## 2023-09-26 DIAGNOSIS — M48.062 SPINAL STENOSIS OF LUMBAR REGION WITH NEUROGENIC CLAUDICATION: Primary | ICD-10-CM

## 2023-09-26 PROCEDURE — 99213 OFFICE O/P EST LOW 20 MIN: CPT | Performed by: NEUROLOGICAL SURGERY

## 2023-09-26 NOTE — PROGRESS NOTES
Subjective   Patient ID: Radha Block is a 63 y.o. female is here today for 6 month follow-up with a new XR Lumbar done on 09/22/2023.    Today patient states that she feels well overall    History of Present Illness    This patient returns today.  She is doing well.  She really has no complaints at all.  She has some occasional numbness in the left side of her back and down her left leg but it comes and goes and is not very frequent.  She otherwise is doing well.    The following portions of the patient's history were reviewed and updated as appropriate: allergies, current medications, past family history, past medical history, past social history, past surgical history, and problem list.    Review of Systems   Constitutional:  Negative for chills and fever.   HENT:  Negative for congestion.    Musculoskeletal:  Positive for back pain. Negative for myalgias.   Neurological:  Negative for weakness and numbness.     I reviewed the review of systems listed by the patient and discussed by my MA    Objective     There were no vitals filed for this visit.  There is no height or weight on file to calculate BMI.    Tobacco Use: Low Risk     Smoking Tobacco Use: Never    Smokeless Tobacco Use: Never    Passive Exposure: Not on file          Physical Exam  Neurological:      Mental Status: She is alert and oriented to person, place, and time.     Neurologic Exam     Mental Status   Oriented to person, place, and time.         Assessment & Plan   Independent Review of Radiographic Studies:      I personally reviewed the images from the following studies.    I reviewed her x-rays which look perfect.  There looks like there is good early fusion.    Medical Decision Making:      I told the patient that from my point of view she can go back to normal activities.  She should avoid strenuously heavy lifting.  She is to call if any problems develop.    Diagnoses and all orders for this visit:    1. Spinal stenosis of  lumbar region with neurogenic claudication (Primary)      Return if symptoms worsen or fail to improve.

## 2023-09-28 ENCOUNTER — TELEPHONE (OUTPATIENT)
Dept: CARDIOLOGY | Facility: CLINIC | Age: 63
End: 2023-09-28
Payer: COMMERCIAL

## 2023-09-28 NOTE — TELEPHONE ENCOUNTER
Received a fax from [facility] re: Cardiac Clearance needed for Endoscopic Sinus Surgery under general anesthesia scheduled for 10/20/2023.    They are leaving how long to hold Eliquis up to us.      Please advise.

## 2023-09-29 NOTE — TELEPHONE ENCOUNTER
From an atrial fibrillation standpoint she would be okay to hold Eliquis for 48 hours prior to procedure, however she also sees Dr. Dodson for history of DVT and factor V.  They should also weigh in on anticoagulation.  I am not sure if they would want a bridge or just to restart medication ASAP after procedure.

## 2023-10-09 ENCOUNTER — TELEPHONE (OUTPATIENT)
Dept: ONCOLOGY | Facility: CLINIC | Age: 63
End: 2023-10-09
Payer: COMMERCIAL

## 2023-10-09 NOTE — TELEPHONE ENCOUNTER
Reviewed with Dr. Dodson; pt may hold eliquis prior to procedure. Printed surgical clearance form from media and faxed signed form back to DEVYN COLEMAN

## 2023-10-09 NOTE — TELEPHONE ENCOUNTER
[facility] received a fax that requires your attention. The document has been indexed to the patient's chart for your review.      Reason for sending: REVIEW, SIGN AND RETURN    Documents Description: SURGICAL CLEARANCE    Name of Sender: [facility]    Date Indexed: 10/9/23    Notes (if needed): PATIENT IS HAVING SINUS SURGERY 10/20/23 AND WOULD LIKE TO HOLD ELIQUIS FOR 48 HOURS PRIOR.

## 2023-10-20 ENCOUNTER — LAB REQUISITION (OUTPATIENT)
Dept: LAB | Facility: HOSPITAL | Age: 63
End: 2023-10-20
Payer: COMMERCIAL

## 2023-10-20 DIAGNOSIS — J32.9 CHRONIC SINUSITIS, UNSPECIFIED: ICD-10-CM

## 2023-10-20 PROCEDURE — 87070 CULTURE OTHR SPECIMN AEROBIC: CPT | Performed by: OTOLARYNGOLOGY

## 2023-10-20 PROCEDURE — 87205 SMEAR GRAM STAIN: CPT | Performed by: OTOLARYNGOLOGY

## 2023-10-22 LAB
BACTERIA SPEC AEROBE CULT: NORMAL
GRAM STN SPEC: NORMAL
GRAM STN SPEC: NORMAL

## 2023-10-23 LAB
LAB AP CASE REPORT: NORMAL
LAB AP CLINICAL INFORMATION: NORMAL
PATH REPORT.FINAL DX SPEC: NORMAL
PATH REPORT.GROSS SPEC: NORMAL

## 2023-11-16 RX ORDER — DILTIAZEM HYDROCHLORIDE 120 MG/1
120 CAPSULE, COATED, EXTENDED RELEASE ORAL DAILY
Qty: 90 CAPSULE | Refills: 3 | Status: SHIPPED | OUTPATIENT
Start: 2023-11-16

## 2023-12-28 ENCOUNTER — OFFICE VISIT (OUTPATIENT)
Dept: NEUROSURGERY | Facility: CLINIC | Age: 63
End: 2023-12-28
Payer: COMMERCIAL

## 2023-12-28 VITALS
WEIGHT: 128 LBS | SYSTOLIC BLOOD PRESSURE: 122 MMHG | RESPIRATION RATE: 20 BRPM | BODY MASS INDEX: 20.09 KG/M2 | DIASTOLIC BLOOD PRESSURE: 78 MMHG | HEIGHT: 67 IN

## 2023-12-28 DIAGNOSIS — M48.062 SPINAL STENOSIS OF LUMBAR REGION WITH NEUROGENIC CLAUDICATION: Primary | ICD-10-CM

## 2023-12-28 PROCEDURE — 99214 OFFICE O/P EST MOD 30 MIN: CPT | Performed by: NEUROLOGICAL SURGERY

## 2023-12-28 NOTE — PROGRESS NOTES
"Subjective   Patient ID: Radha Block is a 63 y.o. female is here today for follow-up PT IS HAVING RIGHT SIDE LOWER BACK AND RIGHT HIP HIGH PAIN WHEN SHE WALKS FOR PAST 3 MONTHS. TRAVEL HIS [facility] HUB 12-18-23     History of Present Illness    This patient was last seen in September.  She had an L3-L5 laminectomy and fusion in January of this year.  She was doing well when I last saw her.  She has some occasional numbness in her back and down her left leg but it came and went and was not very frequent.  Over the past couple of months she has developed pain in the right side of her lower back and into her right hip.  She saw an orthopedic surgeon who did not feel there was a problem with the hip.  He took x-rays.  The pain has become so severe she really cannot walk very far.  He gets worse when she walks.  The pain is located in the right sacroiliac region with some radiation into her right hip.  She does not have a lot of radiation down her leg however.    The following portions of the patient's history were reviewed and updated as appropriate: allergies, current medications, past family history, past medical history, past social history, past surgical history, and problem list.    Review of Systems   Constitutional:  Negative for fever.   Musculoskeletal:  Positive for back pain.   Neurological:  Positive for weakness and numbness.   All other systems reviewed and are negative.      I reviewed the review of systems listed by the patient and discussed by my MA    Objective     Vitals:    12/28/23 1524   BP: 122/78   BP Location: Left arm   Patient Position: Sitting   Cuff Size: Adult   Resp: 20   Weight: 58.1 kg (128 lb)   Height: 170.2 cm (67.01\")   PainSc:   5     Body mass index is 20.04 kg/m².    Tobacco Use: Low Risk  (12/28/2023)    Patient History     Smoking Tobacco Use: Never     Smokeless Tobacco Use: Never     Passive Exposure: Not on file          Physical Exam  Eyes:      Extraocular Movements: " EOM normal.      Pupils: Pupils are equal, round, and reactive to light.   Neurological:      Mental Status: She is alert and oriented to person, place, and time.      Coordination: Finger-Nose-Finger Test and Heel to Shin Test normal.      Gait: Gait is intact.      Deep Tendon Reflexes:      Reflex Scores:       Tricep reflexes are 2+ on the right side and 2+ on the left side.       Bicep reflexes are 2+ on the right side and 2+ on the left side.       Brachioradialis reflexes are 2+ on the right side and 2+ on the left side.       Patellar reflexes are 2+ on the right side and 2+ on the left side.       Achilles reflexes are 2+ on the right side and 2+ on the left side.  Psychiatric:         Speech: Speech normal.       Neurologic Exam     Mental Status   Oriented to person, place, and time.   Registration of memory: Good recent and remote memory.   Attention: normal. Concentration: normal.   Speech: speech is normal   Level of consciousness: alert  Knowledge: consistent with education.     Cranial Nerves     CN II   Visual fields full to confrontation.   Visual acuity: normal    CN III, IV, VI   Pupils are equal, round, and reactive to light.  Extraocular motions are normal.     CN V   Facial sensation intact.   Right corneal reflex: normal  Left corneal reflex: normal    CN VII   Facial expression full, symmetric.   Right facial weakness: none  Left facial weakness: none    CN VIII   Hearing: intact    CN IX, X   Palate: symmetric    CN XI   Right sternocleidomastoid strength: normal  Left sternocleidomastoid strength: normal    CN XII   Tongue: not atrophic  Tongue deviation: none    Motor Exam   Muscle bulk: normal  Right arm tone: normal  Left arm tone: normal  Right leg tone: normal  Left leg tone: normal    Strength   Strength 5/5 except as noted.     Sensory Exam   Light touch normal.     Gait, Coordination, and Reflexes     Gait  Gait: normal    Coordination   Finger to nose coordination: normal  Heel to  shin coordination: normal    Reflexes   Right brachioradialis: 2+  Left brachioradialis: 2+  Right biceps: 2+  Left biceps: 2+  Right triceps: 2+  Left triceps: 2+  Right patellar: 2+  Left patellar: 2+  Right achilles: 2+  Left achilles: 2+  Right : 2+  Left : 2+          Assessment & Plan   Independent Review of Radiographic Studies:      I personally reviewed the images from the following studies.    I reviewed her x-rays which were done on 22 September.  This shows good alignment of the construct.    Medical Decision Making:      I told the patient that we should go ahead and check a CT of her lumbar spine and her sacrum to be sure she does not have a sacral fracture and make sure all the instrumentation is positioned correctly.  I will call her with those results.  I did warn her that we may need to do a MRI after that but we will try the CT first in order to get a good view of the bone.    Diagnoses and all orders for this visit:    1. Spinal stenosis of lumbar region with neurogenic claudication (Primary)  -     CT Lumbar Spine Without Contrast; Future      Return for After radiology test.

## 2024-01-04 ENCOUNTER — TELEPHONE (OUTPATIENT)
Dept: NEUROSURGERY | Facility: CLINIC | Age: 64
End: 2024-01-04
Payer: COMMERCIAL

## 2024-01-04 NOTE — TELEPHONE ENCOUNTER
PATIENT CALLED AND STATES THAT SHE SPOKE WITH IMAGING AND THEY STATE THAT THEY HAVE NOT RECEIVED THE AUTH FOR HER CT.  PATIENT STATES THAT SHE CONTACTED HER INSURANCE AND THEY STATE THEY HAVE NOT RECEIVED A REFERRAL OR PROVIDED AN AUTH.  STATES FOR HER TO MAKE THE APPT THAT IS SCHEDULED ALREADY FOR HER THE REFERRAL WILL NEED TO BE SENT TO HER INSURANCE AS URGENT TO KEEP HER ALREADY SCHEDULED IMAGING APPT.    PLEASE CALL PATIENT  THANK YOU

## 2024-01-05 NOTE — TELEPHONE ENCOUNTER
Patient is having scan done at [facility]. [employer] should be doing referral. I called spoke to patient and went ahead and started auth process.. Notes in referral documentation.

## 2024-01-12 ENCOUNTER — HOSPITAL ENCOUNTER (OUTPATIENT)
Dept: CT IMAGING | Facility: HOSPITAL | Age: 64
Discharge: HOME | End: 2024-01-12
Admitting: NEUROLOGICAL SURGERY
Payer: COMMERCIAL

## 2024-01-12 ENCOUNTER — TRANSCRIBE ORDERS (OUTPATIENT)
Dept: ADMINISTRATIVE | Facility: HOSPITAL | Age: 64
End: 2024-01-12
Payer: COMMERCIAL

## 2024-01-12 DIAGNOSIS — M48.062 SPINAL STENOSIS OF LUMBAR REGION WITH NEUROGENIC CLAUDICATION: ICD-10-CM

## 2024-01-12 PROCEDURE — 72131 CT LUMBAR SPINE W/O DYE: CPT

## 2024-01-15 ENCOUNTER — TELEPHONE (OUTPATIENT)
Dept: NEUROSURGERY | Facility: CLINIC | Age: 64
End: 2024-01-15
Payer: COMMERCIAL

## 2024-01-15 RX ORDER — DEXAMETHASONE 1.5 MG/1
TABLET ORAL
Qty: 50 TABLET | Refills: 0 | Status: SHIPPED | OUTPATIENT
Start: 2024-01-15

## 2024-01-15 NOTE — TELEPHONE ENCOUNTER
PT IS SCHEDULED F/A ON 2-22-24. SHE HAS QUESTIONS ABOUT WHAT MEDICATIONS CAN HELP HER WITH THE PAIN UNTIL HER OFFICE CONSULT.     PLEASE CALL PT TO DISCUSS/ADVISE    THANK YOU

## 2024-01-15 NOTE — TELEPHONE ENCOUNTER
Caller: Radha Lowe     Relationship: [unfilled]     Best call back number: [phone]    What is your medical concern? PAIN IN LOWER BACK TO HIP. RIGHT SIDE BEEN NUMB FOR 6 WEEKS. PT CAN NOT SLEEP AND CAN NOT WALK VERY FAR. PT STATES IT IS A CONSTANT PAIN AND GETS WORSE EACH DAY.     How long has this issue been going on? 2-3 MONTHS    Is your provider already aware of this issue? YES    Have you been treated for this issue? NO, PT HAD RECENT CT AND WOULD LIKE THE RESULTS AND COME UP WITH TREATMENT PLAN.     PLEASE CALL PT TO DISCUSS/ADVISE    THANK YOU,

## 2024-01-17 ENCOUNTER — CLINICAL SUPPORT (OUTPATIENT)
Dept: NEUROSURGERY | Facility: CLINIC | Age: 64
End: 2024-01-17
Payer: COMMERCIAL

## 2024-01-17 DIAGNOSIS — M48.062 SPINAL STENOSIS OF LUMBAR REGION WITH NEUROGENIC CLAUDICATION: Primary | ICD-10-CM

## 2024-01-17 PROCEDURE — 99441 PR PHYS/QHP TELEPHONE EVALUATION 5-10 MIN: CPT | Performed by: NEUROLOGICAL SURGERY

## 2024-01-17 NOTE — PROGRESS NOTES
Subjective   Patient ID: Radha Block is a 63 y.o. female is here today via telephone for follow-up with a new CT Lumbar done on 01/12/2024.    You have chosen to receive care through a telephone visit. Do you consent to use a telephone visit for your medical care today? Yes     We had a telephone visit today.  The patient was at home and I was in the office.  We talked for 5 minutes.    History of Present Illness    This patient was last here at the end of December.  She was having in the right side of her lower back and into her right hip.  There was not a lot of radiation down the leg.  Since I last talked her she has developed some numbness in the upper part of her right leg.  The problem is still all on the right.    The following portions of the patient's history were reviewed and updated as appropriate: allergies, current medications, past family history, past medical history, past social history, past surgical history, and problem list.    Review of Systems    I reviewed the review of systems listed by the patient and discussed by my MA    Objective     Tobacco Use: Low Risk  (12/28/2023)    Patient History     Smoking Tobacco Use: Never     Smokeless Tobacco Use: Never     Passive Exposure: Not on file          Physical Exam  Neurological:      Mental Status: She is alert and oriented to person, place, and time.       Neurologic Exam     Mental Status   Oriented to person, place, and time.           Assessment & Plan   Independent Review of Radiographic Studies:      I personally reviewed the images from the following studies.    I reviewed her CT which was done on 12 January.  This looks okay on the right side.  The L3 screw on the left side is a bit lateral to the pedicle which is very small.  All the rest of the hardware is in perfect position.    Medical Decision Making:      I told the patient about the CT.  I really do not think the screw being a little bit lateral is of any significance at  this point.  She appears to be stable.  I think we will have to check an MRI to see if she has a disc herniation at one of the other levels.  I would like to see her back in the office when that has been completed.    Diagnoses and all orders for this visit:    1. Spinal stenosis of lumbar region with neurogenic claudication (Primary)  -     MRI Lumbar Spine With & Without Contrast; Future      Return for After radiology test.

## 2024-01-31 ENCOUNTER — HOSPITAL ENCOUNTER (OUTPATIENT)
Dept: MRI IMAGING | Facility: HOSPITAL | Age: 64
Discharge: HOME | End: 2024-01-31
Admitting: NEUROLOGICAL SURGERY
Payer: COMMERCIAL

## 2024-01-31 DIAGNOSIS — M48.062 SPINAL STENOSIS OF LUMBAR REGION WITH NEUROGENIC CLAUDICATION: ICD-10-CM

## 2024-01-31 PROCEDURE — 82565 ASSAY OF CREATININE: CPT

## 2024-01-31 PROCEDURE — 0 GADOBENATE DIMEGLUMINE 529 MG/ML SOLUTION: Performed by: NEUROLOGICAL SURGERY

## 2024-01-31 PROCEDURE — A9577 INJ MULTIHANCE: HCPCS | Performed by: NEUROLOGICAL SURGERY

## 2024-01-31 PROCEDURE — 72158 MRI LUMBAR SPINE W/O & W/DYE: CPT

## 2024-01-31 RX ADMIN — GADOBENATE DIMEGLUMINE 11 ML: 529 INJECTION, SOLUTION INTRAVENOUS at 16:28

## 2024-02-01 LAB — CREAT BLDA-MCNC: 0.7 MG/DL (ref 0.6–1.3)

## 2024-02-19 NOTE — PROGRESS NOTES
0Subjective   Patient ID: Radha Block is a 63 y.o. female is here today for follow-up with a new MRI L-spine done on 1/31/2024 @ [facility].    Today patient states that her symptoms are unchanged, patient is having low back pain, along with numbness in the R upper leg, and tingling in R low back    History of Present Illness    Patient was last here in mid January.  At that time she was having pain in the right side of her lower back with radiation to her right hip and a little bit into her right thigh.  The pain was fairly severe.    The following portions of the patient's history were reviewed and updated as appropriate: allergies, current medications, past family history, past medical history, past social history, past surgical history, and problem list.    Review of Systems   Constitutional:  Negative for chills and fever.   HENT:  Negative for congestion.    Musculoskeletal:  Positive for back pain and myalgias.   Neurological:  Positive for numbness.        R leg        I reviewed the review of systems listed by the patient and discussed by my MA    Objective     There were no vitals filed for this visit.  There is no height or weight on file to calculate BMI.    Tobacco Use: Low Risk  (2/20/2024)    Patient History     Smoking Tobacco Use: Never     Smokeless Tobacco Use: Never     Passive Exposure: Not on file          Physical Exam  Neurological:      Mental Status: She is alert and oriented to person, place, and time.       Neurologic Exam     Mental Status   Oriented to person, place, and time.           Assessment & Plan   Independent Review of Radiographic Studies:      I personally reviewed the images from the following studies.    Reviewed her MRI done on 31 January.  This does show a far lateral disc herniation at L2-3 on the right side.  Normal certainly compresses the right L2 nerve root.  This is new when compared to the myelogram that was done in September 2022.     Medical  Decision Making:      Told the patient about the imaging.  I told her I would like to try a selective nerve root injection on the right side at L2-3 just to see if that gets rid of the pain.  I will talk to her when that has been done.  If that helps then I think a minimally invasive far lateral approach to L2-3 would be the way to go.  She agrees.    Diagnoses and all orders for this visit:    1. Neuritis or radiculitis due to rupture of lumbar intervertebral disc (Primary)  -     Selective Nerve Root Injection      Return for Recheck and call after treatment or consultation.

## 2024-02-20 ENCOUNTER — OFFICE VISIT (OUTPATIENT)
Dept: NEUROSURGERY | Facility: CLINIC | Age: 64
End: 2024-02-20
Payer: COMMERCIAL

## 2024-02-20 DIAGNOSIS — M51.16 NEURITIS OR RADICULITIS DUE TO RUPTURE OF LUMBAR INTERVERTEBRAL DISC: Primary | ICD-10-CM

## 2024-02-20 PROCEDURE — 99213 OFFICE O/P EST LOW 20 MIN: CPT | Performed by: NEUROLOGICAL SURGERY

## 2024-02-27 ENCOUNTER — ANESTHESIA EVENT (OUTPATIENT)
Dept: PAIN MEDICINE | Facility: HOSPITAL | Age: 64
End: 2024-02-27
Payer: COMMERCIAL

## 2024-02-27 ENCOUNTER — ANESTHESIA (OUTPATIENT)
Dept: PAIN MEDICINE | Facility: HOSPITAL | Age: 64
End: 2024-02-27
Payer: COMMERCIAL

## 2024-02-27 ENCOUNTER — HOSPITAL ENCOUNTER (OUTPATIENT)
Dept: PAIN MEDICINE | Facility: HOSPITAL | Age: 64
Discharge: HOME | End: 2024-02-27
Payer: COMMERCIAL

## 2024-02-27 ENCOUNTER — HOSPITAL ENCOUNTER (OUTPATIENT)
Dept: GENERAL RADIOLOGY | Facility: HOSPITAL | Age: 64
Discharge: HOME | End: 2024-02-27
Payer: COMMERCIAL

## 2024-02-27 VITALS
TEMPERATURE: 98.2 F | RESPIRATION RATE: 15 BRPM | HEART RATE: 71 BPM | BODY MASS INDEX: 20.4 KG/M2 | WEIGHT: 130 LBS | SYSTOLIC BLOOD PRESSURE: 143 MMHG | DIASTOLIC BLOOD PRESSURE: 83 MMHG | OXYGEN SATURATION: 97 % | HEIGHT: 67 IN

## 2024-02-27 DIAGNOSIS — R52 PAIN: ICD-10-CM

## 2024-02-27 DIAGNOSIS — M51.16 NEURITIS OR RADICULITIS DUE TO RUPTURE OF LUMBAR INTERVERTEBRAL DISC: Primary | ICD-10-CM

## 2024-02-27 PROCEDURE — 25010000002 DEXAMETHASONE SODIUM PHOSPHATE 10 MG/ML SOLUTION: Performed by: ANESTHESIOLOGY

## 2024-02-27 PROCEDURE — 25510000001 IOPAMIDOL 41 % SOLUTION: Performed by: ANESTHESIOLOGY

## 2024-02-27 PROCEDURE — 77003 FLUOROGUIDE FOR SPINE INJECT: CPT

## 2024-02-27 PROCEDURE — 25010000002 BUPIVACAINE (PF) 0.25 % SOLUTION: Performed by: ANESTHESIOLOGY

## 2024-02-27 RX ORDER — FENTANYL CITRATE 50 UG/ML
50 INJECTION, SOLUTION INTRAMUSCULAR; INTRAVENOUS ONCE
Status: DISCONTINUED | OUTPATIENT
Start: 2024-02-27 | End: 2024-02-28 | Stop reason: HOSPADM

## 2024-02-27 RX ORDER — BUPIVACAINE HYDROCHLORIDE 2.5 MG/ML
10 INJECTION, SOLUTION EPIDURAL; INFILTRATION; INTRACAUDAL ONCE
Status: COMPLETED | OUTPATIENT
Start: 2024-02-27 | End: 2024-02-27

## 2024-02-27 RX ORDER — MIDAZOLAM HYDROCHLORIDE 1 MG/ML
1 INJECTION INTRAMUSCULAR; INTRAVENOUS ONCE AS NEEDED
Status: DISCONTINUED | OUTPATIENT
Start: 2024-02-27 | End: 2024-02-28 | Stop reason: HOSPADM

## 2024-02-27 RX ORDER — LIDOCAINE HYDROCHLORIDE 10 MG/ML
1 INJECTION, SOLUTION INFILTRATION; PERINEURAL ONCE
Status: DISCONTINUED | OUTPATIENT
Start: 2024-02-27 | End: 2024-02-28 | Stop reason: HOSPADM

## 2024-02-27 RX ORDER — IOPAMIDOL 408 MG/ML
10 INJECTION, SOLUTION INTRATHECAL
Status: COMPLETED | OUTPATIENT
Start: 2024-02-27 | End: 2024-02-27

## 2024-02-27 RX ORDER — DEXAMETHASONE SODIUM PHOSPHATE 10 MG/ML
10 INJECTION, SOLUTION INTRAMUSCULAR; INTRAVENOUS ONCE
Status: COMPLETED | OUTPATIENT
Start: 2024-02-27 | End: 2024-02-27

## 2024-02-27 RX ADMIN — BUPIVACAINE HYDROCHLORIDE 10 ML: 2.5 INJECTION, SOLUTION EPIDURAL; INFILTRATION; INTRACAUDAL; PERINEURAL at 12:10

## 2024-02-27 RX ADMIN — DEXAMETHASONE SODIUM PHOSPHATE 10 MG: 10 INJECTION INTRAMUSCULAR; INTRAVENOUS at 12:05

## 2024-02-27 RX ADMIN — IOPAMIDOL 10 ML: 408 INJECTION, SOLUTION INTRATHECAL at 12:10

## 2024-02-27 NOTE — H&P
HISTORY:    Chief complaint of low back pain radiating into her right lower extremity.  Constant in timing worse with activity.  Conservative therapy has included rest oral steroids.  She was referred for right-sided lumbar 2 selective nerve root injection.  Her MRI shows a displaced disc at L2  Current Outpatient Medications on File Prior to Encounter   Medication Sig Dispense Refill    Acetaminophen 325 MG capsule Take 2 capsules by mouth 4 (Four) Times a Day As Needed.      alpha1-proteinase inhibitor (Zemaira) 1000 MG injection 1 (One) Time Per Week.      desvenlafaxine (PRISTIQ) 50 MG 24 hr tablet Take 12.5 mg by mouth As Needed.      Diclofenac Epolamine (FLECTOR) 1.3 % patch patch As Needed.      dilTIAZem CD (CARDIZEM CD) 120 MG 24 hr capsule TAKE 1 CAPSULE BY MOUTH DAILY 90 capsule 3    esomeprazole (nexIUM) 40 MG capsule Take 1 capsule by mouth Daily.      estradiol (MINIVELLE, VIVELLE-DOT) 0.075 MG/24HR patch Place 1 patch on the skin as directed by provider 2 (Two) Times a Week.      Fluticasone-Umeclidin-Vilant (Trelegy Ellipta) 100-62.5-25 MCG/INH aerosol powder  Inhale Daily.      guaiFENesin (MUCINEX) 600 MG 12 hr tablet As Needed.      ondansetron (ZOFRAN) 4 MG tablet As Needed.      Progesterone (PROMETRIUM) 200 MG capsule Daily.      albuterol sulfate  (90 Base) MCG/ACT inhaler As Needed for Wheezing.      betamethasone, augmented, (DIPROLENE) 0.05 % cream Apply 1 application twice a day by topical route as directed.      desonide (DESOWEN) 0.05 % cream As Needed.      Eliquis 5 MG tablet tablet TAKE 1 TABLET BY MOUTH TWICE  DAILY 180 tablet 3    EPINEPHrine (EPIPEN) 0.3 MG/0.3ML solution auto-injector injection As Needed.      HYDROcodone-acetaminophen (NORCO) 7.5-325 MG per tablet Take  by mouth See Admin Instructions.      hydrocortisone 2.5 % cream As Needed.      lidocaine-prilocaine (EMLA) 2.5-2.5 % cream As Needed.      tretinoin (RETIN-A) 0.1 % cream As Needed.      valACYclovir  (VALTREX) 1000 MG tablet As Needed.      [DISCONTINUED] amoxicillin-clavulanate (AUGMENTIN) 875-125 MG per tablet       [DISCONTINUED] cefdinir (OMNICEF) 300 MG capsule TAKE 2 CAPSULES EVERY DAY BY MOUTH FOR 14 DAYS      [DISCONTINUED] dexAMETHasone (DECADRON) 1.5 MG tablet 3 am 3 pm for 4 day's, 3 am 2 pm for 1 day, 2 am 2 pm for 3 day's, 2 am 1 pm for 1 day, 1 am 1 pm for 3 day's, 1 po am x 1 day only 50 tablet 0     No current facility-administered medications on file prior to encounter.       Past Medical History:   Diagnosis Date    Arrhythmia     Arthritis     Clotting disorder     Factor 5 Liden    COPD (chronic obstructive pulmonary disease)     Deep vein thrombosis     Essential hypertension 08/24/2021    Mitral valve prolapse     PAD (peripheral artery disease)     PAF (paroxysmal atrial fibrillation) 11/25/2020       No hematologic infectious or constitutional symptoms  Negative screen for VÍCTOR      Exam:  There were no vitals taken for this visit.  Airway [place]atti 2  Alert and oriented      Diagnosis:  Post-Op Diagnosis Codes:     * Lumbar radiculopathy [M54.16]    Plan:   Right-sided lumbar 2 selective nerve root injection with steroid   Risks including failure of relief were discussed with the patient

## 2024-02-27 NOTE — ANESTHESIA PROCEDURE NOTES
Right lumbar 2 selective nerve root injection      Patient reassessed immediately prior to procedure    Patient location during procedure: pain clinic  Indication:procedure for pain  Performed By  Anesthesiologist: Saud Gonzalez MD  Preanesthetic Checklist  Completed: patient identified and risks and benefits discussed  Additional Notes  Diagnosis:  Post-Op Diagnosis Codes:     * Lumbar radiculopathy [M54.16]    Sedation:    Sedation time:    Under fluoroscopic guidance, a transforaminal epidural steroid injection was performed.  Confirmation by Isovue dye  Prep:  Pt Position:prone  Sterile Tech:gloves, sterile barrier and mask  Prep:chlorhexidine gluconate and isopropyl alcohol  Monitoring:blood pressure monitoring, continuous pulse oximetry and EKG  Procedure:Sedation: no     Approach:right paramedian  Guidance: fluoroscopy  Location:lumbar  Level:2-3  Epidural needle type: Quang.  Needle Gauge:22 G  Medications:  Amount (mL): 2 cc.  Comments:Decadron 10 mg PF in Bupivacaine 0.25% PF 4cc  Post Assessment:  Pt Tolerance:patient tolerated the procedure well with no apparent complications  Complications:no

## 2024-03-01 ENCOUNTER — OFFICE VISIT (OUTPATIENT)
Dept: CARDIOLOGY | Facility: CLINIC | Age: 64
End: 2024-03-01
Payer: COMMERCIAL

## 2024-03-01 VITALS
BODY MASS INDEX: 20.25 KG/M2 | WEIGHT: 129 LBS | DIASTOLIC BLOOD PRESSURE: 72 MMHG | HEART RATE: 70 BPM | HEIGHT: 67 IN | SYSTOLIC BLOOD PRESSURE: 118 MMHG

## 2024-03-01 DIAGNOSIS — I10 ESSENTIAL HYPERTENSION: ICD-10-CM

## 2024-03-01 DIAGNOSIS — D68.51 FACTOR V LEIDEN: ICD-10-CM

## 2024-03-01 DIAGNOSIS — I48.0 PAF (PAROXYSMAL ATRIAL FIBRILLATION): Primary | ICD-10-CM

## 2024-03-01 DIAGNOSIS — J43.9 PULMONARY EMPHYSEMA, UNSPECIFIED EMPHYSEMA TYPE: ICD-10-CM

## 2024-03-01 PROCEDURE — 99214 OFFICE O/P EST MOD 30 MIN: CPT | Performed by: NURSE PRACTITIONER

## 2024-03-01 PROCEDURE — 93000 ELECTROCARDIOGRAM COMPLETE: CPT | Performed by: NURSE PRACTITIONER

## 2024-03-01 NOTE — PROGRESS NOTES
Date of Office Visit: 2024  Encounter Provider: KLARISSA Rodríguez  Place of Service: [facility] CARDIOLOGY  Patient Name: Radha Block  :1960    Chief complaint  Paroxysmal atrial fibrillation     History of Present Illness  Patient is a 63 y.o. year old female  patient of Dr. Auguste. Past medical history includes COPD, of arterial disease, deep venous thrombosis, Leiden 5 deficiency, alpha protein deficiency and atrial fibrillation.  And on  she was noted to have palpitations with Apple Watch indicating episodes of atrial fibrillation.  She decreased the modest amounts of caffeinated use.  She had a stress echocardiogram that showed normal systolic function hyperdynamic left ventricle trivial valve regurgitation normal right-sided pressures.  There was no ischemia.  She was started on aspirin and diltiazem.  Subsequent Holter showed 2 episodes of atrial fibrillation totaling 24 minutes.  She was placed on Eliquis and aspirin was discontinued.  Follow-up in 2021 metoprolol was decreased due to fatigue.  On 10/10/2022 she underwent atrial fibrillation by Dr. Iqbal.  Echo performed on 2022 showed size systolic function aortic valve calcification without stenosis or regurgitation.  There is no significant pulmonary hypertension.  She saw Dr. Iqbal on 2022 and he recommended stay on Eliquis indefinitely given history of prior DVT and Leiden 5 deficiency.  At follow-up on 3/2023 with EP service patient again brought up the issue of stopping anticoagulant therapy and was again instructed that the benefits were outweighing the risk.  Of note patient had been previously instructed by Dr. Dodson to stop estradiol due to thrombotic risk however she continues to take this.     Interval history  Patient presents today for routine follow-up.  I will visit with her for the first time and have reviewed her medical record.  Since last visit she has undergone  lumbar nerve block for her back pain with limited success.  She follows with pain management and neurosurgery.  She denies palpitations, shortness of breath, edema, dizziness, chest pain or chest pressure, fatigue, syncope or presyncope.  She is tolerating Eliquis well with no falls or abnormal bleeding.  She is active walking and biking and denies exertional symptoms.  Blood pressure today is at goal.  She reports transiently high readings when she is under significant stress but overall blood pressure has been as it is today.    Past Medical History:   Diagnosis Date    Arrhythmia     Arthritis     Clotting disorder     Factor 5 Liden    COPD (chronic obstructive pulmonary disease)     Deep vein thrombosis     Essential hypertension 08/24/2021    Mitral valve prolapse     PAD (peripheral artery disease)     PAF (paroxysmal atrial fibrillation) 11/25/2020     Past Surgical History:   Procedure Laterality Date    BLADDER SURGERY  2013    E/O polyp    BREAST SURGERY      E/O benign neuroma    CARDIAC ELECTROPHYSIOLOGY PROCEDURE N/A 10/10/2022    Procedure: Ablation atrial fibrillation CRYO;  Surgeon: Venkatesh Iqbal MD;  Location: [facility] INVASIVE LOCATION;  Service: Cardiovascular;  Laterality: N/A;    COLONOSCOPY  2016    HAND SURGERY Right     Carpal metacarpal hand surgery     LUMBAR FUSION N/A 12/30/2022    Procedure: Lumbar 3 to lumbar 4 and lumbar 4 to lumbar 5 laminectomy with fusion and instrumentation;  Surgeon: Rigo Mathur MD;  Location: [facility] OR;  Service: Robotics - Neuro;  Laterality: N/A;    SINUS SURGERY  2017    TONSILLECTOMY       Outpatient Medications Prior to Visit   Medication Sig Dispense Refill    Acetaminophen 325 MG capsule Take 2 capsules by mouth 4 (Four) Times a Day As Needed.      albuterol sulfate  (90 Base) MCG/ACT inhaler As Needed for Wheezing.      alpha1-proteinase inhibitor (Zemaira) 1000 MG injection 1 (One) Time Per Week.      betamethasone,  augmented, (DIPROLENE) 0.05 % cream Apply 1 application twice a day by topical route as directed.      desonide (DESOWEN) 0.05 % cream As Needed.      desvenlafaxine (PRISTIQ) 50 MG 24 hr tablet Take 12.5 mg by mouth As Needed.      Diclofenac Epolamine (FLECTOR) 1.3 % patch patch As Needed.      dilTIAZem CD (CARDIZEM CD) 120 MG 24 hr capsule TAKE 1 CAPSULE BY MOUTH DAILY 90 capsule 3    Eliquis 5 MG tablet tablet TAKE 1 TABLET BY MOUTH TWICE  DAILY (Patient taking differently: 1 tablet Every 12 (Twelve) Hours.) 180 tablet 3    EPINEPHrine (EPIPEN) 0.3 MG/0.3ML solution auto-injector injection As Needed.      esomeprazole (nexIUM) 40 MG capsule Take 1 capsule by mouth Daily.      estradiol (MINIVELLE, VIVELLE-DOT) 0.075 MG/24HR patch Place 1 patch on the skin as directed by provider 2 (Two) Times a Week.      Fluticasone-Umeclidin-Vilant (Trelegy Ellipta) 100-62.5-25 MCG/INH aerosol powder  Inhale Daily.      guaiFENesin (MUCINEX) 600 MG 12 hr tablet As Needed.      HYDROcodone-acetaminophen (NORCO) 7.5-325 MG per tablet Take  by mouth See Admin Instructions.      hydrocortisone 2.5 % cream As Needed.      lidocaine-prilocaine (EMLA) 2.5-2.5 % cream As Needed.      ondansetron (ZOFRAN) 4 MG tablet As Needed.      Progesterone (PROMETRIUM) 200 MG capsule Daily.      tretinoin (RETIN-A) 0.1 % cream As Needed.      valACYclovir (VALTREX) 1000 MG tablet As Needed.       No facility-administered medications prior to visit.       Allergies as of 03/01/2024    (No Known Allergies)     Social History     Socioeconomic History    Marital status:     Number of children: 1   Tobacco Use    Smoking status: Never    Smokeless tobacco: Never   Vaping Use    Vaping Use: Never used   Substance and Sexual Activity    Alcohol use: Yes     Alcohol/week: 3.0 standard drinks of alcohol     Types: 3 Glasses of wine per week     Comment: 2 times a week     Drug use: Never    Sexual activity: Defer     Family History   Adopted: Yes  "  Problem Relation Age of Onset    Malig Hyperthermia Neg Hx      Review of Systems   Constitutional: Negative for malaise/fatigue.   Cardiovascular:  Negative for chest pain, claudication, dyspnea on exertion, leg swelling, near-syncope, orthopnea, palpitations, paroxysmal nocturnal dyspnea and syncope.   Respiratory:  Negative for shortness of breath.    Neurological:  Negative for brief paralysis, dizziness, headaches and light-headedness.   All other systems reviewed and are negative.       Objective:     Vitals:    03/01/24 1500   BP: 118/72   BP Location: Right arm   Patient Position: Sitting   Cuff Size: Small Adult   Pulse: 70   Weight: 58.5 kg (129 lb)   Height: 170.2 cm (67\")     Body mass index is 20.2 kg/m².    Vitals reviewed.   Constitutional:       General: Not in acute distress.     Appearance: Well-developed and not in distress. Not diaphoretic.   HENT:      Head: Normocephalic.   Pulmonary:      Effort: Pulmonary effort is normal. No respiratory distress.      Breath sounds: Normal breath sounds. No wheezing. No rhonchi. No rales.   Cardiovascular:      Normal rate. Regular rhythm.      Murmurs: There is no murmur.   Pulses:     Radial: 2+ bilaterally.  Edema:     Peripheral edema absent.   Skin:     General: Skin is warm and dry. There is no cyanosis.      Findings: No rash.   Neurological:      Mental Status: Alert and oriented to person, place, and time.   Psychiatric:         Behavior: Behavior normal. Behavior is cooperative.         Thought Content: Thought content normal.         Judgment: Judgment normal.       Lab Review:     Lab Results   Component Value Date     12/16/2022     10/07/2022    K 4.0 12/16/2022    K 4.0 10/07/2022     12/16/2022     10/07/2022    CO2 26.0 12/16/2022    CO2 24.8 10/07/2022    BUN 18 12/16/2022    BUN 14 10/07/2022    CREATININE 0.70 01/31/2024    CREATININE 0.86 12/16/2022    EGFRIFNONA 68 11/25/2020    GLUCOSE 90 12/16/2022    GLUCOSE " 95 10/07/2022    CALCIUM 8.7 12/16/2022    CALCIUM 8.6 10/07/2022    ALBUMIN 4.80 11/25/2020    BILITOT 0.7 11/25/2020    AST 28 11/25/2020    ALT 26 11/25/2020     Lab Results   Component Value Date    WBC 14.36 (H) 01/02/2023    WBC 15.93 (H) 01/01/2023    HGB 13.4 01/02/2023    HGB 13.4 01/01/2023    HCT 39.6 01/02/2023    HCT 39.6 01/01/2023    .5 (H) 01/02/2023    .1 (H) 01/01/2023     01/02/2023     01/01/2023     Lab Results   Component Value Date    PROBNP 478.5 11/25/2020     Lab Results   Component Value Date    TROPONINT <0.010 11/25/2020     Lab Results   Component Value Date    TSH 3.930 11/25/2020             ECG 12 Lead    Date/Time: 3/1/2024 3:34 PM  Performed by: Carey Esteves APRN    Authorized by: Carey Esteves APRN  Comparison: compared with previous ECG   Similar to previous ECG  Rhythm: sinus rhythm  Rate: normal  BPM: 70  QRS axis: normal  Comments: Similar to prior        Assessment:       Diagnosis Plan   1. PAF (paroxysmal atrial fibrillation)        2. Essential hypertension        3. Pulmonary emphysema, unspecified emphysema type        4. Factor V Leiden          Plan:       1.  Paroxysmal atrial fibrillation, s/p A-fib ablation.  Maintaining sinus rhythm with plans to stay on Eliquis.  No falls or abnormal bleeding  2.  Hypertension, controlled. Discussed that if BP elevated or if she has significant palpitations, she can take an extra dose of diltiazem at that time. She will let us know if that becomes a regular occurrence.  3.  History of DVT with Leiden 5 mutation.  She was seen by Dr. Dodson in 9/2022.  He did not feel she needed long-term anticoagulation for hypercoagulable state may need thromboprophylaxis for surgical procedures (at that time back surgery).  As before despite multiple discussions she continues to take estradiol.  5.  Alpha antitrypsin antibody deficiency,followed by Dr Ceja  6.  History of asthma   7.  Leukocytosis.   Managed by hematology      Time Spent: I spent 30 minutes caring for Radha on this date of service. This time includes time spent by me in the following activities: preparing for the visit, reviewing tests, performing a medically appropriate examination and/or evaluations, counseling and educating the patient/family/caregiver, ordering medications, tests, or procedures, documenting information in the medical record, and independently interpreting results and communicating that information with the patient/family/caregiver.   I spent 1 minutes on the separately reported service of ECG. This time is not included in the time used to support the E/M service also reported today.        Your medication list            Accurate as of March 1, 2024  3:35 PM. If you have any questions, ask your nurse or doctor.                CHANGE how you take these medications        Instructions Last Dose Given Next Dose Due   Eliquis 5 MG tablet tablet  Generic drug: apixaban  What changed:   how much to take  how to take this  when to take this      TAKE 1 TABLET BY MOUTH TWICE  DAILY              CONTINUE taking these medications        Instructions Last Dose Given Next Dose Due   Acetaminophen 325 MG capsule      Take 2 capsules by mouth 4 (Four) Times a Day As Needed.       albuterol sulfate  (90 Base) MCG/ACT inhaler  Commonly known as: PROVENTIL HFA;VENTOLIN HFA;PROAIR HFA      As Needed for Wheezing.       betamethasone (augmented) 0.05 % cream  Commonly known as: DIPROLENE      Apply 1 application twice a day by topical route as directed.       desonide 0.05 % cream  Commonly known as: DESOWEN      As Needed.       desvenlafaxine 50 MG 24 hr tablet  Commonly known as: PRISTIQ      Take 12.5 mg by mouth As Needed.       Diclofenac Epolamine 1.3 % patch patch  Commonly known as: FLECTOR      As Needed.       dilTIAZem  MG 24 hr capsule  Commonly known as: CARDIZEM CD      TAKE 1 CAPSULE BY MOUTH DAILY        EPINEPHrine 0.3 MG/0.3ML solution auto-injector injection  Commonly known as: EPIPEN      As Needed.       esomeprazole 40 MG capsule  Commonly known as: nexIUM      Take 1 capsule by mouth Daily.       estradiol 0.075 MG/24HR patch  Commonly known as: BLAIR MACKEY-DOT      Place 1 patch on the skin as directed by provider 2 (Two) Times a Week.       guaiFENesin 600 MG 12 hr tablet  Commonly known as: MUCINEX      As Needed.       HYDROcodone-acetaminophen 7.5-325 MG per tablet  Commonly known as: NORCO      Take  by mouth See Admin Instructions.       hydrocortisone 2.5 % cream      As Needed.       lidocaine-prilocaine 2.5-2.5 % cream  Commonly known as: EMLA      As Needed.       ondansetron 4 MG tablet  Commonly known as: ZOFRAN      As Needed.       Progesterone 200 MG capsule  Commonly known as: PROMETRIUM      Daily.       Trelegy Ellipta 100-62.5-25 MCG/INH inhaler  Generic drug: Fluticasone-Umeclidin-Vilant      Inhale Daily.       tretinoin 0.1 % cream  Commonly known as: RETIN-A      As Needed.       valACYclovir 1000 MG tablet  Commonly known as: VALTREX      As Needed.       Zemaira 1000 MG injection  Generic drug: alpha1-proteinase inhibitor      1 (One) Time Per Week.                Patient is no longer taking -.  I corrected the med list to reflect this.  I did not stop these medications.    No follow-ups on file.      Dictated utilizing Dragon dictation

## 2024-03-07 ENCOUNTER — TELEPHONE (OUTPATIENT)
Dept: NEUROSURGERY | Facility: CLINIC | Age: 64
End: 2024-03-07
Payer: COMMERCIAL

## 2024-03-07 NOTE — TELEPHONE ENCOUNTER
PATIENT CALLED IN AND STATES THE INJECTION DID NOT HELP WITH THE PAIN; PATIENT IS SCHEDULED TO FOLLOW UP WITH DR. PARIKH ON 03/21/24 BUT SHE WOULD LIKE TO KNOW IF THAT VISIT WILL BE NECESSARY OR IF DR. PARIKH CAN TELL HER PLAN OF CARE BEFORE SHE COMES IN OR IF SHE IS READY FOR SURGERY.     PLEASE CALL PATIENT WITH ANY ADVICE.    PER PATIENT THE PAIN IS IN HER LOWER BACK AND GOES DOWN TO HER RIGHT THIGH AND GROIN AREA     THANK YOU!

## 2024-03-07 NOTE — TELEPHONE ENCOUNTER
"S/w patient and informed per Dr. Mathur    \"If the injection did not help at all then we are probably not going to be talking about surgery.  I think she should keep her appointment on the 21st. \"  "

## 2024-03-08 NOTE — TELEPHONE ENCOUNTER
Patient decided to go back to her original appt on 03/21/24, states she may call to see if theres cancellations. Pt decided seeing dr in person would be better

## 2024-03-08 NOTE — TELEPHONE ENCOUNTER
Caller: Radha Lowe    Relationship to patient: Self    Best call back number: [phone]    Chief complaint: SEE IF ANYTHING IN PERSON MON., TUES. WED.FRI.    Type of visit: FOLLOW UP AFTER  INJECTIONS    Requested date: ASAP     If rescheduling, when is the original appointment: NA     Additional notes:PT CALLED BACK AND STATES THAT SHE WANTED TO SEE IF  HAD ANYTHING AVAILABLE IN PERSON MON,TUES IR WED. PT STATES THAT SHE WOULD LIKE TO SEE HIM IN PERSON-SENDING TO OFFICE AS HUB UNABLE TO CHANGE APPT. DUE TO  TELEPHONE VISIT-SENDING TO OFFICE TO REVIEW THANK YOU

## 2024-03-20 NOTE — PROGRESS NOTES
Subjective   Patient ID: Radha Block is a 63 y.o. female is here today for follow-up with pain post CARLOS.    Today patient states that she was having low back pain, but it has since improved,  Patient is having pain in her low back pain that radiates to her R hip while walking, along with numbness in the R leg     History of Present Illness    This patient returns today.  She continues with pain in the right side of her lower back with radiation to her right hip and a little bit into her right thigh.  He had a selective nerve root injection on 27 February.  I reviewed the op report and the block was done perfectly.  She does not feel that the injection helped at all even that day.  She does feel it may have helped a little bit since then but the pain is still so bad she cannot live with it.    The following portions of the patient's history were reviewed and updated as appropriate: allergies, current medications, past family history, past medical history, past social history, past surgical history, and problem list.    Review of Systems   Constitutional:  Negative for chills and fever.   HENT:  Negative for congestion.    Genitourinary:  Negative for difficulty urinating and dysuria.   Musculoskeletal:  Positive for back pain and myalgias.   Neurological:  Positive for weakness and numbness.       I reviewed the review of systems listed by the patient and discussed by my MA    Objective     There were no vitals filed for this visit.  There is no height or weight on file to calculate BMI.    Tobacco Use: Low Risk  (3/21/2024)    Patient History     Smoking Tobacco Use: Never     Smokeless Tobacco Use: Never     Passive Exposure: Not on file          Physical Exam  Neurological:      Mental Status: She is alert and oriented to person, place, and time.       Neurologic Exam     Mental Status   Oriented to person, place, and time.           Assessment & Plan   Independent Review of Radiographic Studies:       I personally reviewed the images from the following studies.    I again reviewed her MRI.  There is clearly a question of a far lateral disc herniation at L2-3 on the right side.    Medical Decision Making:      Told the patient I would like to proceed with a lumbar myelogram.  I told the patient what a myelogram involves.  I explained that there is a 50% chance of developing a bad headache and nausea as a result of the test.  I explained that there is also a very small chance of infection, seizures, and bleeding.  I explained how we would treat a post myelogram headache including bedrest, caffeinated fluids, steroids, and blood patch.  The patient does ask to proceed.    Diagnoses and all orders for this visit:    1. Neuritis or radiculitis due to rupture of lumbar intervertebral disc (Primary)  -     Obtain Informed Consent; Standing  -     IR Myelogram Lumbar Spine; Future  -     CT Lumbar Spine With Intrathecal Contrast; Future  -     XR Spine Lumbar Complete With Flex & Ext; Future  -     No Lab Testing Needed; Standing  -     dexAMETHasone (DECADRON) 4 MG tablet; Take 2 tablets by mouth Take As Directed. Take both tablets by mouth 2 hours before myelogram  Dispense: 2 tablet; Refill: 0      Return for After radiology test.

## 2024-03-21 ENCOUNTER — OFFICE VISIT (OUTPATIENT)
Dept: NEUROSURGERY | Facility: CLINIC | Age: 64
End: 2024-03-21
Payer: COMMERCIAL

## 2024-03-21 DIAGNOSIS — M51.16 NEURITIS OR RADICULITIS DUE TO RUPTURE OF LUMBAR INTERVERTEBRAL DISC: Primary | ICD-10-CM

## 2024-03-21 PROCEDURE — 99214 OFFICE O/P EST MOD 30 MIN: CPT | Performed by: NEUROLOGICAL SURGERY

## 2024-03-21 RX ORDER — DEXAMETHASONE 4 MG/1
8 TABLET ORAL TAKE AS DIRECTED
Qty: 2 TABLET | Refills: 0 | Status: SHIPPED | OUTPATIENT
Start: 2024-03-21

## 2024-03-23 NOTE — PROGRESS NOTES
04/04/24 0001   Pre-Procedure Phone Call   Procedure Time Verified Yes   Arrival Time 0600   Procedure Location Verified Yes   Medical History Reviewed No   NPO Status Reinforced Yes   Ride and Caregiver Arranged Yes   Phone Number for Ride/Caregiver hold eliquis x 48 hrs prior to procedure. Pt voiced understanding.   Patient Knows to Bring Current Medications No   Bring Outside Films Requested No

## 2024-04-04 ENCOUNTER — HOSPITAL ENCOUNTER (OUTPATIENT)
Dept: CT IMAGING | Facility: HOSPITAL | Age: 64
Discharge: HOME | End: 2024-04-04
Payer: COMMERCIAL

## 2024-04-04 ENCOUNTER — HOSPITAL ENCOUNTER (OUTPATIENT)
Dept: GENERAL RADIOLOGY | Facility: HOSPITAL | Age: 64
Discharge: HOME | End: 2024-04-04
Payer: COMMERCIAL

## 2024-04-04 VITALS
OXYGEN SATURATION: 97 % | DIASTOLIC BLOOD PRESSURE: 76 MMHG | HEIGHT: 67 IN | BODY MASS INDEX: 20.4 KG/M2 | SYSTOLIC BLOOD PRESSURE: 132 MMHG | WEIGHT: 130 LBS | RESPIRATION RATE: 14 BRPM | TEMPERATURE: 98 F | HEART RATE: 69 BPM

## 2024-04-04 DIAGNOSIS — M51.16 NEURITIS OR RADICULITIS DUE TO RUPTURE OF LUMBAR INTERVERTEBRAL DISC: ICD-10-CM

## 2024-04-04 PROCEDURE — 72240 MYELOGRAPHY NECK SPINE: CPT

## 2024-04-04 PROCEDURE — 72132 CT LUMBAR SPINE W/DYE: CPT

## 2024-04-04 PROCEDURE — 62304 MYELOGRAPHY LUMBAR INJECTION: CPT

## 2024-04-04 PROCEDURE — 62284 INJECTION FOR MYELOGRAM: CPT

## 2024-04-04 PROCEDURE — 62284 INJECTION FOR MYELOGRAM: CPT | Performed by: NEUROLOGICAL SURGERY

## 2024-04-04 PROCEDURE — 25010000002 LIDOCAINE 1 % SOLUTION: Performed by: NEUROLOGICAL SURGERY

## 2024-04-04 PROCEDURE — 72114 X-RAY EXAM L-S SPINE BENDING: CPT

## 2024-04-04 PROCEDURE — 25510000001 IOPAMIDOL 41 % SOLUTION: Performed by: NEUROLOGICAL SURGERY

## 2024-04-04 PROCEDURE — 77003 FLUOROGUIDE FOR SPINE INJECT: CPT

## 2024-04-04 RX ORDER — HYDROCODONE BITARTRATE AND ACETAMINOPHEN 5; 325 MG/1; MG/1
1 TABLET ORAL EVERY 4 HOURS PRN
Status: DISCONTINUED | OUTPATIENT
Start: 2024-04-04 | End: 2024-04-05 | Stop reason: HOSPADM

## 2024-04-04 RX ORDER — LIDOCAINE HYDROCHLORIDE 10 MG/ML
10 INJECTION, SOLUTION INFILTRATION; PERINEURAL ONCE
Status: COMPLETED | OUTPATIENT
Start: 2024-04-04 | End: 2024-04-04

## 2024-04-04 RX ORDER — ACETAMINOPHEN 325 MG/1
650 TABLET ORAL EVERY 4 HOURS PRN
Status: DISCONTINUED | OUTPATIENT
Start: 2024-04-04 | End: 2024-04-05 | Stop reason: HOSPADM

## 2024-04-04 RX ORDER — IOPAMIDOL 408 MG/ML
20 INJECTION, SOLUTION INTRATHECAL
Status: COMPLETED | OUTPATIENT
Start: 2024-04-04 | End: 2024-04-04

## 2024-04-04 RX ADMIN — LIDOCAINE HYDROCHLORIDE 4 ML: 10 INJECTION, SOLUTION INFILTRATION; PERINEURAL at 07:41

## 2024-04-04 RX ADMIN — IOPAMIDOL 20 ML: 408 INJECTION, SOLUTION INTRATHECAL at 07:32

## 2024-04-04 NOTE — POST-PROCEDURE NOTE
Preop diagnosis:  Lumbar radiculopathy  Postop diagnosis:  same  LP at L45  15 cc of 200M Isovue  Complications: none  EBL: 0 cc  Specimens: none

## 2024-04-04 NOTE — DISCHARGE INSTRUCTIONS
EDUCATION /DISCHARGE INSTRUCTIONS:    A myelogram is a special radiology procedure of the spinal cord, spinal nerves and other related structures.  You will be awake during the examination.  An area of your lower back will be cleansed with an antiseptic solution.  The physician will inject a numbing medication in your lower back.  While your back is numb, a needle will be placed in the lower back area.  A small amount of spinal fluid may be withdrawn and sent to the lab if ordered by your physician. While the needle is in the back, an injection of a contrast material (xray dye) will be given through the needle.  The contrast material will allow the physician to see the spinal cord and spinal nerves.  Once injected, the needle will be removed and a band aid will be placed over the injection site.  The table will be tilted during the process to allow the contrast material to flow to particular areas in the spine.  Following the injection and xrays, you will be taken to the CT scanner where more pictures will be taken. After the procedure is finished, the contrast material will be absorbed by your body and eliminated through your kidneys.  The radiologist will study and interpret your myelogram and send the results to your physician.  Procedure risks of a myelogram include, but are not limited to:  *  Bleeding   *  Seizure  *  Infection   *  Headache, possibly severe requiring a blood patch  *  Contrast reaction  *  Nerve or cord injury  *  Paralysis and death    Benefits of the procedure:  *  Best examination for delineating pathology related to spinal cord compression from a disc and/or nerve root compression  Alternatives to the procedure:  MRI - a non invasive procedure requiring intravenous contrast injection.  Cannot be done on patients with certain pacemakers or metal in the body.  MRI risks include possible reaction to the contrast material, movement of metal located in the body.Benefit to MRI:  Non-invasive  and usually painless procedure.    24 hour rest period ends __Friday, April 5, 2024 at 10:00 AM__.    Important information following your myelogram:  * ACTIVITY:   *  You may sit up in the car to go home.  *  When you get home, remain on bed rest (flat on your back or on your side) for 24 hours. You may place a rolled up towel under your neck for support  * You may get up to the bathroom and sit up to eat and drink then lie back down  * Drink additional fluids for 24 hours after the myelogram.   * Continue to drink additional fluids for the next 2-3 days. Water and caffeinated beverages are encouraged.  * Remain less active for the next two to three days.  * Do not drive for 24 hours following a myelogram.  * You may remove the bandage and shower in the morning.    *Resume taking blood thinner or aspirin today.      CALL YOUR PHYSICIAN FOR THE FOLLOWING:  * Pain at the injection site  * Redness, swelling, bruising or drainage at the injection site.  * A fever by mouth of 101.0 or any new symptoms  Headaches are a common side effect after a myelogram.  If you get a headache, you should stay flat in bed and drink plenty of fluids. If the headache persists and does not go away with rest/medication, CALL Dr. Mathur at [phone]

## 2024-04-08 ENCOUNTER — TELEPHONE (OUTPATIENT)
Dept: NEUROSURGERY | Facility: CLINIC | Age: 64
End: 2024-04-08
Payer: COMMERCIAL

## 2024-04-10 NOTE — PROGRESS NOTES
Subjective   Patient ID: Radha Block is a 63 y.o. female is here today for follow-up with a new Lumbar Myelogram done on 4/4/2024.    Today patient's symptoms are unchanged    History of Present Illness    This patient returns today.  She continues with pain in the right side of her lower back with radiation into her right hip.  It goes into her right thigh as well.    The following portions of the patient's history were reviewed and updated as appropriate: allergies, current medications, past family history, past medical history, past social history, past surgical history, and problem list.    Review of Systems   Genitourinary:  Negative for difficulty urinating and dysuria.   Musculoskeletal:  Positive for back pain, gait problem and myalgias.   Neurological:  Positive for weakness and numbness.       I reviewed the review of systems listed by the patient and discussed by my MA    Objective     There were no vitals filed for this visit.  There is no height or weight on file to calculate BMI.    Tobacco Use: Low Risk  (4/11/2024)    Patient History     Smoking Tobacco Use: Never     Smokeless Tobacco Use: Never     Passive Exposure: Not on file          Physical Exam  Neurological:      Mental Status: She is alert and oriented to person, place, and time.       Neurologic Exam     Mental Status   Oriented to person, place, and time.           Assessment & Plan   Independent Review of Radiographic Studies:      I personally reviewed the images from the following studies.    I reviewed her plain films, myelogram, and CT scan myself.  The plain films show the previous surgery at L3-4 and L4-5.  This appears to be solid and in good position.  The myelogram itself shows pretty severe narrowing at L2-3 which is a level just above the fusion.  On the post myelographic CT scan the lower thoracic spine down to L2 looks okay.  L2-3 shows severe stenosis.  The screw on the left at L3 is somewhat outside of the  pedicle.  L3-4 is fairly open as is L4-5.  The worst pressure at L2-3 is on the right from what appears to be a synovial cyst.  L5-S1 looks okay.    Medical Decision Making:      I think of any surgery done in this lady we would need to extend the fusion up another level.  We would need to replace the screw on the left at L3.  I told the patient about the risks, complications and expected outcome of the lumbar surgery.  I explained that there was an 80% chance of getting rid of the pain in the leg.  I explained that there would still be back pain after the surgery.  Initially this will be quite severe but will improve over time.  There is a 2 or 3% chance of infection, bleeding, CSF leak, damage to the nerve as a result of surgery, paralysis, as well as anesthetic risk.  There is a 10% chance of recurrent problems.  There is a 10% chance of nonunion or failure of the instrumentation.  We discussed the postoperative hospital and home course.  The patient does ask to proceed.    She will need to be scheduled for a: Lumbar 2 to lumbar 3 laminectomy with fusion and instrumentation and removal of previous hardware    Diagnoses and all orders for this visit:    1. Neuritis or radiculitis due to rupture of lumbar intervertebral disc (Primary)      Return for 2-3 week post op.

## 2024-04-11 ENCOUNTER — PREP FOR SURGERY (OUTPATIENT)
Dept: OTHER | Facility: HOSPITAL | Age: 64
End: 2024-04-11
Payer: COMMERCIAL

## 2024-04-11 ENCOUNTER — OFFICE VISIT (OUTPATIENT)
Dept: NEUROSURGERY | Facility: CLINIC | Age: 64
End: 2024-04-11
Payer: COMMERCIAL

## 2024-04-11 DIAGNOSIS — M51.16 NEURITIS OR RADICULITIS DUE TO RUPTURE OF LUMBAR INTERVERTEBRAL DISC: Primary | ICD-10-CM

## 2024-04-11 PROCEDURE — 99214 OFFICE O/P EST MOD 30 MIN: CPT | Performed by: NEUROLOGICAL SURGERY

## 2024-04-12 ENCOUNTER — TELEPHONE (OUTPATIENT)
Dept: NEUROSURGERY | Facility: CLINIC | Age: 64
End: 2024-04-12
Payer: COMMERCIAL

## 2024-04-12 NOTE — TELEPHONE ENCOUNTER
Talk to Radha and she stated she was wondering about how long she would be out from work for her upcoming surgery with Dr. Mathur.  I advised we give up to 90 days off with FMLA however if she felt she was ready to go back to work after two weeks and she is cleared with Dr. Mathur we can write her letter releasing her with restrictions. She stated she understood and would work on getting the FMLA turned in to us.

## 2024-04-16 ENCOUNTER — TELEPHONE (OUTPATIENT)
Dept: NEUROSURGERY | Facility: CLINIC | Age: 64
End: 2024-04-16

## 2024-04-16 NOTE — TELEPHONE ENCOUNTER
Caller: PATIENT    Relationship: SELF    Best call back number: [phone]    What is the best time to reach you: ANYTIME    Who are you requesting to speak with (clinical staff, provider,  specific staff member): CLINICAL STAFF    Do you know the name of the person who called: NA    What was the call regarding: PATIENT CALLED AND IS ASKING TO SPEAK WITH ONE OF THE NURSES IN OUR OFFICE-PT HAS QUESTIONS ABOUT RECOVERY TIME FOR HER LAMINECTOMY-PT STATES SHE MAY NEED TO CHANGE HER SURGERY DATE BUT IS WANTING TO SPEAK WITH THE NURSE FIRST-PT ALSO HAS QUESTIONS REGARDING INSURANCE AND APPROVALS-SENDING TO OFFICE TO ADVISE THANK YOU    Is it okay if the provider responds through [employer]: NO

## 2024-04-16 NOTE — TELEPHONE ENCOUNTER
S/w patient and answered questions re: recovery time. Patient also wants to know will she be inpatient or outpatient and she needs to change her actual surgery date, I informed her that I would have mike to give her a call regarding the date change and her inpatient/outpatient status .

## 2024-04-29 ENCOUNTER — TELEPHONE (OUTPATIENT)
Dept: CARDIOLOGY | Facility: CLINIC | Age: 64
End: 2024-04-29
Payer: COMMERCIAL

## 2024-04-29 RX ORDER — DILTIAZEM HYDROCHLORIDE 180 MG/1
180 CAPSULE, COATED, EXTENDED RELEASE ORAL DAILY
Qty: 30 CAPSULE | Refills: 11 | Status: SHIPPED | OUTPATIENT
Start: 2024-04-29

## 2024-04-29 NOTE — TELEPHONE ENCOUNTER
Pt saw Carey 3/1/2023:     Pt called re: In the AM or mid-afternoon she has noticed her BP has been in the 140s-150s/90s with pulse 60-75.    Per Carey: She can take an extra Diltiazem 120mg PRN.     She wants to know if she can increase her old dose of Diltiazem 180mg?

## 2024-04-29 NOTE — TELEPHONE ENCOUNTER
Info to pt.  Verbalized understanding.  No other questions.  Pt will call if she needs the 90 day sent over and BP is below 130/80.  (Done)

## 2024-04-29 NOTE — TELEPHONE ENCOUNTER
Caller: Radha Lowe    Relationship to patient: Self    Best call back number: [phone]    Patient is needing: PT WANTS TO DISCUSS A PLAN OF CARE AS HER BP IS ELEVATED. PLEASE CALL TO DISCUSS.

## 2024-05-21 ENCOUNTER — TELEPHONE (OUTPATIENT)
Dept: NEUROSURGERY | Facility: CLINIC | Age: 64
End: 2024-05-21
Payer: COMMERCIAL

## 2024-05-21 RX ORDER — METHYLPREDNISOLONE 4 MG/1
TABLET ORAL
Qty: 1 EACH | Refills: 0 | Status: SHIPPED | OUTPATIENT
Start: 2024-05-21

## 2024-05-21 NOTE — TELEPHONE ENCOUNTER
Caller: PATIENT    Relationship: SELF    Best call back number: [phone]    What is the best time to reach you: ANYTIME    Who are you requesting to speak with (clinical staff, provider,  specific staff member): CLINICAL STAFF    Do you know the name of the person who called: NA    What was the call regarding: PT CALLED AND STATES THAT SHE IS HAVING A LOT OF PAIN-PT STATES THAT SHE IS WANTING TO SPEAK TO SOMEONE IN OUR OFFICE-PT IS WANTING TO KNOW IF SHE SHOULD DO A STEROID PACK, HAVE A NERVE BLOCK INJECTION OR IF SHE SHE SHOULD  SURGERY SOONER-PT STATES THAT SHE IS IN A LOT OF PAIN-SENDING TO OFFICE TO ADVISE THANK YOU    Is it okay if the provider responds through [employer]: NO

## 2024-05-21 NOTE — TELEPHONE ENCOUNTER
Spoke with patient regarding scheduling her surgery sooner. Patient states that she would try the MDP and see how she feels, but if her pain continues to increase, she will call me and we will move her surgery date up.

## 2024-05-21 NOTE — TELEPHONE ENCOUNTER
"S/w and informed per Dr. Mathur,    \"Try her on a Medrol Dosepak and move her surgery up.\"     Patient expressed understanding     "

## 2024-07-05 ENCOUNTER — APPOINTMENT (OUTPATIENT)
Dept: MRI IMAGING | Facility: HOSPITAL | Age: 64
End: 2024-07-05
Payer: COMMERCIAL

## 2024-07-05 ENCOUNTER — TELEPHONE (OUTPATIENT)
Dept: NEUROSURGERY | Facility: CLINIC | Age: 64
End: 2024-07-05

## 2024-07-05 ENCOUNTER — HOSPITAL ENCOUNTER (INPATIENT)
Facility: HOSPITAL | Age: 64
LOS: 2 days | Discharge: HOME | End: 2024-07-09
Attending: STUDENT IN AN ORGANIZED HEALTH CARE EDUCATION/TRAINING PROGRAM
Payer: COMMERCIAL

## 2024-07-05 DIAGNOSIS — M54.41 ACUTE MIDLINE LOW BACK PAIN WITH RIGHT-SIDED SCIATICA: Primary | ICD-10-CM

## 2024-07-05 DIAGNOSIS — M51.36 BULGE OF LUMBAR DISC WITHOUT MYELOPATHY: ICD-10-CM

## 2024-07-05 PROBLEM — M51.369 BULGE OF LUMBAR DISC WITHOUT MYELOPATHY: Status: ACTIVE | Noted: 2024-07-05

## 2024-07-05 LAB
ALBUMIN SERPL-MCNC: 4.3 G/DL (ref 3.5–5.2)
ALBUMIN/GLOB SERPL: 1.9 G/DL
ALP SERPL-CCNC: 43 U/L (ref 39–117)
ALT SERPL W P-5'-P-CCNC: 27 U/L (ref 1–33)
ANION GAP SERPL CALCULATED.3IONS-SCNC: 21 MMOL/L (ref 5–15)
APTT PPP: <20 SECONDS (ref 22.7–35.4)
AST SERPL-CCNC: 17 U/L (ref 1–32)
BACTERIA UR QL AUTO: ABNORMAL /HPF
BASOPHILS # BLD AUTO: 0.02 10*3/MM3 (ref 0–0.2)
BASOPHILS NFR BLD AUTO: 0.1 % (ref 0–1.5)
BILIRUB SERPL-MCNC: 1.1 MG/DL (ref 0–1.2)
BILIRUB UR QL STRIP: NEGATIVE
BUN SERPL-MCNC: 18 MG/DL (ref 8–23)
BUN/CREAT SERPL: 26.9 (ref 7–25)
CALCIUM SPEC-SCNC: 9.1 MG/DL (ref 8.6–10.5)
CHLORIDE SERPL-SCNC: 103 MMOL/L (ref 98–107)
CLARITY UR: ABNORMAL
CO2 SERPL-SCNC: 22 MMOL/L (ref 22–29)
COLOR UR: YELLOW
CREAT SERPL-MCNC: 0.67 MG/DL (ref 0.57–1)
DEPRECATED RDW RBC AUTO: 44.1 FL (ref 37–54)
EGFRCR SERPLBLD CKD-EPI 2021: 96.5 ML/MIN/1.73
EOSINOPHIL # BLD AUTO: 0 10*3/MM3 (ref 0–0.4)
EOSINOPHIL NFR BLD AUTO: 0 % (ref 0.3–6.2)
ERYTHROCYTE [DISTWIDTH] IN BLOOD BY AUTOMATED COUNT: 12.3 % (ref 12.3–15.4)
GLOBULIN UR ELPH-MCNC: 2.3 GM/DL
GLUCOSE SERPL-MCNC: 103 MG/DL (ref 65–99)
GLUCOSE UR STRIP-MCNC: NEGATIVE MG/DL
HCT VFR BLD AUTO: 51.8 % (ref 34–46.6)
HGB BLD-MCNC: 17.4 G/DL (ref 12–15.9)
HGB UR QL STRIP.AUTO: ABNORMAL
HYALINE CASTS UR QL AUTO: ABNORMAL /LPF
IMM GRANULOCYTES # BLD AUTO: 0.08 10*3/MM3 (ref 0–0.05)
IMM GRANULOCYTES NFR BLD AUTO: 0.6 % (ref 0–0.5)
INR PPP: 0.95 (ref 0.9–1.1)
KETONES UR QL STRIP: ABNORMAL
LEUKOCYTE ESTERASE UR QL STRIP.AUTO: ABNORMAL
LYMPHOCYTES # BLD AUTO: 1.78 10*3/MM3 (ref 0.7–3.1)
LYMPHOCYTES NFR BLD AUTO: 12.6 % (ref 19.6–45.3)
MCH RBC QN AUTO: 33.1 PG (ref 26.6–33)
MCHC RBC AUTO-ENTMCNC: 33.6 G/DL (ref 31.5–35.7)
MCV RBC AUTO: 98.5 FL (ref 79–97)
MONOCYTES # BLD AUTO: 1.44 10*3/MM3 (ref 0.1–0.9)
MONOCYTES NFR BLD AUTO: 10.2 % (ref 5–12)
NEUTROPHILS NFR BLD AUTO: 10.86 10*3/MM3 (ref 1.7–7)
NEUTROPHILS NFR BLD AUTO: 76.5 % (ref 42.7–76)
NITRITE UR QL STRIP: NEGATIVE
NRBC BLD AUTO-RTO: 0 /100 WBC (ref 0–0.2)
PH UR STRIP.AUTO: 7 [PH] (ref 5–8)
PLATELET # BLD AUTO: 291 10*3/MM3 (ref 140–450)
PMV BLD AUTO: 10.4 FL (ref 6–12)
POTASSIUM SERPL-SCNC: 3.8 MMOL/L (ref 3.5–5.2)
PROT SERPL-MCNC: 6.6 G/DL (ref 6–8.5)
PROT UR QL STRIP: NEGATIVE
PROTHROMBIN TIME: 12.9 SECONDS (ref 11.7–14.2)
RBC # BLD AUTO: 5.26 10*6/MM3 (ref 3.77–5.28)
RBC # UR STRIP: ABNORMAL /HPF
REF LAB TEST METHOD: ABNORMAL
SODIUM SERPL-SCNC: 146 MMOL/L (ref 136–145)
SP GR UR STRIP: 1.02 (ref 1–1.03)
SQUAMOUS #/AREA URNS HPF: ABNORMAL /HPF
UROBILINOGEN UR QL STRIP: ABNORMAL
WBC # UR STRIP: ABNORMAL /HPF
WBC NRBC COR # BLD AUTO: 14.18 10*3/MM3 (ref 3.4–10.8)

## 2024-07-05 PROCEDURE — 85610 PROTHROMBIN TIME: CPT | Performed by: STUDENT IN AN ORGANIZED HEALTH CARE EDUCATION/TRAINING PROGRAM

## 2024-07-05 PROCEDURE — 25010000002 HYDROMORPHONE 1 MG/ML SOLUTION: Performed by: STUDENT IN AN ORGANIZED HEALTH CARE EDUCATION/TRAINING PROGRAM

## 2024-07-05 PROCEDURE — 85730 THROMBOPLASTIN TIME PARTIAL: CPT | Performed by: STUDENT IN AN ORGANIZED HEALTH CARE EDUCATION/TRAINING PROGRAM

## 2024-07-05 PROCEDURE — 80053 COMPREHEN METABOLIC PANEL: CPT | Performed by: STUDENT IN AN ORGANIZED HEALTH CARE EDUCATION/TRAINING PROGRAM

## 2024-07-05 PROCEDURE — 81001 URINALYSIS AUTO W/SCOPE: CPT | Performed by: STUDENT IN AN ORGANIZED HEALTH CARE EDUCATION/TRAINING PROGRAM

## 2024-07-05 PROCEDURE — G0378 HOSPITAL OBSERVATION PER HR: HCPCS

## 2024-07-05 PROCEDURE — 25010000002 DIAZEPAM PER 5 MG: Performed by: STUDENT IN AN ORGANIZED HEALTH CARE EDUCATION/TRAINING PROGRAM

## 2024-07-05 PROCEDURE — 85025 COMPLETE CBC W/AUTO DIFF WBC: CPT | Performed by: STUDENT IN AN ORGANIZED HEALTH CARE EDUCATION/TRAINING PROGRAM

## 2024-07-05 PROCEDURE — 72148 MRI LUMBAR SPINE W/O DYE: CPT

## 2024-07-05 PROCEDURE — 99285 EMERGENCY DEPT VISIT HI MDM: CPT

## 2024-07-05 RX ORDER — VENLAFAXINE 50 MG/1
TABLET ORAL
COMMUNITY
Start: 2024-06-23 | End: 2024-07-09 | Stop reason: HOSPADM

## 2024-07-05 RX ORDER — BUDESONIDE 1 MG/2ML
INHALANT ORAL
COMMUNITY
Start: 2024-06-04 | End: 2024-07-09 | Stop reason: HOSPADM

## 2024-07-05 RX ORDER — DIAZEPAM 5 MG/ML
2.5 INJECTION, SOLUTION INTRAMUSCULAR; INTRAVENOUS ONCE
Status: COMPLETED | OUTPATIENT
Start: 2024-07-05 | End: 2024-07-05

## 2024-07-05 RX ORDER — PROMETHAZINE HYDROCHLORIDE 25 MG/1
TABLET ORAL
COMMUNITY
Start: 2024-03-10 | End: 2024-07-09 | Stop reason: HOSPADM

## 2024-07-05 RX ADMIN — DIAZEPAM 2.5 MG: 5 INJECTION, SOLUTION INTRAMUSCULAR; INTRAVENOUS at 21:22

## 2024-07-05 RX ADMIN — HYDROMORPHONE HYDROCHLORIDE 1 MG: 1 INJECTION, SOLUTION INTRAMUSCULAR; INTRAVENOUS; SUBCUTANEOUS at 17:24

## 2024-07-05 NOTE — TELEPHONE ENCOUNTER
PATIENT CALLED IN TO CHECK IF DR. PARIKH HAD APPROVED HER A PRESCRIPTION     ATTEMPTED WT AND NOT SUCCESSFUL     PATIENT'S SON MAY TAKE HER IN TO ED     [phone]    THANK YOU!

## 2024-07-05 NOTE — TELEPHONE ENCOUNTER
Patient called to follow up on status of recent MDP. She said pain is worsening in her lower back R hip and leg pain with N/T. She has trouble standing due to pain. She would like to have surgery moved up sooner the the July 22, 2024.     [facility] is correct.     She is open to getting injection or anything for pain relief until surgery.

## 2024-07-05 NOTE — TELEPHONE ENCOUNTER
Provider: DR PARIKH    Caller: RAVIN WOODSON    Relationship to Patient: SELF    Pharmacy: [facility]    Phone Number: [phone]    Reason for Call: PT STATES SHE WAS TOLD A STEROID PACK WAS GOING TO BE CALLED IN FOR HER, BUT IT HASN'T BEEN.      SHE ALSO WAS WONDERING IF HER SURGERY COULD BE MOVED UP ANYMORE.  SHE STATES SHE IS BASICALLY BOUND TO HER BED.  PLEASE ADVISE.    When was the patient last seen: 4-11-24    When did it start: TERRIBLE PAIN AND WEAKNESS STARTED ON MONDAY.

## 2024-07-05 NOTE — ED TRIAGE NOTES
Pt with back surgery scheduled later this month but pain has worsened significantly, denies new injury, unable to sit d/t pain

## 2024-07-05 NOTE — ED PROVIDER NOTES
EMERGENCY DEPARTMENT ENCOUNTER  Room Number:  33/33  PCP: Bosler, James William III, MD  Independent Historians: Patient      HPI:  Chief Complaint: had concerns including Back Pain.       Context: Radha Block is a 64 y.o. female with a medical history of PAD, PAF, HTN, COPD, factor V Leiden who presents to the ED c/o acute lumbar pain with pain and weakness.  Patient been following with neurosurgery with plans for laminectomy in 2 to 3 weeks.  Patient states pain significantly worsened this week and has become nearly unbearable.  Patient additionally reports some urinary retention and numbness through the groin and right upper extremity.  Patient states the symptoms are new this week.  She has also noticed weakness in the right lower extremity.      Review of prior external notes (non-ED) -and- Review of prior external test results outside of this encounter: Office visit with Dr. Irene of neurosurgery from 4/11/2024 reviewed and notable for presentation secondary to back pain.  Plan at that time was to extend the fusion up another level.  Plan at that time was to schedule lumbar 2 to lumbar 3 laminectomy with fusion and instrumentation.    Prescription drug monitoring program review:         PAST MEDICAL HISTORY  Active Ambulatory Problems     Diagnosis Date Noted    Shortness of breath 11/25/2020    Chest pain 11/25/2020    Palpitations 11/25/2020    PAD (peripheral artery disease) 11/25/2020    PAF (paroxysmal atrial fibrillation) 11/25/2020    Essential hypertension 08/24/2021    Spinal stenosis of lumbar region with neurogenic claudication 08/18/2022    COPD (chronic obstructive pulmonary disease)     Factor V Leiden mutation 09/26/2022    History of DVT of lower extremity 09/26/2022    Neuritis or radiculitis due to rupture of lumbar intervertebral disc 02/20/2024     Resolved Ambulatory Problems     Diagnosis Date Noted    No Resolved Ambulatory Problems     Past Medical History:   Diagnosis Date     Arrhythmia     Arthritis     Clotting disorder     Deep vein thrombosis     Mitral valve prolapse          PAST SURGICAL HISTORY  Past Surgical History:   Procedure Laterality Date    BLADDER SURGERY  2013    E/O polyp    BREAST SURGERY      E/O benign neuroma    CARDIAC ELECTROPHYSIOLOGY PROCEDURE N/A 10/10/2022    Procedure: Ablation atrial fibrillation CRYO;  Surgeon: Venkatesh Iqbal MD;  Location: [facility] INVASIVE LOCATION;  Service: Cardiovascular;  Laterality: N/A;    COLONOSCOPY  2016    HAND SURGERY Right     Carpal metacarpal hand surgery     LUMBAR FUSION N/A 12/30/2022    Procedure: Lumbar 3 to lumbar 4 and lumbar 4 to lumbar 5 laminectomy with fusion and instrumentation;  Surgeon: Rigo Mathur MD;  Location: [facility] OR;  Service: Robotics - Neuro;  Laterality: N/A;    SINUS SURGERY  2017    TONSILLECTOMY           FAMILY HISTORY  Family History   Adopted: Yes   Problem Relation Age of Onset    Malig Hyperthermia Neg Hx          SOCIAL HISTORY  Social History     Socioeconomic History    Marital status:     Number of children: 1   Tobacco Use    Smoking status: Never    Smokeless tobacco: Never   Vaping Use    Vaping status: Never Used   Substance and Sexual Activity    Alcohol use: Yes     Alcohol/week: 3.0 standard drinks of alcohol     Types: 3 Glasses of wine per week     Comment: 2 times a week     Drug use: Never    Sexual activity: Defer         ALLERGIES  Patient has no known allergies.      REVIEW OF SYSTEMS  Review of Systems  Included in HPI  All systems reviewed and negative except for those discussed in HPI.      PHYSICAL EXAM    I have reviewed the triage vital signs and nursing notes.    ED Triage Vitals [07/05/24 1541]   Temp Heart Rate Resp BP SpO2   99.2 °F (37.3 °C) 84 18 -- 98 %      Temp src Heart Rate Source Patient Position BP Location FiO2 (%)   -- -- -- -- --       Physical Exam  GENERAL: alert, no acute distress  SKIN: Warm, dry  HENT: Normocephalic,  atraumatic  EYES: no scleral icterus  CV: regular rhythm, regular rate  RESPIRATORY: normal effort, lungs clear  ABDOMEN: soft, nontender, nondistended  MUSCULOSKELETAL: no deformity  NEURO: alert, moves all extremities, follows commands.  Weakness in the right lower extremity with no sensory deficits.  Pulses noted to be intact at DP and PT            LAB RESULTS  Recent Results (from the past 24 hour(s))   Comprehensive Metabolic Panel    Collection Time: 07/05/24  3:55 PM    Specimen: Blood   Result Value Ref Range    Glucose 103 (H) 65 - 99 mg/dL    BUN 18 8 - 23 mg/dL    Creatinine 0.67 0.57 - 1.00 mg/dL    Sodium 146 (H) 136 - 145 mmol/L    Potassium 3.8 3.5 - 5.2 mmol/L    Chloride 103 98 - 107 mmol/L    CO2 22.0 22.0 - 29.0 mmol/L    Calcium 9.1 8.6 - 10.5 mg/dL    Total Protein 6.6 6.0 - 8.5 g/dL    Albumin 4.3 3.5 - 5.2 g/dL    ALT (SGPT) 27 1 - 33 U/L    AST (SGOT) 17 1 - 32 U/L    Alkaline Phosphatase 43 39 - 117 U/L    Total Bilirubin 1.1 0.0 - 1.2 mg/dL    Globulin 2.3 gm/dL    A/G Ratio 1.9 g/dL    BUN/Creatinine Ratio 26.9 (H) 7.0 - 25.0    Anion Gap 21.0 (H) 5.0 - 15.0 mmol/L    eGFR 96.5 >60.0 mL/min/1.73   Protime-INR    Collection Time: 07/05/24  3:55 PM    Specimen: Blood   Result Value Ref Range    Protime 12.9 11.7 - 14.2 Seconds    INR 0.95 0.90 - 1.10   aPTT    Collection Time: 07/05/24  3:55 PM    Specimen: Blood   Result Value Ref Range    PTT <20.0 (L) 22.7 - 35.4 seconds   CBC Auto Differential    Collection Time: 07/05/24  3:55 PM    Specimen: Blood   Result Value Ref Range    WBC 14.18 (H) 3.40 - 10.80 10*3/mm3    RBC 5.26 3.77 - 5.28 10*6/mm3    Hemoglobin 17.4 (H) 12.0 - 15.9 g/dL    Hematocrit 51.8 (H) 34.0 - 46.6 %    MCV 98.5 (H) 79.0 - 97.0 fL    MCH 33.1 (H) 26.6 - 33.0 pg    MCHC 33.6 31.5 - 35.7 g/dL    RDW 12.3 12.3 - 15.4 %    RDW-SD 44.1 37.0 - 54.0 fl    MPV 10.4 6.0 - 12.0 fL    Platelets 291 140 - 450 10*3/mm3    Neutrophil % 76.5 (H) 42.7 - 76.0 %    Lymphocyte % 12.6  (L) 19.6 - 45.3 %    Monocyte % 10.2 5.0 - 12.0 %    Eosinophil % 0.0 (L) 0.3 - 6.2 %    Basophil % 0.1 0.0 - 1.5 %    Immature Grans % 0.6 (H) 0.0 - 0.5 %    Neutrophils, Absolute 10.86 (H) 1.70 - 7.00 10*3/mm3    Lymphocytes, Absolute 1.78 0.70 - 3.10 10*3/mm3    Monocytes, Absolute 1.44 (H) 0.10 - 0.90 10*3/mm3    Eosinophils, Absolute 0.00 0.00 - 0.40 10*3/mm3    Basophils, Absolute 0.02 0.00 - 0.20 10*3/mm3    Immature Grans, Absolute 0.08 (H) 0.00 - 0.05 10*3/mm3    nRBC 0.0 0.0 - 0.2 /100 WBC   Urinalysis With Microscopic If Indicated (No Culture) - Urine, Clean Catch    Collection Time: 07/05/24  4:19 PM    Specimen: Urine, Clean Catch   Result Value Ref Range    Color, UA Yellow Yellow, Straw    Appearance, UA Cloudy (A) Clear    pH, UA 7.0 5.0 - 8.0    Specific Gravity, UA 1.018 1.005 - 1.030    Glucose, UA Negative Negative    Ketones, UA Trace (A) Negative    Bilirubin, UA Negative Negative    Blood, UA Trace (A) Negative    Protein, UA Negative Negative    Leuk Esterase, UA Small (1+) (A) Negative    Nitrite, UA Negative Negative    Urobilinogen, UA 0.2 E.U./dL 0.2 - 1.0 E.U./dL   Urinalysis, Microscopic Only - Urine, Clean Catch    Collection Time: 07/05/24  4:19 PM    Specimen: Urine, Clean Catch   Result Value Ref Range    RBC, UA 0-2 None Seen, 0-2 /HPF    WBC, UA 11-20 (A) None Seen, 0-2 /HPF    Bacteria, UA 4+ (A) None Seen /HPF    Squamous Epithelial Cells, UA 31-50 (A) None Seen, 0-2 /HPF    Hyaline Casts, UA 3-6 None Seen /LPF    Methodology Automated Microscopy          RADIOLOGY  No Radiology Exams Resulted Within Past 24 Hours      MEDICATIONS GIVEN IN ER  Medications   HYDROmorphone (DILAUDID) injection 1 mg (1 mg Intravenous Given 7/5/24 0344)         ORDERS PLACED DURING THIS VISIT:  Orders Placed This Encounter   Procedures    MRI Lumbar Spine Without Contrast    Comprehensive Metabolic Panel    Urinalysis With Microscopic If Indicated (No Culture) - Urine, Clean Catch    Protime-INR     aPTT    CBC Auto Differential    Urinalysis, Microscopic Only - Urine, Clean Catch    CBC & Differential         OUTPATIENT MEDICATION MANAGEMENT:  No current Epic-ordered facility-administered medications on file.     Current Outpatient Medications Ordered in Epic   Medication Sig Dispense Refill    Acetaminophen 325 MG capsule Take 2 capsules by mouth 4 (Four) Times a Day As Needed.      albuterol sulfate  (90 Base) MCG/ACT inhaler As Needed for Wheezing.      alpha1-proteinase inhibitor (Zemaira) 1000 MG injection 1 (One) Time Per Week.      betamethasone, augmented, (DIPROLENE) 0.05 % cream Apply 1 application twice a day by topical route as directed.      desonide (DESOWEN) 0.05 % cream As Needed.      desvenlafaxine (PRISTIQ) 50 MG 24 hr tablet Take 12.5 mg by mouth As Needed.      Diclofenac Epolamine (FLECTOR) 1.3 % patch patch As Needed.      dilTIAZem CD (CARDIZEM CD) 180 MG 24 hr capsule Take 1 capsule by mouth Daily. 30 capsule 11    Eliquis 5 MG tablet tablet TAKE 1 TABLET BY MOUTH TWICE  DAILY (Patient taking differently: 1 tablet Every 12 (Twelve) Hours.) 180 tablet 3    EPINEPHrine (EPIPEN) 0.3 MG/0.3ML solution auto-injector injection As Needed.      esomeprazole (nexIUM) 40 MG capsule Take 1 capsule by mouth Daily.      estradiol (MINIVELLE, VIVELLE-DOT) 0.075 MG/24HR patch Place 1 patch on the skin as directed by provider 2 (Two) Times a Week.      Fluticasone-Umeclidin-Vilant (Trelegy Ellipta) 100-62.5-25 MCG/INH aerosol powder  Inhale Daily.      guaiFENesin (MUCINEX) 600 MG 12 hr tablet As Needed.      HYDROcodone-acetaminophen (NORCO) 7.5-325 MG per tablet Take  by mouth See Admin Instructions.      hydrocortisone 2.5 % cream As Needed.      lidocaine-prilocaine (EMLA) 2.5-2.5 % cream As Needed.      methylPREDNISolone (MEDROL) 4 MG dose pack Take as directed on package instructions. 1 each 0    ondansetron (ZOFRAN) 4 MG tablet As Needed.      Progesterone (PROMETRIUM) 200 MG capsule  Daily.      tretinoin (RETIN-A) 0.1 % cream As Needed.      valACYclovir (VALTREX) 1000 MG tablet As Needed.           PROCEDURES  Procedures            PROGRESS, DATA ANALYSIS, CONSULTS, AND MEDICAL DECISION MAKING  All labs have been independently interpreted by me.  All radiology studies have been reviewed by me. All EKG's have been independently viewed and interpreted by me.  Discussion below represents my analysis of pertinent findings related to patient's condition, differential diagnosis, treatment plan and final disposition.    Differential diagnosis includes but is not limited to lumbar radiculopathy, cauda equina syndrome, sciatica.    Clinical Scores:                   ED Course as of 07/06/24 2041 Fri Jul 05, 2024   1716 64-year-old female presenting for evaluation of lumbar back pain with right lower extremity pain and weakness.  Patient notes sensory changes through her groin as well as urinary retention.  Laboratory evaluation is notable for leukocytosis of 14 which is consistent with patient's current use of steroids.  Urinalysis with 4+ bacteria however sample appears contaminated and unlikely to represent UTI.  Given patient's new symptoms I think an emergent MRI is warranted at this time.  Will treat pain with IV narcotics and plan on admission with neurosurgical consultation [MW]   2010 Patient was turned over to me by Dr. Hunt.  Pending: MRI and dispo   [CC]   2156 I rechecked the patient.  I discussed the patient's labs, radiology findings (including all incidental findings), diagnosis, and plan for admission. The patient understands and agrees with the plan.   [CC]   9661 Spoke with Jw, APC with A.  Reviewed history, exam, results, treatments.  She agrees admit the patient to Dr. Rangel.      [CC]      ED Course User Index  [CC] Babs Masters PA-C  [MW] Cuate Hunt MD             AS OF 20:10 EDT VITALS:    BP - 164/84  HR - 60  TEMP - 99.2 °F (37.3 °C)  O2 SATS -  95%    COMPLEXITY OF CARE  The patient requires admission.      DIAGNOSIS  Final diagnoses:   Acute midline low back pain with right-sided sciatica   Bulge of lumbar disc without myelopathy         DISPOSITION  ED Disposition       ED Disposition   Intended Admit    Condition   --    Comment   --                Please note that portions of this document were completed with a voice recognition program.    Note Disclaimer: At [facility], we believe that sharing information builds trust and better relationships. You are receiving this note because you recently visited [facility]. It is possible you will see health information before a provider has talked with you about it. This kind of information can be easy to misunderstand. To help you fully understand what it means for your health, we urge you to discuss this note with your provider.         Cuate Hunt MD  07/06/24 0962

## 2024-07-06 PROBLEM — E88.01 ALPHA-1-ANTITRYPSIN DEFICIENCY: Status: ACTIVE | Noted: 2024-07-06

## 2024-07-06 PROCEDURE — 25010000002 ONDANSETRON PER 1 MG: Performed by: NURSE PRACTITIONER

## 2024-07-06 PROCEDURE — 25010000002 MORPHINE PER 10 MG: Performed by: NURSE PRACTITIONER

## 2024-07-06 PROCEDURE — 63710000001 METHYLPREDNISOLONE PER 4 MG: Performed by: STUDENT IN AN ORGANIZED HEALTH CARE EDUCATION/TRAINING PROGRAM

## 2024-07-06 PROCEDURE — 94640 AIRWAY INHALATION TREATMENT: CPT

## 2024-07-06 PROCEDURE — G0378 HOSPITAL OBSERVATION PER HR: HCPCS

## 2024-07-06 PROCEDURE — 94799 UNLISTED PULMONARY SVC/PX: CPT

## 2024-07-06 PROCEDURE — 25010000002 METHYLPREDNISOLONE PER 125 MG: Performed by: NURSE PRACTITIONER

## 2024-07-06 PROCEDURE — 94664 DEMO&/EVAL PT USE INHALER: CPT

## 2024-07-06 PROCEDURE — 25010000002 ENOXAPARIN PER 10 MG: Performed by: STUDENT IN AN ORGANIZED HEALTH CARE EDUCATION/TRAINING PROGRAM

## 2024-07-06 RX ORDER — METHYLPREDNISOLONE 4 MG/1
4 TABLET ORAL
Status: DISCONTINUED | OUTPATIENT
Start: 2024-07-07 | End: 2024-07-06

## 2024-07-06 RX ORDER — METHYLPREDNISOLONE SODIUM SUCCINATE 125 MG/2ML
125 INJECTION, POWDER, LYOPHILIZED, FOR SOLUTION INTRAMUSCULAR; INTRAVENOUS EVERY 6 HOURS
Status: DISCONTINUED | OUTPATIENT
Start: 2024-07-06 | End: 2024-07-08

## 2024-07-06 RX ORDER — MORPHINE SULFATE 2 MG/ML
2 INJECTION, SOLUTION INTRAMUSCULAR; INTRAVENOUS
Status: DISCONTINUED | OUTPATIENT
Start: 2024-07-06 | End: 2024-07-06

## 2024-07-06 RX ORDER — ACETAMINOPHEN 160 MG/5ML
650 SOLUTION ORAL EVERY 4 HOURS PRN
Status: DISCONTINUED | OUTPATIENT
Start: 2024-07-06 | End: 2024-07-07

## 2024-07-06 RX ORDER — PROGESTERONE 200 MG/1
200 CAPSULE ORAL DAILY
Status: DISCONTINUED | OUTPATIENT
Start: 2024-07-06 | End: 2024-07-07

## 2024-07-06 RX ORDER — AMOXICILLIN 250 MG
2 CAPSULE ORAL 2 TIMES DAILY PRN
Status: DISCONTINUED | OUTPATIENT
Start: 2024-07-06 | End: 2024-07-09 | Stop reason: HOSPADM

## 2024-07-06 RX ORDER — BUDESONIDE AND FORMOTEROL FUMARATE DIHYDRATE 160; 4.5 UG/1; UG/1
2 AEROSOL RESPIRATORY (INHALATION)
Status: DISCONTINUED | OUTPATIENT
Start: 2024-07-06 | End: 2024-07-09 | Stop reason: HOSPADM

## 2024-07-06 RX ORDER — METHOCARBAMOL 500 MG/1
500 TABLET, FILM COATED ORAL EVERY 6 HOURS PRN
Status: DISCONTINUED | OUTPATIENT
Start: 2024-07-06 | End: 2024-07-07

## 2024-07-06 RX ORDER — PANTOPRAZOLE SODIUM 40 MG/1
40 TABLET, DELAYED RELEASE ORAL
Status: DISCONTINUED | OUTPATIENT
Start: 2024-07-06 | End: 2024-07-09 | Stop reason: HOSPADM

## 2024-07-06 RX ORDER — ALBUTEROL SULFATE 2.5 MG/3ML
2.5 SOLUTION RESPIRATORY (INHALATION) EVERY 6 HOURS PRN
Status: DISCONTINUED | OUTPATIENT
Start: 2024-07-06 | End: 2024-07-09 | Stop reason: HOSPADM

## 2024-07-06 RX ORDER — BISACODYL 5 MG/1
5 TABLET, DELAYED RELEASE ORAL DAILY PRN
Status: DISCONTINUED | OUTPATIENT
Start: 2024-07-06 | End: 2024-07-09 | Stop reason: HOSPADM

## 2024-07-06 RX ORDER — METHYLPREDNISOLONE 4 MG/1
4 TABLET ORAL 2 TIMES DAILY WITH MEALS
Status: DISCONTINUED | OUTPATIENT
Start: 2024-07-06 | End: 2024-07-06

## 2024-07-06 RX ORDER — DESVENLAFAXINE 25 MG/1
25 TABLET, EXTENDED RELEASE ORAL ONCE
Status: COMPLETED | OUTPATIENT
Start: 2024-07-06 | End: 2024-07-06

## 2024-07-06 RX ORDER — DICLOFENAC SODIUM AND MISOPROSTOL 75; 200 MG/1; UG/1
1 TABLET, DELAYED RELEASE ORAL DAILY
COMMUNITY
End: 2024-07-09 | Stop reason: HOSPADM

## 2024-07-06 RX ORDER — MORPHINE SULFATE 2 MG/ML
1 INJECTION, SOLUTION INTRAMUSCULAR; INTRAVENOUS
Status: DISCONTINUED | OUTPATIENT
Start: 2024-07-06 | End: 2024-07-08

## 2024-07-06 RX ORDER — ENOXAPARIN SODIUM 100 MG/ML
1 INJECTION SUBCUTANEOUS EVERY 12 HOURS
Status: DISCONTINUED | OUTPATIENT
Start: 2024-07-06 | End: 2024-07-09 | Stop reason: HOSPADM

## 2024-07-06 RX ORDER — DILTIAZEM HYDROCHLORIDE 180 MG/1
180 CAPSULE, COATED, EXTENDED RELEASE ORAL DAILY
Status: DISCONTINUED | OUTPATIENT
Start: 2024-07-06 | End: 2024-07-09 | Stop reason: HOSPADM

## 2024-07-06 RX ORDER — ALUMINA, MAGNESIA, AND SIMETHICONE 2400; 2400; 240 MG/30ML; MG/30ML; MG/30ML
15 SUSPENSION ORAL EVERY 6 HOURS PRN
Status: DISCONTINUED | OUTPATIENT
Start: 2024-07-06 | End: 2024-07-09 | Stop reason: HOSPADM

## 2024-07-06 RX ORDER — ACETAMINOPHEN 325 MG/1
650 TABLET ORAL EVERY 4 HOURS PRN
Status: DISCONTINUED | OUTPATIENT
Start: 2024-07-06 | End: 2024-07-07

## 2024-07-06 RX ORDER — HYDROCODONE BITARTRATE AND ACETAMINOPHEN 5; 325 MG/1; MG/1
1 TABLET ORAL EVERY 4 HOURS PRN
Status: DISCONTINUED | OUTPATIENT
Start: 2024-07-06 | End: 2024-07-07

## 2024-07-06 RX ORDER — BISACODYL 10 MG
10 SUPPOSITORY, RECTAL RECTAL DAILY PRN
Status: DISCONTINUED | OUTPATIENT
Start: 2024-07-06 | End: 2024-07-09 | Stop reason: HOSPADM

## 2024-07-06 RX ORDER — SODIUM CHLORIDE 0.9 % (FLUSH) 0.9 %
10 SYRINGE (ML) INJECTION AS NEEDED
Status: DISCONTINUED | OUTPATIENT
Start: 2024-07-06 | End: 2024-07-09 | Stop reason: HOSPADM

## 2024-07-06 RX ORDER — ONDANSETRON 2 MG/ML
4 INJECTION INTRAMUSCULAR; INTRAVENOUS EVERY 6 HOURS PRN
Status: DISCONTINUED | OUTPATIENT
Start: 2024-07-06 | End: 2024-07-09 | Stop reason: HOSPADM

## 2024-07-06 RX ORDER — ACETAMINOPHEN 650 MG/1
650 SUPPOSITORY RECTAL EVERY 4 HOURS PRN
Status: DISCONTINUED | OUTPATIENT
Start: 2024-07-06 | End: 2024-07-07

## 2024-07-06 RX ORDER — POLYETHYLENE GLYCOL 3350 17 G/17G
17 POWDER, FOR SOLUTION ORAL DAILY PRN
Status: DISCONTINUED | OUTPATIENT
Start: 2024-07-06 | End: 2024-07-09 | Stop reason: HOSPADM

## 2024-07-06 RX ORDER — GABAPENTIN 100 MG/1
100 CAPSULE ORAL EVERY 12 HOURS SCHEDULED
Status: DISCONTINUED | OUTPATIENT
Start: 2024-07-06 | End: 2024-07-07

## 2024-07-06 RX ORDER — ONDANSETRON 4 MG/1
4 TABLET, ORALLY DISINTEGRATING ORAL EVERY 6 HOURS PRN
Status: DISCONTINUED | OUTPATIENT
Start: 2024-07-06 | End: 2024-07-09 | Stop reason: HOSPADM

## 2024-07-06 RX ADMIN — BUDESONIDE AND FORMOTEROL FUMARATE DIHYDRATE 2 PUFF: 160; 4.5 AEROSOL RESPIRATORY (INHALATION) at 22:12

## 2024-07-06 RX ADMIN — METHOCARBAMOL TABLETS 500 MG: 500 TABLET, COATED ORAL at 17:56

## 2024-07-06 RX ADMIN — MORPHINE SULFATE 2 MG: 2 INJECTION, SOLUTION INTRAMUSCULAR; INTRAVENOUS at 00:54

## 2024-07-06 RX ADMIN — DILTIAZEM HYDROCHLORIDE 180 MG: 180 CAPSULE, EXTENDED RELEASE ORAL at 09:06

## 2024-07-06 RX ADMIN — ALUMINUM HYDROXIDE, MAGNESIUM HYDROXIDE, AND DIMETHICONE 15 ML: 400; 400; 40 SUSPENSION ORAL at 16:49

## 2024-07-06 RX ADMIN — MORPHINE SULFATE 1 MG: 2 INJECTION, SOLUTION INTRAMUSCULAR; INTRAVENOUS at 15:04

## 2024-07-06 RX ADMIN — METHYLPREDNISOLONE SODIUM SUCCINATE 125 MG: 125 INJECTION INTRAMUSCULAR; INTRAVENOUS at 22:07

## 2024-07-06 RX ADMIN — ONDANSETRON 4 MG: 2 INJECTION INTRAMUSCULAR; INTRAVENOUS at 22:19

## 2024-07-06 RX ADMIN — METHYLPREDNISOLONE SODIUM SUCCINATE 125 MG: 125 INJECTION INTRAMUSCULAR; INTRAVENOUS at 14:19

## 2024-07-06 RX ADMIN — METHOCARBAMOL TABLETS 500 MG: 500 TABLET, COATED ORAL at 12:06

## 2024-07-06 RX ADMIN — GABAPENTIN 100 MG: 100 CAPSULE ORAL at 22:07

## 2024-07-06 RX ADMIN — Medication 10 ML: at 22:09

## 2024-07-06 RX ADMIN — HYDROCODONE BITARTRATE AND ACETAMINOPHEN 1 TABLET: 5; 325 TABLET ORAL at 18:50

## 2024-07-06 RX ADMIN — ENOXAPARIN SODIUM 60 MG: 100 INJECTION SUBCUTANEOUS at 09:06

## 2024-07-06 RX ADMIN — HYDROCODONE BITARTRATE AND ACETAMINOPHEN 1 TABLET: 5; 325 TABLET ORAL at 08:51

## 2024-07-06 RX ADMIN — METHYLPREDNISOLONE 4 MG: 4 TABLET ORAL at 09:11

## 2024-07-06 RX ADMIN — ENOXAPARIN SODIUM 60 MG: 100 INJECTION SUBCUTANEOUS at 22:06

## 2024-07-06 RX ADMIN — BUDESONIDE AND FORMOTEROL FUMARATE DIHYDRATE 2 PUFF: 160; 4.5 AEROSOL RESPIRATORY (INHALATION) at 08:56

## 2024-07-06 RX ADMIN — PROGESTERONE 200 MG: 200 CAPSULE ORAL at 09:07

## 2024-07-06 RX ADMIN — TIOTROPIUM BROMIDE INHALATION SPRAY 2 PUFF: 3.12 SPRAY, METERED RESPIRATORY (INHALATION) at 08:56

## 2024-07-06 RX ADMIN — DESVENLAFAXINE SUCCINATE 25 MG: 25 TABLET, EXTENDED RELEASE ORAL at 22:06

## 2024-07-06 RX ADMIN — PANTOPRAZOLE SODIUM 40 MG: 40 TABLET, DELAYED RELEASE ORAL at 08:55

## 2024-07-06 RX ADMIN — MORPHINE SULFATE 1 MG: 2 INJECTION, SOLUTION INTRAMUSCULAR; INTRAVENOUS at 22:06

## 2024-07-06 NOTE — PROGRESS NOTES
"[facility] Clinical Pharmacy Services: Enoxaparin Consult    Radha Romero Umair has a pharmacy consult to dose full-dose enoxaparin per Dr. Melendrez's request.     Indication:  Full anticoagulation, Factor V leiden, h/o DVT - normally on Eliquis - pre-op  Home Anticoagulation: Eliquis 5mg BID    Relevant clinical data and objective history reviewed:  64 y.o. female 170.2 cm (67\") 58.5 kg (129 lb)   Body mass index is 20.2 kg/m².   Results from last 7 days   Lab Units 07/05/24  1555   PLATELETS 10*3/mm3 291     Estimated Creatinine Clearance: 76.3 mL/min (by C-G formula based on SCr of 0.67 mg/dL).    Assessment/Plan    Will start patient on  60 (1mg/kg) subcutaneous every 12 hours, adjusted for renal function. Consult order will be discontinued but pharmacy will continue to follow.     Jonn Weber [facility]  Clinical Pharmacist   " detailed exam

## 2024-07-06 NOTE — ED NOTES
Nursing report ED to floor  Radha Block  64 y.o.  female      Radha Block is a 64 y.o. female with a medical history of PAD, PAF, HTN, COPD, factor V Leiden who presents to the ED c/o acute lumbar pain with pain and weakness.  Patient been following with neurosurgery with plans for laminectomy in 2 to 3 weeks.  Patient states pain significantly worsened this week and has become nearly unbearable.  Patient additionally reports some urinary retention and numbness through the groin and right upper extremity.  Patient states the symptoms are new this week.  She has also noticed weakness in the right lower extremity.     HPI :  HPI (Adult)  Stated Reason for Visit: back pain  History Obtained From: patient    Chief Complaint  Chief Complaint   Patient presents with    Back Pain       Admitting doctor:   Jefferson Rangel MD    Admitting diagnosis:   The primary encounter diagnosis was Acute midline low back pain with right-sided sciatica. A diagnosis of Bulge of lumbar disc without myelopathy was also pertinent to this visit.    Code status:   Current Code Status       Date Active Code Status Order ID Comments User Context       Prior            Allergies:   Patient has no known allergies.    Isolation:   No active isolations    Intake and Output  No intake or output data in the 24 hours ending 07/05/24 2249    Weight:       07/05/24  1541   Weight: 58.5 kg (129 lb)       Most recent vitals:   Vitals:    07/05/24 1631 07/05/24 1801 07/05/24 1831 07/05/24 1931   BP: 166/93 150/90 141/89 164/84   Pulse: 58 60 65 60   Resp:       Temp:       SpO2: 95% 96% 96% 95%   Weight:       Height:           Active LDAs/IV Access:   Lines, Drains & Airways       Active LDAs       Name Placement date Placement time Site Days    Peripheral IV 07/05/24 1555 Right Antecubital 07/05/24  1555  Antecubital  less than 1                    Labs (abnormal labs have a star):   Labs Reviewed   COMPREHENSIVE METABOLIC PANEL -  Abnormal; Notable for the following components:       Result Value    Glucose 103 (*)     Sodium 146 (*)     BUN/Creatinine Ratio 26.9 (*)     Anion Gap 21.0 (*)     All other components within normal limits    Narrative:     GFR Normal >60  Chronic Kidney Disease <60  Kidney Failure <15     URINALYSIS W/ MICROSCOPIC IF INDICATED (NO CULTURE) - Abnormal; Notable for the following components:    Appearance, UA Cloudy (*)     Ketones, UA Trace (*)     Blood, UA Trace (*)     Leuk Esterase, UA Small (1+) (*)     All other components within normal limits   APTT - Abnormal; Notable for the following components:    PTT <20.0 (*)     All other components within normal limits   CBC WITH AUTO DIFFERENTIAL - Abnormal; Notable for the following components:    WBC 14.18 (*)     Hemoglobin 17.4 (*)     Hematocrit 51.8 (*)     MCV 98.5 (*)     MCH 33.1 (*)     Neutrophil % 76.5 (*)     Lymphocyte % 12.6 (*)     Eosinophil % 0.0 (*)     Immature Grans % 0.6 (*)     Neutrophils, Absolute 10.86 (*)     Monocytes, Absolute 1.44 (*)     Immature Grans, Absolute 0.08 (*)     All other components within normal limits   URINALYSIS, MICROSCOPIC ONLY - Abnormal; Notable for the following components:    WBC, UA 11-20 (*)     Bacteria, UA 4+ (*)     Squamous Epithelial Cells, UA 31-50 (*)     All other components within normal limits   PROTIME-INR - Normal   CBC AND DIFFERENTIAL    Narrative:     The following orders were created for panel order CBC & Differential.  Procedure                               Abnormality         Status                     ---------                               -----------         ------                     CBC Auto Differential[[id number]]        Abnormal            Final result                 Please view results for these tests on the individual orders.       EKG:   No orders to display       Meds given in ED:   Medications   HYDROmorphone (DILAUDID) injection 1 mg (1 mg Intravenous Given 7/5/24 2786)    diazePAM (VALIUM) injection 2.5 mg (2.5 mg Intravenous Given 7/5/24 2122)       Imaging results:  MRI Lumbar Spine Without Contrast    Result Date: 7/5/2024  Postsurgical changes of L3 and L4 wide laminectomies with interbody disc spacers L3-L5 and bilateral paired posterior vanessa and pedicle screws L3-L5. Very similar 4-5 mm of L2 on L3 retrolisthesis. L2-L3 asymmetric right disc bulge which has increased since January. Moderate L2-L3 spinal canal stenosis and severe right L2-L3 neuroforaminal stenosis with disc bulge contacting the exiting right L2 nerve root, both increased since January MRI.   This report was finalized on 7/5/2024 9:51 PM by Dr. Cuate Tran M.D on Workstation: [employer]       Ambulatory status:   - SUPERVISION    Social issues:   Social History     Socioeconomic History    Marital status:     Number of children: 1   Tobacco Use    Smoking status: Never    Smokeless tobacco: Never   Vaping Use    Vaping status: Never Used   Substance and Sexual Activity    Alcohol use: Yes     Alcohol/week: 3.0 standard drinks of alcohol     Types: 3 Glasses of wine per week     Comment: 2 times a week     Drug use: Never    Sexual activity: Defer       Peripheral Neurovascular  Peripheral Neurovascular (Adult)  Peripheral Neurovascular WDL: .WDL except, neurovascular assessment lower, pulse assessment  Pulse Assessment: dorsalis pedis  LLE Neurovascular Assessment  Temperature LLE: warm  Color LLE: no discoloration  Sensation LLE: tingling present  RLE Neurovascular Assessment  Temperature RLE: warm  Color RLE: no discoloration  Sensation RLE: tingling present    Neuro Cognitive  Neuro Cognitive (Adult)  Cognitive/Neuro/Behavioral WDL: WDL, orientation  Orientation: oriented x 4    Learning  Learning Assessment (Adult)  Learning Readiness and Ability: no barriers identified  Education Provided  Person Taught: patient  Teaching Method: verbal instruction  Teaching Focus: symptom/problem  overview  Education Outcome Evaluation: eager to learn, acceptance expressed, verbalizes understanding    Respiratory  Respiratory WDL  Respiratory WDL: WDL    Abdominal Pain       Pain Assessments  Pain (Adult)  (0-10) Pain Rating: Rest: 8  Pain Location: back  Pain Side/Orientation: lower  Response to Pain Interventions: intervention not effective per patient    NIH Stroke Scale       Sonido Cherry RN  07/05/24 22:49 EDT

## 2024-07-06 NOTE — CONSULTS
[facility] NEUROSURGERY CONSULT NOTE    Patient name: Radha Block  Reason for Consultation: Back pain     Patient Care Team:  Bosler, James William III, MD as PCP - General (Internal Medicine)  Raisa Cummings MD as Consulting Physician (Obstetrics and Gynecology)  Jj Dodson MD as Consulting Physician (Hematology and Oncology)  Bosler, James William III, MD as Referring Physician (Internal Medicine)    Chief complaint: back pain     Subjective .     History of present illness:    Patient is a 64 y.o.  female  with a history of Afib, HTN, COPD, alpha-1 antitrypsin deficiency, history of DVT with factor V Leiden mutation presented to the ED with acute on chronic back pain.     She has a history of a lumbar fusion in December 2022 with Dr Blanco. She says that the back pain became progressively worse since last October. She saw Dr. Mathur in the office in April 2024 and was scheduled for a L2-3 fusion on 7/22/24.     She started having pain about 7 days ago that was not tolerable. The pain radiates down the RLE. She was having difficulty standing and walking due to the pain. She has had saddle anesthesia. She denies any incontinence of bowel or bladder.     SOCIAL HISTORY  Social History     Tobacco Use    Smoking status: Never    Smokeless tobacco: Never   Vaping Use    Vaping status: Never Used   Substance Use Topics    Alcohol use: Yes     Alcohol/week: 3.0 standard drinks of alcohol     Types: 3 Glasses of wine per week     Comment: 2 times a week     Drug use: Never       History  PAST MEDICAL HISTORY  Past Medical History:   Diagnosis Date    Arrhythmia     Arthritis     Clotting disorder     Factor 5 Liden    COPD (chronic obstructive pulmonary disease)     Deep vein thrombosis     Essential hypertension 08/24/2021    Mitral valve prolapse     PAD (peripheral artery disease)     PAF (paroxysmal atrial fibrillation) 11/25/2020       PAST SURGICAL HISTORY  Past Surgical History:   Procedure  Laterality Date    BLADDER SURGERY  2013    E/O polyp    BREAST SURGERY      E/O benign neuroma    CARDIAC ELECTROPHYSIOLOGY PROCEDURE N/A 10/10/2022    Procedure: Ablation atrial fibrillation CRYO;  Surgeon: Venkatesh Iqbal MD;  Location: [facility] CATH INVASIVE LOCATION;  Service: Cardiovascular;  Laterality: N/A;    COLONOSCOPY  2016    HAND SURGERY Right     Carpal metacarpal hand surgery     LUMBAR FUSION N/A 12/30/2022    Procedure: Lumbar 3 to lumbar 4 and lumbar 4 to lumbar 5 laminectomy with fusion and instrumentation;  Surgeon: Rigo Mathur MD;  Location: [facility] MAIN OR;  Service: Robotics - Neuro;  Laterality: N/A;    SINUS SURGERY  2017    TONSILLECTOMY         FAMILY HISTORY  Family History   Adopted: Yes   Problem Relation Age of Onset    Malig Hyperthermia Neg Hx        Allergies:  Patient has no known allergies.    MEDICATIONS:  Medications Prior to Admission   Medication Sig Dispense Refill Last Dose    Acetaminophen 325 MG capsule Take 2 capsules by mouth 4 (Four) Times a Day As Needed.   Past Week    alpha1-proteinase inhibitor (Zemaira) 1000 MG injection 1 (One) Time Per Week. Pt takes on Fridays at infusion center   Past Week    desvenlafaxine (PRISTIQ) 50 MG 24 hr tablet Take 12.5 mg by mouth As Needed.   7/4/2024    diclofenac-miSOPROStol (ARTHROTEC 75) 75-0.2 MG EC tablet Take 1 tablet by mouth Daily.   7/4/2024    dilTIAZem CD (CARDIZEM CD) 180 MG 24 hr capsule Take 1 capsule by mouth Daily. 30 capsule 11 7/4/2024    Eliquis 5 MG tablet tablet TAKE 1 TABLET BY MOUTH TWICE  DAILY (Patient taking differently: 1 tablet Every 12 (Twelve) Hours.) 180 tablet 3 Past Week    esomeprazole (nexIUM) 40 MG capsule Take 1 capsule by mouth Daily.   7/4/2024    estradiol (MINIVELLE, VIVELLE-DOT) 0.075 MG/24HR patch Place 1 patch on the skin as directed by provider 2 (Two) Times a Week.   7/2/2024    Fluticasone-Umeclidin-Vilant (Trelegy Ellipta) 100-62.5-25 MCG/INH aerosol powder  Inhale 1 puff  Daily.   7/4/2024    guaiFENesin (MUCINEX) 600 MG 12 hr tablet As Needed.   Past Month    methylPREDNISolone (MEDROL) 4 MG dose pack Take as directed on package instructions. (Patient taking differently: Take as directed on package instructions.pt has 2 days left) 1 each 0 7/4/2024    ondansetron (ZOFRAN) 4 MG tablet Every 8 (Eight) Hours As Needed.   Past Week    Progesterone (PROMETRIUM) 200 MG capsule Daily.   7/4/2024    albuterol sulfate  (90 Base) MCG/ACT inhaler 1 puff As Needed for Wheezing.   More than a month    betamethasone, augmented, (DIPROLENE) 0.05 % cream 2 (Two) Times a Day As Needed.   More than a month    budesonide (PULMICORT) 1 MG/2ML nebulizer solution  (Patient not taking: Reported on 7/5/2024)   Not Taking    desonide (DESOWEN) 0.05 % cream As Needed. (Patient not taking: Reported on 7/5/2024)   Not Taking    Diclofenac Epolamine (FLECTOR) 1.3 % patch patch As Needed. (Patient not taking: Reported on 7/5/2024)   Not Taking    EPINEPHrine (EPIPEN) 0.3 MG/0.3ML solution auto-injector injection As Needed.   Unknown    HYDROcodone-acetaminophen (NORCO) 7.5-325 MG per tablet Take  by mouth See Admin Instructions. (Patient not taking: Reported on 7/6/2024)   Not Taking    hydrocortisone 2.5 % cream As Needed. (Patient not taking: Reported on 7/6/2024)   Not Taking    lidocaine-prilocaine (EMLA) 2.5-2.5 % cream As Needed. (Patient not taking: Reported on 7/6/2024)   Not Taking    promethazine (PHENERGAN) 25 MG tablet  (Patient not taking: Reported on 7/6/2024)   Not Taking    tretinoin (RETIN-A) 0.1 % cream As Needed. (Patient not taking: Reported on 7/6/2024)   Not Taking    valACYclovir (VALTREX) 1000 MG tablet As Needed. (Patient not taking: Reported on 7/6/2024)   Not Taking    venlafaxine (EFFEXOR) 50 MG tablet  (Patient not taking: Reported on 7/6/2024)   Not Taking       Review of Systems  Review of Systems   Constitutional:  Positive for activity change.   Gastrointestinal: Negative.     Genitourinary: Negative.    Musculoskeletal:  Positive for arthralgias, back pain and gait problem.   Neurological: Negative.        Objective     Physical Exam:  Physical Exam  HENT:      Head: Normocephalic.   Eyes:      Pupils: Pupils are equal, round, and reactive to light.   Musculoskeletal:         General: Normal range of motion.   Skin:     General: Skin is warm and dry.   Neurological:      General: No focal deficit present.      Mental Status: She is alert and oriented to person, place, and time.       Neurologic Exam     Mental Status   Oriented to person, place, and time.   Attention: normal. Concentration: normal.   Level of consciousness: alert  Knowledge: good.     Cranial Nerves     CN II   Visual fields full to confrontation.     CN III, IV, VI   Pupils are equal, round, and reactive to light.    CN V   Facial sensation intact.     CN VII   Facial expression full, symmetric.     CN VIII   Hearing: intact    CN IX, X   Palate: symmetric  Right gag reflex: normal    CN XI   CN XI normal.     CN XII   CN XII normal.     Motor Exam   Muscle bulk: normal    Strength   Right iliopsoas: 4/5  Left iliopsoas: 5/5  Right quadriceps: 4/5  Left quadriceps: 5/5  Right hamstrin/5  Left hamstrin/5  Right posterior tibial: 5/5  Left posterior tibial: 5/5  Right gastroc: 5/5  Left gastroc: 5/5    Sensory Exam   Right arm light touch: normal  Left arm light touch: normal  Right leg light touch: decreased sensation right thigh.  Left leg light touch: normal    Gait, Coordination, and Reflexes     Reflexes   Right ankle clonus: absent  Left ankle clonus: absent        Results Review:  LABS:    Admission on 2024   Component Date Value Ref Range Status    Glucose 2024 103 (H)  65 - 99 mg/dL Final    BUN 2024 18  8 - 23 mg/dL Final    Creatinine 2024 0.67  0.57 - 1.00 mg/dL Final    Sodium 2024 146 (H)  136 - 145 mmol/L Final    Potassium 2024 3.8  3.5 - 5.2 mmol/L Final     Chloride 07/05/2024 103  98 - 107 mmol/L Final    CO2 07/05/2024 22.0  22.0 - 29.0 mmol/L Final    Calcium 07/05/2024 9.1  8.6 - 10.5 mg/dL Final    Total Protein 07/05/2024 6.6  6.0 - 8.5 g/dL Final    Albumin 07/05/2024 4.3  3.5 - 5.2 g/dL Final    ALT (SGPT) 07/05/2024 27  1 - 33 U/L Final    AST (SGOT) 07/05/2024 17  1 - 32 U/L Final    Alkaline Phosphatase 07/05/2024 43  39 - 117 U/L Final    Total Bilirubin 07/05/2024 1.1  0.0 - 1.2 mg/dL Final    Globulin 07/05/2024 2.3  gm/dL Final    A/G Ratio 07/05/2024 1.9  g/dL Final    BUN/Creatinine Ratio 07/05/2024 26.9 (H)  7.0 - 25.0 Final    Anion Gap 07/05/2024 21.0 (H)  5.0 - 15.0 mmol/L Final    eGFR 07/05/2024 96.5  >60.0 mL/min/1.73 Final    Color, UA 07/05/2024 Yellow  Yellow, Straw Final    Appearance, UA 07/05/2024 Cloudy (A)  Clear Final    pH, UA 07/05/2024 7.0  5.0 - 8.0 Final    Specific Gravity, UA 07/05/2024 1.018  1.005 - 1.030 Final    Glucose, UA 07/05/2024 Negative  Negative Final    Ketones, UA 07/05/2024 Trace (A)  Negative Final    Bilirubin, UA 07/05/2024 Negative  Negative Final    Blood, UA 07/05/2024 Trace (A)  Negative Final    Protein, UA 07/05/2024 Negative  Negative Final    Leuk Esterase, UA 07/05/2024 Small (1+) (A)  Negative Final    Nitrite, UA 07/05/2024 Negative  Negative Final    Urobilinogen, UA 07/05/2024 0.2 E.U./dL  0.2 - 1.0 E.U./dL Final    Protime 07/05/2024 12.9  11.7 - 14.2 Seconds Final    INR 07/05/2024 0.95  0.90 - 1.10 Final    PTT 07/05/2024 <20.0 (L)  22.7 - 35.4 seconds Final    WBC 07/05/2024 14.18 (H)  3.40 - 10.80 10*3/mm3 Final    RBC 07/05/2024 5.26  3.77 - 5.28 10*6/mm3 Final    Hemoglobin 07/05/2024 17.4 (H)  12.0 - 15.9 g/dL Final    Hematocrit 07/05/2024 51.8 (H)  34.0 - 46.6 % Final    MCV 07/05/2024 98.5 (H)  79.0 - 97.0 fL Final    MCH 07/05/2024 33.1 (H)  26.6 - 33.0 pg Final    MCHC 07/05/2024 33.6  31.5 - 35.7 g/dL Final    RDW 07/05/2024 12.3  12.3 - 15.4 % Final    RDW-SD 07/05/2024 44.1  37.0 -  54.0 fl Final    MPV 07/05/2024 10.4  6.0 - 12.0 fL Final    Platelets 07/05/2024 291  140 - 450 10*3/mm3 Final    Neutrophil % 07/05/2024 76.5 (H)  42.7 - 76.0 % Final    Lymphocyte % 07/05/2024 12.6 (L)  19.6 - 45.3 % Final    Monocyte % 07/05/2024 10.2  5.0 - 12.0 % Final    Eosinophil % 07/05/2024 0.0 (L)  0.3 - 6.2 % Final    Basophil % 07/05/2024 0.1  0.0 - 1.5 % Final    Immature Grans % 07/05/2024 0.6 (H)  0.0 - 0.5 % Final    Neutrophils, Absolute 07/05/2024 10.86 (H)  1.70 - 7.00 10*3/mm3 Final    Lymphocytes, Absolute 07/05/2024 1.78  0.70 - 3.10 10*3/mm3 Final    Monocytes, Absolute 07/05/2024 1.44 (H)  0.10 - 0.90 10*3/mm3 Final    Eosinophils, Absolute 07/05/2024 0.00  0.00 - 0.40 10*3/mm3 Final    Basophils, Absolute 07/05/2024 0.02  0.00 - 0.20 10*3/mm3 Final    Immature Grans, Absolute 07/05/2024 0.08 (H)  0.00 - 0.05 10*3/mm3 Final    nRBC 07/05/2024 0.0  0.0 - 0.2 /100 WBC Final    RBC, UA 07/05/2024 0-2  None Seen, 0-2 /HPF Final    WBC, UA 07/05/2024 11-20 (A)  None Seen, 0-2 /HPF Final    Bacteria, UA 07/05/2024 4+ (A)  None Seen /HPF Final    Squamous Epithelial Cells, UA 07/05/2024 31-50 (A)  None Seen, 0-2 /HPF Final    Hyaline Casts, UA 07/05/2024 3-6  None Seen /LPF Final    Methodology 07/05/2024 Automated Microscopy   Final       DIAGNOSTICS:  MRI [facility] 7/5/24   DISCS SPINAL CANAL NEURAL FORAMINA:    L1-L2:  Unremarkable.  No significant disc disease.  No stenosis.    L2-L3:  L2-3: Similar disc space narrowing with posterior marginal   osteophyte complex noted at L2-3 level.  This mildly narrows the central   canal with preservation of the CSF collar.  Facet hypertrophic changes   suggested.  Mild bilateral neural foraminal encroachment, stable.    L3-L4:  Discectomy with hardware noted.  No central canal stenosis or   neural foraminal encroachment.    L4-L5:  Discectomy with hardware noted.  No central canal stenosis or   neural foraminal encroachment.    L5-S1:  Mild annular disc  bulge.  No posterior disc protrusion or   central canal stenosis.  No neuroforaminal encroachment.  Bilateral facet   hypertrophic changes, stable.     IMPRESSION:       Stable postsurgical changes, as detailed above.  No significant central   canal stenosis noted.  IMPRESSION:        Electronically signed by Cuate Bonilla MD on 07-05-24 at 2346    Results Review:   I reviewed the patient's new clinical results.  I personally viewed the patient's chart, it was also reviewed by and discussed with Dr Alarcon    Vital Signs   Temp:  [97.1 °F (36.2 °C)-99.2 °F (37.3 °C)] 97.3 °F (36.3 °C)  Heart Rate:  [58-84] 59  Resp:  [16-18] 16  BP: (127-166)/(65-95) 147/65      Assessment & Plan       Bulge of lumbar disc without myelopathy    The patient is a 64 year old female who presents with acute on chronic low back pain. The pain has been so intense that she has had difficulty with walking and standing.     She was scheduled for a L2-3 fusion on 7/22 with Dr. Mathur. Discussed Lumbar MRI with Dr. Alarcon didn't see any significant changes from the MRI in January. Will plan to control pain.    PLAN:   Will start solumedrol IV every 6  Will add Robaxin 500mg every   Will add gabapentin 100mg BID    I discussed the patient's findings and my recommendations with patient and Dr. Agnes Plaza, KLARISSA  07/06/24  10:19 EDT

## 2024-07-06 NOTE — ED PROVIDER NOTES
EMERGENCY DEPARTMENT ENCOUNTER    Room number:  33/33  Date Seen:  7/5/2024  PCP:  Bosler, James William III, MD    Laboratory Results:  Recent Results (from the past 24 hour(s))   Comprehensive Metabolic Panel    Collection Time: 07/05/24  3:55 PM    Specimen: Blood   Result Value Ref Range    Glucose 103 (H) 65 - 99 mg/dL    BUN 18 8 - 23 mg/dL    Creatinine 0.67 0.57 - 1.00 mg/dL    Sodium 146 (H) 136 - 145 mmol/L    Potassium 3.8 3.5 - 5.2 mmol/L    Chloride 103 98 - 107 mmol/L    CO2 22.0 22.0 - 29.0 mmol/L    Calcium 9.1 8.6 - 10.5 mg/dL    Total Protein 6.6 6.0 - 8.5 g/dL    Albumin 4.3 3.5 - 5.2 g/dL    ALT (SGPT) 27 1 - 33 U/L    AST (SGOT) 17 1 - 32 U/L    Alkaline Phosphatase 43 39 - 117 U/L    Total Bilirubin 1.1 0.0 - 1.2 mg/dL    Globulin 2.3 gm/dL    A/G Ratio 1.9 g/dL    BUN/Creatinine Ratio 26.9 (H) 7.0 - 25.0    Anion Gap 21.0 (H) 5.0 - 15.0 mmol/L    eGFR 96.5 >60.0 mL/min/1.73   Protime-INR    Collection Time: 07/05/24  3:55 PM    Specimen: Blood   Result Value Ref Range    Protime 12.9 11.7 - 14.2 Seconds    INR 0.95 0.90 - 1.10   aPTT    Collection Time: 07/05/24  3:55 PM    Specimen: Blood   Result Value Ref Range    PTT <20.0 (L) 22.7 - 35.4 seconds   CBC Auto Differential    Collection Time: 07/05/24  3:55 PM    Specimen: Blood   Result Value Ref Range    WBC 14.18 (H) 3.40 - 10.80 10*3/mm3    RBC 5.26 3.77 - 5.28 10*6/mm3    Hemoglobin 17.4 (H) 12.0 - 15.9 g/dL    Hematocrit 51.8 (H) 34.0 - 46.6 %    MCV 98.5 (H) 79.0 - 97.0 fL    MCH 33.1 (H) 26.6 - 33.0 pg    MCHC 33.6 31.5 - 35.7 g/dL    RDW 12.3 12.3 - 15.4 %    RDW-SD 44.1 37.0 - 54.0 fl    MPV 10.4 6.0 - 12.0 fL    Platelets 291 140 - 450 10*3/mm3    Neutrophil % 76.5 (H) 42.7 - 76.0 %    Lymphocyte % 12.6 (L) 19.6 - 45.3 %    Monocyte % 10.2 5.0 - 12.0 %    Eosinophil % 0.0 (L) 0.3 - 6.2 %    Basophil % 0.1 0.0 - 1.5 %    Immature Grans % 0.6 (H) 0.0 - 0.5 %    Neutrophils, Absolute 10.86 (H) 1.70 - 7.00 10*3/mm3    Lymphocytes,  Absolute 1.78 0.70 - 3.10 10*3/mm3    Monocytes, Absolute 1.44 (H) 0.10 - 0.90 10*3/mm3    Eosinophils, Absolute 0.00 0.00 - 0.40 10*3/mm3    Basophils, Absolute 0.02 0.00 - 0.20 10*3/mm3    Immature Grans, Absolute 0.08 (H) 0.00 - 0.05 10*3/mm3    nRBC 0.0 0.0 - 0.2 /100 WBC   Urinalysis With Microscopic If Indicated (No Culture) - Urine, Clean Catch    Collection Time: 07/05/24  4:19 PM    Specimen: Urine, Clean Catch   Result Value Ref Range    Color, UA Yellow Yellow, Straw    Appearance, UA Cloudy (A) Clear    pH, UA 7.0 5.0 - 8.0    Specific Gravity, UA 1.018 1.005 - 1.030    Glucose, UA Negative Negative    Ketones, UA Trace (A) Negative    Bilirubin, UA Negative Negative    Blood, UA Trace (A) Negative    Protein, UA Negative Negative    Leuk Esterase, UA Small (1+) (A) Negative    Nitrite, UA Negative Negative    Urobilinogen, UA 0.2 E.U./dL 0.2 - 1.0 E.U./dL   Urinalysis, Microscopic Only - Urine, Clean Catch    Collection Time: 07/05/24  4:19 PM    Specimen: Urine, Clean Catch   Result Value Ref Range    RBC, UA 0-2 None Seen, 0-2 /HPF    WBC, UA 11-20 (A) None Seen, 0-2 /HPF    Bacteria, UA 4+ (A) None Seen /HPF    Squamous Epithelial Cells, UA 31-50 (A) None Seen, 0-2 /HPF    Hyaline Casts, UA 3-6 None Seen /LPF    Methodology Automated Microscopy      I reviewed the above results.    Radiology:  MRI Lumbar Spine Without Contrast    Result Date: 7/5/2024  MRI OF THE LUMBAR SPINE WITHOUT CONTRAST  CLINICAL HISTORY: Low back pain with right lower extremity pain and weakness and urinary retention.  TECHNIQUE: MRI of the lumbar spine was obtained with sagittal T1, proton-density, and T2-weighted images. Additionally, there are axial T1 and T2-weighted images through the lumbar spine.  COMPARISON: CT lumbar spine myelogram 4/4/2024. Lumbar spine MR 1/31/2024  FINDINGS: Exam is partially limited by artifact from surgical hardware.  Segmentation: Normal segmentation with 5 nonrib-bearing lumbar-type vertebrae.   Spinal cord: Normal distal cord signal and intrinsic volume. Normal cauda equina nerve roots. No thickening, clumping or nodularity.  Conus terminates: L1-L2  Discs: Moderate disc degeneration L2-L3 with associated asymmetric right disc bulge which has increased since January.  Bones: Vertebral body heights are maintained. Very similar 4-5 mm of L2 on L3 retrolisthesis. Postsurgical changes of L3 and L4 wide laminectomies with interbody disc spacers L3-L5 and paired bilateral posterior vanessa and pedicle screws L3-L5.  T12-L1: No spinal canal or neuroforaminal stenosis.  L1-L2: No spinal canal or neuroforaminal stenosis.  L2-L3: Moderate spinal canal stenosis secondary to L2 on L3 retrolisthesis, diffuse disc bulge and ligamentum flavum hypertrophy (series 9/images 14 through 18). Similar severe right neuroforaminal stenosis secondary to disc and facet joint degeneration with the disc bulge contacting the exiting right L2 nerve root. Mild left neuroforaminal stenosis secondary to disc and facet joint degeneration. Spinal canal and right neuroforaminal stenosis are increased since January.  L3-L4: No spinal canal or neuroforaminal stenosis.  L4-L5: No spinal canal or neuroforaminal stenosis.  L5-S1: No spinal canal or neuroforaminal stenosis.  Sacroiliac joints: Normal where visualized.  Soft Tissues: Unremarkable.        Postsurgical changes of L3 and L4 wide laminectomies with interbody disc spacers L3-L5 and bilateral paired posterior vanessa and pedicle screws L3-L5. Very similar 4-5 mm of L2 on L3 retrolisthesis. L2-L3 asymmetric right disc bulge which has increased since January. Moderate L2-L3 spinal canal stenosis and severe right L2-L3 neuroforaminal stenosis with disc bulge contacting the exiting right L2 nerve root, both increased since January MRI.   This report was finalized on 7/5/2024 9:51 PM by Dr. Cuate Tran M.D on Workstation: [id number]       I reviewed the above results    Medications ordered  in ED:  Medications   HYDROmorphone (DILAUDID) injection 1 mg (1 mg Intravenous Given 7/5/24 1724)       ED Course as of 07/05/24 1646   Fri Jul 05, 2024 1716 64-year-old female presenting for evaluation of lumbar back pain with right lower extremity pain and weakness.  Patient notes sensory changes through her groin as well as urinary retention.  Laboratory evaluation is notable for leukocytosis of 14 which is consistent with patient's current use of steroids.  Urinalysis with 4+ bacteria however sample appears contaminated and unlikely to represent UTI.  Given patient's new symptoms I think an emergent MRI is warranted at this time.  Will treat pain with IV narcotics and plan on admission with neurosurgical consultation [MW]   2010 Patient was turned over to me by Dr. Hunt.  Pending: MRI and dispo   [CC]   2156 I rechecked the patient.  I discussed the patient's labs, radiology findings (including all incidental findings), diagnosis, and plan for admission. The patient understands and agrees with the plan.   [CC]   9987 Spoke with DOMINICK Escalera with [facility].  Reviewed history, exam, results, treatments.  She agrees admit the patient to Dr. Rangel.      [CC]      ED Course User Index  [CC] Babs Masters PA-C  [MW] Cuate Hunt MD       Diagnosis:  Final diagnoses:   Acute midline low back pain with right-sided sciatica             Provider attestation:  I personally reviewed the past medical history, past surgical history, social history, family history, current medications, and allergies as they appear in the chart.    The patient was seen and examined by myself and Dr. Hunt, who agree with plan.      Babs Masters PA-C  07/05/24 [id number]

## 2024-07-06 NOTE — SIGNIFICANT NOTE
07/06/24 1020   OTHER   Discipline physical therapist   Rehab Time/Intention   Session Not Performed patient/family declined evaluation  (Pt declines PT eval due to pain, states pain med management changing with POC ongoing, and wanting to defer PT eval until tomorrow. Discussed plan to f/u tomorrow if appropriate.)   Recommendation   PT - Next Appointment 07/07/24

## 2024-07-06 NOTE — H&P
Patient Name:  Radha Block  YOB: 1960  MRN:  [id number]  Admit Date:  7/5/2024  Patient Care Team:  Bosler, James William III, MD as PCP - General (Internal Medicine)  Raisa Cummings MD as Consulting Physician (Obstetrics and Gynecology)  Jj Dodson MD as Consulting Physician (Hematology and Oncology)  Bosler, James William III, MD as Referring Physician (Internal Medicine)      Subjective   History Present Illness     Chief Complaint   Patient presents with    Back Pain       Ms. Nick Block is a 64 y.o.  with a history of paroxysmal A-fib, hypertension, COPD, alpha-1 antitrypsin deficiency, history of DVT with factor V Leiden mutation that presents to [facility] complaining of acute on chronic back pain    History of Present Illness  64-year-old female with a history of back pain in the lumbar spine.  The patient follows with Dr. Mathur and has had a lumbar fusion surgery in December 2022.  Patient reports back pain has been progressively worsening since last October and she has been trying to get surgery but has had issues with insurance coverage.  She has plans for surgery in about 3 weeks but since last Monday, approximately 7 days ago, her pain has become what she describes as unbearable.  She reports she cannot stand due to pain unable to walk.  She says she chronically has numbness and tingling of her groin/right extremity but the numbness has gotten worse.  She presented to the ER for further evaluation.    Review of Systems   Constitutional:  Negative for fatigue and fever.   Respiratory:  Negative for cough and shortness of breath.    Cardiovascular:  Negative for chest pain, palpitations and leg swelling.   Gastrointestinal:  Negative for abdominal pain, nausea and vomiting.   Musculoskeletal:  Positive for back pain.        Difficulty standing/ambulating due to pain   Neurological:  Positive for weakness and numbness.        Personal History      Past Medical History:   Diagnosis Date    Arrhythmia     Arthritis     Clotting disorder     Factor 5 Liden    COPD (chronic obstructive pulmonary disease)     Deep vein thrombosis     Essential hypertension 08/24/2021    Mitral valve prolapse     PAD (peripheral artery disease)     PAF (paroxysmal atrial fibrillation) 11/25/2020     Past Surgical History:   Procedure Laterality Date    BLADDER SURGERY  2013    E/O polyp    BREAST SURGERY      E/O benign neuroma    CARDIAC ELECTROPHYSIOLOGY PROCEDURE N/A 10/10/2022    Procedure: Ablation atrial fibrillation CRYO;  Surgeon: Venkatesh Iqbal MD;  Location: [facility] INVASIVE LOCATION;  Service: Cardiovascular;  Laterality: N/A;    COLONOSCOPY  2016    HAND SURGERY Right     Carpal metacarpal hand surgery     LUMBAR FUSION N/A 12/30/2022    Procedure: Lumbar 3 to lumbar 4 and lumbar 4 to lumbar 5 laminectomy with fusion and instrumentation;  Surgeon: Rigo Mathur MD;  Location: [facility] OR;  Service: Robotics - Neuro;  Laterality: N/A;    SINUS SURGERY  2017    TONSILLECTOMY       Family History   Adopted: Yes   Problem Relation Age of Onset    Malig Hyperthermia Neg Hx      Social History     Tobacco Use    Smoking status: Never    Smokeless tobacco: Never   Vaping Use    Vaping status: Never Used   Substance Use Topics    Alcohol use: Yes     Alcohol/week: 3.0 standard drinks of alcohol     Types: 3 Glasses of wine per week     Comment: 2 times a week     Drug use: Never     No current facility-administered medications on file prior to encounter.     Current Outpatient Medications on File Prior to Encounter   Medication Sig Dispense Refill    Acetaminophen 325 MG capsule Take 2 capsules by mouth 4 (Four) Times a Day As Needed.      alpha1-proteinase inhibitor (Zemaira) 1000 MG injection 1 (One) Time Per Week. Pt takes on Fridays at infusion center      desvenlafaxine (PRISTIQ) 50 MG 24 hr tablet Take 12.5 mg by mouth As Needed.       diclofenac-miSOPROStol (ARTHROTEC 75) 75-0.2 MG EC tablet Take 1 tablet by mouth Daily.      dilTIAZem CD (CARDIZEM CD) 180 MG 24 hr capsule Take 1 capsule by mouth Daily. 30 capsule 11    Eliquis 5 MG tablet tablet TAKE 1 TABLET BY MOUTH TWICE  DAILY (Patient taking differently: 1 tablet Every 12 (Twelve) Hours.) 180 tablet 3    esomeprazole (nexIUM) 40 MG capsule Take 1 capsule by mouth Daily.      estradiol (MINIVELLE, VIVELLE-DOT) 0.075 MG/24HR patch Place 1 patch on the skin as directed by provider 2 (Two) Times a Week.      Fluticasone-Umeclidin-Vilant (Trelegy Ellipta) 100-62.5-25 MCG/INH aerosol powder  Inhale 1 puff Daily.      guaiFENesin (MUCINEX) 600 MG 12 hr tablet As Needed.      methylPREDNISolone (MEDROL) 4 MG dose pack Take as directed on package instructions. (Patient taking differently: Take as directed on package instructions.pt has 2 days left) 1 each 0    ondansetron (ZOFRAN) 4 MG tablet Every 8 (Eight) Hours As Needed.      Progesterone (PROMETRIUM) 200 MG capsule Daily.      albuterol sulfate  (90 Base) MCG/ACT inhaler 1 puff As Needed for Wheezing.      betamethasone, augmented, (DIPROLENE) 0.05 % cream 2 (Two) Times a Day As Needed.      budesonide (PULMICORT) 1 MG/2ML nebulizer solution  (Patient not taking: Reported on 7/5/2024)      desonide (DESOWEN) 0.05 % cream As Needed. (Patient not taking: Reported on 7/5/2024)      Diclofenac Epolamine (FLECTOR) 1.3 % patch patch As Needed. (Patient not taking: Reported on 7/5/2024)      EPINEPHrine (EPIPEN) 0.3 MG/0.3ML solution auto-injector injection As Needed.      HYDROcodone-acetaminophen (NORCO) 7.5-325 MG per tablet Take  by mouth See Admin Instructions. (Patient not taking: Reported on 7/6/2024)      hydrocortisone 2.5 % cream As Needed. (Patient not taking: Reported on 7/6/2024)      lidocaine-prilocaine (EMLA) 2.5-2.5 % cream As Needed. (Patient not taking: Reported on 7/6/2024)      promethazine (PHENERGAN) 25 MG tablet   (Patient not taking: Reported on 7/6/2024)      tretinoin (RETIN-A) 0.1 % cream As Needed. (Patient not taking: Reported on 7/6/2024)      valACYclovir (VALTREX) 1000 MG tablet As Needed. (Patient not taking: Reported on 7/6/2024)      venlafaxine (EFFEXOR) 50 MG tablet  (Patient not taking: Reported on 7/6/2024)       No Known Allergies    Objective    Objective     Vital Signs  Temp:  [97.1 °F (36.2 °C)-99.2 °F (37.3 °C)] 97.4 °F (36.3 °C)  Heart Rate:  [58-84] 59  Resp:  [16-18] 16  BP: (127-166)/(65-95) 138/74  SpO2:  [94 %-98 %] 96 %  on   ;   Device (Oxygen Therapy): room air  Body mass index is 20.2 kg/m².    Physical Exam  Constitutional:       Appearance: Normal appearance. She is normal weight. She is not ill-appearing.      Comments: Appears to be in moderate amount of pain   HENT:      Head: Normocephalic and atraumatic.      Nose: Nose normal.      Mouth/Throat:      Mouth: Mucous membranes are moist.      Pharynx: Oropharynx is clear.   Eyes:      Extraocular Movements: Extraocular movements intact.      Conjunctiva/sclera: Conjunctivae normal.      Pupils: Pupils are equal, round, and reactive to light.   Cardiovascular:      Rate and Rhythm: Normal rate and regular rhythm.   Pulmonary:      Effort: Pulmonary effort is normal. No respiratory distress.      Breath sounds: Normal breath sounds. No wheezing.   Abdominal:      General: Abdomen is flat.      Palpations: Abdomen is soft.      Tenderness: There is no abdominal tenderness.   Musculoskeletal:         General: No swelling or tenderness. Normal range of motion.      Right lower leg: No edema.      Left lower leg: No edema.   Skin:     General: Skin is warm and dry.   Neurological:      General: No focal deficit present.      Mental Status: She is alert and oriented to person, place, and time. Mental status is at baseline.      Sensory: Sensory deficit present.      Motor: Weakness present.   Psychiatric:         Mood and Affect: Mood normal.          Behavior: Behavior normal.         Thought Content: Thought content normal.         Judgment: Judgment normal.         Results Review:  I reviewed the patient's new clinical results.  I reviewed the patient's new imaging results and agree with the interpretation.  I reviewed the patient's other test results and agree with the interpretation  I personally viewed and interpreted the patient's EKG/Telemetry data    Lab Results (last 24 hours)       Procedure Component Value Units Date/Time    CBC & Differential [[id number]]  (Abnormal) Collected: 07/05/24 1555    Specimen: Blood Updated: 07/05/24 1609    Narrative:      The following orders were created for panel order CBC & Differential.  Procedure                               Abnormality         Status                     ---------                               -----------         ------                     CBC Auto Differential[[id number]]        Abnormal            Final result                 Please view results for these tests on the individual orders.    Comprehensive Metabolic Panel [[id number]]  (Abnormal) Collected: 07/05/24 1555    Specimen: Blood Updated: 07/05/24 1644     Glucose 103 mg/dL      BUN 18 mg/dL      Creatinine 0.67 mg/dL      Sodium 146 mmol/L      Potassium 3.8 mmol/L      Chloride 103 mmol/L      CO2 22.0 mmol/L      Calcium 9.1 mg/dL      Total Protein 6.6 g/dL      Albumin 4.3 g/dL      ALT (SGPT) 27 U/L      AST (SGOT) 17 U/L      Alkaline Phosphatase 43 U/L      Total Bilirubin 1.1 mg/dL      Globulin 2.3 gm/dL      A/G Ratio 1.9 g/dL      BUN/Creatinine Ratio 26.9     Anion Gap 21.0 mmol/L      eGFR 96.5 mL/min/1.73     Narrative:      GFR Normal >60  Chronic Kidney Disease <60  Kidney Failure <15      Protime-INR [[id number]]  (Normal) Collected: 07/05/24 1555    Specimen: Blood Updated: 07/05/24 1643     Protime 12.9 Seconds      INR 0.95    aPTT [[id number]]  (Abnormal) Collected: 07/05/24 1555    Specimen: Blood Updated: 07/05/24  1643     PTT <20.0 seconds     CBC Auto Differential [[id number]]  (Abnormal) Collected: 07/05/24 1555    Specimen: Blood Updated: 07/05/24 1609     WBC 14.18 10*3/mm3      RBC 5.26 10*6/mm3      Hemoglobin 17.4 g/dL      Hematocrit 51.8 %      MCV 98.5 fL      MCH 33.1 pg      MCHC 33.6 g/dL      RDW 12.3 %      RDW-SD 44.1 fl      MPV 10.4 fL      Platelets 291 10*3/mm3      Neutrophil % 76.5 %      Lymphocyte % 12.6 %      Monocyte % 10.2 %      Eosinophil % 0.0 %      Basophil % 0.1 %      Immature Grans % 0.6 %      Neutrophils, Absolute 10.86 10*3/mm3      Lymphocytes, Absolute 1.78 10*3/mm3      Monocytes, Absolute 1.44 10*3/mm3      Eosinophils, Absolute 0.00 10*3/mm3      Basophils, Absolute 0.02 10*3/mm3      Immature Grans, Absolute 0.08 10*3/mm3      nRBC 0.0 /100 WBC     Urinalysis With Microscopic If Indicated (No Culture) - Urine, Clean Catch [[id number]]  (Abnormal) Collected: 07/05/24 1619    Specimen: Urine, Clean Catch Updated: 07/05/24 1646     Color, UA Yellow     Appearance, UA Cloudy     pH, UA 7.0     Specific Gravity, UA 1.018     Glucose, UA Negative     Ketones, UA Trace     Bilirubin, UA Negative     Blood, UA Trace     Protein, UA Negative     Leuk Esterase, UA Small (1+)     Nitrite, UA Negative     Urobilinogen, UA 0.2 E.U./dL    Urinalysis, Microscopic Only - Urine, Clean Catch [[id number]]  (Abnormal) Collected: 07/05/24 1619    Specimen: Urine, Clean Catch Updated: 07/05/24 1655     RBC, UA 0-2 /HPF      WBC, UA 11-20 /HPF      Bacteria, UA 4+ /HPF      Squamous Epithelial Cells, UA 31-50 /HPF      Hyaline Casts, UA 3-6 /LPF      Methodology Automated Microscopy            Imaging Results (Last 24 Hours)       Procedure Component Value Units Date/Time    MRI Lumbar Spine Without Contrast [[id number]] Collected: 07/05/24 2109     Updated: 07/05/24 5766    Narrative:        Patient: RAVIN FERRER  Time Out: 23:46  Exam(s): MRI L SPINE Without Contrast     EXAM:    MR Lumbar Spine  Without Intravenous Contrast    CLINICAL HISTORY:      concern for cauda equina.    TECHNIQUE:    Magnetic resonance images of the lumbar spine without intravenous   contrast in multiple planes.    COMPARISON:    MR lumbar spine 1 31 2024    FINDINGS:    Vertebrae:  There is stable posterior fusion from L3-L5 with pedicle   screws and paraspinal rods.  Laminectomy defects suggested.  There is   stable interbody hardware at L3-L4 and L4-L5.  The vertebral bodies   demonstrate stable marrow signal.    Spinal cord:  The conus is stable in appearance and unremarkable,   terminating at the L1 vertebral body level.  The cauda equina fibers are   stable in appearance without evidence for arachnoiditis.    Soft tissues:  No significant abnormal paraspinal soft tissue   abnormality identified.     DISCS SPINAL CANAL NEURAL FORAMINA:    L1-L2:  Unremarkable.  No significant disc disease.  No stenosis.    L2-L3:  L2-3: Similar disc space narrowing with posterior marginal   osteophyte complex noted at L2-3 level.  This mildly narrows the central   canal with preservation of the CSF collar.  Facet hypertrophic changes   suggested.  Mild bilateral neural foraminal encroachment, stable.    L3-L4:  Discectomy with hardware noted.  No central canal stenosis or   neural foraminal encroachment.    L4-L5:  Discectomy with hardware noted.  No central canal stenosis or   neural foraminal encroachment.    L5-S1:  Mild annular disc bulge.  No posterior disc protrusion or   central canal stenosis.  No neuroforaminal encroachment.  Bilateral facet   hypertrophic changes, stable.    IMPRESSION:       Stable postsurgical changes, as detailed above.  No significant central   canal stenosis noted.    Impression:          Electronically signed by Cuate Bonilla MD on 07-05-24 at 2346            Results for orders placed during the hospital encounter of 11/17/22    Adult Transthoracic Echo Complete w/ Color, Spectral and Contrast if Necessary  Per Protocol    Interpretation Summary    Left ventricular systolic function is normal. Calculated left ventricular EF = 69% Normal left ventricular cavity size and wall thickness noted. All left ventricular wall segments contract normally. Left ventricular diastolic function was normal.    No aortic valve regurgitation or stenosis is present. The aortic valve is abnormal in structure. There is moderate calcification of the aortic valve.    No mitral valve regurgitation or significant stenosis is present. Mild mitral annular calcification is present.    Trace tricuspid valve regurgitation is present. Insufficient TR velocity profile to estimate the right ventricular systolic pressure.      No orders to display        Assessment/Plan     Active Hospital Problems    Diagnosis  POA    **Bulge of lumbar disc without myelopathy [M51.36]  Yes    Alpha-1-antitrypsin deficiency [E88.01]  Yes    Factor V Leiden mutation [D68.51]  Yes    History of DVT of lower extremity [Z86.718]  Not Applicable    COPD (chronic obstructive pulmonary disease) [J44.9]  Yes    Spinal stenosis of lumbar region with neurogenic claudication [M48.062]  Yes    PAF (paroxysmal atrial fibrillation) [I48.0]  Yes    PAD (peripheral artery disease) [I73.9]  Yes      Resolved Hospital Problems   No resolved problems to display.       Ms. Nick Block is a 64 y.o. with a history of paroxysmal A-fib, hypertension, COPD, alpha-1 antitrypsin deficiency, history of DVT with factor V Leiden mutation who presents with acute on chronic back pain.    Spinal stenosis with lumbar claudication  -MRI reviewed, reports no significant canal stenosis  -Neurosurgery has been consulted; will follow-up plans  -Continue as needed pain medications    COPD  -Resume home inhalers, as needed albuterol nebs  -Not in exacerbation    Factor V Leiden mutation  History of DVT  -Takes Eliquis at home, Lovenox here pending surgical plans    PAD  -On Eliquis at home    PAF  -Continue  Dilt, on Eliquis at home but has not taken for the last 5 to 7 days  -Will do Lovenox here while awaiting neurosurgical plans    HTN  -Continue diltiazem    Alpha-1 antitrypsin antibody deficiency  - follows with pulm at [place]   -Gets outpatient weekly infusions    I discussed the patient's findings and my recommendations with patient and nursing staff.    VTE Prophylaxis - Pharmacy to dose Lovenox.  Code Status - Full code.       Josefina Melendrez MD  [facility]  07/06/24  12:04 EDT

## 2024-07-07 LAB
ANION GAP SERPL CALCULATED.3IONS-SCNC: 10 MMOL/L (ref 5–15)
BUN SERPL-MCNC: 20 MG/DL (ref 8–23)
BUN/CREAT SERPL: 35.7 (ref 7–25)
CALCIUM SPEC-SCNC: 8.5 MG/DL (ref 8.6–10.5)
CHLORIDE SERPL-SCNC: 103 MMOL/L (ref 98–107)
CO2 SERPL-SCNC: 22 MMOL/L (ref 22–29)
CREAT SERPL-MCNC: 0.56 MG/DL (ref 0.57–1)
DEPRECATED RDW RBC AUTO: 44.7 FL (ref 37–54)
EGFRCR SERPLBLD CKD-EPI 2021: 100.8 ML/MIN/1.73
ERYTHROCYTE [DISTWIDTH] IN BLOOD BY AUTOMATED COUNT: 12 % (ref 12.3–15.4)
GLUCOSE SERPL-MCNC: 149 MG/DL (ref 65–99)
HCT VFR BLD AUTO: 48.9 % (ref 34–46.6)
HGB BLD-MCNC: 16.7 G/DL (ref 12–15.9)
MCH RBC QN AUTO: 34.2 PG (ref 26.6–33)
MCHC RBC AUTO-ENTMCNC: 34.2 G/DL (ref 31.5–35.7)
MCV RBC AUTO: 100.2 FL (ref 79–97)
PLATELET # BLD AUTO: 283 10*3/MM3 (ref 140–450)
PMV BLD AUTO: 10.3 FL (ref 6–12)
POTASSIUM SERPL-SCNC: 4.2 MMOL/L (ref 3.5–5.2)
RBC # BLD AUTO: 4.88 10*6/MM3 (ref 3.77–5.28)
SODIUM SERPL-SCNC: 135 MMOL/L (ref 136–145)
WBC NRBC COR # BLD AUTO: 12.64 10*3/MM3 (ref 3.4–10.8)

## 2024-07-07 PROCEDURE — 25010000002 ENOXAPARIN PER 10 MG: Performed by: STUDENT IN AN ORGANIZED HEALTH CARE EDUCATION/TRAINING PROGRAM

## 2024-07-07 PROCEDURE — 63710000001 ONDANSETRON ODT 4 MG TABLET DISPERSIBLE: Performed by: NURSE PRACTITIONER

## 2024-07-07 PROCEDURE — 80048 BASIC METABOLIC PNL TOTAL CA: CPT | Performed by: STUDENT IN AN ORGANIZED HEALTH CARE EDUCATION/TRAINING PROGRAM

## 2024-07-07 PROCEDURE — 97530 THERAPEUTIC ACTIVITIES: CPT

## 2024-07-07 PROCEDURE — 94664 DEMO&/EVAL PT USE INHALER: CPT

## 2024-07-07 PROCEDURE — 94799 UNLISTED PULMONARY SVC/PX: CPT

## 2024-07-07 PROCEDURE — 25010000002 MORPHINE PER 10 MG: Performed by: NURSE PRACTITIONER

## 2024-07-07 PROCEDURE — 25010000002 METHYLPREDNISOLONE PER 125 MG: Performed by: NURSE PRACTITIONER

## 2024-07-07 PROCEDURE — 85027 COMPLETE CBC AUTOMATED: CPT | Performed by: STUDENT IN AN ORGANIZED HEALTH CARE EDUCATION/TRAINING PROGRAM

## 2024-07-07 PROCEDURE — 97162 PT EVAL MOD COMPLEX 30 MIN: CPT

## 2024-07-07 RX ORDER — ACETAMINOPHEN 500 MG
1000 TABLET ORAL EVERY 8 HOURS
Status: DISCONTINUED | OUTPATIENT
Start: 2024-07-07 | End: 2024-07-09 | Stop reason: HOSPADM

## 2024-07-07 RX ORDER — OXYCODONE HYDROCHLORIDE 15 MG/1
7.5 TABLET ORAL EVERY 4 HOURS PRN
Status: DISCONTINUED | OUTPATIENT
Start: 2024-07-07 | End: 2024-07-08

## 2024-07-07 RX ORDER — METHOCARBAMOL 500 MG/1
500 TABLET, FILM COATED ORAL EVERY 8 HOURS SCHEDULED
Status: DISCONTINUED | OUTPATIENT
Start: 2024-07-07 | End: 2024-07-09 | Stop reason: HOSPADM

## 2024-07-07 RX ORDER — GABAPENTIN 100 MG/1
100 CAPSULE ORAL 3 TIMES DAILY
Status: DISCONTINUED | OUTPATIENT
Start: 2024-07-07 | End: 2024-07-09 | Stop reason: HOSPADM

## 2024-07-07 RX ORDER — DESVENLAFAXINE 25 MG/1
25 TABLET, EXTENDED RELEASE ORAL DAILY
Status: DISCONTINUED | OUTPATIENT
Start: 2024-07-07 | End: 2024-07-09 | Stop reason: HOSPADM

## 2024-07-07 RX ADMIN — MORPHINE SULFATE 1 MG: 2 INJECTION, SOLUTION INTRAMUSCULAR; INTRAVENOUS at 07:48

## 2024-07-07 RX ADMIN — DESVENLAFAXINE SUCCINATE 25 MG: 25 TABLET, EXTENDED RELEASE ORAL at 09:12

## 2024-07-07 RX ADMIN — GABAPENTIN 100 MG: 100 CAPSULE ORAL at 14:37

## 2024-07-07 RX ADMIN — PANTOPRAZOLE SODIUM 40 MG: 40 TABLET, DELAYED RELEASE ORAL at 06:42

## 2024-07-07 RX ADMIN — METHYLPREDNISOLONE SODIUM SUCCINATE 125 MG: 125 INJECTION INTRAMUSCULAR; INTRAVENOUS at 21:08

## 2024-07-07 RX ADMIN — ACETAMINOPHEN 1000 MG: 500 TABLET ORAL at 21:08

## 2024-07-07 RX ADMIN — ACETAMINOPHEN 1000 MG: 500 TABLET ORAL at 14:37

## 2024-07-07 RX ADMIN — MORPHINE SULFATE 1 MG: 2 INJECTION, SOLUTION INTRAMUSCULAR; INTRAVENOUS at 01:34

## 2024-07-07 RX ADMIN — METHYLPREDNISOLONE SODIUM SUCCINATE 125 MG: 125 INJECTION INTRAMUSCULAR; INTRAVENOUS at 01:46

## 2024-07-07 RX ADMIN — DILTIAZEM HYDROCHLORIDE 180 MG: 180 CAPSULE, EXTENDED RELEASE ORAL at 09:13

## 2024-07-07 RX ADMIN — ENOXAPARIN SODIUM 60 MG: 100 INJECTION SUBCUTANEOUS at 21:08

## 2024-07-07 RX ADMIN — BUDESONIDE AND FORMOTEROL FUMARATE DIHYDRATE 2 PUFF: 160; 4.5 AEROSOL RESPIRATORY (INHALATION) at 08:11

## 2024-07-07 RX ADMIN — METHOCARBAMOL TABLETS 500 MG: 500 TABLET, COATED ORAL at 21:08

## 2024-07-07 RX ADMIN — OXYCODONE HYDROCHLORIDE 7.5 MG: 15 TABLET ORAL at 18:52

## 2024-07-07 RX ADMIN — METHOCARBAMOL TABLETS 500 MG: 500 TABLET, COATED ORAL at 14:38

## 2024-07-07 RX ADMIN — OXYCODONE HYDROCHLORIDE 7.5 MG: 15 TABLET ORAL at 11:25

## 2024-07-07 RX ADMIN — GABAPENTIN 100 MG: 100 CAPSULE ORAL at 09:11

## 2024-07-07 RX ADMIN — BUDESONIDE AND FORMOTEROL FUMARATE DIHYDRATE 2 PUFF: 160; 4.5 AEROSOL RESPIRATORY (INHALATION) at 21:05

## 2024-07-07 RX ADMIN — ONDANSETRON 4 MG: 4 TABLET, ORALLY DISINTEGRATING ORAL at 09:21

## 2024-07-07 RX ADMIN — ENOXAPARIN SODIUM 60 MG: 100 INJECTION SUBCUTANEOUS at 09:12

## 2024-07-07 RX ADMIN — METHYLPREDNISOLONE SODIUM SUCCINATE 125 MG: 125 INJECTION INTRAMUSCULAR; INTRAVENOUS at 14:38

## 2024-07-07 RX ADMIN — GABAPENTIN 100 MG: 100 CAPSULE ORAL at 21:08

## 2024-07-07 RX ADMIN — TIOTROPIUM BROMIDE INHALATION SPRAY 2 PUFF: 3.12 SPRAY, METERED RESPIRATORY (INHALATION) at 08:11

## 2024-07-07 RX ADMIN — METHYLPREDNISOLONE SODIUM SUCCINATE 125 MG: 125 INJECTION INTRAMUSCULAR; INTRAVENOUS at 07:49

## 2024-07-07 NOTE — PLAN OF CARE
Goal Outcome Evaluation:  Plan of Care Reviewed With: patient        Progress: improving  Outcome Evaluation: Radha Romero is a 65 y/o F with PMH including a-fib, COPD, thrombophilia, hx of DVT, and HTN, admitted to [facility] on 7/5/24 for bulge of lumbar disc without myelopathy. Pt agreeable to PT evaluation this AM, reporting 4/10 pain generalized to low back and down R LE, experiencing significant N/T which worsens with increased movement. Pt lives alone, with family available to assist, indep with ADLs and no AD used at baseline. 2 ANTONIO home with no railing and 1 flight of stairs inside home she uses. Pt completed bed mobility with SBA today, STS with SBA, and ambulated 20' in room using hiking stick in R UE, CGA-SBA with mild unsteadiness noted and reports of N/T worsening down R LE with movement. Pt returned to fowlers in bed, all needs met and call light in reach. Reports ortho consult tomorrow for surgery planned in 2 weeks. Recommend IRF or OP PT at time of d/c post-op back surgery, will monitor closely to assess progress. Pt remains appropriate for skilled acute PT to address funtional mobility deficits and strength impairments.      Anticipated Discharge Disposition (PT): home with outpatient therapy services, inpatient rehabilitation facility

## 2024-07-07 NOTE — THERAPY EVALUATION
Patient Name: Radha Block  : 1960    MRN: [id number]                              Today's Date: 2024       Admit Date: 2024    Visit Dx:     ICD-10-CM ICD-9-CM   1. Acute midline low back pain with right-sided sciatica  M54.41 724.2     724.3   2. Bulge of lumbar disc without myelopathy  M51.36 722.52     Patient Active Problem List   Diagnosis    Shortness of breath    Chest pain    Palpitations    PAD (peripheral artery disease)    PAF (paroxysmal atrial fibrillation)    Essential hypertension    Spinal stenosis of lumbar region with neurogenic claudication    COPD (chronic obstructive pulmonary disease)    Factor V Leiden mutation    History of DVT of lower extremity    Neuritis or radiculitis due to rupture of lumbar intervertebral disc    Bulge of lumbar disc without myelopathy    Alpha-1-antitrypsin deficiency     Past Medical History:   Diagnosis Date    Arrhythmia     Arthritis     Clotting disorder     Factor 5 Liden    COPD (chronic obstructive pulmonary disease)     Deep vein thrombosis     Essential hypertension 2021    Mitral valve prolapse     PAD (peripheral artery disease)     PAF (paroxysmal atrial fibrillation) 2020     Past Surgical History:   Procedure Laterality Date    BLADDER SURGERY      E/O polyp    BREAST SURGERY      E/O benign neuroma    CARDIAC ELECTROPHYSIOLOGY PROCEDURE N/A 10/10/2022    Procedure: Ablation atrial fibrillation CRYO;  Surgeon: Venkatesh Iqbal MD;  Location: [facility] INVASIVE LOC[facility];  Service: Cardiovascular;  Laterality: N/A;    COLONOSCOPY  2016    HAND SURGERY Right     Carpal metacarpal hand surgery     LUMBAR FUSION N/A 2022    Procedure: Lumbar 3 to lumbar 4 and lumbar 4 to lumbar 5 laminectomy with fusion and instrumentation;  Surgeon: Rigo Mathur MD;  Location: [facility] OR;  Service: [employer] - Neuro;  Laterality: N/A;    SINUS SURGERY  2017    TONSILLECTOMY        General Information       Row  Name 07/07/24 1431          Physical Therapy Time and Intention    Document Type evaluation  -     Mode of Treatment individual therapy;physical therapy  -       Row Name 07/07/24 1431          General Information    Patient Profile Reviewed yes  -     Prior Level of Function independent:  -     Existing Precautions/Restrictions fall  -     Barriers to Rehab medically complex;previous functional deficit  -       Row Name 07/07/24 1431          Living Environment    People in Home alone  -       Row Name 07/07/24 1431          Home Main Entrance    Number of Stairs, Main Entrance two  -DR     Stair Railings, Main Entrance none  -       Row Name 07/07/24 1431          Stairs Within Home, Primary    Stairs, Within Home, Primary 1 flight of stairs to 2nd floor in home with one-side railing  -       Row Name 07/07/24 1431          Cognition    Orientation Status (Cognition) oriented x 4  -       Row Name 07/07/24 1431          Safety Issues, Functional Mobility    Safety Issues Affecting Function (Mobility) insight into deficits/self-awareness;safety precaution awareness;awareness of need for assistance  -     Impairments Affecting Function (Mobility) balance;endurance/activity tolerance;pain;strength  -               User Key  (r) = Recorded By, (t) = Taken By, (c) = Cosigned By      Initials Name Provider Type    Bernardino Chakraborty, PT Physical Therapist                   Mobility       Row Name 07/07/24 1433          Bed Mobility    Bed Mobility bed mobility (all) activities  -     All Activities, [place] (Bed Mobility) standby assist;verbal cues;1 person assist  -       Row Name 07/07/24 1433          Bed-Chair Transfer    Bed-Chair [place] (Transfers) not tested  -       Row Name 07/07/24 1433          Sit-Stand Transfer    Sit-Stand [place] (Transfers) contact guard;standby assist;1 person assist;verbal cues  -     Assistive Device (Sit-Stand Transfers) other (see  comments)  pt used 1 hiking stick in R UE  -DR       Row Name 07/07/24 1433          Gait/Stairs (Locomotion)    [place] Level (Gait) standby assist;contact guard;1 person assist;verbal cues  -     Assistive Device (Gait) other (see comments)  pt used 1 hiking stick in R UE  -DR     Patient was able to Ambulate yes  -DR     Distance in Feet (Gait) 20  -DR     Deviations/Abnormal Patterns (Gait) bilateral deviations;antalgic;gait speed decreased;stride length decreased  -DR     Bilateral Gait Deviations forward flexed posture;decreased arm swing  -DR     [place] Level (Stairs) not tested  -     Comment, (Gait/Stairs) Pt ambulated 20' in room using hiking stick in R UE, CGA-SBA with mild unsteadiness noted and reports of N/T worsening down R LE with movement.  -               User Key  (r) = Recorded By, (t) = Taken By, (c) = Cosigned By      Initials Name Provider Type    Bernardino Chakraborty, PT Physical Therapist                   Obj/Interventions       Row Name 07/07/24 1435          Range of Motion Comprehensive    General Range of Motion bilateral lower extremity ROM WFL  -       Row Name 07/07/24 1435          Strength Comprehensive (MMT)    General Manual Muscle Testing (MMT) Assessment lower extremity strength deficits identified  -     Comment, General Manual Muscle Testing (MMT) Assessment 4/5 MMT grossly B LE, pain verbalized with testing R LE  -       Row Name 07/07/24 1435          Motor Skills    Motor Skills functional endurance  -     Functional Endurance fair  -       Row Name 07/07/24 1435          Balance    Balance Assessment sitting static balance;sitting dynamic balance;standing static balance;standing dynamic balance  -     Static Sitting Balance modified independence;verbal cues  -     Dynamic Sitting Balance modified independence;verbal cues  -     Position, Sitting Balance unsupported;sitting edge of bed  -DR     Static Standing Balance standby assist;1-person  assist;verbal cues  -DR     Dynamic Standing Balance contact guard;standby assist;1-person assist;verbal cues  -DR     Position/Device Used, Standing Balance supported;other (see comments)  hiking stick in R UE  -DR     Balance Interventions sitting;standing;sit to stand;static;dynamic;weight shifting activity  -DR       Row Name 07/07/24 1435          Sensory Assessment (Somatosensory)    Sensory Assessment (Somatosensory) left-sided sensation intact  -DR               User Key  (r) = Recorded By, (t) = Taken By, (c) = Cosigned By      Initials Name Provider Type    Bernardino Chakraborty, PT Physical Therapist                   Goals/Plan       Row Name 07/07/24 1440          Bed Mobility Goal 1 (PT)    Activity/Assistive Device (Bed Mobility Goal 1, PT) bed mobility activities, all  -DR     [place] Level/Cues Needed (Bed Mobility Goal 1, PT) modified independence  -DR     Time Frame (Bed Mobility Goal 1, PT) long term goal (LTG);1 week  -DR       Row Name 07/07/24 1440          Transfer Goal 1 (PT)    Activity/Assistive Device (Transfer Goal 1, PT) transfers, all  -DR     [place] Level/Cues Needed (Transfer Goal 1, PT) supervision required  -DR     Time Frame (Transfer Goal 1, PT) long term goal (LTG);1 week  -DR       Row Name 07/07/24 1440          Gait Training Goal 1 (PT)    Activity/Assistive Device (Gait Training Goal 1, PT) gait (walking locomotion)  -DR     [place] Level (Gait Training Goal 1, PT) standby assist  -DR     Distance (Gait Training Goal 1, PT) 150  -DR     Time Frame (Gait Training Goal 1, PT) long term goal (LTG);1 week  -DR               User Key  (r) = Recorded By, (t) = Taken By, (c) = Cosigned By      Initials Name Provider Type    Bernardino Chakraborty, PT Physical Therapist                   Clinical Impression       Row Name 07/07/24 1436          Pain    Pretreatment Pain Rating 4/10  -DR     Posttreatment Pain Rating 4/10  -DR     Pain Location generalized  -DR     Pain  Location - back  -DR     Pain Intervention(s) Repositioned;Ambulation/increased activity;Rest  -DR Sy Name 07/07/24 1436          Plan of Care Review    Plan of Care Reviewed With patient  -     Progress improving  -DR     Outcome Evaluation Radha Romero is a 65 y/o F with PMH including a-fib, COPD, thrombophilia, hx of DVT, and HTN, admitted to [facility] on 7/5/24 for bulge of lumbar disc without myelopathy. Pt agreeable to PT evaluation this AM, reporting 4/10 pain generalized to low back and down R LE, experiencing significant N/T which worsens with increased movement. Pt lives alone, with family available to assist, indep with ADLs and no AD used at baseline. 2 ANTONIO home with no railing and 1 flight of stairs inside home she uses. Pt completed bed mobility with SBA today, STS with SBA, and ambulated 20' in room using hiking stick in R UE, CGA-SBA with mild unsteadiness noted and reports of N/T worsening down R LE with movement. Pt returned to [employer]ow[employer] in bed, all needs met and call light in reach. Reports ortho consult tomorrow for surgery planned in 2 weeks. Recommend IRF or OP PT at time of d/c post-op back surgery, will monitor closely to assess progress. Pt remains appropriate for skilled acute PT to address funtional mobility deficits and strength impairments.  -DR Sy Name 07/07/24 1436          Therapy Assessment/Plan (PT)    Rehab Potential (PT) good, to achieve stated therapy goals  -     Criteria for Skilled Interventions Met (PT) yes  -     Therapy Frequency (PT) 5 times/wk  -DR Yossi Zaldivar 07/07/24 1436          Positioning and Restraints    Pre-Treatment Position in bed  -DR     Post Treatment Position bed  -DR     In Bed notified nsg;fowlers;call light within reach;encouraged to call for assist;exit alarm on  -               User Key  (r) = Recorded By, (t) = Taken By, (c) = Cosigned By      Initials Name Provider Type    Bernardino Chakraborty, PT Physical Therapist                    Outcome Measures       Row Name 07/07/24 1440 07/07/24 1200       How much help from another person do you currently need...    Turning from your back to your side while in flat bed without using bedrails? 4  -DR 4  -JK    Moving from lying on back to sitting on the side of a flat bed without bedrails? 4  -DR 4  -JK    Moving to and from a bed to a chair (including a wheelchair)? 4  -DR 3  -JK    Standing up from a chair using your arms (e.g., wheelchair, bedside chair)? 4  -DR 3  -JK    Climbing 3-5 steps with a railing? 3  -DR 2  -JK    To walk in hospital room? 3  -DR 3  -JK    AM-PAC 6 Clicks Score (PT) 22  -DR 19  -JK    Highest Level of Mobility Goal 7 --> Walk 25 feet or more  -DR 6 --> Walk 10 steps or more  -JK      Row Name 07/07/24 1440          Functional Assessment    Outcome Measure Options AM-PAC 6 Clicks Basic Mobility (PT)  -               User Key  (r) = Recorded By, (t) = Taken By, (c) = Cosigned By      Initials Name Provider Type    Bernardino Chakraborty, PT Physical Therapist    Opal Burton, RN Registered Nurse                                 Physical Therapy Education       Title: PT OT SLP Therapies (Done)       Topic: Physical Therapy (Done)       Point: Mobility training (Done)       Learning Progress Summary             Patient Acceptance, E,TB, VU by  at 7/7/2024 1441                         Point: Home exercise program (Done)       Learning Progress Summary             Patient Acceptance, E,TB, VU by  at 7/7/2024 1441                         Point: Body mechanics (Done)       Learning Progress Summary             Patient Acceptance, E,TB, VU by  at 7/7/2024 1441                         Point: Precautions (Done)       Learning Progress Summary             Patient Acceptance, E,TB, VU by  at 7/7/2024 1441                                         User Key       Initials Effective Dates Name Provider Type Christiano VIRAMONTES 05/24/23 -  Bernardino Watson, PT Physical Therapist  PT                  PT Recommendation and Plan     Plan of Care Reviewed With: patient  Progress: improving  Outcome Evaluation: Radha Romero is a 65 y/o F with PMH including a-fib, COPD, thrombophilia, hx of DVT, and HTN, admitted to [facility] on 7/5/24 for bulge of lumbar disc without myelopathy. Pt agreeable to PT evaluation this AM, reporting 4/10 pain generalized to low back and down R LE, experiencing significant N/T which worsens with increased movement. Pt lives alone, with family available to assist, indep with ADLs and no AD used at baseline. 2 [place] home with no railing and 1 flight of stairs inside home she uses. Pt completed bed mobility with SBA today, STS with SBA, and ambulated 20' in room using hiking stick in R UE, CGA-SBA with mild unsteadiness noted and reports of N/T worsening down R LE with movement. Pt returned to [employer] in bed, all needs met and call light in reach. Reports ortho consult tomorrow for surgery planned in 2 weeks. Recommend IRF or OP PT at time of d/c post-op back surgery, will monitor closely to assess progress. Pt remains appropriate for skilled acute PT to address funtional mobility deficits and strength impairments.     Time Calculation:         PT Charges       Row Name 07/07/24 1442             Time Calculation    Start Time 1055  -DR      Stop Time 1115  -DR      Time Calculation (min) 20 min  -DR      PT Received On 07/07/24  -      PT - Next Appointment 07/08/24  -      PT Goal Re-Cert Due Date 07/14/24  -DR         Time Calculation- PT    Total Timed Code Minutes- PT 18 minute(s)  -DR         Timed Charges    32894 - PT Therapeutic Activity Minutes 13  -DR      95595 - PT Self Care/Mgmt Minutes 5  -DR         Total Minutes    Timed Charges Total Minutes 18  -DR       Total Minutes 18  -DR                User Key  (r) = Recorded By, (t) = Taken By, (c) = Cosigned By      Initials Name Provider Type    Bernardino Chakraborty, PT Physical Therapist                  Therapy  Charges for Today       Code Description Service Date Service Provider Modifiers Qty    11952075719  PT THERAPEUTIC ACT EA 15 MIN 7/7/2024 Bernardino Watson, PT GP 1    [id number] HC PT EVAL MOD COMPLEXITY 2 7/7/2024 Bernardino Watson, PT GP 1            PT G-Codes  Outcome Measure Options: AM-PAC 6 Clicks Basic Mobility (PT)  AM-PAC 6 Clicks Score (PT): 22  PT Discharge Summary  Anticipated Discharge Disposition (PT): home with outpatient therapy services, inpatient rehabilitation facility    Bernardino Watson, PT  7/7/2024

## 2024-07-07 NOTE — PROGRESS NOTES
[facility] THORACIC/LUMBAR NEUROSURGERY PROGRESS NOTE      CC: back pain      Subjective     Interval History: Patient reports pain better managed this am    ROS:  Constitutional: No fever, chills  MS: +back pain  Neuro: +numbness, tingling, noweakness,  no balance difficulties  : No difficulty voiding, no incontinence    Objective     Vital signs in last 24 hours:  Temp:  [97 °F (36.1 °C)-98.1 °F (36.7 °C)] 97.1 °F (36.2 °C)  Heart Rate:  [56-77] 63  Resp:  [16-22] 20  BP: (121-140)/(70-88) 135/78    Intake/Output this shift:  No intake/output data recorded.    LABS:  Results from last 7 days   Lab Units 07/07/24  0722 07/05/24  1555   WBC 10*3/mm3 12.64* 14.18*   HEMOGLOBIN g/dL 16.7* 17.4*   HEMATOCRIT % 48.9* 51.8*   PLATELETS 10*3/mm3 283 291      Results from last 7 days   Lab Units 07/07/24  0722 07/05/24  1555   SODIUM mmol/L 135* 146*   POTASSIUM mmol/L 4.2 3.8   CHLORIDE mmol/L 103 103   CO2 mmol/L 22.0 22.0   BUN mg/dL 20 18   CREATININE mg/dL 0.56* 0.67   CALCIUM mg/dL 8.5* 9.1   BILIRUBIN mg/dL  --  1.1   ALK PHOS U/L  --  43   ALT (SGPT) U/L  --  27   AST (SGOT) U/L  --  17   GLUCOSE mg/dL 149* 103*      7/5/24 PTT <20.0  7/5/24 PT 12.9/INR 0.95    IMAGING STUDIES:  MRI Lumbar 7/5/24   DISCS SPINAL CANAL NEURAL FORAMINA:    L1-L2:  Unremarkable.  No significant disc disease.  No stenosis.    L2-L3:  L2-3: Similar disc space narrowing with posterior marginal   osteophyte complex noted at L2-3 level.  This mildly narrows the central   canal with preservation of the CSF collar.  Facet hypertrophic changes   suggested.  Mild bilateral neural foraminal encroachment, stable.    L3-L4:  Discectomy with hardware noted.  No central canal stenosis or   neural foraminal encroachment.    L4-L5:  Discectomy with hardware noted.  No central canal stenosis or   neural foraminal encroachment.    L5-S1:  Mild annular disc bulge.  No posterior disc protrusion or   central canal stenosis.  No neuroforaminal encroachment.   Bilateral facet   hypertrophic changes, stable.     IMPRESSION:       Stable postsurgical changes, as detailed above.  No significant central   canal stenosis noted.  IMPRESSION:      Electronically signed by Cuate Bonilla MD on 07-05-24 at 2346    I personally viewed and interpreted the patient's chart.    Meds reviewed/changed: Yes  Tylenol 1000mg every 8 hours  Desvenlafaxine ER 25mg daily  Cardizem  mg daily  Lovenox 60mg every 12 hours  Gabapentin 100mg BID  Robaxin 500mg every 8 hours  Solumedrol 125mg every 6 hours  Protonix 40mg daily   9.   Morphine 1mg every 3 hours prn-2 doses/12 hours  10. Roxicodone 7.5mg every 4 hours prn-1 dose/12 hours   Physical Exam:    General:   Awake, alert.  Motor:    LLE 5/5 RLE 4+/5, Right posterior tibial and right gastroc 5/5 No fasciculations, rigidity, spasticity, or abnormal movements.  Reflexes:   no clonus  Sensation:   Decreased sensation right thigh  Station and Gait:             Gait not assessed   Extremities:   Wearing SCD      Assessment & Plan     ASSESSMENT:      Bulge of lumbar disc without myelopathy    PAD (peripheral artery disease)    PAF (paroxysmal atrial fibrillation)    Spinal stenosis of lumbar region with neurogenic claudication    COPD (chronic obstructive pulmonary disease)    Factor V Leiden mutation    History of DVT of lower extremity    Alpha-1-antitrypsin deficiency    The patient is a 64 year old female who presents with acute on chronic low back pain. The pain has been so intense that she has had difficulty with walking and standing.      She was scheduled for a L2-3 fusion on 7/22 with Dr. Mathur. Discussed Lumbar MRI with Dr. Alarcon didn't see any significant changes from the MRI in January.     Hospitalist made adjustments to pain medications which seems to be helpful.     PLAN:   Continue to work on pain control   Will increase gabapentin to 100mg TID  Eliquis on hold    I discussed the patient's findings and my recommendations  with patient, consulting provider, and Dr. Alarcon.          LOS: 0 days       Raissa Plaza, KLARISSA  7/7/2024  11:13 EDT

## 2024-07-07 NOTE — PLAN OF CARE
Goal Outcome Evaluation:       Patient has had improved day.Patient is alert and oriented able to get up and walk about better. VS normal.Patient did get up to restroom this morning for toileting and self care..    Dr. Melendrez changed pain medication told and patient has responded positively. Patient is no longer tearful when getting up to move about. She continues to have issues with the right leg.    Patient has requested several prn medications today. She has received prn morphine, prn zophran x 1, prn oxycodone IR 7.5mg.

## 2024-07-07 NOTE — PLAN OF CARE
Goal Outcome Evaluation:  Plan of Care Reviewed With: patient        Progress: no change  Outcome Evaluation: Patient alert, oriented, able to voice needs. PRN morphine given x 2 this shift per request. Patient does report relief of symptoms. Patient did ambulate to bathroom with assistance. Continent of bowel and bladder. Weakness noted when patient stands for extended period of time especially in RLE. Patient given prestig per her request after speaking with [facility] for the new order.

## 2024-07-07 NOTE — PROGRESS NOTES
Name: Radha Block ADMIT: 2024   : 1960  PCP: Bosler, James William III, MD    MRN: [id number] LOS: 0 days   AGE/SEX: 64 y.o. female  ROOM: [facility]     Subjective   Subjective   Resting in bed, discussed with RN, patient is trying to not use pain medications and waiting until her pain is severe and then asking for morphine.  Discussed with patient, will schedule Tylenol and muscle relaxer and continue gabapentin per neurosurgery, as needed Roxicodone and morphine.  Patient agreeable to changes to try and improve pain control       Objective   Objective   Vital Signs  Temp:  [97 °F (36.1 °C)-98.1 °F (36.7 °C)] 97.1 °F (36.2 °C)  Heart Rate:  [56-77] 63  Resp:  [16-22] 20  BP: (121-140)/(70-88) 135/78  SpO2:  [92 %-99 %] 98 %  on   ;   Device (Oxygen Therapy): room air  Body mass index is 20.2 kg/m².  Physical Exam  Constitutional:       General: She is not in acute distress.     Appearance: Normal appearance. She is not ill-appearing.   Cardiovascular:      Rate and Rhythm: Normal rate and regular rhythm.   Pulmonary:      Effort: Pulmonary effort is normal. No respiratory distress.      Breath sounds: Normal breath sounds.   Abdominal:      General: Abdomen is flat.      Palpations: Abdomen is soft.      Tenderness: There is no abdominal tenderness.   Musculoskeletal:         General: No swelling or tenderness.      Right lower leg: No edema.      Left lower leg: No edema.   Skin:     General: Skin is warm and dry.   Neurological:      General: No focal deficit present.      Mental Status: She is alert and oriented to person, place, and time. Mental status is at baseline.         Results Review     I reviewed the patient's new clinical results.  Results from last 7 days   Lab Units 24  0722 24  1555   WBC 10*3/mm3 12.64* 14.18*   HEMOGLOBIN g/dL 16.7* 17.4*   PLATELETS 10*3/mm3 283 291     Results from last 7 days   Lab Units 24  0722 24  1555   SODIUM mmol/L 135*  "146*   POTASSIUM mmol/L 4.2 3.8   CHLORIDE mmol/L 103 103   CO2 mmol/L 22.0 22.0   BUN mg/dL 20 18   CREATININE mg/dL 0.56* 0.67   GLUCOSE mg/dL 149* 103*   Estimated Creatinine Clearance: 91.3 mL/min (A) (by C-G formula based on SCr of 0.56 mg/dL (L)).  Results from last 7 days   Lab Units 07/05/24  1555   ALBUMIN g/dL 4.3   BILIRUBIN mg/dL 1.1   ALK PHOS U/L 43   AST (SGOT) U/L 17   ALT (SGPT) U/L 27     Results from last 7 days   Lab Units 07/07/24  0722 07/05/24  1555   CALCIUM mg/dL 8.5* 9.1   ALBUMIN g/dL  --  4.3     No results found for: \"COVID19\"  No results found for: \"HGBA1C\", \"POCGLU\"    MRI Lumbar Spine Without Contrast  Narrative: Patient: RAVIN FERRER  Time Out: 23:46  Exam(s): MRI L SPINE Without Contrast     EXAM:    MR Lumbar Spine Without Intravenous Contrast    CLINICAL HISTORY:      concern for cauda equina.    TECHNIQUE:    Magnetic resonance images of the lumbar spine without intravenous   contrast in multiple planes.    COMPARISON:    MR lumbar spine 1 31 2024    FINDINGS:    Vertebrae:  There is stable posterior fusion from L3-L5 with pedicle   screws and paraspinal rods.  Laminectomy defects suggested.  There is   stable interbody hardware at L3-L4 and L4-L5.  The vertebral bodies   demonstrate stable marrow signal.    Spinal cord:  The conus is stable in appearance and unremarkable,   terminating at the L1 vertebral body level.  The cauda equina fibers are   stable in appearance without evidence for arachnoiditis.    Soft tissues:  No significant abnormal paraspinal soft tissue   abnormality identified.     DISCS SPINAL CANAL NEURAL FORAMINA:    L1-L2:  Unremarkable.  No significant disc disease.  No stenosis.    L2-L3:  L2-3: Similar disc space narrowing with posterior marginal   osteophyte complex noted at L2-3 level.  This mildly narrows the central   canal with preservation of the CSF collar.  Facet hypertrophic changes   suggested.  Mild bilateral neural foraminal " encroachment, stable.    L3-L4:  Discectomy with hardware noted.  No central canal stenosis or   neural foraminal encroachment.    L4-L5:  Discectomy with hardware noted.  No central canal stenosis or   neural foraminal encroachment.    L5-S1:  Mild annular disc bulge.  No posterior disc protrusion or   central canal stenosis.  No neuroforaminal encroachment.  Bilateral facet   hypertrophic changes, stable.    IMPRESSION:       Stable postsurgical changes, as detailed above.  No significant central   canal stenosis noted.  Impression: Electronically signed by Cuate Bonilla MD on 07-05-24 at 2346    Scheduled Medications  acetaminophen, 1,000 mg, Oral, Q8H  budesonide-formoterol, 2 puff, Inhalation, BID - RT   And  tiotropium bromide monohydrate, 2 puff, Inhalation, Daily - RT  desvenlafaxine, 25 mg, Oral, Daily  dilTIAZem CD, 180 mg, Oral, Daily  enoxaparin, 1 mg/kg, Subcutaneous, Q12H  gabapentin, 100 mg, Oral, TID  methocarbamol, 500 mg, Oral, Q8H  methylPREDNISolone sodium succinate, 125 mg, Intravenous, Q6H  pantoprazole, 40 mg, Oral, Q AM    Infusions   Diet  Diet: Cardiac; Healthy Heart (2-3 Na+); Fluid Consistency: Thin (IDDSI 0)         I have personally reviewed:  [x]  Laboratory   []  Microbiology   []  Radiology   []  EKG/Telemetry   []  Cardiology/Vascular   []  Pathology   []  Records    Assessment/Plan     Active Hospital Problems    Diagnosis  POA    **Bulge of lumbar disc without myelopathy [M51.36]  Yes    Alpha-1-antitrypsin deficiency [E88.01]  Yes    Factor V Leiden mutation [D68.51]  Yes    History of DVT of lower extremity [Z86.718]  Not Applicable    COPD (chronic obstructive pulmonary disease) [J44.9]  Yes    Spinal stenosis of lumbar region with neurogenic claudication [M48.062]  Yes    PAF (paroxysmal atrial fibrillation) [I48.0]  Yes    PAD (peripheral artery disease) [I73.9]  Yes      Resolved Hospital Problems   No resolved problems to display.     Ms. Nick Block is a 64 y.o. with  a history of paroxysmal A-fib, hypertension, COPD, alpha-1 antitrypsin deficiency, history of DVT with factor V Leiden mutation who presents with acute on chronic back pain.     Spinal stenosis with lumbar claudication  -MRI reviewed, reports no significant canal stenosis  -Neurosurgery has been consulted; will follow-up plans  -Continue as needed pain medications- change pain regimen today to try and improve pain control; IV dexamethasone  - dw JANEEN NP; cont to work on pain control; Dr. Mathur going to see tomorrow.      COPD  -Resume home inhalers, as needed albuterol nebs  -Not in exacerbation     Factor V Leiden mutation  History of DVT  -Takes Eliquis at home, Lovenox here pending surgical plans     PAD  -On Eliquis at home     PAF  -Continue Dilt, on Eliquis at home but has not taken for the last 5 to 7 days  -Will do Lovenox here while awaiting neurosurgical plans     HTN  -Continue diltiazem     Alpha-1 antitrypsin antibody deficiency  - follows with pulm at [place]   -Gets outpatient weekly infusions    Pharmacy to dose Lovenox for DVT prophylaxis.  Full code.  Discussed with patient, nursing staff, and consulting provider JANEEN Plaza.  Anticipated discharge TBD, TBD        Josefina Melendrez MD  [facility]  07/07/24  11:18 EDT    I wore protective equipment throughout this patient encounter including gloves.  Hand hygiene was performed before donning protective equipment and after removal when leaving the room.         Copied text in this note has been reviewed and is accurate as of 07/07/24.

## 2024-07-08 LAB
ANION GAP SERPL CALCULATED.3IONS-SCNC: 11.8 MMOL/L (ref 5–15)
BUN SERPL-MCNC: 24 MG/DL (ref 8–23)
BUN/CREAT SERPL: 41.4 (ref 7–25)
CALCIUM SPEC-SCNC: 8.4 MG/DL (ref 8.6–10.5)
CHLORIDE SERPL-SCNC: 102 MMOL/L (ref 98–107)
CO2 SERPL-SCNC: 20.2 MMOL/L (ref 22–29)
CREAT SERPL-MCNC: 0.58 MG/DL (ref 0.57–1)
DEPRECATED RDW RBC AUTO: 43.9 FL (ref 37–54)
EGFRCR SERPLBLD CKD-EPI 2021: 99.9 ML/MIN/1.73
ERYTHROCYTE [DISTWIDTH] IN BLOOD BY AUTOMATED COUNT: 11.9 % (ref 12.3–15.4)
GLUCOSE SERPL-MCNC: 155 MG/DL (ref 65–99)
HCT VFR BLD AUTO: 46.9 % (ref 34–46.6)
HGB BLD-MCNC: 15.5 G/DL (ref 12–15.9)
MCH RBC QN AUTO: 33 PG (ref 26.6–33)
MCHC RBC AUTO-ENTMCNC: 33 G/DL (ref 31.5–35.7)
MCV RBC AUTO: 100 FL (ref 79–97)
PLATELET # BLD AUTO: 283 10*3/MM3 (ref 140–450)
PMV BLD AUTO: 10.1 FL (ref 6–12)
POTASSIUM SERPL-SCNC: 4.5 MMOL/L (ref 3.5–5.2)
RBC # BLD AUTO: 4.69 10*6/MM3 (ref 3.77–5.28)
SODIUM SERPL-SCNC: 134 MMOL/L (ref 136–145)
WBC NRBC COR # BLD AUTO: 26 10*3/MM3 (ref 3.4–10.8)

## 2024-07-08 PROCEDURE — 85027 COMPLETE CBC AUTOMATED: CPT | Performed by: STUDENT IN AN ORGANIZED HEALTH CARE EDUCATION/TRAINING PROGRAM

## 2024-07-08 PROCEDURE — 94799 UNLISTED PULMONARY SVC/PX: CPT

## 2024-07-08 PROCEDURE — 94761 N-INVAS EAR/PLS OXIMETRY MLT: CPT

## 2024-07-08 PROCEDURE — 25010000002 METHYLPREDNISOLONE PER 125 MG: Performed by: NURSE PRACTITIONER

## 2024-07-08 PROCEDURE — 25010000002 ENOXAPARIN PER 10 MG: Performed by: STUDENT IN AN ORGANIZED HEALTH CARE EDUCATION/TRAINING PROGRAM

## 2024-07-08 PROCEDURE — 80048 BASIC METABOLIC PNL TOTAL CA: CPT | Performed by: STUDENT IN AN ORGANIZED HEALTH CARE EDUCATION/TRAINING PROGRAM

## 2024-07-08 PROCEDURE — 94664 DEMO&/EVAL PT USE INHALER: CPT

## 2024-07-08 RX ORDER — HYDROCODONE BITARTRATE AND ACETAMINOPHEN 5; 325 MG/1; MG/1
1 TABLET ORAL EVERY 4 HOURS PRN
Status: DISCONTINUED | OUTPATIENT
Start: 2024-07-08 | End: 2024-07-09

## 2024-07-08 RX ORDER — MORPHINE SULFATE 2 MG/ML
1 INJECTION, SOLUTION INTRAMUSCULAR; INTRAVENOUS
Status: DISCONTINUED | OUTPATIENT
Start: 2024-07-08 | End: 2024-07-09 | Stop reason: HOSPADM

## 2024-07-08 RX ORDER — OXYCODONE HYDROCHLORIDE 15 MG/1
7.5 TABLET ORAL EVERY 4 HOURS PRN
Status: DISCONTINUED | OUTPATIENT
Start: 2024-07-08 | End: 2024-07-09 | Stop reason: HOSPADM

## 2024-07-08 RX ORDER — METHYLPREDNISOLONE SODIUM SUCCINATE 125 MG/2ML
80 INJECTION, POWDER, LYOPHILIZED, FOR SOLUTION INTRAMUSCULAR; INTRAVENOUS EVERY 8 HOURS
Status: DISCONTINUED | OUTPATIENT
Start: 2024-07-08 | End: 2024-07-09

## 2024-07-08 RX ADMIN — METHYLPREDNISOLONE SODIUM SUCCINATE 125 MG: 125 INJECTION INTRAMUSCULAR; INTRAVENOUS at 01:02

## 2024-07-08 RX ADMIN — TIOTROPIUM BROMIDE INHALATION SPRAY 2 PUFF: 3.12 SPRAY, METERED RESPIRATORY (INHALATION) at 07:31

## 2024-07-08 RX ADMIN — METHOCARBAMOL TABLETS 500 MG: 500 TABLET, COATED ORAL at 21:24

## 2024-07-08 RX ADMIN — DESVENLAFAXINE SUCCINATE 25 MG: 25 TABLET, EXTENDED RELEASE ORAL at 08:42

## 2024-07-08 RX ADMIN — ACETAMINOPHEN 1000 MG: 500 TABLET ORAL at 21:12

## 2024-07-08 RX ADMIN — GABAPENTIN 100 MG: 100 CAPSULE ORAL at 15:20

## 2024-07-08 RX ADMIN — METHYLPREDNISOLONE SODIUM SUCCINATE 80 MG: 125 INJECTION INTRAMUSCULAR; INTRAVENOUS at 15:21

## 2024-07-08 RX ADMIN — METHYLPREDNISOLONE SODIUM SUCCINATE 125 MG: 125 INJECTION INTRAMUSCULAR; INTRAVENOUS at 08:42

## 2024-07-08 RX ADMIN — DILTIAZEM HYDROCHLORIDE 180 MG: 180 CAPSULE, EXTENDED RELEASE ORAL at 08:41

## 2024-07-08 RX ADMIN — OXYCODONE HYDROCHLORIDE 7.5 MG: 15 TABLET ORAL at 21:12

## 2024-07-08 RX ADMIN — BUDESONIDE AND FORMOTEROL FUMARATE DIHYDRATE 2 PUFF: 160; 4.5 AEROSOL RESPIRATORY (INHALATION) at 21:38

## 2024-07-08 RX ADMIN — BUDESONIDE AND FORMOTEROL FUMARATE DIHYDRATE 2 PUFF: 160; 4.5 AEROSOL RESPIRATORY (INHALATION) at 07:31

## 2024-07-08 RX ADMIN — GABAPENTIN 100 MG: 100 CAPSULE ORAL at 08:41

## 2024-07-08 RX ADMIN — ACETAMINOPHEN 1000 MG: 500 TABLET ORAL at 05:54

## 2024-07-08 RX ADMIN — GABAPENTIN 100 MG: 100 CAPSULE ORAL at 21:12

## 2024-07-08 RX ADMIN — ACETAMINOPHEN 1000 MG: 500 TABLET ORAL at 15:20

## 2024-07-08 RX ADMIN — METHOCARBAMOL TABLETS 500 MG: 500 TABLET, COATED ORAL at 05:54

## 2024-07-08 RX ADMIN — ENOXAPARIN SODIUM 60 MG: 100 INJECTION SUBCUTANEOUS at 08:42

## 2024-07-08 RX ADMIN — Medication 10 ML: at 21:15

## 2024-07-08 RX ADMIN — OXYCODONE HYDROCHLORIDE 7.5 MG: 15 TABLET ORAL at 11:15

## 2024-07-08 RX ADMIN — METHOCARBAMOL TABLETS 500 MG: 500 TABLET, COATED ORAL at 15:21

## 2024-07-08 RX ADMIN — PANTOPRAZOLE SODIUM 40 MG: 40 TABLET, DELAYED RELEASE ORAL at 05:54

## 2024-07-08 RX ADMIN — OXYCODONE HYDROCHLORIDE 7.5 MG: 15 TABLET ORAL at 00:55

## 2024-07-08 NOTE — PROGRESS NOTES
Name: Radha Block ADMIT: 2024   : 1960  PCP: Bosler, James William III, MD    MRN: [id number] LOS: 1 days   AGE/SEX: 64 y.o. female  ROOM: [facility]     Subjective   Subjective   Resting in bed, patient reports significant improvement in back pain with steroids/change in pain medications.  Eager to discuss with Dr. Mathur surgical plans.       Objective   Objective   Vital Signs  Temp:  [97.1 °F (36.2 °C)-98.1 °F (36.7 °C)] 97.7 °F (36.5 °C)  Heart Rate:  [60-70] 63  Resp:  [18-20] 18  BP: (111-163)/(62-86) 111/63  SpO2:  [94 %-98 %] 95 %  on   ;   Device (Oxygen Therapy): room air  Body mass index is 20.2 kg/m².  Physical Exam  Constitutional:       General: She is not in acute distress.     Appearance: Normal appearance. She is not ill-appearing.   Cardiovascular:      Rate and Rhythm: Normal rate and regular rhythm.   Pulmonary:      Effort: Pulmonary effort is normal. No respiratory distress.      Breath sounds: Normal breath sounds.   Abdominal:      General: Abdomen is flat.      Palpations: Abdomen is soft.      Tenderness: There is no abdominal tenderness.   Musculoskeletal:         General: No swelling or tenderness.      Right lower leg: No edema.      Left lower leg: No edema.   Skin:     General: Skin is warm and dry.   Neurological:      General: No focal deficit present.      Mental Status: She is alert and oriented to person, place, and time. Mental status is at baseline.         Results Review     I reviewed the patient's new clinical results.  Results from last 7 days   Lab Units 24  0611 24  0722 24  1555   WBC 10*3/mm3 26.00* 12.64* 14.18*   HEMOGLOBIN g/dL 15.5 16.7* 17.4*   PLATELETS 10*3/mm3 283 283 291     Results from last 7 days   Lab Units 24  0612 24  0722 24  1555   SODIUM mmol/L 134* 135* 146*   POTASSIUM mmol/L 4.5 4.2 3.8   CHLORIDE mmol/L 102 103 103   CO2 mmol/L 20.2* 22.0 22.0   BUN mg/dL 24* 20 18   CREATININE mg/dL 0.58  "0.56* 0.67   GLUCOSE mg/dL 155* 149* 103*   Estimated Creatinine Clearance: 88.1 mL/min (by C-G formula based on SCr of 0.58 mg/dL).  Results from last 7 days   Lab Units 07/05/24  1555   ALBUMIN g/dL 4.3   BILIRUBIN mg/dL 1.1   ALK PHOS U/L 43   AST (SGOT) U/L 17   ALT (SGPT) U/L 27     Results from last 7 days   Lab Units 07/08/24  0612 07/07/24  0722 07/05/24  1555   CALCIUM mg/dL 8.4* 8.5* 9.1   ALBUMIN g/dL  --   --  4.3     No results found for: \"COVID19\"  No results found for: \"HGBA1C\", \"POCGLU\"    MRI Lumbar Spine Without Contrast  Narrative: Patient: RAVIN FERRER  Time Out: 23:46  Exam(s): MRI L SPINE Without Contrast     EXAM:    MR Lumbar Spine Without Intravenous Contrast    CLINICAL HISTORY:      concern for cauda equina.    TECHNIQUE:    Magnetic resonance images of the lumbar spine without intravenous   contrast in multiple planes.    COMPARISON:    MR lumbar spine 1 31 2024    FINDINGS:    Vertebrae:  There is stable posterior fusion from L3-L5 with pedicle   screws and paraspinal rods.  Laminectomy defects suggested.  There is   stable interbody hardware at L3-L4 and L4-L5.  The vertebral bodies   demonstrate stable marrow signal.    Spinal cord:  The conus is stable in appearance and unremarkable,   terminating at the L1 vertebral body level.  The cauda equina fibers are   stable in appearance without evidence for arachnoiditis.    Soft tissues:  No significant abnormal paraspinal soft tissue   abnormality identified.     DISCS SPINAL CANAL NEURAL FORAMINA:    L1-L2:  Unremarkable.  No significant disc disease.  No stenosis.    L2-L3:  L2-3: Similar disc space narrowing with posterior marginal   osteophyte complex noted at L2-3 level.  This mildly narrows the central   canal with preservation of the CSF collar.  Facet hypertrophic changes   suggested.  Mild bilateral neural foraminal encroachment, stable.    L3-L4:  Discectomy with hardware noted.  No central canal stenosis or   neural " foraminal encroachment.    L4-L5:  Discectomy with hardware noted.  No central canal stenosis or   neural foraminal encroachment.    L5-S1:  Mild annular disc bulge.  No posterior disc protrusion or   central canal stenosis.  No neuroforaminal encroachment.  Bilateral facet   hypertrophic changes, stable.    IMPRESSION:       Stable postsurgical changes, as detailed above.  No significant central   canal stenosis noted.  Impression: Electronically signed by Cuate Bonilla MD on 07-05-24 at 2346    Scheduled Medications  acetaminophen, 1,000 mg, Oral, Q8H  budesonide-formoterol, 2 puff, Inhalation, BID - RT   And  tiotropium bromide monohydrate, 2 puff, Inhalation, Daily - RT  desvenlafaxine, 25 mg, Oral, Daily  dilTIAZem CD, 180 mg, Oral, Daily  enoxaparin, 1 mg/kg, Subcutaneous, Q12H  gabapentin, 100 mg, Oral, TID  methocarbamol, 500 mg, Oral, Q8H  methylPREDNISolone sodium succinate, 125 mg, Intravenous, Q6H  pantoprazole, 40 mg, Oral, Q AM    Infusions   Diet  Diet: Cardiac; Healthy Heart (2-3 Na+); Fluid Consistency: Thin (IDDSI 0)         I have personally reviewed:  [x]  Laboratory   []  Microbiology   []  Radiology   []  EKG/Telemetry   []  Cardiology/Vascular   []  Pathology   []  Records    Assessment/Plan     Active Hospital Problems    Diagnosis  POA    **Bulge of lumbar disc without myelopathy [M51.36]  Yes    Alpha-1-antitrypsin deficiency [E88.01]  Yes    Factor V Leiden mutation [D68.51]  Yes    History of DVT of lower extremity [Z86.718]  Not Applicable    COPD (chronic obstructive pulmonary disease) [J44.9]  Yes    Spinal stenosis of lumbar region with neurogenic claudication [M48.062]  Yes    PAF (paroxysmal atrial fibrillation) [I48.0]  Yes    PAD (peripheral artery disease) [I73.9]  Yes      Resolved Hospital Problems   No resolved problems to display.     Ms. Nick Block is a 64 y.o. with a history of paroxysmal A-fib, hypertension, COPD, alpha-1 antitrypsin deficiency, history of DVT with  factor V Leiden mutation who presents with acute on chronic back pain.     Spinal stenosis with lumbar claudication  -MRI reviewed, reports no significant canal stenosis  -Neurosurgery has been consulted; will follow-up plans  -Continue as needed pain medications-continue steroids per neurosurgery, pain meds changed yesterday with positive results  - Dr. Mathur to see this week      COPD  -Resume home inhalers, as needed albuterol nebs  -Not in exacerbation     Factor V Leiden mutation  History of DVT  -Takes Eliquis at home, Lovenox here pending surgical plans     PAD  -On Eliquis at home     PAF  -Continue Dilt, on Eliquis at home but has not taken for the last 5 to 7 days  -Will do Lovenox here while awaiting neurosurgical plans     HTN  -Continue diltiazem     Alpha-1 antitrypsin antibody deficiency  - follows with pulm at [place]   -Gets outpatient weekly infusions    Pharmacy to dose Lovenox for DVT prophylaxis.  Full code.  Discussed with patient and nursing staff   Anticipated discharge TBD, TBMARYLOU Melendrez MD  [facility]  07/08/24  10:15 EDT    I wore protective equipment throughout this patient encounter including gloves.  Hand hygiene was performed before donning protective equipment and after removal when leaving the room.         Copied text in this note has been reviewed and is accurate as of 07/08/24.

## 2024-07-08 NOTE — PAYOR COMM NOTE
"Nick Block Radha HANDY (64 y.o. Female)       PLEASE SEE ATTACHED FOR INPT AUTH     PT WAS OBS ON 7/5  CHANGED TO INPT ON 7/7    REF # [id number]    PLEASE CALL MARIBEL ORTIZ RN/ DEPT @ [phone]  OR FAX  DEPARTMENT @  [phone]    THANK YOU   MARIBEL ORTIZ RN  [facility]          Date of Birth   1960    Social Security Number       Address   [address] [place] [address]    Home Phone   [phone]    MRN   [id number]       Catholic   Roman Catholic    Marital Status                               Admission Date   7/5/24 OBS  7/7/24 INPT     Admission Type   Emergency    Admitting Provider       Attending Provider   Josefina Melendrez MD    Department, Room/Bed   [facility] [address] [place], P496/1       Discharge Date       Discharge Disposition       Discharge Destination                                 Attending Provider: Josefina Melendrez MD    Allergies: No Known Allergies    Isolation: None   Infection: None   Code Status: CPR    Ht: 170.2 cm (67\")   Wt: 58.5 kg (129 lb)    Admission Cmt: None   Principal Problem: Bulge of lumbar disc without myelopathy [M51.36]                   Active Insurance as of 7/5/2024       Primary Coverage       Payor Plan Insurance Group Employer/Plan Group    [facility] [employer] [facility] [employer] BLUE [facility] PPO [id number]       Payor Plan Address Payor Plan Phone Number Payor Plan Fax Number Effective Dates    [address] [phone]  1/1/2023 - None Entered    [facility] [address]         Subscriber Name Subscriber Birth Date Member ID       RADHA FERRER 1960 [id number]                     Emergency Contacts        (Rel.) Home Phone Work Phone Mobile Phone    FRANCSICO BLOCK (Son) -- -- [phone]    BOSLER,JAMES (Other) -- -- [phone]              [place]: NPI [id number]  Tax ID [id number]     History & Physical        Josefina Melendrez MD at 07/06/24 0736              Patient Name:  " Radha Block  YOB: 1960  MRN:  [id number]  Admit Date:  7/5/2024  Patient Care Team:  Bosler, James William III, MD as PCP - General (Internal Medicine)  Raisa Cummings MD as Consulting Physician (Obstetrics and Gynecology)  Jj Dodson MD as Consulting Physician (Hematology and Oncology)  Bosler, James William III, MD as Referring Physician (Internal Medicine)      Subjective  History Present Illness     Chief Complaint   Patient presents with    Back Pain       Ms. Nick Block is a 64 y.o.  with a history of paroxysmal A-fib, hypertension, COPD, alpha-1 antitrypsin deficiency, history of DVT with factor V Leiden mutation that presents to [facility] complaining of acute on chronic back pain    History of Present Illness  64-year-old female with a history of back pain in the lumbar spine.  The patient follows with Dr. Mathur and has had a lumbar fusion surgery in December 2022.  Patient reports back pain has been progressively worsening since last October and she has been trying to get surgery but has had issues with insurance coverage.  She has plans for surgery in about 3 weeks but since last Monday, approximately 7 days ago, her pain has become what she describes as unbearable.  She reports she cannot stand due to pain unable to walk.  She says she chronically has numbness and tingling of her groin/right extremity but the numbness has gotten worse.  She presented to the ER for further evaluation.    Review of Systems   Constitutional:  Negative for fatigue and fever.   Respiratory:  Negative for cough and shortness of breath.    Cardiovascular:  Negative for chest pain, palpitations and leg swelling.   Gastrointestinal:  Negative for abdominal pain, nausea and vomiting.   Musculoskeletal:  Positive for back pain.        Difficulty standing/ambulating due to pain   Neurological:  Positive for weakness and numbness.        Personal History     Past Medical  History:   Diagnosis Date    Arrhythmia     Arthritis     Clotting disorder     Factor 5 Liden    COPD (chronic obstructive pulmonary disease)     Deep vein thrombosis     Essential hypertension 08/24/2021    Mitral valve prolapse     PAD (peripheral artery disease)     PAF (paroxysmal atrial fibrillation) 11/25/2020     Past Surgical History:   Procedure Laterality Date    BLADDER SURGERY  2013    E/O polyp    BREAST SURGERY      E/O benign neuroma    CARDIAC ELECTROPHYSIOLOGY PROCEDURE N/A 10/10/2022    Procedure: Ablation atrial fibrillation CRYO;  Surgeon: Venkatesh Iqbal MD;  Location: [facility] INVASIVE LOCATION;  Service: Cardiovascular;  Laterality: N/A;    COLONOSCOPY  2016    HAND SURGERY Right     Carpal metacarpal hand surgery     LUMBAR FUSION N/A 12/30/2022    Procedure: Lumbar 3 to lumbar 4 and lumbar 4 to lumbar 5 laminectomy with fusion and instrumentation;  Surgeon: Rigo Mathur MD;  Location: [facility] OR;  Service: Robotics - Neuro;  Laterality: N/A;    SINUS SURGERY  2017    TONSILLECTOMY       Family History   Adopted: Yes   Problem Relation Age of Onset    Malig Hyperthermia Neg Hx      Social History     Tobacco Use    Smoking status: Never    Smokeless tobacco: Never   Vaping Use    Vaping status: Never Used   Substance Use Topics    Alcohol use: Yes     Alcohol/week: 3.0 standard drinks of alcohol     Types: 3 Glasses of wine per week     Comment: 2 times a week     Drug use: Never     No current facility-administered medications on file prior to encounter.     Current Outpatient Medications on File Prior to Encounter   Medication Sig Dispense Refill    Acetaminophen 325 MG capsule Take 2 capsules by mouth 4 (Four) Times a Day As Needed.      alpha1-proteinase inhibitor (Zemaira) 1000 MG injection 1 (One) Time Per Week. Pt takes on Fridays at infusion center      desvenlafaxine (PRISTIQ) 50 MG 24 hr tablet Take 12.5 mg by mouth As Needed.      diclofenac-miSOPROStol (ARTHROTEC  75) 75-0.2 MG EC tablet Take 1 tablet by mouth Daily.      dilTIAZem CD (CARDIZEM CD) 180 MG 24 hr capsule Take 1 capsule by mouth Daily. 30 capsule 11    Eliquis 5 MG tablet tablet TAKE 1 TABLET BY MOUTH TWICE  DAILY (Patient taking differently: 1 tablet Every 12 (Twelve) Hours.) 180 tablet 3    esomeprazole (nexIUM) 40 MG capsule Take 1 capsule by mouth Daily.      estradiol (MINIVELLE, VIVELLE-DOT) 0.075 MG/24HR patch Place 1 patch on the skin as directed by provider 2 (Two) Times a Week.      Fluticasone-Umeclidin-Vilant (Trelegy Ellipta) 100-62.5-25 MCG/INH aerosol powder  Inhale 1 puff Daily.      guaiFENesin (MUCINEX) 600 MG 12 hr tablet As Needed.      methylPREDNISolone (MEDROL) 4 MG dose pack Take as directed on package instructions. (Patient taking differently: Take as directed on package instructions.pt has 2 days left) 1 each 0    ondansetron (ZOFRAN) 4 MG tablet Every 8 (Eight) Hours As Needed.      Progesterone (PROMETRIUM) 200 MG capsule Daily.      albuterol sulfate  (90 Base) MCG/ACT inhaler 1 puff As Needed for Wheezing.      betamethasone, augmented, (DIPROLENE) 0.05 % cream 2 (Two) Times a Day As Needed.      budesonide (PULMICORT) 1 MG/2ML nebulizer solution  (Patient not taking: Reported on 7/5/2024)      desonide (DESOWEN) 0.05 % cream As Needed. (Patient not taking: Reported on 7/5/2024)      Diclofenac Epolamine (FLECTOR) 1.3 % patch patch As Needed. (Patient not taking: Reported on 7/5/2024)      EPINEPHrine (EPIPEN) 0.3 MG/0.3ML solution auto-injector injection As Needed.      HYDROcodone-acetaminophen (NORCO) 7.5-325 MG per tablet Take  by mouth See Admin Instructions. (Patient not taking: Reported on 7/6/2024)      hydrocortisone 2.5 % cream As Needed. (Patient not taking: Reported on 7/6/2024)      lidocaine-prilocaine (EMLA) 2.5-2.5 % cream As Needed. (Patient not taking: Reported on 7/6/2024)      promethazine (PHENERGAN) 25 MG tablet  (Patient not taking: Reported on  7/6/2024)      tretinoin (RETIN-A) 0.1 % cream As Needed. (Patient not taking: Reported on 7/6/2024)      valACYclovir (VALTREX) 1000 MG tablet As Needed. (Patient not taking: Reported on 7/6/2024)      venlafaxine (EFFEXOR) 50 MG tablet  (Patient not taking: Reported on 7/6/2024)       No Known Allergies    Objective   Objective     Vital Signs  Temp:  [97.1 °F (36.2 °C)-99.2 °F (37.3 °C)] 97.4 °F (36.3 °C)  Heart Rate:  [58-84] 59  Resp:  [16-18] 16  BP: (127-166)/(65-95) 138/74  SpO2:  [94 %-98 %] 96 %  on   ;   Device (Oxygen Therapy): room air  Body mass index is 20.2 kg/m².    Physical Exam  Constitutional:       Appearance: Normal appearance. She is normal weight. She is not ill-appearing.      Comments: Appears to be in moderate amount of pain   HENT:      Head: Normocephalic and atraumatic.      Nose: Nose normal.      Mouth/Throat:      Mouth: Mucous membranes are moist.      Pharynx: Oropharynx is clear.   Eyes:      Extraocular Movements: Extraocular movements intact.      Conjunctiva/sclera: Conjunctivae normal.      Pupils: Pupils are equal, round, and reactive to light.   Cardiovascular:      Rate and Rhythm: Normal rate and regular rhythm.   Pulmonary:      Effort: Pulmonary effort is normal. No respiratory distress.      Breath sounds: Normal breath sounds. No wheezing.   Abdominal:      General: Abdomen is flat.      Palpations: Abdomen is soft.      Tenderness: There is no abdominal tenderness.   Musculoskeletal:         General: No swelling or tenderness. Normal range of motion.      Right lower leg: No edema.      Left lower leg: No edema.   Skin:     General: Skin is warm and dry.   Neurological:      General: No focal deficit present.      Mental Status: She is alert and oriented to person, place, and time. Mental status is at baseline.      Sensory: Sensory deficit present.      Motor: Weakness present.   Psychiatric:         Mood and Affect: Mood normal.         Behavior: Behavior normal.          Thought Content: Thought content normal.         Judgment: Judgment normal.         Results Review:  I reviewed the patient's new clinical results.  I reviewed the patient's new imaging results and agree with the interpretation.  I reviewed the patient's other test results and agree with the interpretation  I personally viewed and interpreted the patient's EKG/Telemetry data    Lab Results (last 24 hours)       Procedure Component Value Units Date/Time    CBC & Differential [[id number]]  (Abnormal) Collected: 07/05/24 1555    Specimen: Blood Updated: 07/05/24 1609    Narrative:      The following orders were created for panel order CBC & Differential.  Procedure                               Abnormality         Status                     ---------                               -----------         ------                     CBC Auto Differential[[id number]]        Abnormal            Final result                 Please view results for these tests on the individual orders.    Comprehensive Metabolic Panel [[id number]]  (Abnormal) Collected: 07/05/24 1555    Specimen: Blood Updated: 07/05/24 1644     Glucose 103 mg/dL      BUN 18 mg/dL      Creatinine 0.67 mg/dL      Sodium 146 mmol/L      Potassium 3.8 mmol/L      Chloride 103 mmol/L      CO2 22.0 mmol/L      Calcium 9.1 mg/dL      Total Protein 6.6 g/dL      Albumin 4.3 g/dL      ALT (SGPT) 27 U/L      AST (SGOT) 17 U/L      Alkaline Phosphatase 43 U/L      Total Bilirubin 1.1 mg/dL      Globulin 2.3 gm/dL      A/G Ratio 1.9 g/dL      BUN/Creatinine Ratio 26.9     Anion Gap 21.0 mmol/L      eGFR 96.5 mL/min/1.73     Narrative:      GFR Normal >60  Chronic Kidney Disease <60  Kidney Failure <15      Protime-INR [[id number]]  (Normal) Collected: 07/05/24 1555    Specimen: Blood Updated: 07/05/24 1643     Protime 12.9 Seconds      INR 0.95    aPTT [[id number]]  (Abnormal) Collected: 07/05/24 1555    Specimen: Blood Updated: 07/05/24 1643     PTT <20.0 seconds     CBC  Auto Differential [[id number]]  (Abnormal) Collected: 07/05/24 1555    Specimen: Blood Updated: 07/05/24 1609     WBC 14.18 10*3/mm3      RBC 5.26 10*6/mm3      Hemoglobin 17.4 g/dL      Hematocrit 51.8 %      MCV 98.5 fL      MCH 33.1 pg      MCHC 33.6 g/dL      RDW 12.3 %      RDW-SD 44.1 fl      MPV 10.4 fL      Platelets 291 10*3/mm3      Neutrophil % 76.5 %      Lymphocyte % 12.6 %      Monocyte % 10.2 %      Eosinophil % 0.0 %      Basophil % 0.1 %      Immature Grans % 0.6 %      Neutrophils, Absolute 10.86 10*3/mm3      Lymphocytes, Absolute 1.78 10*3/mm3      Monocytes, Absolute 1.44 10*3/mm3      Eosinophils, Absolute 0.00 10*3/mm3      Basophils, Absolute 0.02 10*3/mm3      Immature Grans, Absolute 0.08 10*3/mm3      nRBC 0.0 /100 WBC     Urinalysis With Microscopic If Indicated (No Culture) - Urine, Clean Catch [[id number]]  (Abnormal) Collected: 07/05/24 1619    Specimen: Urine, Clean Catch Updated: 07/05/24 1646     Color, UA Yellow     Appearance, UA Cloudy     pH, UA 7.0     Specific Gravity, UA 1.018     Glucose, UA Negative     Ketones, UA Trace     Bilirubin, UA Negative     Blood, UA Trace     Protein, UA Negative     Leuk Esterase, UA Small (1+)     Nitrite, UA Negative     Urobilinogen, UA 0.2 E.U./dL    Urinalysis, Microscopic Only - Urine, Clean Catch [[id number]]  (Abnormal) Collected: 07/05/24 1619    Specimen: Urine, Clean Catch Updated: 07/05/24 1655     RBC, UA 0-2 /HPF      WBC, UA 11-20 /HPF      Bacteria, UA 4+ /HPF      Squamous Epithelial Cells, UA 31-50 /HPF      Hyaline Casts, UA 3-6 /LPF      Methodology Automated Microscopy            Imaging Results (Last 24 Hours)       Procedure Component Value Units Date/Time    MRI Lumbar Spine Without Contrast [[id number]] Collected: 07/05/24 2109     Updated: 07/05/24 2346    Narrative:        Patient: RAVIN FERRER  Time Out: 23:46  Exam(s): MRI L SPINE Without Contrast     EXAM:    MR Lumbar Spine Without Intravenous  Contrast    CLINICAL HISTORY:      concern for cauda equina.    TECHNIQUE:    Magnetic resonance images of the lumbar spine without intravenous   contrast in multiple planes.    COMPARISON:    MR lumbar spine 1 31 2024    FINDINGS:    Vertebrae:  There is stable posterior fusion from L3-L5 with pedicle   screws and paraspinal rods.  Laminectomy defects suggested.  There is   stable interbody hardware at L3-L4 and L4-L5.  The vertebral bodies   demonstrate stable marrow signal.    Spinal cord:  The conus is stable in appearance and unremarkable,   terminating at the L1 vertebral body level.  The cauda equina fibers are   stable in appearance without evidence for arachnoiditis.    Soft tissues:  No significant abnormal paraspinal soft tissue   abnormality identified.     DISCS SPINAL CANAL NEURAL FORAMINA:    L1-L2:  Unremarkable.  No significant disc disease.  No stenosis.    L2-L3:  L2-3: Similar disc space narrowing with posterior marginal   osteophyte complex noted at L2-3 level.  This mildly narrows the central   canal with preservation of the CSF collar.  Facet hypertrophic changes   suggested.  Mild bilateral neural foraminal encroachment, stable.    L3-L4:  Discectomy with hardware noted.  No central canal stenosis or   neural foraminal encroachment.    L4-L5:  Discectomy with hardware noted.  No central canal stenosis or   neural foraminal encroachment.    L5-S1:  Mild annular disc bulge.  No posterior disc protrusion or   central canal stenosis.  No neuroforaminal encroachment.  Bilateral facet   hypertrophic changes, stable.    IMPRESSION:       Stable postsurgical changes, as detailed above.  No significant central   canal stenosis noted.    Impression:          Electronically signed by Cuate Bonilla MD on 07-05-24 at 2346            Results for orders placed during the hospital encounter of 11/17/22    Adult Transthoracic Echo Complete w/ Color, Spectral and Contrast if Necessary Per  Protocol    Interpretation Summary    Left ventricular systolic function is normal. Calculated left ventricular EF = 69% Normal left ventricular cavity size and wall thickness noted. All left ventricular wall segments contract normally. Left ventricular diastolic function was normal.    No aortic valve regurgitation or stenosis is present. The aortic valve is abnormal in structure. There is moderate calcification of the aortic valve.    No mitral valve regurgitation or significant stenosis is present. Mild mitral annular calcification is present.    Trace tricuspid valve regurgitation is present. Insufficient TR velocity profile to estimate the right ventricular systolic pressure.      No orders to display        Assessment/Plan     Active Hospital Problems    Diagnosis  POA    **Bulge of lumbar disc without myelopathy [M51.36]  Yes    Alpha-1-antitrypsin deficiency [E88.01]  Yes    Factor V Leiden mutation [D68.51]  Yes    History of DVT of lower extremity [Z86.718]  Not Applicable    COPD (chronic obstructive pulmonary disease) [J44.9]  Yes    Spinal stenosis of lumbar region with neurogenic claudication [M48.062]  Yes    PAF (paroxysmal atrial fibrillation) [I48.0]  Yes    PAD (peripheral artery disease) [I73.9]  Yes      Resolved Hospital Problems   No resolved problems to display.       Ms. Nick Block is a 64 y.o. with a history of paroxysmal A-fib, hypertension, COPD, alpha-1 antitrypsin deficiency, history of DVT with factor V Leiden mutation who presents with acute on chronic back pain.    Spinal stenosis with lumbar claudication  -MRI reviewed, reports no significant canal stenosis  -Neurosurgery has been consulted; will follow-up plans  -Continue as needed pain medications    COPD  -Resume home inhalers, as needed albuterol nebs  -Not in exacerbation    Factor V Leiden mutation  History of DVT  -Takes Eliquis at home, Lovenox here pending surgical plans    PAD  -On Eliquis at home    PAF  -Continue  Dilt, on Eliquis at home but has not taken for the last 5 to 7 days  -Will do Lovenox here while awaiting neurosurgical plans    HTN  -Continue diltiazem    Alpha-1 antitrypsin antibody deficiency  - follows with pulm at [facility]   -Gets outpatient weekly infusions    I discussed the patient's findings and my recommendations with patient and nursing staff.    VTE Prophylaxis - Pharmacy to dose Lovenox.  Code Status - Full code.       Josefina Melendrez MD  [facility]  07/06/24  12:04 EDT      Electronically signed by Josefina Melendrez MD at 07/06/24 1204          Emergency Department Notes        Sonido Cherry, RN at 07/05/24 5395          Nursing report ED to floor  Radha Block  64 y.o.  female      Radha Block is a 64 y.o. female with a medical history of PAD, PAF, HTN, COPD, factor V Leiden who presents to the ED c/o acute lumbar pain with pain and weakness.  Patient been following with neurosurgery with plans for laminectomy in 2 to 3 weeks.  Patient states pain significantly worsened this week and has become nearly unbearable.  Patient additionally reports some urinary retention and numbness through the groin and right upper extremity.  Patient states the symptoms are new this week.  She has also noticed weakness in the right lower extremity.     HPI :  HPI (Adult)  Stated Reason for Visit: back pain  History Obtained From: patient    Chief Complaint  Chief Complaint   Patient presents with    Back Pain       Admitting doctor:   Jefferson Rangel MD    Admitting diagnosis:   The primary encounter diagnosis was Acute midline low back pain with right-sided sciatica. A diagnosis of Bulge of lumbar disc without myelopathy was also pertinent to this visit.    Code status:   Current Code Status       Date Active Code Status Order ID Comments User Context       Prior            Allergies:   Patient has no known allergies.    Isolation:   No active isolations    Intake and  Output  No intake or output data in the 24 hours ending 07/05/24 2249    Weight:       07/05/24  1541   Weight: 58.5 kg (129 lb)       Most recent vitals:   Vitals:    07/05/24 1631 07/05/24 1801 07/05/24 1831 07/05/24 1931   BP: 166/93 150/90 141/89 164/84   Pulse: 58 60 65 60   Resp:       Temp:       SpO2: 95% 96% 96% 95%   Weight:       Height:           Active LDAs/IV Access:   Lines, Drains & Airways       Active LDAs       Name Placement date Placement time Site Days    Peripheral IV 07/05/24 1555 Right Antecubital 07/05/24  1555  Antecubital  less than 1                    Labs (abnormal labs have a star):   Labs Reviewed   COMPREHENSIVE METABOLIC PANEL - Abnormal; Notable for the following components:       Result Value    Glucose 103 (*)     Sodium 146 (*)     BUN/Creatinine Ratio 26.9 (*)     Anion Gap 21.0 (*)     All other components within normal limits    Narrative:     GFR Normal >60  Chronic Kidney Disease <60  Kidney Failure <15     URINALYSIS W/ MICROSCOPIC IF INDICATED (NO CULTURE) - Abnormal; Notable for the following components:    Appearance, UA Cloudy (*)     Ketones, UA Trace (*)     Blood, UA Trace (*)     Leuk Esterase, UA Small (1+) (*)     All other components within normal limits   APTT - Abnormal; Notable for the following components:    PTT <20.0 (*)     All other components within normal limits   CBC WITH AUTO DIFFERENTIAL - Abnormal; Notable for the following components:    WBC 14.18 (*)     Hemoglobin 17.4 (*)     Hematocrit 51.8 (*)     MCV 98.5 (*)     MCH 33.1 (*)     Neutrophil % 76.5 (*)     Lymphocyte % 12.6 (*)     Eosinophil % 0.0 (*)     Immature Grans % 0.6 (*)     Neutrophils, Absolute 10.86 (*)     Monocytes, Absolute 1.44 (*)     Immature Grans, Absolute 0.08 (*)     All other components within normal limits   URINALYSIS, MICROSCOPIC ONLY - Abnormal; Notable for the following components:    WBC, UA 11-20 (*)     Bacteria, UA 4+ (*)     Squamous Epithelial Cells, UA  31-50 (*)     All other components within normal limits   PROTIME-INR - Normal   CBC AND DIFFERENTIAL    Narrative:     The following orders were created for panel order CBC & Differential.  Procedure                               Abnormality         Status                     ---------                               -----------         ------                     CBC Auto Differential[[id number]]        Abnormal            Final result                 Please view results for these tests on the individual orders.       EKG:   No orders to display       Meds given in ED:   Medications   HYDROmorphone (DILAUDID) injection 1 mg (1 mg Intravenous Given 7/5/24 1724)   diazePAM (VALIUM) injection 2.5 mg (2.5 mg Intravenous Given 7/5/24 2122)       Imaging results:  MRI Lumbar Spine Without Contrast    Result Date: 7/5/2024  Postsurgical changes of L3 and L4 wide laminectomies with interbody disc spacers L3-L5 and bilateral paired posterior vanessa and pedicle screws L3-L5. Very similar 4-5 mm of L2 on L3 retrolisthesis. L2-L3 asymmetric right disc bulge which has increased since January. Moderate L2-L3 spinal canal stenosis and severe right L2-L3 neuroforaminal stenosis with disc bulge contacting the exiting right L2 nerve root, both increased since January MRI.   This report was finalized on 7/5/2024 9:51 PM by Dr. Cuate Tran M.D on Workstation: [id number]       Ambulatory status:   - SUPERVISION    Social issues:   Social History     Socioeconomic History    Marital status:     Number of children: 1   Tobacco Use    Smoking status: Never    Smokeless tobacco: Never   Vaping Use    Vaping status: Never Used   Substance and Sexual Activity    Alcohol use: Yes     Alcohol/week: 3.0 standard drinks of alcohol     Types: 3 Glasses of wine per week     Comment: 2 times a week     Drug use: Never    Sexual activity: Defer       Peripheral Neurovascular  Peripheral Neurovascular (Adult)  Peripheral Neurovascular WDL:  .WDL except, neurovascular assessment lower, pulse assessment  Pulse Assessment: dorsalis pedis  LLE Neurovascular Assessment  Temperature LLE: warm  Color LLE: no discoloration  Sensation LLE: tingling present  RLE Neurovascular Assessment  Temperature RLE: warm  Color RLE: no discoloration  Sensation RLE: tingling present    Neuro Cognitive  Neuro Cognitive (Adult)  Cognitive/Neuro/Behavioral WDL: WDL, orientation  Orientation: oriented x 4    Learning  Learning Assessment (Adult)  Learning Readiness and Ability: no barriers identified  Education Provided  Person Taught: patient  Teaching Method: verbal instruction  Teaching Focus: symptom/problem overview  Education Outcome Evaluation: eager to learn, acceptance expressed, verbalizes understanding    Respiratory  Respiratory WDL  Respiratory WDL: WDL    Abdominal Pain       Pain Assessments  Pain (Adult)  (0-10) Pain Rating: Rest: 8  Pain Location: back  Pain Side/Orientation: lower  Response to Pain Interventions: intervention not effective per patient    NIH Stroke Scale       Sonido Cherry RN  07/05/24 22:49 EDT      Electronically signed by Sonido Cherry RN at 07/05/24 4600       Babs Masters PA-C at 07/05/24 2015       Attestation signed by Cuate Hunt MD at 07/06/24 7322        SHARED APC FACE TO FACE: I performed a substantive part of the MDM during the patient's E/M visit. I personally evaluated and examined the patient. I personally made or approved the documented management plan and acknowledge its risk of complications.   Cuate Hunt MD 7/6/2024 22:19 EDT                         EMERGENCY DEPARTMENT ENCOUNTER    Room number:  33/33  Date Seen:  7/5/2024  PCP:  Bosler, James William III, MD    Laboratory Results:  Recent Results (from the past 24 hour(s))   Comprehensive Metabolic Panel    Collection Time: 07/05/24  3:55 PM    Specimen: Blood   Result Value Ref Range    Glucose 103 (H) 65 - 99 mg/dL    BUN 18 8 - 23 mg/dL     Creatinine 0.67 0.57 - 1.00 mg/dL    Sodium 146 (H) 136 - 145 mmol/L    Potassium 3.8 3.5 - 5.2 mmol/L    Chloride 103 98 - 107 mmol/L    CO2 22.0 22.0 - 29.0 mmol/L    Calcium 9.1 8.6 - 10.5 mg/dL    Total Protein 6.6 6.0 - 8.5 g/dL    Albumin 4.3 3.5 - 5.2 g/dL    ALT (SGPT) 27 1 - 33 U/L    AST (SGOT) 17 1 - 32 U/L    Alkaline Phosphatase 43 39 - 117 U/L    Total Bilirubin 1.1 0.0 - 1.2 mg/dL    Globulin 2.3 gm/dL    A/G Ratio 1.9 g/dL    BUN/Creatinine Ratio 26.9 (H) 7.0 - 25.0    Anion Gap 21.0 (H) 5.0 - 15.0 mmol/L    eGFR 96.5 >60.0 mL/min/1.73   Protime-INR    Collection Time: 07/05/24  3:55 PM    Specimen: Blood   Result Value Ref Range    Protime 12.9 11.7 - 14.2 Seconds    INR 0.95 0.90 - 1.10   aPTT    Collection Time: 07/05/24  3:55 PM    Specimen: Blood   Result Value Ref Range    PTT <20.0 (L) 22.7 - 35.4 seconds   CBC Auto Differential    Collection Time: 07/05/24  3:55 PM    Specimen: Blood   Result Value Ref Range    WBC 14.18 (H) 3.40 - 10.80 10*3/mm3    RBC 5.26 3.77 - 5.28 10*6/mm3    Hemoglobin 17.4 (H) 12.0 - 15.9 g/dL    Hematocrit 51.8 (H) 34.0 - 46.6 %    MCV 98.5 (H) 79.0 - 97.0 fL    MCH 33.1 (H) 26.6 - 33.0 pg    MCHC 33.6 31.5 - 35.7 g/dL    RDW 12.3 12.3 - 15.4 %    RDW-SD 44.1 37.0 - 54.0 fl    MPV 10.4 6.0 - 12.0 fL    Platelets 291 140 - 450 10*3/mm3    Neutrophil % 76.5 (H) 42.7 - 76.0 %    Lymphocyte % 12.6 (L) 19.6 - 45.3 %    Monocyte % 10.2 5.0 - 12.0 %    Eosinophil % 0.0 (L) 0.3 - 6.2 %    Basophil % 0.1 0.0 - 1.5 %    Immature Grans % 0.6 (H) 0.0 - 0.5 %    Neutrophils, Absolute 10.86 (H) 1.70 - 7.00 10*3/mm3    Lymphocytes, Absolute 1.78 0.70 - 3.10 10*3/mm3    Monocytes, Absolute 1.44 (H) 0.10 - 0.90 10*3/mm3    Eosinophils, Absolute 0.00 0.00 - 0.40 10*3/mm3    Basophils, Absolute 0.02 0.00 - 0.20 10*3/mm3    Immature Grans, Absolute 0.08 (H) 0.00 - 0.05 10*3/mm3    nRBC 0.0 0.0 - 0.2 /100 WBC   Urinalysis With Microscopic If Indicated (No Culture) - Urine, Clean Catch     Collection Time: 07/05/24  4:19 PM    Specimen: Urine, Clean Catch   Result Value Ref Range    Color, UA Yellow Yellow, Straw    Appearance, UA Cloudy (A) Clear    pH, UA 7.0 5.0 - 8.0    Specific Gravity, UA 1.018 1.005 - 1.030    Glucose, UA Negative Negative    Ketones, UA Trace (A) Negative    Bilirubin, UA Negative Negative    Blood, UA Trace (A) Negative    Protein, UA Negative Negative    Leuk Esterase, UA Small (1+) (A) Negative    Nitrite, UA Negative Negative    Urobilinogen, UA 0.2 E.U./dL 0.2 - 1.0 E.U./dL   Urinalysis, Microscopic Only - Urine, Clean Catch    Collection Time: 07/05/24  4:19 PM    Specimen: Urine, Clean Catch   Result Value Ref Range    RBC, UA 0-2 None Seen, 0-2 /HPF    WBC, UA 11-20 (A) None Seen, 0-2 /HPF    Bacteria, UA 4+ (A) None Seen /HPF    Squamous Epithelial Cells, UA 31-50 (A) None Seen, 0-2 /HPF    Hyaline Casts, UA 3-6 None Seen /LPF    Methodology Automated Microscopy      I reviewed the above results.    Radiology:  MRI Lumbar Spine Without Contrast    Result Date: 7/5/2024  MRI OF THE LUMBAR SPINE WITHOUT CONTRAST  CLINICAL HISTORY: Low back pain with right lower extremity pain and weakness and urinary retention.  TECHNIQUE: MRI of the lumbar spine was obtained with sagittal T1, proton-density, and T2-weighted images. Additionally, there are axial T1 and T2-weighted images through the lumbar spine.  COMPARISON: CT lumbar spine myelogram 4/4/2024. Lumbar spine MR 1/31/2024  FINDINGS: Exam is partially limited by artifact from surgical hardware.  Segmentation: Normal segmentation with 5 nonrib-bearing lumbar-type vertebrae.  Spinal cord: Normal distal cord signal and intrinsic volume. Normal cauda equina nerve roots. No thickening, clumping or nodularity.  Conus terminates: L1-L2  Discs: Moderate disc degeneration L2-L3 with associated asymmetric right disc bulge which has increased since January.  Bones: Vertebral body heights are maintained. Very similar 4-5 mm of L2 on  L3 retrolisthesis. Postsurgical changes of L3 and L4 wide laminectomies with interbody disc spacers L3-L5 and paired bilateral posterior vanessa and pedicle screws L3-L5.  T12-L1: No spinal canal or neuroforaminal stenosis.  L1-L2: No spinal canal or neuroforaminal stenosis.  L2-L3: Moderate spinal canal stenosis secondary to L2 on L3 retrolisthesis, diffuse disc bulge and ligamentum flavum hypertrophy (series 9/images 14 through 18). Similar severe right neuroforaminal stenosis secondary to disc and facet joint degeneration with the disc bulge contacting the exiting right L2 nerve root. Mild left neuroforaminal stenosis secondary to disc and facet joint degeneration. Spinal canal and right neuroforaminal stenosis are increased since January.  L3-L4: No spinal canal or neuroforaminal stenosis.  L4-L5: No spinal canal or neuroforaminal stenosis.  L5-S1: No spinal canal or neuroforaminal stenosis.  Sacroiliac joints: Normal where visualized.  Soft Tissues: Unremarkable.        Postsurgical changes of L3 and L4 wide laminectomies with interbody disc spacers L3-L5 and bilateral paired posterior vanessa and pedicle screws L3-L5. Very similar 4-5 mm of L2 on L3 retrolisthesis. L2-L3 asymmetric right disc bulge which has increased since January. Moderate L2-L3 spinal canal stenosis and severe right L2-L3 neuroforaminal stenosis with disc bulge contacting the exiting right L2 nerve root, both increased since January MRI.   This report was finalized on 7/5/2024 9:51 PM by Dr. Cuate Tran M.D on Workstation: [id number]       I reviewed the above results    Medications ordered in ED:  Medications   HYDROmorphone (DILAUDID) injection 1 mg (1 mg Intravenous Given 7/5/24 1724)       ED Course as of 07/05/24 2316   Fri Jul 05, 2024   1716 64-year-old female presenting for evaluation of lumbar back pain with right lower extremity pain and weakness.  Patient notes sensory changes through her groin as well as urinary retention.   Laboratory evaluation is notable for leukocytosis of 14 which is consistent with patient's current use of steroids.  Urinalysis with 4+ bacteria however sample appears contaminated and unlikely to represent UTI.  Given patient's new symptoms I think an emergent MRI is warranted at this time.  Will treat pain with IV narcotics and plan on admission with neurosurgical consultation [MW]   2010 Patient was turned over to me by Dr. Hunt.  Pending: MRI and dispo   [CC]   2156 I rechecked the patient.  I discussed the patient's labs, radiology findings (including all incidental findings), diagnosis, and plan for admission. The patient understands and agrees with the plan.   [CC]   2247 Spoke with Jw, APC with A.  Reviewed history, exam, results, treatments.  She agrees admit the patient to Dr. Rangel.      [CC]      ED Course User Index  [CC] Babs Masters PA-C  [MW] Cuate Hunt MD       Diagnosis:  Final diagnoses:   Acute midline low back pain with right-sided sciatica             Provider attestation:  I personally reviewed the past medical history, past surgical history, social history, family history, current medications, and allergies as they appear in the chart.    The patient was seen and examined by myself and Dr. Hunt, who agree with plan.      Babs Masters PA-C  07/05/24 2312      Electronically signed by Cuate Hunt MD at 07/06/24 5398       Cuate Hunt MD at 07/05/24 1542           EMERGENCY DEPARTMENT ENCOUNTER  Room Number:  33/33  PCP: Bosler, James William III, MD  Independent Historians: Patient      HPI:  Chief Complaint: had concerns including Back Pain.       Context: Radha Block is a 64 y.o. female with a medical history of PAD, PAF, HTN, COPD, factor V Leiden who presents to the ED c/o acute lumbar pain with pain and weakness.  Patient been following with neurosurgery with plans for laminectomy in 2 to 3 weeks.  Patient states pain significantly  worsened this week and has become nearly unbearable.  Patient additionally reports some urinary retention and numbness through the groin and right upper extremity.  Patient states the symptoms are new this week.  She has also noticed weakness in the right lower extremity.      Review of prior external notes (non-ED) -and- Review of prior external test results outside of this encounter: Office visit with Dr. Irene of neurosurgery from 4/11/2024 reviewed and notable for presentation secondary to back pain.  Plan at that time was to extend the fusion up another level.  Plan at that time was to schedule lumbar 2 to lumbar 3 laminectomy with fusion and instrumentation.    Prescription drug monitoring program review:         PAST MEDICAL HISTORY  Active Ambulatory Problems     Diagnosis Date Noted    Shortness of breath 11/25/2020    Chest pain 11/25/2020    Palpitations 11/25/2020    PAD (peripheral artery disease) 11/25/2020    PAF (paroxysmal atrial fibrillation) 11/25/2020    Essential hypertension 08/24/2021    Spinal stenosis of lumbar region with neurogenic claudication 08/18/2022    COPD (chronic obstructive pulmonary disease)     Factor V Leiden mutation 09/26/2022    History of DVT of lower extremity 09/26/2022    Neuritis or radiculitis due to rupture of lumbar intervertebral disc 02/20/2024     Resolved Ambulatory Problems     Diagnosis Date Noted    No Resolved Ambulatory Problems     Past Medical History:   Diagnosis Date    Arrhythmia     Arthritis     Clotting disorder     Deep vein thrombosis     Mitral valve prolapse          PAST SURGICAL HISTORY  Past Surgical History:   Procedure Laterality Date    BLADDER SURGERY  2013    E/O polyp    BREAST SURGERY      E/O benign neuroma    CARDIAC ELECTROPHYSIOLOGY PROCEDURE N/A 10/10/2022    Procedure: Ablation atrial fibrillation CRYO;  Surgeon: Venkatesh Iqbal MD;  Location: [facility] CATH INVASIVE LOCATION;  Service: Cardiovascular;  Laterality: N/A;     COLONOSCOPY  2016    HAND SURGERY Right     Carpal metacarpal hand surgery     LUMBAR FUSION N/A 12/30/2022    Procedure: Lumbar 3 to lumbar 4 and lumbar 4 to lumbar 5 laminectomy with fusion and instrumentation;  Surgeon: Rigo Mathur MD;  Location: [facility];  Service: Robotics - Neuro;  Laterality: N/A;    SINUS SURGERY  2017    TONSILLECTOMY           FAMILY HISTORY  Family History   Adopted: Yes   Problem Relation Age of Onset    Malig Hyperthermia Neg Hx          SOCIAL HISTORY  Social History     Socioeconomic History    Marital status:     Number of children: 1   Tobacco Use    Smoking status: Never    Smokeless tobacco: Never   Vaping Use    Vaping status: Never Used   Substance and Sexual Activity    Alcohol use: Yes     Alcohol/week: 3.0 standard drinks of alcohol     Types: 3 Glasses of wine per week     Comment: 2 times a week     Drug use: Never    Sexual activity: Defer         ALLERGIES  Patient has no known allergies.      REVIEW OF SYSTEMS  Review of Systems  Included in HPI  All systems reviewed and negative except for those discussed in HPI.      PHYSICAL EXAM    I have reviewed the triage vital signs and nursing notes.    ED Triage Vitals [07/05/24 1541]   Temp Heart Rate Resp BP SpO2   99.2 °F (37.3 °C) 84 18 -- 98 %      Temp src Heart Rate Source Patient Position BP Location FiO2 (%)   -- -- -- -- --       Physical Exam  GENERAL: alert, no acute distress  SKIN: Warm, dry  HENT: Normocephalic, atraumatic  EYES: no scleral icterus  CV: regular rhythm, regular rate  RESPIRATORY: normal effort, lungs clear  ABDOMEN: soft, nontender, nondistended  MUSCULOSKELETAL: no deformity  NEURO: alert, moves all extremities, follows commands.  Weakness in the right lower extremity with no sensory deficits.  Pulses noted to be intact at DP and PT            LAB RESULTS  Recent Results (from the past 24 hour(s))   Comprehensive Metabolic Panel    Collection Time: 07/05/24  3:55 PM    Specimen:  Blood   Result Value Ref Range    Glucose 103 (H) 65 - 99 mg/dL    BUN 18 8 - 23 mg/dL    Creatinine 0.67 0.57 - 1.00 mg/dL    Sodium 146 (H) 136 - 145 mmol/L    Potassium 3.8 3.5 - 5.2 mmol/L    Chloride 103 98 - 107 mmol/L    CO2 22.0 22.0 - 29.0 mmol/L    Calcium 9.1 8.6 - 10.5 mg/dL    Total Protein 6.6 6.0 - 8.5 g/dL    Albumin 4.3 3.5 - 5.2 g/dL    ALT (SGPT) 27 1 - 33 U/L    AST (SGOT) 17 1 - 32 U/L    Alkaline Phosphatase 43 39 - 117 U/L    Total Bilirubin 1.1 0.0 - 1.2 mg/dL    Globulin 2.3 gm/dL    A/G Ratio 1.9 g/dL    BUN/Creatinine Ratio 26.9 (H) 7.0 - 25.0    Anion Gap 21.0 (H) 5.0 - 15.0 mmol/L    eGFR 96.5 >60.0 mL/min/1.73   Protime-INR    Collection Time: 07/05/24  3:55 PM    Specimen: Blood   Result Value Ref Range    Protime 12.9 11.7 - 14.2 Seconds    INR 0.95 0.90 - 1.10   aPTT    Collection Time: 07/05/24  3:55 PM    Specimen: Blood   Result Value Ref Range    PTT <20.0 (L) 22.7 - 35.4 seconds   CBC Auto Differential    Collection Time: 07/05/24  3:55 PM    Specimen: Blood   Result Value Ref Range    WBC 14.18 (H) 3.40 - 10.80 10*3/mm3    RBC 5.26 3.77 - 5.28 10*6/mm3    Hemoglobin 17.4 (H) 12.0 - 15.9 g/dL    Hematocrit 51.8 (H) 34.0 - 46.6 %    MCV 98.5 (H) 79.0 - 97.0 fL    MCH 33.1 (H) 26.6 - 33.0 pg    MCHC 33.6 31.5 - 35.7 g/dL    RDW 12.3 12.3 - 15.4 %    RDW-SD 44.1 37.0 - 54.0 fl    MPV 10.4 6.0 - 12.0 fL    Platelets 291 140 - 450 10*3/mm3    Neutrophil % 76.5 (H) 42.7 - 76.0 %    Lymphocyte % 12.6 (L) 19.6 - 45.3 %    Monocyte % 10.2 5.0 - 12.0 %    Eosinophil % 0.0 (L) 0.3 - 6.2 %    Basophil % 0.1 0.0 - 1.5 %    Immature Grans % 0.6 (H) 0.0 - 0.5 %    Neutrophils, Absolute 10.86 (H) 1.70 - 7.00 10*3/mm3    Lymphocytes, Absolute 1.78 0.70 - 3.10 10*3/mm3    Monocytes, Absolute 1.44 (H) 0.10 - 0.90 10*3/mm3    Eosinophils, Absolute 0.00 0.00 - 0.40 10*3/mm3    Basophils, Absolute 0.02 0.00 - 0.20 10*3/mm3    Immature Grans, Absolute 0.08 (H) 0.00 - 0.05 10*3/mm3    nRBC 0.0 0.0 -  0.2 /100 WBC   Urinalysis With Microscopic If Indicated (No Culture) - Urine, Clean Catch    Collection Time: 07/05/24  4:19 PM    Specimen: Urine, Clean Catch   Result Value Ref Range    Color, UA Yellow Yellow, Straw    Appearance, UA Cloudy (A) Clear    pH, UA 7.0 5.0 - 8.0    Specific Gravity, UA 1.018 1.005 - 1.030    Glucose, UA Negative Negative    Ketones, UA Trace (A) Negative    Bilirubin, UA Negative Negative    Blood, UA Trace (A) Negative    Protein, UA Negative Negative    Leuk Esterase, UA Small (1+) (A) Negative    Nitrite, UA Negative Negative    Urobilinogen, UA 0.2 E.U./dL 0.2 - 1.0 E.U./dL   Urinalysis, Microscopic Only - Urine, Clean Catch    Collection Time: 07/05/24  4:19 PM    Specimen: Urine, Clean Catch   Result Value Ref Range    RBC, UA 0-2 None Seen, 0-2 /HPF    WBC, UA 11-20 (A) None Seen, 0-2 /HPF    Bacteria, UA 4+ (A) None Seen /HPF    Squamous Epithelial Cells, UA 31-50 (A) None Seen, 0-2 /HPF    Hyaline Casts, UA 3-6 None Seen /LPF    Methodology Automated Microscopy          RADIOLOGY  No Radiology Exams Resulted Within Past 24 Hours      MEDICATIONS GIVEN IN ER  Medications   HYDROmorphone (DILAUDID) injection 1 mg (1 mg Intravenous Given 7/5/24 1724)         ORDERS PLACED DURING THIS VISIT:  Orders Placed This Encounter   Procedures    MRI Lumbar Spine Without Contrast    Comprehensive Metabolic Panel    Urinalysis With Microscopic If Indicated (No Culture) - Urine, Clean Catch    Protime-INR    aPTT    CBC Auto Differential    Urinalysis, Microscopic Only - Urine, Clean Catch    CBC & Differential         OUTPATIENT MEDICATION MANAGEMENT:  No current Epic-ordered facility-administered medications on file.     Current Outpatient Medications Ordered in Epic   Medication Sig Dispense Refill    Acetaminophen 325 MG capsule Take 2 capsules by mouth 4 (Four) Times a Day As Needed.      albuterol sulfate  (90 Base) MCG/ACT inhaler As Needed for Wheezing.      alpha1-proteinase  inhibitor (Zemaira) 1000 MG injection 1 (One) Time Per Week.      betamethasone, augmented, (DIPROLENE) 0.05 % cream Apply 1 application twice a day by topical route as directed.      desonide (DESOWEN) 0.05 % cream As Needed.      desvenlafaxine (PRISTIQ) 50 MG 24 hr tablet Take 12.5 mg by mouth As Needed.      Diclofenac Epolamine (FLECTOR) 1.3 % patch patch As Needed.      dilTIAZem CD (CARDIZEM CD) 180 MG 24 hr capsule Take 1 capsule by mouth Daily. 30 capsule 11    Eliquis 5 MG tablet tablet TAKE 1 TABLET BY MOUTH TWICE  DAILY (Patient taking differently: 1 tablet Every 12 (Twelve) Hours.) 180 tablet 3    EPINEPHrine (EPIPEN) 0.3 MG/0.3ML solution auto-injector injection As Needed.      esomeprazole (nexIUM) 40 MG capsule Take 1 capsule by mouth Daily.      estradiol (MINIVELLE, VIVELLE-DOT) 0.075 MG/24HR patch Place 1 patch on the skin as directed by provider 2 (Two) Times a Week.      Fluticasone-Umeclidin-Vilant (Trelegy Ellipta) 100-62.5-25 MCG/INH aerosol powder  Inhale Daily.      guaiFENesin (MUCINEX) 600 MG 12 hr tablet As Needed.      HYDROcodone-acetaminophen (NORCO) 7.5-325 MG per tablet Take  by mouth See Admin Instructions.      hydrocortisone 2.5 % cream As Needed.      lidocaine-prilocaine (EMLA) 2.5-2.5 % cream As Needed.      methylPREDNISolone (MEDROL) 4 MG dose pack Take as directed on package instructions. 1 each 0    ondansetron (ZOFRAN) 4 MG tablet As Needed.      Progesterone (PROMETRIUM) 200 MG capsule Daily.      tretinoin (RETIN-A) 0.1 % cream As Needed.      valACYclovir (VALTREX) 1000 MG tablet As Needed.           PROCEDURES  Procedures            PROGRESS, DATA ANALYSIS, CONSULTS, AND MEDICAL DECISION MAKING  All labs have been independently interpreted by me.  All radiology studies have been reviewed by me. All EKG's have been independently viewed and interpreted by me.  Discussion below represents my analysis of pertinent findings related to patient's condition, differential  diagnosis, treatment plan and final disposition.    Differential diagnosis includes but is not limited to lumbar radiculopathy, cauda equina syndrome, sciatica.    Clinical Scores:                   ED Course as of 07/06/24 2041 Fri Jul 05, 2024 1716 64-year-old female presenting for evaluation of lumbar back pain with right lower extremity pain and weakness.  Patient notes sensory changes through her groin as well as urinary retention.  Laboratory evaluation is notable for leukocytosis of 14 which is consistent with patient's current use of steroids.  Urinalysis with 4+ bacteria however sample appears contaminated and unlikely to represent UTI.  Given patient's new symptoms I think an emergent MRI is warranted at this time.  Will treat pain with IV narcotics and plan on admission with neurosurgical consultation [MW]   2010 Patient was turned over to me by Dr. Hunt.  Pending: MRI and dispo   [CC]   2156 I rechecked the patient.  I discussed the patient's labs, radiology findings (including all incidental findings), diagnosis, and plan for admission. The patient understands and agrees with the plan.   [CC]   2246 Spoke with Jw APC with A.  Reviewed history, exam, results, treatments.  She agrees admit the patient to Dr. Rangel.      [CC]      ED Course User Index  [CC] Babs Masters PA-C  [MW] Cuate Hunt MD             AS OF 20:10 EDT VITALS:    BP - 164/84  HR - 60  TEMP - 99.2 °F (37.3 °C)  O2 SATS - 95%    COMPLEXITY OF CARE  The patient requires admission.      DIAGNOSIS  Final diagnoses:   Acute midline low back pain with right-sided sciatica   Bulge of lumbar disc without myelopathy         DISPOSITION  ED Disposition       ED Disposition   Intended Admit    Condition   --    Comment   --                Please note that portions of this document were completed with a voice recognition program.    Note Disclaimer: At [facility], we believe that sharing information builds trust and  better relationships. You are receiving this note because you recently visited [facility]. It is possible you will see health information before a provider has talked with you about it. This kind of information can be easy to misunderstand. To help you fully understand what it means for your health, we urge you to discuss this note with your provider.         Cuate Hunt MD  07/06/24 2042      Electronically signed by Cuate Hunt MD at 07/06/24 2042       Shane Orona RN at 07/05/24 1540          Pt with back surgery scheduled later this month but pain has worsened significantly, denies new injury, unable to sit d/t pain    Electronically signed by Shane Orona RN at 07/05/24 1541       Medication Administration Report for Radha Lowe as of 7/8/24 through 7/8/24     Legend:    Given Hold Not Given Due Canceled Entry Other Actions    Time Time (Time) Time Time-Action         Discontinued     Completed     Future     MAR Hold     Linked             Medications 07/08/24     acetaminophen (TYLENOL) tablet 1,000 mg  Dose: 1,000 mg  Freq: Every 8 Hours Route: PO  Start: 07/07/24 1400     Admin Instructions:   Based on patient request - if ordered for moderate or severe pain, provider allows for administration of a medication prescribed for a lower pain scale.    Do not exceed 4 grams of acetaminophen in a 24 hr period. Max dose of 2gm for AST/ALT greater than 120 units/L.    If given for pain, use the following pain scale:   Mild Pain = Pain Score of 1-3, CPOT 1-2  Moderate Pain = Pain Score of 4-6, CPOT 3-4  Severe Pain = Pain Score of 7-10, CPOT 5-8      0554-Given     1520-Given     2200                 albuterol (PROVENTIL) nebulizer solution 0.083% 2.5 mg/3mL  Dose: 2.5 mg  Freq: Every 6 Hours PRN Route: NEBULIZATION  PRN Reasons: Shortness of Air,Wheezing  Start: 07/06/24 0739         aluminum-magnesium hydroxide-simethicone (MAALOX MAX) 400-400-40 MG/5ML suspension 15  mL  Dose: 15 mL  Freq: Every 6 Hours PRN Route: PO  PRN Reason: Heartburn  Start: 07/06/24 0034     Admin Instructions:   Maximum 60 mL in 24 hours.          sennosides-docusate (PERICOLACE) 8.6-50 MG per tablet 2 tablet  Dose: 2 tablet  Freq: 2 Times Daily PRN Route: PO  PRN Reason: Constipation  Start: 07/06/24 0034     Admin Instructions:   Start bowel management regimen if patient has not had a bowel movement after 12 hours.         And   polyethylene glycol (MIRALAX) packet 17 g  Dose: 17 g  Freq: Daily PRN Route: PO  PRN Reason: Constipation  PRN Comment: Use if senna-docusate is ineffective  Start: 07/06/24 0034     Admin Instructions:   Use if no bowel movement after 12 hours. Mix in 6-8 ounces of water.  Use 4-8 ounces of water, tea, or juice for each 17 gram dose.         And   bisacodyl (DULCOLAX) EC tablet 5 mg  Dose: 5 mg  Freq: Daily PRN Route: PO  PRN Reason: Constipation  PRN Comment: Use if polyethylene glycol is ineffective  Start: 07/06/24 0034     Admin Instructions:   Use if no bowel movement after 12 hours.  Swallow whole. Do not crush, split, or chew tablet.         And   bisacodyl (DULCOLAX) suppository 10 mg  Dose: 10 mg  Freq: Daily PRN Route: RE  PRN Reason: Constipation  PRN Comment: Use if bisacodyl oral is ineffective  Start: 07/06/24 0034     Admin Instructions:   Use if no bowel movement after 12 hours.  Hold for diarrhea          budesonide-formoterol (SYMBICORT) 160-4.5 MCG/ACT inhaler 2 puff  Dose: 2 puff  Freq: 2 Times Daily - RT Route: IN  Start: 07/06/24 0930     Admin Instructions:    Shake well.  Rinse mouth after use, do not swallow water.  Send aerosols to pharmacy in ziplock bag for proper disposal.      0731-Given     2130                  And   tiotropium (SPIRIVA RESPIMAT) 2.5 mcg/act aerosol solution inhaler  Dose: 2 puff  Freq: Daily - RT Route: IN  Start: 07/06/24 0930      0731-Given                   Desvenlafaxine Succinate ER 25 mg  Dose: 25 mg  Freq: Daily Route:  PO  Start: 07/07/24 0900     Admin Instructions:   Do not crush or chew the capsules or tablets. The drug may not work as designed if the capsule or tablet is crushed or chewed. Swallow whole.  Swallow whole.  Do not crush, chew, or split.      0842-Given                   dilTIAZem CD (CARDIZEM CD) 24 hr capsule 180 mg  Dose: 180 mg  Freq: Daily Route: PO  Start: 07/06/24 0900     Admin Instructions:   Hold for SBP less than 100, DBP less than 60, or heart rate less than 50    Do not crush or chew the capsules or tablets. The drug may not work as designed if the capsule or tablet is crushed or chewed. Swallow whole.  Caution: Look alike/sound alike drug alert.   Swallow capsule whole. Do not crush, chew, or open capsule. Avoid grapefruit juice. Maximum simvastatin dose 10 mg while taking dilTIAZem.      0841-Given                   Enoxaparin Sodium (LOVENOX) syringe 60 mg  Dose: 1 mg/kg  Weight Dosing Info: 58.5 kg  Freq: Every 12 Hours Route: SC  Indications of Use: Other - full anticoagulation  Indications Comment: Factor V leiden, h/o DVT - normally on Eliquis pre-op  Start: 07/06/24 0845     Admin Instructions:   Give subcutaneous in abdomen only. Do not massage site after injection.      0842-Given     1217-Held by provider     2045-Held by provider                 gabapentin (NEURONTIN) capsule 100 mg  Dose: 100 mg  Freq: 3 times daily Route: PO  Start: 07/07/24 1500     Admin Instructions:         0841-Given     1520-Given     2100                 HYDROcodone-acetaminophen (NORCO) 5-325 MG per tablet 1 tablet  Dose: 1 tablet  Freq: Every 4 Hours PRN Route: PO  PRN Reason: Moderate Pain  Start: 07/08/24 1221 End: 07/13/24 1220     Admin Instructions:   Based on patient request - if ordered for moderate or severe pain, provider allows for administration of a medication prescribed for a lower pain scale.  [ERIC]    Do not exceed 4 grams of acetaminophen in a 24 hr period. Max dose of 2gm for AST/ALT greater  than 120 units/L        If given for pain, use the following pain scale:   Mild Pain = Pain Score of 1-3, CPOT 1-2  Moderate Pain = Pain Score of 4-6, CPOT 3-4  Severe Pain = Pain Score of 7-10, CPOT 5-8         methocarbamol (ROBAXIN) tablet 500 mg  Dose: 500 mg  Freq: Every 8 Hours Scheduled Route: PO  Start: 07/07/24 1400      0554-Given     1521-Given     2200                 methylPREDNISolone sodium succinate (SOLU-Medrol) injection 80 mg  Dose: 80 mg  Freq: Every 8 Hours Route: IV  Start: 07/08/24 1600     Admin Instructions:   Caution: Look alike/sound alike drug alert      1521-Given                   morphine injection 1 mg  Dose: 1 mg  Freq: Every 3 Hours PRN Route: IV  PRN Reason: Severe Pain  PRN Comment: breakthrough pain after orals tried  Start: 07/08/24 1222 End: 07/16/24 0033     Admin Instructions:   If given for pain, use the following pain scale:  Mild Pain = Pain Score of 1-3, CPOT 1-2  Moderate Pain = Pain Score of 4-6, CPOT 3-4  Severe Pain = Pain Score of 7-10, CPOT 5-8          ondansetron ODT (ZOFRAN-ODT) disintegrating tablet 4 mg  Dose: 4 mg  Freq: Every 6 Hours PRN Route: PO  PRN Reasons: Nausea,Vomiting  Start: 07/06/24 0034     Admin Instructions:   If BOTH ondansetron (ZOFRAN) and promethazine (PHENERGAN) are ordered use ondansetron first and THEN promethazine IF ondansetron is ineffective.  Place on tongue and allow to dissolve.         Or   ondansetron (ZOFRAN) injection 4 mg  Dose: 4 mg  Freq: Every 6 Hours PRN Route: IV  PRN Reasons: Nausea,Vomiting  Start: 07/06/24 0034     Admin Instructions:   If BOTH ondansetron (ZOFRAN) and promethazine (PHENERGAN) are ordered use ondansetron first and THEN promethazine IF ondansetron is ineffective.         oxyCODONE (ROXICODONE) immediate release tablet 7.5 mg  Dose: 7.5 mg  Freq: Every 4 Hours PRN Route: PO  PRN Reason: Severe Pain  Start: 07/08/24 1221 End: 07/12/24 0838     Admin Instructions:   Based on patient request - if ordered for  moderate or severe pain, provider allows for administration of a medication prescribed for a lower pain scale.      If given for pain, use the following pain scale:  Mild Pain = Pain Score of 1-3, CPOT 1-2  Moderate Pain = Pain Score of 4-6, CPOT 3-4  Severe Pain = Pain Score of 7-10, CPOT 5-8         pantoprazole (PROTONIX) EC tablet 40 mg  Dose: 40 mg  Freq: Every Early Morning Route: PO  Start: 07/06/24 0830     Admin Instructions:   Swallow whole; do not crush, split, or chew.      0554-Given                   sodium chloride 0.9 % flush 10 mL  Dose: 10 mL  Freq: As Needed Route: IV  PRN Reason: Line Care  Start: 07/06/24 0033         Completed Medications  Medications 07/08/24      Desvenlafaxine Succinate ER 25 mg  Dose: 25 mg  Freq: Once Route: PO  Start: 07/06/24 2045 End: 07/06/24 2206     Admin Instructions:   Do not crush or chew the capsules or tablets. The drug may not work as designed if the capsule or tablet is crushed or chewed. Swallow whole.  Swallow whole.  Do not crush, chew, or split.         diazePAM (VALIUM) injection 2.5 mg  Dose: 2.5 mg  Freq: Once Route: IV  Start: 07/05/24 2132 End: 07/05/24 2122     Admin Instructions:    May give each 5 mg IV push over 1 minute. May be injected through infusion tubing using port closest to vein insertion. Do not mix or dilute with other solutions.         HYDROmorphone (DILAUDID) injection 1 mg  Dose: 1 mg  Freq: Once Route: IV  Start: 07/05/24 1703 End: 07/05/24 1724     Admin Instructions:   Based on patient request - if ordered for moderate or severe pain, provider allows for administration of a medication prescribed for a lower pain scale.      Caution: Look alike/sound alike drug alert    If given for pain, use the following pain scale:  Mild Pain = Pain Score of 1-3, CPOT 1-2  Moderate Pain = Pain Score of 4-6, CPOT 3-4  Severe Pain = Pain Score of 7-10, CPOT 5-8         Discontinued Medications  Medications 07/08/24       acetaminophen (TYLENOL)  tablet 650 mg  Dose: 650 mg  Freq: Every 4 Hours PRN Route: PO  PRN Reason: Mild Pain  Start: 07/06/24 0034 End: 07/07/24 0840     Admin Instructions:   If given for fever, use fever parameter: fever greater than 100.4 °F  Based on patient request - if ordered for moderate or severe pain, provider allows for administration of a medication prescribed for a lower pain scale.    Do not exceed 4 grams of acetaminophen in a 24 hr period. Max dose of 2gm for AST/ALT greater than 120 units/L.    If given for pain, use the following pain scale:   Mild Pain = Pain Score of 1-3, CPOT 1-2  Moderate Pain = Pain Score of 4-6, CPOT 3-4  Severe Pain = Pain Score of 7-10, CPOT 5-8         Or   acetaminophen (TYLENOL) 160 MG/5ML oral solution 650 mg  Dose: 650 mg  Freq: Every 4 Hours PRN Route: PO  PRN Reason: Mild Pain  Start: 07/06/24 0034 End: 07/07/24 0840     Admin Instructions:   If given for fever, use fever parameter: fever greater than 100.4 °F  Based on patient request - if ordered for moderate or severe pain, provider allows for administration of a medication prescribed for a lower pain scale.    Do not exceed 4 grams of acetaminophen in a 24 hr period. Max dose of 2gm for AST/ALT greater than 120 units/L.    If given for pain, use the following pain scale:   Mild Pain = Pain Score of 1-3, CPOT 1-2  Moderate Pain = Pain Score of 4-6, CPOT 3-4  Severe Pain = Pain Score of 7-10, CPOT 5-8         Or   acetaminophen (TYLENOL) suppository 650 mg  Dose: 650 mg  Freq: Every 4 Hours PRN Route: RE  PRN Reason: Mild Pain  Start: 07/06/24 0034 End: 07/07/24 0840     Admin Instructions:   If given for fever, use fever parameter: fever greater than 100.4 °F  Based on patient request - if ordered for moderate or severe pain, provider allows for administration of a medication prescribed for a lower pain scale.    Do not exceed 4 grams of acetaminophen in a 24 hr period. Max dose of 2gm for AST/ALT greater than 120 units/L.    If given  for pain, use the following pain scale:   Mild Pain = Pain Score of 1-3, CPOT 1-2  Moderate Pain = Pain Score of 4-6, CPOT 3-4  Severe Pain = Pain Score of 7-10, CPOT 5-8         gabapentin (NEURONTIN) capsule 100 mg  Dose: 100 mg  Freq: Every 12 Hours Scheduled Route: PO  Start: 07/06/24 2100 End: 07/07/24 1054     Admin Instructions:            HYDROcodone-acetaminophen (NORCO) 5-325 MG per tablet 1 tablet  Dose: 1 tablet  Freq: Every 4 Hours PRN Route: PO  PRN Reasons: Moderate Pain,Severe Pain  Start: 07/06/24 0034 End: 07/07/24 0840     Admin Instructions:   Based on patient request - if ordered for moderate or severe pain, provider allows for administration of a medication prescribed for a lower pain scale.  [ERIC]    Do not exceed 4 grams of acetaminophen in a 24 hr period. Max dose of 2gm for AST/ALT greater than 120 units/L        If given for pain, use the following pain scale:   Mild Pain = Pain Score of 1-3, CPOT 1-2  Moderate Pain = Pain Score of 4-6, CPOT 3-4  Severe Pain = Pain Score of 7-10, CPOT 5-8         methocarbamol (ROBAXIN) tablet 500 mg  Dose: 500 mg  Freq: Every 6 Hours PRN Route: PO  PRN Reason: Muscle Spasms  Start: 07/06/24 1200 End: 07/07/24 0840          methylPREDNISolone (MEDROL) tablet 4 mg  Dose: 4 mg  Freq: 2 Times Daily With Meals Route: PO  Start: 07/06/24 0830 End: 07/06/24 1217     Admin Instructions:   Caution: Look alike/sound alike drug alert. Take with food.  Avoid grapefruit juice.         Followed by   methylPREDNISolone (MEDROL) tablet 4 mg  Dose: 4 mg  Freq: Daily With Breakfast Route: PO  Start: 07/07/24 0800 End: 07/06/24 1217     Admin Instructions:   Caution: Look alike/sound alike drug alert. Take with food.  Avoid grapefruit juice.         methylPREDNISolone sodium succinate (SOLU-Medrol) injection 125 mg  Dose: 125 mg  Freq: Every 6 Hours Route: IV  Start: 07/06/24 1300 End: 07/08/24 1222     Admin Instructions:   Caution: Look alike/sound alike drug alert       0102-Given     0842-Given                  morphine injection 1 mg  Dose: 1 mg  Freq: Every 3 Hours PRN Route: IV  PRN Reason: Severe Pain  Start: 07/06/24 1020 End: 07/08/24 1222     Admin Instructions:   If given for pain, use the following pain scale:  Mild Pain = Pain Score of 1-3, CPOT 1-2  Moderate Pain = Pain Score of 4-6, CPOT 3-4  Severe Pain = Pain Score of 7-10, CPOT 5-8         morphine injection 2 mg  Dose: 2 mg  Freq: Every 3 Hours PRN Route: IV  PRN Reason: Severe Pain  Start: 07/06/24 0034 End: 07/06/24 1020     Admin Instructions:   If given for pain, use the following pain scale:  Mild Pain = Pain Score of 1-3, CPOT 1-2  Moderate Pain = Pain Score of 4-6, CPOT 3-4  Severe Pain = Pain Score of 7-10, CPOT 5-8         oxyCODONE (ROXICODONE) immediate release tablet 7.5 mg  Dose: 7.5 mg  Freq: Every 4 Hours PRN Route: PO  PRN Reason: Moderate Pain  Start: 07/07/24 0839 End: 07/08/24 1222     Admin Instructions:   Based on patient request - if ordered for moderate or severe pain, provider allows for administration of a medication prescribed for a lower pain scale.      If given for pain, use the following pain scale:  Mild Pain = Pain Score of 1-3, CPOT 1-2  Moderate Pain = Pain Score of 4-6, CPOT 3-4  Severe Pain = Pain Score of 7-10, CPOT 5-8      0055-Given     1115-Given                  Pharmacy to Dose enoxaparin (LOVENOX)  Freq: Continuous PRN Route: XX  PRN Reason: Consult  Indications of Use: Other - full anticoagulation  Indications Comment: Factor V leiden, h/o DVT- normally on Eliquis- pre-op  Start: 07/06/24 0749 End: 07/06/24 0757         Progesterone (PROMETRIUM) capsule 200 mg  Dose: 200 mg  Freq: Daily Route: PO  Start: 07/06/24 0900 End: 07/07/24 0840     Admin Instructions:   Group 1 (Yellow) Hazardous Drug - See Handling Guide                        Physician Progress Notes (last 72 hours)        Gracie Suero, APRN at 07/08/24 1112          [facility]  "PROGRESS NOTE      CC: Back and right leg pain      Subjective     Interval History: Reports that her right leg is feeling much better today.  She feels that the gabapentin and steroid have been instrumental.  She has used the Oxy IR some but feels that it \"knocks her out\".    ROS:  Constitutional: No fever, chills  MS: back pain  Neuro: Right leg pain numbness, tingling, or weakness,  no balance difficulties  : No difficulty voiding, no incontinence    Objective     Vital signs in last 24 hours:  Temp:  [97.1 °F (36.2 °C)-98.1 °F (36.7 °C)] 97.7 °F (36.5 °C)  Heart Rate:  [60-70] 63  Resp:  [18-20] 18  BP: (111-163)/(62-86) 111/63    Intake/Output this shift:  I/O this shift:  In: 120 [P.O.:120]  Out: -     LABS:  Results from last 7 days   Lab Units 07/08/24  0611 07/07/24  0722 07/05/24  1555   WBC 10*3/mm3 26.00* 12.64* 14.18*   HEMOGLOBIN g/dL 15.5 16.7* 17.4*   HEMATOCRIT % 46.9* 48.9* 51.8*   PLATELETS 10*3/mm3 283 283 291     Results from last 7 days   Lab Units 07/08/24  0612 07/07/24  0722 07/05/24  1555   SODIUM mmol/L 134* 135* 146*   POTASSIUM mmol/L 4.5 4.2 3.8   CHLORIDE mmol/L 102 103 103   CO2 mmol/L 20.2* 22.0 22.0   BUN mg/dL 24* 20 18   CREATININE mg/dL 0.58 0.56* 0.67   CALCIUM mg/dL 8.4* 8.5* 9.1   BILIRUBIN mg/dL  --   --  1.1   ALK PHOS U/L  --   --  43   ALT (SGPT) U/L  --   --  27   AST (SGOT) U/L  --   --  17   GLUCOSE mg/dL 155* 149* 103*     IMAGING STUDIES:  No new imaging    I personally viewed and interpreted the patient's chart.    Meds reviewed/changed: Yes  Tylenol 1 g p.o. every 8 hours  Lovenox 60 mg SQ every 12 hours  Gabapentin 100 mg p.o. 3 times daily  Robaxin 500 mg p.o. every 8 hours  Solu-Medrol 125 mg IV every 6 hours  Roxicodone IR 7.5 mg p.o. every 4 as needed-3 doses    Physical Exam:    General:   Awake, alert.  Resting in bed.  Pleasant and cooperative hide Dr. Goldstein review  Back:    - SLR.  Midline incision well-healed  Motor:    Normal muscle strength, bulk and " tone in lower extremities.  No fasciculations, rigidity, spasticity, or abnormal movements.  Sensation:   Normal to light touch slava LEs except right thigh which is chronically diminished  Station and Gait:             Per PT note 7/7-ompleted bed mobility with SBA today, STS with SBA, and ambulated 20' in room using hiking stick in R UE, CGA-SBA with mild unsteadiness noted and reports of N/T worsening down R LE with movement.    Extremities:   Wearing SCD      Assessment & Plan     ASSESSMENT:      Bulge of lumbar disc without myelopathy    PAD (peripheral artery disease)    PAF (paroxysmal atrial fibrillation)    Spinal stenosis of lumbar region with neurogenic claudication    COPD (chronic obstructive pulmonary disease)    Factor V Leiden mutation    History of DVT of lower extremity    Alpha-1-antitrypsin deficiency    Patient with L2/3 adjacent level stenosis from prior fusion with back and right leg pain.  Feeling much better today after high-dose steroids and addition of gabapentin, scheduled Robaxin, and Tylenol.  She has taken no morphine overnight and a couple of doses of Oxy IR.  She is scheduled for surgery in 2 weeks from today.  I recommended epidural injection which can be done tomorrow after holding her Lovenox for 24 hours and then she can be discharged if she is mobilizing well.  I recommend that she stay on the gabapentin at discharge but the Robaxin can be as needed.  Will determine tomorrow morning whether she needs to go home on any steroid taper.  I did encourage her to try to reduce her use of the Oxy IR and I have resumed her Norco 5 for moderate pain and the Oxy IR for severe pain.  She is in agreement with this plan.  She states she does not need sedation for the epidural so she does not need to be n.p.o. after midnight.  Dr. Mathur is out of office until next Monday.  Patient aware.    PLAN:   Cont IV steroid today-reduce dose  LESI tomorrow  Cont Gabapentin/Robaxin  Resume Norco 5 PRN mod  "pain and use Oxy IR for severe pain, MSO4 for breakthrough pain  Likely OK for DC tomorrow after LESI  OR   Hold Lovenox today/AM    I discussed the patient's findings and my recommendations with patient and Dr. Lorenzana    During patient visit, I utilized appropriate personal protective equipment including gloves. Appropriate PPE was worn during the entire visit.  Hand hygiene was completed before and after.      LOS: 1 day       KLARISSA Peterson  2024  11:12 EDT    \"Dictated utilizing Dragon dictation\".        Electronically signed by Gracie Suero APRN at 24 1230       Josefina Melendrez MD at 24 1015              Name: Radha Block ADMIT: 2024   : 1960  PCP: Bosler, James William III, MD    MRN: [id number] LOS: 1 days   AGE/SEX: 64 y.o. female  ROOM: [facility]     Subjective   Subjective   Resting in bed, patient reports significant improvement in back pain with steroids/change in pain medications.  Eager to discuss with Dr. Mathur surgical plans.      Objective   Objective   Vital Signs  Temp:  [97.1 °F (36.2 °C)-98.1 °F (36.7 °C)] 97.7 °F (36.5 °C)  Heart Rate:  [60-70] 63  Resp:  [18-20] 18  BP: (111-163)/(62-86) 111/63  SpO2:  [94 %-98 %] 95 %  on   ;   Device (Oxygen Therapy): room air  Body mass index is 20.2 kg/m².  Physical Exam  Constitutional:       General: She is not in acute distress.     Appearance: Normal appearance. She is not ill-appearing.   Cardiovascular:      Rate and Rhythm: Normal rate and regular rhythm.   Pulmonary:      Effort: Pulmonary effort is normal. No respiratory distress.      Breath sounds: Normal breath sounds.   Abdominal:      General: Abdomen is flat.      Palpations: Abdomen is soft.      Tenderness: There is no abdominal tenderness.   Musculoskeletal:         General: No swelling or tenderness.      Right lower leg: No edema.      Left lower leg: No edema.   Skin:     General: Skin is warm and dry.   Neurological:      " "General: No focal deficit present.      Mental Status: She is alert and oriented to person, place, and time. Mental status is at baseline.         Results Review     I reviewed the patient's new clinical results.  Results from last 7 days   Lab Units 07/08/24  0611 07/07/24  0722 07/05/24  1555   WBC 10*3/mm3 26.00* 12.64* 14.18*   HEMOGLOBIN g/dL 15.5 16.7* 17.4*   PLATELETS 10*3/mm3 283 283 291     Results from last 7 days   Lab Units 07/08/24  0612 07/07/24  0722 07/05/24  1555   SODIUM mmol/L 134* 135* 146*   POTASSIUM mmol/L 4.5 4.2 3.8   CHLORIDE mmol/L 102 103 103   CO2 mmol/L 20.2* 22.0 22.0   BUN mg/dL 24* 20 18   CREATININE mg/dL 0.58 0.56* 0.67   GLUCOSE mg/dL 155* 149* 103*   Estimated Creatinine Clearance: 88.1 mL/min (by C-G formula based on SCr of 0.58 mg/dL).  Results from last 7 days   Lab Units 07/05/24  1555   ALBUMIN g/dL 4.3   BILIRUBIN mg/dL 1.1   ALK PHOS U/L 43   AST (SGOT) U/L 17   ALT (SGPT) U/L 27     Results from last 7 days   Lab Units 07/08/24  0612 07/07/24  0722 07/05/24  1555   CALCIUM mg/dL 8.4* 8.5* 9.1   ALBUMIN g/dL  --   --  4.3     No results found for: \"COVID19\"  No results found for: \"HGBA1C\", \"POCGLU\"    MRI Lumbar Spine Without Contrast  Narrative: Patient: RAVIN FERRER  Time Out: 23:46  Exam(s): MRI L SPINE Without Contrast     EXAM:    MR Lumbar Spine Without Intravenous Contrast    CLINICAL HISTORY:      concern for cauda equina.    TECHNIQUE:    Magnetic resonance images of the lumbar spine without intravenous   contrast in multiple planes.    COMPARISON:    MR lumbar spine 1 31 2024    FINDINGS:    Vertebrae:  There is stable posterior fusion from L3-L5 with pedicle   screws and paraspinal rods.  Laminectomy defects suggested.  There is   stable interbody hardware at L3-L4 and L4-L5.  The vertebral bodies   demonstrate stable marrow signal.    Spinal cord:  The conus is stable in appearance and unremarkable,   terminating at the L1 vertebral body level.  The " cauda equina fibers are   stable in appearance without evidence for arachnoiditis.    Soft tissues:  No significant abnormal paraspinal soft tissue   abnormality identified.     DISCS SPINAL CANAL NEURAL FORAMINA:    L1-L2:  Unremarkable.  No significant disc disease.  No stenosis.    L2-L3:  L2-3: Similar disc space narrowing with posterior marginal   osteophyte complex noted at L2-3 level.  This mildly narrows the central   canal with preservation of the CSF collar.  Facet hypertrophic changes   suggested.  Mild bilateral neural foraminal encroachment, stable.    L3-L4:  Discectomy with hardware noted.  No central canal stenosis or   neural foraminal encroachment.    L4-L5:  Discectomy with hardware noted.  No central canal stenosis or   neural foraminal encroachment.    L5-S1:  Mild annular disc bulge.  No posterior disc protrusion or   central canal stenosis.  No neuroforaminal encroachment.  Bilateral facet   hypertrophic changes, stable.    IMPRESSION:       Stable postsurgical changes, as detailed above.  No significant central   canal stenosis noted.  Impression: Electronically signed by Cuate Bonilla MD on 07-05-24 at 2346    Scheduled Medications  acetaminophen, 1,000 mg, Oral, Q8H  budesonide-formoterol, 2 puff, Inhalation, BID - RT   And  tiotropium bromide monohydrate, 2 puff, Inhalation, Daily - RT  desvenlafaxine, 25 mg, Oral, Daily  dilTIAZem CD, 180 mg, Oral, Daily  enoxaparin, 1 mg/kg, Subcutaneous, Q12H  gabapentin, 100 mg, Oral, TID  methocarbamol, 500 mg, Oral, Q8H  methylPREDNISolone sodium succinate, 125 mg, Intravenous, Q6H  pantoprazole, 40 mg, Oral, Q AM    Infusions   Diet  Diet: Cardiac; Healthy Heart (2-3 Na+); Fluid Consistency: Thin (IDDSI 0)        I have personally reviewed:  [x]  Laboratory   []  Microbiology   []  Radiology   []  EKG/Telemetry   []  Cardiology/Vascular   []  Pathology   []  Records    Assessment/Plan     Active Hospital Problems    Diagnosis  POA    **Bulge of  lumbar disc without myelopathy [M51.36]  Yes    Alpha-1-antitrypsin deficiency [E88.01]  Yes    Factor V Leiden mutation [D68.51]  Yes    History of DVT of lower extremity [Z86.718]  Not Applicable    COPD (chronic obstructive pulmonary disease) [J44.9]  Yes    Spinal stenosis of lumbar region with neurogenic claudication [M48.062]  Yes    PAF (paroxysmal atrial fibrillation) [I48.0]  Yes    PAD (peripheral artery disease) [I73.9]  Yes      Resolved Hospital Problems   No resolved problems to display.     Ms. Nick Block is a 64 y.o. with a history of paroxysmal A-fib, hypertension, COPD, alpha-1 antitrypsin deficiency, history of DVT with factor V Leiden mutation who presents with acute on chronic back pain.     Spinal stenosis with lumbar claudication  -MRI reviewed, reports no significant canal stenosis  -Neurosurgery has been consulted; will follow-up plans  -Continue as needed pain medications-continue steroids per neurosurgery, pain meds changed yesterday with positive results  - Dr. Mathur to see this week      COPD  -Resume home inhalers, as needed albuterol nebs  -Not in exacerbation     Factor V Leiden mutation  History of DVT  -Takes Eliquis at home, Lovenox here pending surgical plans     PAD  -On Eliquis at home     PAF  -Continue Dilt, on Eliquis at home but has not taken for the last 5 to 7 days  -Will do Lovenox here while awaiting neurosurgical plans     HTN  -Continue diltiazem     Alpha-1 antitrypsin antibody deficiency  - follows with pulm at [place]   -Gets outpatient weekly infusions    Pharmacy to dose Lovenox for DVT prophylaxis.  Full code.  Discussed with patient and nursing staff   Anticipated discharge JUDSON, JUDSON Melendrez MD  [facility]  07/08/24  10:15 EDT    I wore protective equipment throughout this patient encounter including gloves.  Hand hygiene was performed before donning protective equipment and after removal when leaving the room.          Copied text in this note has been reviewed and is accurate as of 24.       Electronically signed by Josefina Melendrez MD at 24 1017       Josefina Melendrez MD at 24 1117              Name: Radha Block ADMIT: 2024   : 1960  PCP: Bosler, James William III, MD    MRN: [id number] LOS: 0 days   AGE/SEX: 64 y.o. female  ROOM: [facility]     Subjective   Subjective   Resting in bed, discussed with RN, patient is trying to not use pain medications and waiting until her pain is severe and then asking for morphine.  Discussed with patient, will schedule Tylenol and muscle relaxer and continue gabapentin per neurosurgery, as needed Roxicodone and morphine.  Patient agreeable to changes to try and improve pain control      Objective   Objective   Vital Signs  Temp:  [97 °F (36.1 °C)-98.1 °F (36.7 °C)] 97.1 °F (36.2 °C)  Heart Rate:  [56-77] 63  Resp:  [16-22] 20  BP: (121-140)/(70-88) 135/78  SpO2:  [92 %-99 %] 98 %  on   ;   Device (Oxygen Therapy): room air  Body mass index is 20.2 kg/m².  Physical Exam  Constitutional:       General: She is not in acute distress.     Appearance: Normal appearance. She is not ill-appearing.   Cardiovascular:      Rate and Rhythm: Normal rate and regular rhythm.   Pulmonary:      Effort: Pulmonary effort is normal. No respiratory distress.      Breath sounds: Normal breath sounds.   Abdominal:      General: Abdomen is flat.      Palpations: Abdomen is soft.      Tenderness: There is no abdominal tenderness.   Musculoskeletal:         General: No swelling or tenderness.      Right lower leg: No edema.      Left lower leg: No edema.   Skin:     General: Skin is warm and dry.   Neurological:      General: No focal deficit present.      Mental Status: She is alert and oriented to person, place, and time. Mental status is at baseline.         Results Review     I reviewed the patient's new clinical results.  Results from last 7 days   Lab Units  "07/07/24  0722 07/05/24  1555   WBC 10*3/mm3 12.64* 14.18*   HEMOGLOBIN g/dL 16.7* 17.4*   PLATELETS 10*3/mm3 283 291     Results from last 7 days   Lab Units 07/07/24  0722 07/05/24  1555   SODIUM mmol/L 135* 146*   POTASSIUM mmol/L 4.2 3.8   CHLORIDE mmol/L 103 103   CO2 mmol/L 22.0 22.0   BUN mg/dL 20 18   CREATININE mg/dL 0.56* 0.67   GLUCOSE mg/dL 149* 103*   Estimated Creatinine Clearance: 91.3 mL/min (A) (by C-G formula based on SCr of 0.56 mg/dL (L)).  Results from last 7 days   Lab Units 07/05/24  1555   ALBUMIN g/dL 4.3   BILIRUBIN mg/dL 1.1   ALK PHOS U/L 43   AST (SGOT) U/L 17   ALT (SGPT) U/L 27     Results from last 7 days   Lab Units 07/07/24  0722 07/05/24  1555   CALCIUM mg/dL 8.5* 9.1   ALBUMIN g/dL  --  4.3     No results found for: \"COVID19\"  No results found for: \"HGBA1C\", \"POCGLU\"    MRI Lumbar Spine Without Contrast  Narrative: Patient: RAVIN FERRER  Time Out: 23:46  Exam(s): MRI L SPINE Without Contrast     EXAM:    MR Lumbar Spine Without Intravenous Contrast    CLINICAL HISTORY:      concern for cauda equina.    TECHNIQUE:    Magnetic resonance images of the lumbar spine without intravenous   contrast in multiple planes.    COMPARISON:    MR lumbar spine 1 31 2024    FINDINGS:    Vertebrae:  There is stable posterior fusion from L3-L5 with pedicle   screws and paraspinal rods.  Laminectomy defects suggested.  There is   stable interbody hardware at L3-L4 and L4-L5.  The vertebral bodies   demonstrate stable marrow signal.    Spinal cord:  The conus is stable in appearance and unremarkable,   terminating at the L1 vertebral body level.  The cauda equina fibers are   stable in appearance without evidence for arachnoiditis.    Soft tissues:  No significant abnormal paraspinal soft tissue   abnormality identified.     DISCS SPINAL CANAL NEURAL FORAMINA:    L1-L2:  Unremarkable.  No significant disc disease.  No stenosis.    L2-L3:  L2-3: Similar disc space narrowing with posterior " marginal   osteophyte complex noted at L2-3 level.  This mildly narrows the central   canal with preservation of the CSF collar.  Facet hypertrophic changes   suggested.  Mild bilateral neural foraminal encroachment, stable.    L3-L4:  Discectomy with hardware noted.  No central canal stenosis or   neural foraminal encroachment.    L4-L5:  Discectomy with hardware noted.  No central canal stenosis or   neural foraminal encroachment.    L5-S1:  Mild annular disc bulge.  No posterior disc protrusion or   central canal stenosis.  No neuroforaminal encroachment.  Bilateral facet   hypertrophic changes, stable.    IMPRESSION:       Stable postsurgical changes, as detailed above.  No significant central   canal stenosis noted.  Impression: Electronically signed by Cuate Bonilla MD on 07-05-24 at 2346    Scheduled Medications  acetaminophen, 1,000 mg, Oral, Q8H  budesonide-formoterol, 2 puff, Inhalation, BID - RT   And  tiotropium bromide monohydrate, 2 puff, Inhalation, Daily - RT  desvenlafaxine, 25 mg, Oral, Daily  dilTIAZem CD, 180 mg, Oral, Daily  enoxaparin, 1 mg/kg, Subcutaneous, Q12H  gabapentin, 100 mg, Oral, TID  methocarbamol, 500 mg, Oral, Q8H  methylPREDNISolone sodium succinate, 125 mg, Intravenous, Q6H  pantoprazole, 40 mg, Oral, Q AM    Infusions   Diet  Diet: Cardiac; Healthy Heart (2-3 Na+); Fluid Consistency: Thin (IDDSI 0)        I have personally reviewed:  [x]  Laboratory   []  Microbiology   []  Radiology   []  EKG/Telemetry   []  Cardiology/Vascular   []  Pathology   []  Records    Assessment/Plan     Active Hospital Problems    Diagnosis  POA    **Bulge of lumbar disc without myelopathy [M51.36]  Yes    Alpha-1-antitrypsin deficiency [E88.01]  Yes    Factor V Leiden mutation [D68.51]  Yes    History of DVT of lower extremity [Z86.718]  Not Applicable    COPD (chronic obstructive pulmonary disease) [J44.9]  Yes    Spinal stenosis of lumbar region with neurogenic claudication [M48.062]  Yes    PAF  (paroxysmal atrial fibrillation) [I48.0]  Yes    PAD (peripheral artery disease) [I73.9]  Yes      Resolved Hospital Problems   No resolved problems to display.     Ms. Nick Block is a 64 y.o. with a history of paroxysmal A-fib, hypertension, COPD, alpha-1 antitrypsin deficiency, history of DVT with factor V Leiden mutation who presents with acute on chronic back pain.     Spinal stenosis with lumbar claudication  -MRI reviewed, reports no significant canal stenosis  -Neurosurgery has been consulted; will follow-up plans  -Continue as needed pain medications- change pain regimen today to try and improve pain control; IV dexamethasone  - dw JANEEN NP; cont to work on pain control; Dr. Mathur going to see tomorrow.      COPD  -Resume home inhalers, as needed albuterol nebs  -Not in exacerbation     Factor V Leiden mutation  History of DVT  -Takes Eliquis at home, Lovenox here pending surgical plans     PAD  -On Eliquis at home     PAF  -Continue Dilt, on Eliquis at home but has not taken for the last 5 to 7 days  -Will do Lovenox here while awaiting neurosurgical plans     HTN  -Continue diltiazem     Alpha-1 antitrypsin antibody deficiency  - follows with pulm at [place]   -Gets outpatient weekly infusions    Pharmacy to dose Lovenox for DVT prophylaxis.  Full code.  Discussed with patient, nursing staff, and consulting provider JANEEN Plaza.  Anticipated discharge TBD, TBD        Josefina Melendrez MD  [facility]  07/07/24  11:18 EDT    I wore protective equipment throughout this patient encounter including gloves.  Hand hygiene was performed before donning protective equipment and after removal when leaving the room.         Copied text in this note has been reviewed and is accurate as of 07/07/24.       Electronically signed by Josefina Melendrez MD at 07/07/24 1121       Raissa Plaza APRN at 07/07/24 1113          [facility] THORACIC/LUMBAR NEUROSURGERY PROGRESS NOTE      CC: back  pain      Subjective     Interval History: Patient reports pain better managed this am    ROS:  Constitutional: No fever, chills  MS: +back pain  Neuro: +numbness, tingling, noweakness,  no balance difficulties  : No difficulty voiding, no incontinence    Objective     Vital signs in last 24 hours:  Temp:  [97 °F (36.1 °C)-98.1 °F (36.7 °C)] 97.1 °F (36.2 °C)  Heart Rate:  [56-77] 63  Resp:  [16-22] 20  BP: (121-140)/(70-88) 135/78    Intake/Output this shift:  No intake/output data recorded.    LABS:  Results from last 7 days   Lab Units 07/07/24  0722 07/05/24  1555   WBC 10*3/mm3 12.64* 14.18*   HEMOGLOBIN g/dL 16.7* 17.4*   HEMATOCRIT % 48.9* 51.8*   PLATELETS 10*3/mm3 283 291      Results from last 7 days   Lab Units 07/07/24  0722 07/05/24  1555   SODIUM mmol/L 135* 146*   POTASSIUM mmol/L 4.2 3.8   CHLORIDE mmol/L 103 103   CO2 mmol/L 22.0 22.0   BUN mg/dL 20 18   CREATININE mg/dL 0.56* 0.67   CALCIUM mg/dL 8.5* 9.1   BILIRUBIN mg/dL  --  1.1   ALK PHOS U/L  --  43   ALT (SGPT) U/L  --  27   AST (SGOT) U/L  --  17   GLUCOSE mg/dL 149* 103*      7/5/24 PTT <20.0  7/5/24 PT 12.9/INR 0.95    IMAGING STUDIES:  MRI Lumbar 7/5/24   DISCS SPINAL CANAL NEURAL FORAMINA:    L1-L2:  Unremarkable.  No significant disc disease.  No stenosis.    L2-L3:  L2-3: Similar disc space narrowing with posterior marginal   osteophyte complex noted at L2-3 level.  This mildly narrows the central   canal with preservation of the CSF collar.  Facet hypertrophic changes   suggested.  Mild bilateral neural foraminal encroachment, stable.    L3-L4:  Discectomy with hardware noted.  No central canal stenosis or   neural foraminal encroachment.    L4-L5:  Discectomy with hardware noted.  No central canal stenosis or   neural foraminal encroachment.    L5-S1:  Mild annular disc bulge.  No posterior disc protrusion or   central canal stenosis.  No neuroforaminal encroachment.  Bilateral facet   hypertrophic changes, stable.     IMPRESSION:        Stable postsurgical changes, as detailed above.  No significant central   canal stenosis noted.  IMPRESSION:      Electronically signed by Cuate Bonilla MD on 07-05-24 at 2346    I personally viewed and interpreted the patient's chart.    Meds reviewed/changed: Yes  Tylenol 1000mg every 8 hours  Desvenlafaxine ER 25mg daily  Cardizem  mg daily  Lovenox 60mg every 12 hours  Gabapentin 100mg BID  Robaxin 500mg every 8 hours  Solumedrol 125mg every 6 hours  Protonix 40mg daily   9.   Morphine 1mg every 3 hours prn-2 doses/12 hours  10. Roxicodone 7.5mg every 4 hours prn-1 dose/12 hours   Physical Exam:    General:   Awake, alert.  Motor:    LLE 5/5 RLE 4+/5, Right posterior tibial and right gastroc 5/5 No fasciculations, rigidity, spasticity, or abnormal movements.  Reflexes:   no clonus  Sensation:   Decreased sensation right thigh  Station and Gait:             Gait not assessed   Extremities:   Wearing SCD      Assessment & Plan     ASSESSMENT:      Bulge of lumbar disc without myelopathy    PAD (peripheral artery disease)    PAF (paroxysmal atrial fibrillation)    Spinal stenosis of lumbar region with neurogenic claudication    COPD (chronic obstructive pulmonary disease)    Factor V Leiden mutation    History of DVT of lower extremity    Alpha-1-antitrypsin deficiency    The patient is a 64 year old female who presents with acute on chronic low back pain. The pain has been so intense that she has had difficulty with walking and standing.      She was scheduled for a L2-3 fusion on 7/22 with Dr. Mathur. Discussed Lumbar MRI with Dr. Alarcon didn't see any significant changes from the MRI in January.     Hospitalist made adjustments to pain medications which seems to be helpful.     PLAN:   Continue to work on pain control   Will increase gabapentin to 100mg TID  Eliquis on hold    I discussed the patient's findings and my recommendations with patient, consulting provider, and Dr. Alarcon.          LOS: 0  days       KLARISSA Viveros  7/7/2024  11:13 EDT        Electronically signed by aRissa Plaza APRN at 07/07/24 1202          Consult Notes (last 72 hours)        Raissa Plaza APRN at 07/06/24 1019        Consult Orders    1. Inpatient Neurosurgery Consult [[id number]] ordered by Daisy Hunt APRN at 07/06/24 0036                 [facility] NEUROSURGERY CONSULT NOTE    Patient name: Radha Block  Reason for Consultation: Back pain     Patient Care Team:  Bosler, James William III, MD as PCP - General (Internal Medicine)  Raisa Cummings MD as Consulting Physician (Obstetrics and Gynecology)  Jj Dodson MD as Consulting Physician (Hematology and Oncology)  Bosler, James William III, MD as Referring Physician (Internal Medicine)    Chief complaint: back pain     Subjective .     History of present illness:    Patient is a 64 y.o.  female  with a history of Afib, HTN, COPD, alpha-1 antitrypsin deficiency, history of DVT with factor V Leiden mutation presented to the ED with acute on chronic back pain.     She has a history of a lumbar fusion in December 2022 with Dr Blanco. She says that the back pain became progressively worse since last October. She saw Dr. Mathur in the office in April 2024 and was scheduled for a L2-3 fusion on 7/22/24.     She started having pain about 7 days ago that was not tolerable. The pain radiates down the RLE. She was having difficulty standing and walking due to the pain. She has had saddle anesthesia. She denies any incontinence of bowel or bladder.     SOCIAL HISTORY  Social History     Tobacco Use    Smoking status: Never    Smokeless tobacco: Never   Vaping Use    Vaping status: Never Used   Substance Use Topics    Alcohol use: Yes     Alcohol/week: 3.0 standard drinks of alcohol     Types: 3 Glasses of wine per week     Comment: 2 times a week     Drug use: Never       History  PAST MEDICAL HISTORY  Past Medical History:   Diagnosis Date     Arrhythmia     Arthritis     Clotting disorder     Factor 5 Liden    COPD (chronic obstructive pulmonary disease)     Deep vein thrombosis     Essential hypertension 08/24/2021    Mitral valve prolapse     PAD (peripheral artery disease)     PAF (paroxysmal atrial fibrillation) 11/25/2020       PAST SURGICAL HISTORY  Past Surgical History:   Procedure Laterality Date    BLADDER SURGERY  2013    E/O polyp    BREAST SURGERY      E/O benign neuroma    CARDIAC ELECTROPHYSIOLOGY PROCEDURE N/A 10/10/2022    Procedure: Ablation atrial fibrillation CRYO;  Surgeon: Venkatesh Iqbal MD;  Location: [facility] CATH INVASIVE LOC[facility];  Service: Cardiovascular;  Laterality: N/A;    COLONOSCOPY  2016    HAND SURGERY Right     Carpal metacarpal hand surgery     LUMBAR FUSION N/A 12/30/2022    Procedure: Lumbar 3 to lumbar 4 and lumbar 4 to lumbar 5 laminectomy with fusion and instrumentation;  Surgeon: Rigo Mathur MD;  Location: [facility] OR;  Service: Robotics - Neuro;  Laterality: N/A;    SINUS SURGERY  2017    TONSILLECTOMY         FAMILY HISTORY  Family History   Adopted: Yes   Problem Relation Age of Onset    Malig Hyperthermia Neg Hx        Allergies:  Patient has no known allergies.    MEDICATIONS:  Medications Prior to Admission   Medication Sig Dispense Refill Last Dose    Acetaminophen 325 MG capsule Take 2 capsules by mouth 4 (Four) Times a Day As Needed.   Past Week    alpha1-proteinase inhibitor (Zemaira) 1000 MG injection 1 (One) Time Per Week. Pt takes on Fridays at infusion center   Past Week    desvenlafaxine (PRISTIQ) 50 MG 24 hr tablet Take 12.5 mg by mouth As Needed.   7/4/2024    diclofenac-miSOPROStol (ARTHROTEC 75) 75-0.2 MG EC tablet Take 1 tablet by mouth Daily.   7/4/2024    dilTIAZem CD (CARDIZEM CD) 180 MG 24 hr capsule Take 1 capsule by mouth Daily. 30 capsule 11 7/4/2024    Eliquis 5 MG tablet tablet TAKE 1 TABLET BY MOUTH TWICE  DAILY (Patient taking differently: 1 tablet Every 12 (Twelve)  Hours.) 180 tablet 3 Past Week    esomeprazole (nexIUM) 40 MG capsule Take 1 capsule by mouth Daily.   7/4/2024    estradiol (MINIVELLE, VIVELLE-DOT) 0.075 MG/24HR patch Place 1 patch on the skin as directed by provider 2 (Two) Times a Week.   7/2/2024    Fluticasone-Umeclidin-Vilant (Trelegy Ellipta) 100-62.5-25 MCG/INH aerosol powder  Inhale 1 puff Daily.   7/4/2024    guaiFENesin (MUCINEX) 600 MG 12 hr tablet As Needed.   Past Month    methylPREDNISolone (MEDROL) 4 MG dose pack Take as directed on package instructions. (Patient taking differently: Take as directed on package instructions.pt has 2 days left) 1 each 0 7/4/2024    ondansetron (ZOFRAN) 4 MG tablet Every 8 (Eight) Hours As Needed.   Past Week    Progesterone (PROMETRIUM) 200 MG capsule Daily.   7/4/2024    albuterol sulfate  (90 Base) MCG/ACT inhaler 1 puff As Needed for Wheezing.   More than a month    betamethasone, augmented, (DIPROLENE) 0.05 % cream 2 (Two) Times a Day As Needed.   More than a month    budesonide (PULMICORT) 1 MG/2ML nebulizer solution  (Patient not taking: Reported on 7/5/2024)   Not Taking    desonide (DESOWEN) 0.05 % cream As Needed. (Patient not taking: Reported on 7/5/2024)   Not Taking    Diclofenac Epolamine (FLECTOR) 1.3 % patch patch As Needed. (Patient not taking: Reported on 7/5/2024)   Not Taking    EPINEPHrine (EPIPEN) 0.3 MG/0.3ML solution auto-injector injection As Needed.   Unknown    HYDROcodone-acetaminophen (NORCO) 7.5-325 MG per tablet Take  by mouth See Admin Instructions. (Patient not taking: Reported on 7/6/2024)   Not Taking    hydrocortisone 2.5 % cream As Needed. (Patient not taking: Reported on 7/6/2024)   Not Taking    lidocaine-prilocaine (EMLA) 2.5-2.5 % cream As Needed. (Patient not taking: Reported on 7/6/2024)   Not Taking    promethazine (PHENERGAN) 25 MG tablet  (Patient not taking: Reported on 7/6/2024)   Not Taking    tretinoin (RETIN-A) 0.1 % cream As Needed. (Patient not taking:  Reported on 2024)   Not Taking    valACYclovir (VALTREX) 1000 MG tablet As Needed. (Patient not taking: Reported on 2024)   Not Taking    venlafaxine (EFFEXOR) 50 MG tablet  (Patient not taking: Reported on 2024)   Not Taking       Review of Systems  Review of Systems   Constitutional:  Positive for activity change.   Gastrointestinal: Negative.    Genitourinary: Negative.    Musculoskeletal:  Positive for arthralgias, back pain and gait problem.   Neurological: Negative.        Objective     Physical Exam:  Physical Exam  HENT:      Head: Normocephalic.   Eyes:      Pupils: Pupils are equal, round, and reactive to light.   Musculoskeletal:         General: Normal range of motion.   Skin:     General: Skin is warm and dry.   Neurological:      General: No focal deficit present.      Mental Status: She is alert and oriented to person, place, and time.       Neurologic Exam     Mental Status   Oriented to person, place, and time.   Attention: normal. Concentration: normal.   Level of consciousness: alert  Knowledge: good.     Cranial Nerves     CN II   Visual fields full to confrontation.     CN III, IV, VI   Pupils are equal, round, and reactive to light.    CN V   Facial sensation intact.     CN VII   Facial expression full, symmetric.     CN VIII   Hearing: intact    CN IX, X   Palate: symmetric  Right gag reflex: normal    CN XI   CN XI normal.     CN XII   CN XII normal.     Motor Exam   Muscle bulk: normal    Strength   Right iliopsoas: 4/5  Left iliopsoas: 5/5  Right quadriceps: 4/5  Left quadriceps: 5/5  Right hamstrin/5  Left hamstrin/5  Right posterior tibial: 5/5  Left posterior tibial: 5/5  Right gastroc: 5/5  Left gastroc: 5/5    Sensory Exam   Right arm light touch: normal  Left arm light touch: normal  Right leg light touch: decreased sensation right thigh.  Left leg light touch: normal    Gait, Coordination, and Reflexes     Reflexes   Right ankle clonus: absent  Left ankle clonus:  absent        Results Review:  LABS:    Admission on 07/05/2024   Component Date Value Ref Range Status    Glucose 07/05/2024 103 (H)  65 - 99 mg/dL Final    BUN 07/05/2024 18  8 - 23 mg/dL Final    Creatinine 07/05/2024 0.67  0.57 - 1.00 mg/dL Final    Sodium 07/05/2024 146 (H)  136 - 145 mmol/L Final    Potassium 07/05/2024 3.8  3.5 - 5.2 mmol/L Final    Chloride 07/05/2024 103  98 - 107 mmol/L Final    CO2 07/05/2024 22.0  22.0 - 29.0 mmol/L Final    Calcium 07/05/2024 9.1  8.6 - 10.5 mg/dL Final    Total Protein 07/05/2024 6.6  6.0 - 8.5 g/dL Final    Albumin 07/05/2024 4.3  3.5 - 5.2 g/dL Final    ALT (SGPT) 07/05/2024 27  1 - 33 U/L Final    AST (SGOT) 07/05/2024 17  1 - 32 U/L Final    Alkaline Phosphatase 07/05/2024 43  39 - 117 U/L Final    Total Bilirubin 07/05/2024 1.1  0.0 - 1.2 mg/dL Final    Globulin 07/05/2024 2.3  gm/dL Final    A/G Ratio 07/05/2024 1.9  g/dL Final    BUN/Creatinine Ratio 07/05/2024 26.9 (H)  7.0 - 25.0 Final    Anion Gap 07/05/2024 21.0 (H)  5.0 - 15.0 mmol/L Final    eGFR 07/05/2024 96.5  >60.0 mL/min/1.73 Final    Color, UA 07/05/2024 Yellow  Yellow, Straw Final    Appearance, UA 07/05/2024 Cloudy (A)  Clear Final    pH, UA 07/05/2024 7.0  5.0 - 8.0 Final    Specific Gravity, UA 07/05/2024 1.018  1.005 - 1.030 Final    Glucose, UA 07/05/2024 Negative  Negative Final    Ketones, UA 07/05/2024 Trace (A)  Negative Final    Bilirubin, UA 07/05/2024 Negative  Negative Final    Blood, UA 07/05/2024 Trace (A)  Negative Final    Protein, UA 07/05/2024 Negative  Negative Final    Leuk Esterase, UA 07/05/2024 Small (1+) (A)  Negative Final    Nitrite, UA 07/05/2024 Negative  Negative Final    Urobilinogen, UA 07/05/2024 0.2 E.U./dL  0.2 - 1.0 E.U./dL Final    Protime 07/05/2024 12.9  11.7 - 14.2 Seconds Final    INR 07/05/2024 0.95  0.90 - 1.10 Final    PTT 07/05/2024 <20.0 (L)  22.7 - 35.4 seconds Final    WBC 07/05/2024 14.18 (H)  3.40 - 10.80 10*3/mm3 Final    RBC 07/05/2024 5.26  3.77 -  5.28 10*6/mm3 Final    Hemoglobin 07/05/2024 17.4 (H)  12.0 - 15.9 g/dL Final    Hematocrit 07/05/2024 51.8 (H)  34.0 - 46.6 % Final    MCV 07/05/2024 98.5 (H)  79.0 - 97.0 fL Final    MCH 07/05/2024 33.1 (H)  26.6 - 33.0 pg Final    MCHC 07/05/2024 33.6  31.5 - 35.7 g/dL Final    RDW 07/05/2024 12.3  12.3 - 15.4 % Final    RDW-SD 07/05/2024 44.1  37.0 - 54.0 fl Final    MPV 07/05/2024 10.4  6.0 - 12.0 fL Final    Platelets 07/05/2024 291  140 - 450 10*3/mm3 Final    Neutrophil % 07/05/2024 76.5 (H)  42.7 - 76.0 % Final    Lymphocyte % 07/05/2024 12.6 (L)  19.6 - 45.3 % Final    Monocyte % 07/05/2024 10.2  5.0 - 12.0 % Final    Eosinophil % 07/05/2024 0.0 (L)  0.3 - 6.2 % Final    Basophil % 07/05/2024 0.1  0.0 - 1.5 % Final    Immature Grans % 07/05/2024 0.6 (H)  0.0 - 0.5 % Final    Neutrophils, Absolute 07/05/2024 10.86 (H)  1.70 - 7.00 10*3/mm3 Final    Lymphocytes, Absolute 07/05/2024 1.78  0.70 - 3.10 10*3/mm3 Final    Monocytes, Absolute 07/05/2024 1.44 (H)  0.10 - 0.90 10*3/mm3 Final    Eosinophils, Absolute 07/05/2024 0.00  0.00 - 0.40 10*3/mm3 Final    Basophils, Absolute 07/05/2024 0.02  0.00 - 0.20 10*3/mm3 Final    Immature Grans, Absolute 07/05/2024 0.08 (H)  0.00 - 0.05 10*3/mm3 Final    nRBC 07/05/2024 0.0  0.0 - 0.2 /100 WBC Final    RBC, UA 07/05/2024 0-2  None Seen, 0-2 /HPF Final    WBC, UA 07/05/2024 11-20 (A)  None Seen, 0-2 /HPF Final    Bacteria,  07/05/2024 4+ (A)  None Seen /HPF Final    Squamous Epithelial Cells,  07/05/2024 31-50 (A)  None Seen, 0-2 /HPF Final    Hyaline Casts,  07/05/2024 3-6  None Seen /LPF Final    Methodology 07/05/2024 Automated Microscopy   Final       DIAGNOSTICS:  MRI [facility] 7/5/24   DISCS SPINAL CANAL NEURAL FORAMINA:    L1-L2:  Unremarkable.  No significant disc disease.  No stenosis.    L2-L3:  L2-3: Similar disc space narrowing with posterior marginal   osteophyte complex noted at L2-3 level.  This mildly narrows the central   canal with preservation of  the CSF collar.  Facet hypertrophic changes   suggested.  Mild bilateral neural foraminal encroachment, stable.    L3-L4:  Discectomy with hardware noted.  No central canal stenosis or   neural foraminal encroachment.    L4-L5:  Discectomy with hardware noted.  No central canal stenosis or   neural foraminal encroachment.    L5-S1:  Mild annular disc bulge.  No posterior disc protrusion or   central canal stenosis.  No neuroforaminal encroachment.  Bilateral facet   hypertrophic changes, stable.     IMPRESSION:       Stable postsurgical changes, as detailed above.  No significant central   canal stenosis noted.  IMPRESSION:        Electronically signed by Cuate Bonilla MD on 07-05-24 at 3369    Results Review:   I reviewed the patient's new clinical results.  I personally viewed the patient's chart, it was also reviewed by and discussed with Dr Alarcon    Vital Signs   Temp:  [97.1 °F (36.2 °C)-99.2 °F (37.3 °C)] 97.3 °F (36.3 °C)  Heart Rate:  [58-84] 59  Resp:  [16-18] 16  BP: (127-166)/(65-95) 147/65      Assessment & Plan       Bulge of lumbar disc without myelopathy    The patient is a 64 year old female who presents with acute on chronic low back pain. The pain has been so intense that she has had difficulty with walking and standing.     She was scheduled for a L2-3 fusion on 7/22 with Dr. Mathur. Discussed Lumbar MRI with Dr. Alarcon didn't see any significant changes from the MRI in January. Will plan to control pain.    PLAN:   Will start solumedrol IV every 6  Will add Robaxin 500mg every   Will add gabapentin 100mg BID    I discussed the patient's findings and my recommendations with patient and KLARISSA Diamond  07/06/24  10:19 EDT        Electronically signed by Raissa Plaza APRN at 07/06/24 0541

## 2024-07-08 NOTE — PLAN OF CARE
Goal Outcome Evaluation:  Plan of Care Reviewed With: patient        Progress: improving  Outcome Evaluation: Patient alert, oriented, able to voice needs. Pleasant and cooperative. PRN oxycodone given x 1. Patient states pain management is more under control and her movement has improved. Continent of bowel and bladder.

## 2024-07-08 NOTE — PROGRESS NOTES
"[facility] THORACIC/LUMBAR NEUROSURGERY PROGRESS NOTE      CC: Back and right leg pain      Subjective     Interval History: Reports that her right leg is feeling much better today.  She feels that the gabapentin and steroid have been instrumental.  She has used the Oxy IR some but feels that it \"knocks her out\".    ROS:  Constitutional: No fever, chills  MS: back pain  Neuro: Right leg pain numbness, tingling, or weakness,  no balance difficulties  : No difficulty voiding, no incontinence    Objective     Vital signs in last 24 hours:  Temp:  [97.1 °F (36.2 °C)-98.1 °F (36.7 °C)] 97.7 °F (36.5 °C)  Heart Rate:  [60-70] 63  Resp:  [18-20] 18  BP: (111-163)/(62-86) 111/63    Intake/Output this shift:  I/O this shift:  In: 120 [P.O.:120]  Out: -     LABS:  Results from last 7 days   Lab Units 07/08/24  0611 07/07/24  0722 07/05/24  1555   WBC 10*3/mm3 26.00* 12.64* 14.18*   HEMOGLOBIN g/dL 15.5 16.7* 17.4*   HEMATOCRIT % 46.9* 48.9* 51.8*   PLATELETS 10*3/mm3 283 283 291     Results from last 7 days   Lab Units 07/08/24  0612 07/07/24  0722 07/05/24  1555   SODIUM mmol/L 134* 135* 146*   POTASSIUM mmol/L 4.5 4.2 3.8   CHLORIDE mmol/L 102 103 103   CO2 mmol/L 20.2* 22.0 22.0   BUN mg/dL 24* 20 18   CREATININE mg/dL 0.58 0.56* 0.67   CALCIUM mg/dL 8.4* 8.5* 9.1   BILIRUBIN mg/dL  --   --  1.1   ALK PHOS U/L  --   --  43   ALT (SGPT) U/L  --   --  27   AST (SGOT) U/L  --   --  17   GLUCOSE mg/dL 155* 149* 103*     IMAGING STUDIES:  No new imaging    I personally viewed and interpreted the patient's chart.    Meds reviewed/changed: Yes  Tylenol 1 g p.o. every 8 hours  Lovenox 60 mg SQ every 12 hours  Gabapentin 100 mg p.o. 3 times daily  Robaxin 500 mg p.o. every 8 hours  Solu-Medrol 125 mg IV every 6 hours  Roxicodone IR 7.5 mg p.o. every 4 as needed-3 doses    Physical Exam:    General:   Awake, alert.  Resting in bed.  Pleasant and cooperative hide Dr. Goldstein review  Back:    - SLR.  Midline incision " well-healed  Motor:    Normal muscle strength, bulk and tone in lower extremities.  No fasciculations, rigidity, spasticity, or abnormal movements.  Sensation:   Normal to light touch slava LEs except right thigh which is chronically diminished  Station and Gait:             Per PT note 7/7-ompleted bed mobility with SBA today, STS with SBA, and ambulated 20' in room using hiking stick in R UE, CGA-SBA with mild unsteadiness noted and reports of N/T worsening down R LE with movement.    Extremities:   Wearing SCD      Assessment & Plan     ASSESSMENT:      Bulge of lumbar disc without myelopathy    PAD (peripheral artery disease)    PAF (paroxysmal atrial fibrillation)    Spinal stenosis of lumbar region with neurogenic claudication    COPD (chronic obstructive pulmonary disease)    Factor V Leiden mutation    History of DVT of lower extremity    Alpha-1-antitrypsin deficiency    Patient with L2/3 adjacent level stenosis from prior fusion with back and right leg pain.  Feeling much better today after high-dose steroids and addition of gabapentin, scheduled Robaxin, and Tylenol.  She has taken no morphine overnight and a couple of doses of Oxy IR.  She is scheduled for surgery in 2 weeks from today.  I recommended epidural injection which can be done tomorrow after holding her Lovenox for 24 hours and then she can be discharged if she is mobilizing well.  I recommend that she stay on the gabapentin at discharge but the Robaxin can be as needed.  Will determine tomorrow morning whether she needs to go home on any steroid taper.  I did encourage her to try to reduce her use of the Oxy IR and I have resumed her Norco 5 for moderate pain and the Oxy IR for severe pain.  She is in agreement with this plan.  She states she does not need sedation for the epidural so she does not need to be n.p.o. after midnight.  Dr. Mathur is out of office until next Monday.  Patient aware.    PLAN:   Cont IV steroid today-reduce dose  LESI  "tomorrow  Cont Gabapentin/Robaxin  Resume Norco 5 PRN mod pain and use Oxy IR for severe pain, MSO4 for breakthrough pain  Likely OK for DC tomorrow after LESI  OR 7/22  Hold Lovenox today/AM    I discussed the patient's findings and my recommendations with patient and Dr. Lorenzana    During patient visit, I utilized appropriate personal protective equipment including gloves. Appropriate PPE was worn during the entire visit.  Hand hygiene was completed before and after.      LOS: 1 day       Gracie Suero, APRN  7/8/2024  11:12 EDT    \"Dictated utilizing Dragon dictation\".      "

## 2024-07-08 NOTE — SIGNIFICANT NOTE
07/08/24 1215   OTHER   Discipline physical therapist   Rehab Time/Intention   Session Not Performed patient/family declined treatment  (pt was on a phone call and wanted therapy to follow up later today. States she has been moving better and stood up to 30 minutes last night. Will follow up as time allows.)

## 2024-07-08 NOTE — PLAN OF CARE
Goal Outcome Evaluation:      Pt A&OX4; able to voice needs. Lovenox held per MD order. Pt will need to be NPO except meds 6hrs before schedule epidural 7/9 at 1500. Pt states she has not taken eliquis for at least 5-7 days. Pt refused bed alarm; RN educated on safety risk. PRN oxycodone utilized once. PIV in RAC flushed and saline locked. Pt appears to be resting comfortably with unlabored breathing. Will continue plan of care.

## 2024-07-09 ENCOUNTER — APPOINTMENT (OUTPATIENT)
Dept: PAIN MEDICINE | Facility: HOSPITAL | Age: 64
End: 2024-07-09
Payer: COMMERCIAL

## 2024-07-09 ENCOUNTER — READMISSION MANAGEMENT (OUTPATIENT)
Dept: CALL CENTER | Facility: HOSPITAL | Age: 64
End: 2024-07-09
Payer: COMMERCIAL

## 2024-07-09 ENCOUNTER — APPOINTMENT (OUTPATIENT)
Dept: GENERAL RADIOLOGY | Facility: HOSPITAL | Age: 64
End: 2024-07-09
Payer: COMMERCIAL

## 2024-07-09 ENCOUNTER — ANESTHESIA (OUTPATIENT)
Dept: PAIN MEDICINE | Facility: HOSPITAL | Age: 64
End: 2024-07-09
Payer: COMMERCIAL

## 2024-07-09 ENCOUNTER — ANESTHESIA EVENT (OUTPATIENT)
Dept: PAIN MEDICINE | Facility: HOSPITAL | Age: 64
End: 2024-07-09
Payer: COMMERCIAL

## 2024-07-09 VITALS
DIASTOLIC BLOOD PRESSURE: 99 MMHG | BODY MASS INDEX: 20.25 KG/M2 | WEIGHT: 129 LBS | TEMPERATURE: 99.1 F | SYSTOLIC BLOOD PRESSURE: 148 MMHG | OXYGEN SATURATION: 95 % | RESPIRATION RATE: 18 BRPM | HEART RATE: 70 BPM | HEIGHT: 67 IN

## 2024-07-09 LAB
ANION GAP SERPL CALCULATED.3IONS-SCNC: 10.7 MMOL/L (ref 5–15)
BUN SERPL-MCNC: 21 MG/DL (ref 8–23)
BUN/CREAT SERPL: 32.8 (ref 7–25)
CALCIUM SPEC-SCNC: 8.5 MG/DL (ref 8.6–10.5)
CHLORIDE SERPL-SCNC: 103 MMOL/L (ref 98–107)
CO2 SERPL-SCNC: 24.3 MMOL/L (ref 22–29)
CREAT SERPL-MCNC: 0.64 MG/DL (ref 0.57–1)
DEPRECATED RDW RBC AUTO: 46.6 FL (ref 37–54)
EGFRCR SERPLBLD CKD-EPI 2021: 97.6 ML/MIN/1.73
ERYTHROCYTE [DISTWIDTH] IN BLOOD BY AUTOMATED COUNT: 12.4 % (ref 12.3–15.4)
GLUCOSE SERPL-MCNC: 144 MG/DL (ref 65–99)
HCT VFR BLD AUTO: 47.2 % (ref 34–46.6)
HGB BLD-MCNC: 15.6 G/DL (ref 12–15.9)
MCH RBC QN AUTO: 33.5 PG (ref 26.6–33)
MCHC RBC AUTO-ENTMCNC: 33.1 G/DL (ref 31.5–35.7)
MCV RBC AUTO: 101.5 FL (ref 79–97)
PLATELET # BLD AUTO: 263 10*3/MM3 (ref 140–450)
PMV BLD AUTO: 10.6 FL (ref 6–12)
POTASSIUM SERPL-SCNC: 4.7 MMOL/L (ref 3.5–5.2)
RBC # BLD AUTO: 4.65 10*6/MM3 (ref 3.77–5.28)
SODIUM SERPL-SCNC: 138 MMOL/L (ref 136–145)
WBC NRBC COR # BLD AUTO: 20.82 10*3/MM3 (ref 3.4–10.8)

## 2024-07-09 PROCEDURE — 25510000001 IOPAMIDOL 41 % SOLUTION: Performed by: ANESTHESIOLOGY

## 2024-07-09 PROCEDURE — 85027 COMPLETE CBC AUTOMATED: CPT | Performed by: STUDENT IN AN ORGANIZED HEALTH CARE EDUCATION/TRAINING PROGRAM

## 2024-07-09 PROCEDURE — 94664 DEMO&/EVAL PT USE INHALER: CPT

## 2024-07-09 PROCEDURE — 94799 UNLISTED PULMONARY SVC/PX: CPT

## 2024-07-09 PROCEDURE — 3E0R33Z INTRODUCTION OF ANTI-INFLAMMATORY INTO SPINAL CANAL, PERCUTANEOUS APPROACH: ICD-10-PCS | Performed by: ANESTHESIOLOGY

## 2024-07-09 PROCEDURE — 25010000002 METHYLPREDNISOLONE PER 80 MG: Performed by: ANESTHESIOLOGY

## 2024-07-09 PROCEDURE — 25010000002 LIDOCAINE 1 % SOLUTION: Performed by: ANESTHESIOLOGY

## 2024-07-09 PROCEDURE — 25010000002 METHYLPREDNISOLONE PER 125 MG: Performed by: NURSE PRACTITIONER

## 2024-07-09 PROCEDURE — 3E0R3BZ INTRODUCTION OF ANESTHETIC AGENT INTO SPINAL CANAL, PERCUTANEOUS APPROACH: ICD-10-PCS | Performed by: ANESTHESIOLOGY

## 2024-07-09 PROCEDURE — 77003 FLUOROGUIDE FOR SPINE INJECT: CPT

## 2024-07-09 PROCEDURE — 80048 BASIC METABOLIC PNL TOTAL CA: CPT | Performed by: STUDENT IN AN ORGANIZED HEALTH CARE EDUCATION/TRAINING PROGRAM

## 2024-07-09 RX ORDER — MIDAZOLAM HYDROCHLORIDE 1 MG/ML
1 INJECTION INTRAMUSCULAR; INTRAVENOUS ONCE AS NEEDED
Status: DISCONTINUED | OUTPATIENT
Start: 2024-07-09 | End: 2024-07-09

## 2024-07-09 RX ORDER — METHOCARBAMOL 500 MG/1
500 TABLET, FILM COATED ORAL EVERY 8 HOURS SCHEDULED
Qty: 90 TABLET | Refills: 0 | Status: SHIPPED | OUTPATIENT
Start: 2024-07-09

## 2024-07-09 RX ORDER — METHYLPREDNISOLONE SODIUM SUCCINATE 125 MG/2ML
60 INJECTION, POWDER, LYOPHILIZED, FOR SOLUTION INTRAMUSCULAR; INTRAVENOUS EVERY 8 HOURS
Status: DISCONTINUED | OUTPATIENT
Start: 2024-07-09 | End: 2024-07-09 | Stop reason: HOSPADM

## 2024-07-09 RX ORDER — METHYLPREDNISOLONE 4 MG/1
TABLET ORAL
Qty: 21 TABLET | Refills: 0 | Status: SHIPPED | OUTPATIENT
Start: 2024-07-09

## 2024-07-09 RX ORDER — NALOXONE HYDROCHLORIDE 4 MG/.1ML
SPRAY NASAL
Qty: 2 EACH | Refills: 0 | Status: SHIPPED | OUTPATIENT
Start: 2024-07-09

## 2024-07-09 RX ORDER — ACETAMINOPHEN 500 MG
1000 TABLET ORAL EVERY 8 HOURS
Qty: 180 TABLET | Refills: 0 | Status: SHIPPED | OUTPATIENT
Start: 2024-07-09

## 2024-07-09 RX ORDER — LIDOCAINE HYDROCHLORIDE 10 MG/ML
1 INJECTION, SOLUTION INFILTRATION; PERINEURAL ONCE
Status: COMPLETED | OUTPATIENT
Start: 2024-07-09 | End: 2024-07-09

## 2024-07-09 RX ORDER — METHYLPREDNISOLONE ACETATE 80 MG/ML
80 INJECTION, SUSPENSION INTRA-ARTICULAR; INTRALESIONAL; INTRAMUSCULAR; SOFT TISSUE ONCE
Status: COMPLETED | OUTPATIENT
Start: 2024-07-09 | End: 2024-07-09

## 2024-07-09 RX ORDER — IOPAMIDOL 408 MG/ML
10 INJECTION, SOLUTION INTRATHECAL
Status: COMPLETED | OUTPATIENT
Start: 2024-07-09 | End: 2024-07-09

## 2024-07-09 RX ORDER — GABAPENTIN 100 MG/1
100 CAPSULE ORAL 3 TIMES DAILY
Qty: 90 CAPSULE | Refills: 0 | Status: SHIPPED | OUTPATIENT
Start: 2024-07-09

## 2024-07-09 RX ORDER — OXYCODONE HYDROCHLORIDE 5 MG/1
5 TABLET ORAL EVERY 4 HOURS PRN
Qty: 10 TABLET | Refills: 0 | Status: SHIPPED | OUTPATIENT
Start: 2024-07-09

## 2024-07-09 RX ORDER — POLYETHYLENE GLYCOL 3350 17 G/17G
17 POWDER, FOR SOLUTION ORAL 2 TIMES DAILY
Qty: 238 G | Refills: 0 | Status: SHIPPED | OUTPATIENT
Start: 2024-07-09

## 2024-07-09 RX ORDER — FENTANYL CITRATE 50 UG/ML
50 INJECTION, SOLUTION INTRAMUSCULAR; INTRAVENOUS ONCE
Status: DISCONTINUED | OUTPATIENT
Start: 2024-07-09 | End: 2024-07-09

## 2024-07-09 RX ADMIN — ACETAMINOPHEN 1000 MG: 500 TABLET ORAL at 14:36

## 2024-07-09 RX ADMIN — PANTOPRAZOLE SODIUM 40 MG: 40 TABLET, DELAYED RELEASE ORAL at 06:07

## 2024-07-09 RX ADMIN — OXYCODONE HYDROCHLORIDE 7.5 MG: 15 TABLET ORAL at 08:14

## 2024-07-09 RX ADMIN — ACETAMINOPHEN 1000 MG: 500 TABLET ORAL at 06:07

## 2024-07-09 RX ADMIN — METHOCARBAMOL TABLETS 500 MG: 500 TABLET, COATED ORAL at 14:36

## 2024-07-09 RX ADMIN — IOPAMIDOL 10 ML: 408 INJECTION, SOLUTION INTRATHECAL at 13:31

## 2024-07-09 RX ADMIN — TIOTROPIUM BROMIDE INHALATION SPRAY 2 PUFF: 3.12 SPRAY, METERED RESPIRATORY (INHALATION) at 08:48

## 2024-07-09 RX ADMIN — GABAPENTIN 100 MG: 100 CAPSULE ORAL at 08:14

## 2024-07-09 RX ADMIN — METHYLPREDNISOLONE SODIUM SUCCINATE 80 MG: 125 INJECTION INTRAMUSCULAR; INTRAVENOUS at 00:06

## 2024-07-09 RX ADMIN — LIDOCAINE HYDROCHLORIDE 1 ML: 10 INJECTION, SOLUTION INFILTRATION; PERINEURAL at 13:30

## 2024-07-09 RX ADMIN — GABAPENTIN 100 MG: 100 CAPSULE ORAL at 14:36

## 2024-07-09 RX ADMIN — BUDESONIDE AND FORMOTEROL FUMARATE DIHYDRATE 2 PUFF: 160; 4.5 AEROSOL RESPIRATORY (INHALATION) at 08:48

## 2024-07-09 RX ADMIN — METHYLPREDNISOLONE SODIUM SUCCINATE 80 MG: 125 INJECTION INTRAMUSCULAR; INTRAVENOUS at 08:14

## 2024-07-09 RX ADMIN — METHOCARBAMOL TABLETS 500 MG: 500 TABLET, COATED ORAL at 06:07

## 2024-07-09 RX ADMIN — DESVENLAFAXINE SUCCINATE 25 MG: 25 TABLET, EXTENDED RELEASE ORAL at 08:15

## 2024-07-09 RX ADMIN — DILTIAZEM HYDROCHLORIDE 180 MG: 180 CAPSULE, EXTENDED RELEASE ORAL at 08:14

## 2024-07-09 RX ADMIN — METHYLPREDNISOLONE ACETATE 80 MG: 80 INJECTION, SUSPENSION INTRA-ARTICULAR; INTRALESIONAL; INTRAMUSCULAR; SOFT TISSUE at 13:31

## 2024-07-09 NOTE — NURSING NOTE
Barbara called to report on patient returning from surgery with education and instructions. Patient educated and understands education. She is calling son to . Medications have already been in delivered.  
Patient educated on use of scd's to prevent blood clots due she is at risk for. Patient educated on procedure today. She wants it to be at 1400, but is schedule at 1500. Patient needs to be NPO. Dr. Ibarra okayed that she can have sips of decaff coffee.  
Patient stated that the last day of taking eliquis was February 14, 2024.  
Cardiac

## 2024-07-09 NOTE — OUTREACH NOTE
Prep Survey      Flowsheet Row Responses   [facility] patient discharged [facility]? [facility]   Is LACE score < 7 ? No   Eligibility Readm Mgmt   Discharge diagnosis Bulge of lumbar disc without myelopathy   Does the patient have one of the following disease processes/diagnoses(primary or secondary)? Other   Prep survey completed? Yes            Kassie HICKMAN - Registered Nurse

## 2024-07-09 NOTE — DISCHARGE SUMMARY
Date of Discharge:  7/9/2024    PCP: Bosler, James William III, MD    Discharge Diagnosis:   Active Hospital Problems    Diagnosis  POA    **Bulge of lumbar disc without myelopathy [M51.36]  Yes    Alpha-1-antitrypsin deficiency [E88.01]  Yes    Factor V Leiden mutation [D68.51]  Yes    History of DVT of lower extremity [Z86.718]  Not Applicable    COPD (chronic obstructive pulmonary disease) [J44.9]  Yes    Spinal stenosis of lumbar region with neurogenic claudication [M48.062]  Yes    PAF (paroxysmal atrial fibrillation) [I48.0]  Yes    PAD (peripheral artery disease) [I73.9]  Yes      Resolved Hospital Problems   No resolved problems to display.          Consults       Date and Time Order Name Status Description    7/6/2024 12:36 AM Inpatient Neurosurgery Consult Completed     7/5/2024  9:56 PM [facility] (on-call MD unless specified) Details            Hospital Course  64 y.o. female with a history of paroxysmal A-fib, hypertension, COPD, alpha-1 antitrypsin deficiency, history of DVT with factor V Leiden mutation that presents to [facility] complaining of acute on chronic back pain. She has a history of prior lumbar fusion December 2022. She was scheduled for lumbar fusion 722. Lumbar MRI did not show any significant changes from January. She was started on medications for pain control with improvement in her symptoms (steroids, scheduled Tylenol, gabapentin, muscle relaxer, as needed oxycodone). Lumbar epidural steroid injection is planned for today and afterwards she will be discharged home and she will follow-up with neurosurgery. Neurosurgery says she can resume Eliquis 24 hours after epidural and she already has a stop date for it before surgery.    I discussed the patient's findings and my recommendations with patient and nursing staff and JANEEN Hodges regarding eliquis instructions.    Temp:  [97.6 °F (36.4 °C)-98.4 °F (36.9 °C)] 97.9 °F (36.6 °C)  Heart Rate:  [58-62] 59  Resp:   [16-18] 18  BP: (116-141)/(67-83) 116/68  Body mass index is 20.2 kg/m².    Physical Exam  Constitutional:       General: She is not in acute distress.     Appearance: She is not toxic-appearing.   HENT:      Head: Normocephalic and atraumatic.   Cardiovascular:      Rate and Rhythm: Normal rate and regular rhythm.   Pulmonary:      Effort: Pulmonary effort is normal. No respiratory distress.      Breath sounds: Normal breath sounds. No wheezing or rhonchi.   Abdominal:      General: Bowel sounds are normal.      Palpations: Abdomen is soft.      Tenderness: There is no abdominal tenderness. There is no guarding or rebound.   Musculoskeletal:         General: No swelling.   Skin:     General: Skin is warm and dry.   Neurological:      General: No focal deficit present.      Mental Status: She is alert and oriented to person, place, and time.   Psychiatric:         Mood and Affect: Mood normal.         Behavior: Behavior normal.       Disposition: Home or Self Care       Discharge Medications        New Medications        Instructions Start Date   acetaminophen 500 MG tablet  Commonly known as: TYLENOL  Replaces: Acetaminophen 325 MG capsule   1,000 mg, Oral, Every 8 Hours      gabapentin 100 MG capsule  Commonly known as: NEURONTIN   100 mg, Oral, 3 times daily      methocarbamol 500 MG tablet  Commonly known as: ROBAXIN   500 mg, Oral, Every 8 Hours Scheduled      naloxone 4 MG/0.1ML nasal spray  Commonly known as: NARCAN   Call 911. Don't prime. [place] in 1 nostril for overdose. Repeat in 2-3 minutes in other nostril if no or minimal breathing/responsiveness.      oxyCODONE 5 MG immediate release tablet  Commonly known as: Roxicodone   5 mg, Oral, Every 4 Hours PRN      polyethylene glycol 17 GM/SCOOP powder  Commonly known as: MIRALAX   Mix 17 g (1 capful) in liquid and drink by mouth 2 (Two) Times a Day.             Changes to Medications        Instructions Start Date   apixaban 5 MG tablet tablet  Commonly  known as: Eliquis  What changed:   how much to take  when to take this  additional instructions   5 mg, Oral, Every 12 Hours Scheduled, resume 24 hours after epidural injection   Start Date: July 10, 2024     methylPREDNISolone 4 MG dose pack  Commonly known as: MEDROL  What changed: additional instructions   Take as directed on package instructions.             Continue These Medications        Instructions Start Date   albuterol sulfate  (90 Base) MCG/ACT inhaler  Commonly known as: PROVENTIL HFA;VENTOLIN HFA;PROAIR HFA   1 puff, As Needed      betamethasone (augmented) 0.05 % cream  Commonly known as: DIPROLENE   2 (Two) Times a Day As Needed.      desvenlafaxine 50 MG 24 hr tablet  Commonly known as: PRISTIQ   12.5 mg, Oral, As Needed      dilTIAZem  MG 24 hr capsule  Commonly known as: CARDIZEM CD   180 mg, Oral, Daily      EPINEPHrine 0.3 MG/0.3ML solution auto-injector injection  Commonly known as: EPIPEN   As Needed      esomeprazole 40 MG capsule  Commonly known as: nexIUM   40 mg, Oral, Daily      estradiol 0.075 MG/24HR patch  Commonly known as: MINIVELLE, VIVELLE-DOT   1 patch, Transdermal, 2 Times Weekly      guaiFENesin 600 MG 12 hr tablet  Commonly known as: MUCINEX   As Needed      ondansetron 4 MG tablet  Commonly known as: ZOFRAN   Every 8 Hours PRN      Progesterone 200 MG capsule  Commonly known as: PROMETRIUM   Daily      Trelegy Ellipta 100-62.5-25 MCG/INH inhaler  Generic drug: Fluticasone-Umeclidin-Vilant   1 puff, Inhalation, Daily      Zemaira 1000 MG injection  Generic drug: alpha1-proteinase inhibitor   Weekly, Pt takes on Fridays at infusion center             Stop These Medications      Acetaminophen 325 MG capsule  Replaced by: acetaminophen 500 MG tablet     budesonide 1 MG/2ML nebulizer solution  Commonly known as: PULMICORT     desonide 0.05 % cream  Commonly known as: DESOWEN     Diclofenac Epolamine 1.3 % patch patch  Commonly known as: FLECTOR      diclofenac-miSOPROStol 75-0.2 MG EC tablet  Commonly known as: ARTHROTEC 75     HYDROcodone-acetaminophen 7.5-325 MG per tablet  Commonly known as: NORCO     hydrocortisone 2.5 % cream     lidocaine-prilocaine 2.5-2.5 % cream  Commonly known as: EMLA     promethazine 25 MG tablet  Commonly known as: PHENERGAN     tretinoin 0.1 % cream  Commonly known as: RETIN-A     valACYclovir 1000 MG tablet  Commonly known as: VALTREX     venlafaxine 50 MG tablet  Commonly known as: EFFEXOR             Diet Instructions       Diet: Regular/House Diet, Cardiac Diets; Healthy Heart (2-3 Na+)      Discharge Diet:  Regular/House Diet  Cardiac Diets       Cardiac Diet: Healthy Heart (2-3 Na+)    Texture: Regular Texture (IDDSI 7)    Fluid Consistency: Thin (IDDSI 0)           Activity Instructions       Activity as Tolerated      per JANEEN           Additional Instructions for the Follow-ups that You Need to Schedule       Call MD for problems / concerns.   As directed             Follow-up Information       Bosler, James William III, MD .    Specialty: Internal Medicine  Contact information:  [address] [address]  [place] [address] [phone]                            Future Appointments   Date Time Provider Department Center   7/9/2024  3:00 PM TREATMENT RM 1 [facility] PAIN [facility] PAIN [facility]   7/16/2024 11:30 AM BH [facility] PAT 4 BH IN[facility]I PAT [facility]   7/16/2024  1:45 PM Rigo Mathur MD [facility] NS [facility] N[facility]   9/3/2024 10:40 AM Ijeoma Auguste MD [facility] CD L[facility] N[facility]        [facility] MD Stacey  [facility]  07/09/24    Discharge time spent greater than 30 minutes.

## 2024-07-09 NOTE — DISCHARGE INSTRUCTIONS

## 2024-07-09 NOTE — ANESTHESIA PROCEDURE NOTES
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 [M99.73]     * Lumbar radiculopathy [M54.16]    Prep:  Pt Position:prone  Sterile Tech:sterile barrier, mask and gloves  Prep:chlorhexidine gluconate and isopropyl alcohol  Monitoring:blood pressure monitoring, continuous pulse oximetry and EKG  Procedure:Sedation: no     Approach:right paramedian  Guidance: fluoroscopy  Location:lumbar  Level:1-2  Needle Type:Tuohy  Needle Gauge:20 G  Aspiration:negative  Test Dose:negative  Medications:  Isovue:2mL  Comments:The L1-2 level to avoid possible compromised interspinous ligament from previous fusion.Depomedrol:80mg  Post Assessment:  Pt Tolerance:patient tolerated the procedure well with no apparent complications  Complications:no

## 2024-07-09 NOTE — PLAN OF CARE
Goal Outcome Evaluation:  Plan of Care Reviewed With: patient        Progress: no change  Outcome Evaluation: Pt is alert and oriented, pleasant. She requested PRN pain medication x1 this shift. Up and able to ambulate to the bathroom without issue several times this shift. Lovenox is being held for procedure in the afternoon, and she is NPO starting at 8am for it. She voices no other complaints.

## 2024-07-09 NOTE — PROGRESS NOTES
Discharge Planning Assessment  [facility]     Patient Name: Radha Block  MRN: [id number]  Today's Date: 7/9/2024    Admit Date: 7/5/2024    Plan: home   Discharge Needs Assessment    No documentation.                  Discharge Plan       Row Name 07/09/24 1457       Plan    Plan home    Plan Comments The patient transferred to [facility] from ER on 7/5/24. The patient got epidural today and states she does not want PT/OT at this time. Denies any needs. Family to transport. HAM Finney RN, CCP    Final Discharge Disposition Code 01 - home or self-care    Final Note Discharged to home with family on 7/9/24. HAM Finney RN, CCP.                  Continued Care and Services - Admitted Since 7/5/2024    No active coordination exists for this encounter.       Expected Discharge Date and Time       Expected Discharge Date Expected Discharge Time    Jul 9, 2024            Demographic Summary    No documentation.                  Functional Status    No documentation.                  Psychosocial    No documentation.                  Abuse/Neglect    No documentation.                  Legal    No documentation.                  Substance Abuse    No documentation.                  Patient Forms    No documentation.                     Jeannie Finney RN

## 2024-07-09 NOTE — H&P
[facility]    History and Physical    Patient Name: Radha Block  :  1960  MRN:  [id number]  Date of Admission: 2024    Subjective     Patient is a 64 y.o. female presents with chief complaint of acute on chronic low back and leg: right pain.  Onset of symptoms was gradual starting several months ago.  Symptoms are associated/aggravated by nothing in particular or standing. Symptoms improve with nothing    The following portions of the patients history were reviewed and updated as appropriate: current medications, allergies, past medical history, past surgical history, past family history, past social history, and problem list    She has had low back issues for the past several months.  She was having some radicular symptoms and had a previous epidural steroid injection at L2-3.  She was not having severe radicular symptoms into her groin at that time.  Subsequently over the last few days she began having severe radicular symptoms into her leg and groin.  They were markedly exacerbated when she stood to the point where she could no longer walk.  She was admitted to the hospital and reevaluated.  Been asked to perform epidural steroid injection today.          COMPARISON:    MR lumbar spine 2024     FINDINGS:    Vertebrae:  There is stable posterior fusion from L3-L5 with pedicle   screws and paraspinal rods.  Laminectomy defects suggested.  There is   stable interbody hardware at L3-L4 and L4-L5.  The vertebral bodies   demonstrate stable marrow signal.    Spinal cord:  The conus is stable in appearance and unremarkable,   terminating at the L1 vertebral body level.  The cauda equina fibers are   stable in appearance without evidence for arachnoiditis.    Soft tissues:  No significant abnormal paraspinal soft tissue   abnormality identified.      DISCS SPINAL CANAL NEURAL FORAMINA:    L1-L2:  Unremarkable.  No significant disc disease.  No stenosis.    L2-L3:  L2-3: Similar disc space  narrowing with posterior marginal   osteophyte complex noted at L2-3 level.  This mildly narrows the central   canal with preservation of the CSF collar.  Facet hypertrophic changes   suggested.  Mild bilateral neural foraminal encroachment, stable.    L3-L4:  Discectomy with hardware noted.  No central canal stenosis or   neural foraminal encroachment.    L4-L5:  Discectomy with hardware noted.  No central canal stenosis or   neural foraminal encroachment.    L5-S1:  Mild annular disc bulge.  No posterior disc protrusion or   central canal stenosis.  No neuroforaminal encroachment.  Bilateral facet   hypertrophic changes, stable.     IMPRESSION:       Stable postsurgical changes, as detailed above.  No significant central   canal stenosis noted.  IMPRESSION:        Electronically signed by Cuate Bonilla MD on 07-05-24 at 234  Objective     Past Medical History:   Past Medical History:   Diagnosis Date   • Arrhythmia    • Arthritis    • Clotting disorder     Factor 5 Liden   • COPD (chronic obstructive pulmonary disease)    • Deep vein thrombosis    • Essential hypertension 08/24/2021   • Low back pain    • Mitral valve prolapse    • PAD (peripheral artery disease)    • PAF (paroxysmal atrial fibrillation) 11/25/2020     Past Surgical History:   Past Surgical History:   Procedure Laterality Date   • BLADDER SURGERY  2013    E/O polyp   • BREAST SURGERY      E/O benign neuroma   • CARDIAC ELECTROPHYSIOLOGY PROCEDURE N/A 10/10/2022    Procedure: Ablation atrial fibrillation CRYO;  Surgeon: Venkatesh Iqbal MD;  Location: [facility] INVASIVE LOCATION;  Service: Cardiovascular;  Laterality: N/A;   • COLONOSCOPY  2016   • EPIDURAL BLOCK     • HAND SURGERY Right     Carpal metacarpal hand surgery    • LUMBAR FUSION N/A 12/30/2022    Procedure: Lumbar 3 to lumbar 4 and lumbar 4 to lumbar 5 laminectomy with fusion and instrumentation;  Surgeon: Rigo Mathur MD;  Location: [facility] OR;  Service: Robotics -  "Neuro;  Laterality: N/A;   • SINUS SURGERY  2017   • TONSILLECTOMY       Family History:   Family History   Adopted: Yes   Problem Relation Age of Onset   • Malig Hyperthermia Neg Hx      Social History:   Social History     Socioeconomic History   • Marital status:    • Number of children: 1   Tobacco Use   • Smoking status: Never   • Smokeless tobacco: Never   Vaping Use   • Vaping status: Never Used   Substance and Sexual Activity   • Alcohol use: Yes     Alcohol/week: 3.0 standard drinks of alcohol     Types: 3 Glasses of wine per week     Comment: 2 times a week    • Drug use: Never   • Sexual activity: Defer       Vital Signs Range for the last 24 hours  Temperature: Temp:  [36.4 °C (97.6 °F)-37.3 °C (99.1 °F)] 37.3 °C (99.1 °F)   Temp Source: Temp src: Temporal   BP: BP: (116-157)/(67-83) 157/81   Pulse: Heart Rate:  [58-68] 68   Respirations: Resp:  [16-18] 16   SPO2: SpO2:  [94 %-97 %] 96 %   O2 Amount (l/min):     O2 Devices Device (Oxygen Therapy): room air   Weight:       Flowsheet Rows      Flowsheet Row First Filed Value   Admission Height 170.2 cm (67\") Documented at 07/05/2024 1541   Admission Weight 58.5 kg (129 lb) Documented at 07/05/2024 1541            --------------------------------------------------------------------------------    Current Facility-Administered Medications   Medication Dose Route Frequency Provider Last Rate Last Admin   • acetaminophen (TYLENOL) tablet 1,000 mg  1,000 mg Oral Q8H Josefina Melendrez MD   1,000 mg at 07/09/24 0607   • albuterol (PROVENTIL) nebulizer solution 0.083% 2.5 mg/3mL  2.5 mg Nebulization Q6H PRN Josefina Melendrez MD       • aluminum-magnesium hydroxide-simethicone (MAALOX MAX) 400-400-40 MG/5ML suspension 15 mL  15 mL Oral Q6H PRN Daisy Hunt APRN   15 mL at 07/06/24 1649   • sennosides-docusate (PERICOLACE) 8.6-50 MG per tablet 2 tablet  2 tablet Oral BID PRN Daisy Hunt APRN        And   • polyethylene glycol (MIRALAX) " packet 17 g  17 g Oral Daily PRN Daisy Hunt APRN        And   • bisacodyl (DULCOLAX) EC tablet 5 mg  5 mg Oral Daily PRN Daisy Hunt APRN        And   • bisacodyl (DULCOLAX) suppository 10 mg  10 mg Rectal Daily PRN Daisy Hunt APRN       • budesonide-formoterol (SYMBICORT) 160-4.5 MCG/ACT inhaler 2 puff  2 puff Inhalation BID - RT Josefina Melendrez MD   2 puff at 07/09/24 0848    And   • tiotropium (SPIRIVA RESPIMAT) 2.5 mcg/act aerosol solution inhaler  2 puff Inhalation Daily - RT Josefina Melendrez MD   2 puff at 07/09/24 0848   • Desvenlafaxine Succinate ER 25 mg  25 mg Oral Daily Josefina Melendrez MD   25 mg at 07/09/24 0815   • dilTIAZem CD (CARDIZEM CD) 24 hr capsule 180 mg  180 mg Oral Daily Josefina Melendrez MD   180 mg at 07/09/24 0814   • [Held by provider] Enoxaparin Sodium (LOVENOX) syringe 60 mg  1 mg/kg Subcutaneous Q12H Josefina Melendrez MD   60 mg at 07/08/24 0842   • fentaNYL citrate (PF) (SUBLIMAZE) injection 50 mcg  50 mcg Intravenous Once Render, Antonio Cornejo MD       • gabapentin (NEURONTIN) capsule 100 mg  100 mg Oral TID Raissa Plaza APRN   100 mg at 07/09/24 0814   • iopamidol (ISOVUE-M 200) injection 41%  10 mL Epidural Once in imaging Render, Antonio Cornejo MD       • lidocaine (XYLOCAINE) 1 % injection 1 mL  1 mL Intradermal Once Render, Antonio Cornejo MD       • methocarbamol (ROBAXIN) tablet 500 mg  500 mg Oral Q8H Josefina Melendrez MD   500 mg at 07/09/24 0607   • methylPREDNISolone acetate (DEPO-medrol) injection 80 mg  80 mg Epidural Once Render, Antonio Cornejo MD       • methylPREDNISolone sodium succinate (SOLU-Medrol) injection 60 mg  60 mg Intravenous Q8H Gracie Suero, APRN       • midazolam (VERSED) injection 1 mg  1 mg Intravenous Once PRN Antonio Collado MD       • morphine injection 1 mg  1 mg Intravenous Q3H PRN Gracie Suero, APRN       • ondansetron ODT (ZOFRAN-ODT) disintegrating tablet 4 mg  4 mg Oral Q6H PRN Daisy Hunt  KLARISSA FOSS   4 mg at 07/07/24 0921    Or   • ondansetron (ZOFRAN) injection 4 mg  4 mg Intravenous Q6H PRN Daisy Hunt APRN   4 mg at 07/06/24 2219   • oxyCODONE (ROXICODONE) immediate release tablet 7.5 mg  7.5 mg Oral Q4H PRN Gracie Suero APRN   7.5 mg at 07/09/24 0814   • pantoprazole (PROTONIX) EC tablet 40 mg  40 mg Oral Q AM Josefina Melendrez MD   40 mg at 07/09/24 0607   • sodium chloride 0.9 % flush 10 mL  10 mL Intravenous PRN Daisy Hunt APRN   10 mL at 07/08/24 2115       --------------------------------------------------------------------------------  Assessment & Plan      Anesthesia Evaluation           Pain impairs ability to perform ADLs: Ambulation and Exercise/Activity  Modalities previously tried to control pain with limited effectiveness within the last 4-6 weeks: OTC medications     Airway   Mallampati: II  No difficulty expected  Dental      Pulmonary    (-) wheezes  Cardiovascular     Rhythm: regular      PE comment: Dorsal pedal pulses are palpable bilaterally    Neuro/Psych  (-) left straight leg raise test, right straight leg raise test  GI/Hepatic/Renal/Endo      Musculoskeletal         PE comment: She may have some weakness to flexors in her right lower extremity although pain testing is difficult due to guarding from pain.  He says she could now ambulate but it would be painful.  Prior to this she was unable to ambulate at all because of pain.  Abdominal    Substance History      OB/GYN          Other                 Diagnosis and Plan    Treatment Plan  ASA 2   Patient has had previous injection/procedure with 10-25% improvement.   Procedures: Lumbar Epidural Steroid Injection(LESI), With fluoroscopy,      Anesthetic plan and risks discussed with patient.      Discussed with patient risk and benefits of CARLOS including but not limited to : Bleeding, infection, PDPH, inadvertant spinal anesthetic, worsening pain, hyperglycemia, hypertension, CHF, nerve damage,  "steroid \"toxicity\" and AVN of hips.  Pt agrees to proceed.  Diagnosis     * Connective tissue and disc stenosis of intervertebral foramina of lumbar region [M99.73]     * Lumbar radiculopathy [M54.16]                      "

## 2024-07-09 NOTE — PROGRESS NOTES
[facility] THORACIC/LUMBAR NEUROSURGERY PROGRESS NOTE      CC: Back and right leg pain      Subjective     Interval History: Continues to have some discomfort in her right low back and legs with standing and walking but able to tolerate ambulation.  Overall feels much better than on admission.  Awaiting LESI and then discharge later.    ROS:  Constitutional: No fever, chills  MS: back pain  Neuro: Right leg pain numbness, tingling  : No difficulty voiding, no incontinence    Objective     Vital signs in last 24 hours:  Temp:  [97.6 °F (36.4 °C)-98.4 °F (36.9 °C)] 97.9 °F (36.6 °C)  Heart Rate:  [58-62] 59  Resp:  [16-18] 18  BP: (116-141)/(67-83) 116/68    Intake/Output this shift:  No intake/output data recorded.    LABS:  Results from last 7 days   Lab Units 07/09/24  0600 07/08/24  0611 07/07/24  0722   WBC 10*3/mm3 20.82* 26.00* 12.64*   HEMOGLOBIN g/dL 15.6 15.5 16.7*   HEMATOCRIT % 47.2* 46.9* 48.9*   PLATELETS 10*3/mm3 263 283 283     Results from last 7 days   Lab Units 07/09/24  0600 07/08/24  0612 07/07/24  0722 07/05/24  1555   SODIUM mmol/L 138 134* 135* 146*   POTASSIUM mmol/L 4.7 4.5 4.2 3.8   CHLORIDE mmol/L 103 102 103 103   CO2 mmol/L 24.3 20.2* 22.0 22.0   BUN mg/dL 21 24* 20 18   CREATININE mg/dL 0.64 0.58 0.56* 0.67   CALCIUM mg/dL 8.5* 8.4* 8.5* 9.1   BILIRUBIN mg/dL  --   --   --  1.1   ALK PHOS U/L  --   --   --  43   ALT (SGPT) U/L  --   --   --  27   AST (SGOT) U/L  --   --   --  17   GLUCOSE mg/dL 144* 155* 149* 103*     IMAGING STUDIES:  No new imaging    I personally viewed and interpreted the patient's chart.    Meds reviewed/changed: Yes  Tylenol 1 g p.o. every 8 hours  Lovenox 60 mg SQ every 12 hours  Gabapentin 100 mg p.o. 3 times daily  Robaxin 500 mg p.o. every 8 hours  Solu-Medrol 80 mg IV every 6 hours  Roxicodone IR 7.5 mg p.o. every 4 as needed-2 doses    Physical Exam:    General:   Awake, alert.  Resting in bed.  Pleasant and cooperative   Back:    - SLR.    Motor:    Normal  muscle strength, bulk and tone in lower extremities.  No fasciculations, rigidity, spasticity, or abnormal movements.  Station and Gait:            Up ad addy.   extremities:   Not wearing SCD      Assessment & Plan     ASSESSMENT:      Bulge of lumbar disc without myelopathy    PAD (peripheral artery disease)    PAF (paroxysmal atrial fibrillation)    Spinal stenosis of lumbar region with neurogenic claudication    COPD (chronic obstructive pulmonary disease)    Factor V Leiden mutation    History of DVT of lower extremity    Alpha-1-antitrypsin deficiency    Patient with right lumbar radiculopathy and known L2/3 adjacent level stenosis from prior fusion.  Continues to feel better than on admission but still having some pain in the back and right leg.  She has had an Eliquis and Lovenox washout.  Planning for epidural injection today and then discharged home with gabapentin and Robaxin.  Primary service has prescribed Oxy IR for discharge as well.  Advised patient to limit use of this extensively as possible.  She also tells me she was not regularly taking her Eliquis at home as she was taking NSAIDs for pain.  Advised against at this as she has atrial fibrillation.  She can resume her Eliquis 24 hours after epidural and she will need to hold prior to surgery at least 5 days, but she has an appoint with Dr. Mathur on 7/16 for preop visit and she will discuss when to hold the Eliquis at that point in time.    Blood cell count has diminished with reduction in steroids.  No fever.  I think her leukocytosis is simply reactive to her steroids.    PLAN:   Reduce IV steroid while still in hospital  LESI today  Cont Gabapentin at DC  OK for DC after LESI  OR 7/22  Hold Lovenox today/AM  Resume Eliquis 24 hours after LESI  Keep scheduled follow-up with Dr. Mathur 7/16-discuss hold date of Eliquis for surgery with him at that appointment    I discussed the patient's findings and my recommendations with patient, primary care  "team, and Dr. Lorenzana    During patient visit, I utilized appropriate personal protective equipment including gloves. Appropriate PPE was worn during the entire visit.  Hand hygiene was completed before and after.      LOS: 2 days       Gracie Suero, APRN  7/9/2024  09:50 EDT    \"Dictated utilizing Dragon dictation\".      "

## 2024-07-09 NOTE — PROGRESS NOTES
Case Management Discharge Note      Final Note: Discharged to home with family on 7/9/24. HAM Finney RN, CCP.         Selected Continued Care - Admitted Since 7/5/2024       Destination    No services have been selected for the patient.                Durable Medical Equipment    No services have been selected for the patient.                Dialysis/Infusion    No services have been selected for the patient.                Home Medical Care    No services have been selected for the patient.                Therapy    No services have been selected for the patient.                Community Resources    No services have been selected for the patient.                Community & DME    No services have been selected for the patient.                         Final Discharge Disposition Code: 01 - home or self-care

## 2024-07-09 NOTE — PLAN OF CARE
Goal Outcome Evaluation:              Outcome Evaluation: Patient is 64 year old female admitted on 7/5/24 for Bulge of lumbar disc without myelopathy. Current pain level at time of assessment was 6/10 not active and 10/10 when getting up to use toilet.    Pain decreased with norco. Patient declined pt/ot at this time due to discomfort.    Patient was ordered robaxin for muscle relaxer which helps to decrease pain. She feels that norco is not strong enough, but may rely on it later. She stated the morphine is makes her drowsy. Will continue to monitor.

## 2024-07-11 ENCOUNTER — TELEPHONE (OUTPATIENT)
Dept: NEUROSURGERY | Facility: CLINIC | Age: 64
End: 2024-07-11

## 2024-07-11 NOTE — TELEPHONE ENCOUNTER
Received disability paperwork via fax today requesting it to be filled out on behalf of patient by 08/09/24, forms are at  in Dr. Mathur's folder.

## 2024-07-12 NOTE — H&P (VIEW-ONLY)
Subjective   Patient ID: Radha Block is a 64 y.o. female is here today for PRE-OP follow-up.    Today patient states that her symptoms a re unchanged     History of Present Illness    This patient was last seen in the office in April.  She was recently in the hospital last week.  Her pain is increased to the point where she is having severe pain in her anterior thigh on the right.  It was so severe she could barely move last week but it has calm down after IV steroids.    The following portions of the patient's history were reviewed and updated as appropriate: allergies, current medications, past family history, past medical history, past social history, past surgical history, and problem list.    Review of Systems   Constitutional:  Negative for chills and fever.   HENT:  Negative for congestion.    Genitourinary:  Negative for difficulty urinating and dysuria.   Musculoskeletal:  Positive for back pain and myalgias.   Neurological:  Positive for weakness and numbness.       I reviewed the review of systems listed by the patient and discussed by my MA    Objective     There were no vitals filed for this visit.  There is no height or weight on file to calculate BMI.    Tobacco Use: Low Risk  (7/16/2024)    Patient History     Smoking Tobacco Use: Never     Smokeless Tobacco Use: Never     Passive Exposure: Not on file          Physical Exam  Eyes:      Extraocular Movements: EOM normal.      Pupils: Pupils are equal, round, and reactive to light.   Neurological:      Mental Status: She is oriented to person, place, and time.      Coordination: Finger-Nose-Finger Test and Heel to Shin Test normal.      Gait: Gait is intact.      Deep Tendon Reflexes:      Reflex Scores:       Tricep reflexes are 2+ on the right side and 2+ on the left side.       Bicep reflexes are 2+ on the right side and 2+ on the left side.       Brachioradialis reflexes are 2+ on the right side and 2+ on the left side.       Patellar  reflexes are 1+ on the right side and 2+ on the left side.       Achilles reflexes are 2+ on the right side and 2+ on the left side.  Psychiatric:         Speech: Speech normal.       Neurologic Exam     Mental Status   Oriented to person, place, and time.   Registration of memory: Good recent and remote memory.   Attention: normal. Concentration: normal.   Speech: speech is normal   Level of consciousness: alert  Knowledge: consistent with education.     Cranial Nerves     CN II   Visual fields full to confrontation.   Visual acuity: normal    CN III, IV, VI   Pupils are equal, round, and reactive to light.  Extraocular motions are normal.     CN V   Facial sensation intact.   Right corneal reflex: normal  Left corneal reflex: normal    CN VII   Facial expression full, symmetric.   Right facial weakness: none  Left facial weakness: none    CN VIII   Hearing: intact    CN IX, X   Palate: symmetric    CN XI   Right sternocleidomastoid strength: normal  Left sternocleidomastoid strength: normal    CN XII   Tongue: not atrophic  Tongue deviation: none    Motor Exam   Muscle bulk: normal  Right arm tone: normal  Left arm tone: normal  Right leg tone: normal  Left leg tone: normal    Strength   Strength 5/5 except as noted.     Sensory Exam   Light touch normal.   Sensory deficit distribution on right: L3    Gait, Coordination, and Reflexes     Gait  Gait: normal    Coordination   Finger to nose coordination: normal  Heel to shin coordination: normal    Reflexes   Right brachioradialis: 2+  Left brachioradialis: 2+  Right biceps: 2+  Left biceps: 2+  Right triceps: 2+  Left triceps: 2+  Right patellar: 1+  Left patellar: 2+  Right achilles: 2+  Left achilles: 2+  Right : 2+  Left : 2+          Assessment & Plan   Independent Review of Radiographic Studies:      I personally reviewed the images from the following studies.    I reviewed her plain films, myelogram, and CT scan from April of this year.  This shows  pretty severe stenosis at L3.  There appears to be a synovial cyst on the right.  I reviewed an MRI of the lumbar spine done earlier this month.  This also shows stenosis at  L2-3.  The screw on the left at L3 is somewhat lateral.    Medical Decision Making:      I told the patient I thought was appropriate to proceed at this point.  I told the patient about the risks, complications and expected outcome of the lumbar surgery.  I explained that there was an 80% chance of getting rid of the pain in the leg.  I explained that there would still be back pain after the surgery.  Initially this will be quite severe but will improve over time.  There is a 2 or 3% chance of infection, bleeding, CSF leak, damage to the nerve as a result of surgery, paralysis, as well as anesthetic risk.  There is a 10% chance of recurrent problems.  There is a 10% chance of nonunion or failure of the instrumentation.  We discussed the postoperative hospital and home course.  The patient does ask to proceed.    She will need to be scheduled for a: Lumbar 2 to lumbar 3 laminectomy with fusion and instrumentation and removal of previous hardware     Diagnoses and all orders for this visit:    1. Spinal stenosis of lumbar region with neurogenic claudication (Primary)      Return for 2-3 week post op.

## 2024-07-12 NOTE — PAYOR COMM NOTE
"Radha Ferrer (64 y.o. Female)     PLEASE SEE ATTACHED FOR DC NOTICE    REF #  [id number]     THANK YOU  MARIBEL ORTIZ RN/ DEPT  [facility]   [phone]  [phone]        Date of Birth   1960    Social Security Number       Address   [address] [place] [address]    Home Phone   [phone]    MRN   [id number]       Adventist   Quaker    Marital Status                               Admission Date   7/5/24    Admission Type   Emergency    Admitting Provider       Attending Provider       Department, Room/Bed   [facility] 4 [place], P496/1       Discharge Date   7/9/2024    Discharge Disposition   Home or Self Care    Discharge Destination                                 Attending Provider: (none)   Allergies: No Known Allergies    Isolation: None   Infection: None   Code Status: Prior    Ht: 170.2 cm (67\")   Wt: 58.5 kg (129 lb)    Admission Cmt: None   Principal Problem: Bulge of lumbar disc without myelopathy [M51.36]                   Active Insurance as of 7/5/2024       Primary Coverage       Payor Plan Insurance Group Employer/Plan Group    [employer]HEM BLUE CROSS ANTHEM BLUE CROSS BLUE SHIELD PPO [id number]       Payor Plan Address Payor Plan Phone Number Payor Plan Fax Number Effective Dates    [address] [phone]  1/1/2023 - None Entered    [place] [address]         Subscriber Name Subscriber Birth Date Member ID       RADHA FERRER 1960 [id number]                     Emergency Contacts        (Rel.) Home Phone Work Phone Mobile Phone    FRANCISCO WOODSON (Son) -- -- [phone]    BOSLER,JAMES (Other) -- -- [phone]              [place]: [facility] [id number]  Tax ID [id number]     Discharge Summary        Dixon Ibarra MD at 07/09/24 0955          Date of Discharge:  7/9/2024    PCP: Bosler, James William III, MD    Discharge Diagnosis:   Active Hospital Problems    Diagnosis  POA    **Bulge of lumbar " disc without myelopathy [M51.36]  Yes    Alpha-1-antitrypsin deficiency [E88.01]  Yes    Factor V Leiden mutation [D68.51]  Yes    History of DVT of lower extremity [Z86.718]  Not Applicable    COPD (chronic obstructive pulmonary disease) [J44.9]  Yes    Spinal stenosis of lumbar region with neurogenic claudication [M48.062]  Yes    PAF (paroxysmal atrial fibrillation) [I48.0]  Yes    PAD (peripheral artery disease) [I73.9]  Yes      Resolved Hospital Problems   No resolved problems to display.          Consults       Date and Time Order Name Status Description    7/6/2024 12:36 AM Inpatient Neurosurgery Consult Completed     7/5/2024  9:56 PM [facility] (on-call MD unless specified) Details            Hospital Course  64 y.o. female with a history of paroxysmal A-fib, hypertension, COPD, alpha-1 antitrypsin deficiency, history of DVT with factor V Leiden mutation that presents to [facility] complaining of acute on chronic back pain. She has a history of prior lumbar fusion December 2022. She was scheduled for lumbar fusion 722. Lumbar MRI did not show any significant changes from January. She was started on medications for pain control with improvement in her symptoms (steroids, scheduled Tylenol, gabapentin, muscle relaxer, as needed oxycodone). Lumbar epidural steroid injection is planned for today and afterwards she will be discharged home and she will follow-up with neurosurgery. Neurosurgery says she can resume Eliquis 24 hours after epidural and she already has a stop date for it before surgery.    I discussed the patient's findings and my recommendations with patient and nursing staff and JANEEN Hodges regarding eliquis instructions.    Temp:  [97.6 °F (36.4 °C)-98.4 °F (36.9 °C)] 97.9 °F (36.6 °C)  Heart Rate:  [58-62] 59  Resp:  [16-18] 18  BP: (116-141)/(67-83) 116/68  Body mass index is 20.2 kg/m².    Physical Exam  Constitutional:       General: She is not in acute distress.      Appearance: She is not toxic-appearing.   HENT:      Head: Normocephalic and atraumatic.   Cardiovascular:      Rate and Rhythm: Normal rate and regular rhythm.   Pulmonary:      Effort: Pulmonary effort is normal. No respiratory distress.      Breath sounds: Normal breath sounds. No wheezing or rhonchi.   Abdominal:      General: Bowel sounds are normal.      Palpations: Abdomen is soft.      Tenderness: There is no abdominal tenderness. There is no guarding or rebound.   Musculoskeletal:         General: No swelling.   Skin:     General: Skin is warm and dry.   Neurological:      General: No focal deficit present.      Mental Status: She is alert and oriented to person, place, and time.   Psychiatric:         Mood and Affect: Mood normal.         Behavior: Behavior normal.       Disposition: Home or Self Care       Discharge Medications        New Medications        Instructions Start Date   acetaminophen 500 MG tablet  Commonly known as: TYLENOL  Replaces: Acetaminophen 325 MG capsule   1,000 mg, Oral, Every 8 Hours      gabapentin 100 MG capsule  Commonly known as: NEURONTIN   100 mg, Oral, 3 times daily      methocarbamol 500 MG tablet  Commonly known as: ROBAXIN   500 mg, Oral, Every 8 Hours Scheduled      naloxone 4 MG/0.1ML nasal spray  Commonly known as: NARCAN   Call 911. Don't prime. [place] in 1 nostril for overdose. Repeat in 2-3 minutes in other nostril if no or minimal breathing/responsiveness.      oxyCODONE 5 MG immediate release tablet  Commonly known as: Roxicodone   5 mg, Oral, Every 4 Hours PRN      polyethylene glycol 17 GM/SCOOP powder  Commonly known as: MIRALAX   Mix 17 g (1 capful) in liquid and drink by mouth 2 (Two) Times a Day.             Changes to Medications        Instructions Start Date   apixaban 5 MG tablet tablet  Commonly known as: Zenon  What changed:   how much to take  when to take this  additional instructions   5 mg, Oral, Every 12 Hours Scheduled, resume 24 hours after  epidural injection   Start Date: July 10, 2024     methylPREDNISolone 4 MG dose pack  Commonly known as: MEDROL  What changed: additional instructions   Take as directed on package instructions.             Continue These Medications        Instructions Start Date   albuterol sulfate  (90 Base) MCG/ACT inhaler  Commonly known as: PROVENTIL HFA;VENTOLIN HFA;PROAIR HFA   1 puff, As Needed      betamethasone (augmented) 0.05 % cream  Commonly known as: DIPROLENE   2 (Two) Times a Day As Needed.      desvenlafaxine 50 MG 24 hr tablet  Commonly known as: PRISTIQ   12.5 mg, Oral, As Needed      dilTIAZem  MG 24 hr capsule  Commonly known as: CARDIZEM CD   180 mg, Oral, Daily      EPINEPHrine 0.3 MG/0.3ML solution auto-injector injection  Commonly known as: EPIPEN   As Needed      esomeprazole 40 MG capsule  Commonly known as: nexIUM   40 mg, Oral, Daily      estradiol 0.075 MG/24HR patch  Commonly known as: MINIVELLE, VIVELLE-DOT   1 patch, Transdermal, 2 Times Weekly      guaiFENesin 600 MG 12 hr tablet  Commonly known as: MUCINEX   As Needed      ondansetron 4 MG tablet  Commonly known as: ZOFRAN   Every 8 Hours PRN      Progesterone 200 MG capsule  Commonly known as: PROMETRIUM   Daily      Trelegy Ellipta 100-62.5-25 MCG/INH inhaler  Generic drug: Fluticasone-Umeclidin-Vilant   1 puff, Inhalation, Daily      Zemaira 1000 MG injection  Generic drug: alpha1-proteinase inhibitor   Weekly, Pt takes on Fridays at infusion center             Stop These Medications      Acetaminophen 325 MG capsule  Replaced by: acetaminophen 500 MG tablet     budesonide 1 MG/2ML nebulizer solution  Commonly known as: PULMICORT     desonide 0.05 % cream  Commonly known as: DESOWEN     Diclofenac Epolamine 1.3 % patch patch  Commonly known as: FLECTOR     diclofenac-miSOPROStol 75-0.2 MG EC tablet  Commonly known as: ARTHROTEC 75     HYDROcodone-acetaminophen 7.5-325 MG per tablet  Commonly known as: NORCO     hydrocortisone 2.5  % cream     lidocaine-prilocaine 2.5-2.5 % cream  Commonly known as: EMLA     promethazine 25 MG tablet  Commonly known as: PHENERGAN     tretinoin 0.1 % cream  Commonly known as: RETIN-A     valACYclovir 1000 MG tablet  Commonly known as: VALTREX     venlafaxine 50 MG tablet  Commonly known as: EFFEXOR             Diet Instructions       Diet: Regular/House Diet, Cardiac Diets; Healthy Heart (2-3 Na+)      Discharge Diet:  Regular/House Diet  Cardiac Diets       Cardiac Diet: Healthy Heart (2-3 Na+)    Texture: Regular Texture (IDDSI 7)    Fluid Consistency: Thin (IDDSI 0)           Activity Instructions       Activity as Tolerated      per JANEEN           Additional Instructions for the Follow-ups that You Need to Schedule       Call MD for problems / concerns.   As directed             Follow-up Information       Bosler, James William III, MD .    Specialty: Internal Medicine  Contact information:  [address] [facility]  [place] [id number] [phone]                            Future Appointments   Date Time Provider Department Center   7/9/2024  3:00 PM TREATMENT RM 1 [facility] PAIN [facility] PAIN [facility]   7/16/2024 11:30 AM BH [facility] PAT 4 BH [facility] PAT [facility]   7/16/2024  1:45 PM Sherice Parikh MD [facility] NS [facility]I N[facility]I   9/3/2024 10:40 AM Ijeoma Auguste MD [facility] CD LCGKR N[facility]I        Dixon Ibarra MD  [facility]  07/09/24    Discharge time spent greater than 30 minutes.    Electronically signed by Dixon Ibarra MD at 07/09/24 1020       Discharge Order (From admission, onward)       Start     Ordered    07/09/24 0949  Discharge patient  Once        Expected Discharge Date: 07/09/24   Discharge Disposition: Home or Self Care   Physician of Record for Attribution - Please select from Treatment Team: SHERICE PARIKH [[id number]]   Review needed by CMO to determine Physician of Record: No      Question Answer Comment   Physician of Record for Attribution - Please select from Treatment Team SHERICE PARIKH    Review needed by CMO to  determine Physician of Record No        07/09/24 0954

## 2024-07-16 ENCOUNTER — OFFICE VISIT (OUTPATIENT)
Dept: NEUROSURGERY | Facility: CLINIC | Age: 64
End: 2024-07-16
Payer: COMMERCIAL

## 2024-07-16 ENCOUNTER — PRE-ADMISSION TESTING (OUTPATIENT)
Dept: PREADMISSION TESTING | Facility: HOSPITAL | Age: 64
End: 2024-07-16
Payer: COMMERCIAL

## 2024-07-16 VITALS
WEIGHT: 123.4 LBS | SYSTOLIC BLOOD PRESSURE: 148 MMHG | BODY MASS INDEX: 19.37 KG/M2 | OXYGEN SATURATION: 95 % | DIASTOLIC BLOOD PRESSURE: 89 MMHG | TEMPERATURE: 97.8 F | RESPIRATION RATE: 18 BRPM | HEART RATE: 60 BPM | HEIGHT: 67 IN

## 2024-07-16 DIAGNOSIS — M48.062 SPINAL STENOSIS OF LUMBAR REGION WITH NEUROGENIC CLAUDICATION: Primary | ICD-10-CM

## 2024-07-16 LAB
ANION GAP SERPL CALCULATED.3IONS-SCNC: 10.5 MMOL/L (ref 5–15)
BUN SERPL-MCNC: 22 MG/DL (ref 8–23)
BUN/CREAT SERPL: 34.4 (ref 7–25)
CALCIUM SPEC-SCNC: 8.5 MG/DL (ref 8.6–10.5)
CHLORIDE SERPL-SCNC: 105 MMOL/L (ref 98–107)
CO2 SERPL-SCNC: 20.5 MMOL/L (ref 22–29)
CREAT SERPL-MCNC: 0.64 MG/DL (ref 0.57–1)
DEPRECATED RDW RBC AUTO: 46.1 FL (ref 37–54)
EGFRCR SERPLBLD CKD-EPI 2021: 97.6 ML/MIN/1.73
ERYTHROCYTE [DISTWIDTH] IN BLOOD BY AUTOMATED COUNT: 12.4 % (ref 12.3–15.4)
GLUCOSE SERPL-MCNC: 92 MG/DL (ref 65–99)
HCT VFR BLD AUTO: 43.9 % (ref 34–46.6)
HGB BLD-MCNC: 14.7 G/DL (ref 12–15.9)
INR PPP: 0.94 (ref 0.9–1.1)
MCH RBC QN AUTO: 34 PG (ref 26.6–33)
MCHC RBC AUTO-ENTMCNC: 33.5 G/DL (ref 31.5–35.7)
MCV RBC AUTO: 101.6 FL (ref 79–97)
PLATELET # BLD AUTO: 286 10*3/MM3 (ref 140–450)
PMV BLD AUTO: 9.7 FL (ref 6–12)
POTASSIUM SERPL-SCNC: 4 MMOL/L (ref 3.5–5.2)
PROTHROMBIN TIME: 12.8 SECONDS (ref 11.7–14.2)
RBC # BLD AUTO: 4.32 10*6/MM3 (ref 3.77–5.28)
SODIUM SERPL-SCNC: 136 MMOL/L (ref 136–145)
WBC NRBC COR # BLD AUTO: 23.93 10*3/MM3 (ref 3.4–10.8)

## 2024-07-16 PROCEDURE — 85027 COMPLETE CBC AUTOMATED: CPT

## 2024-07-16 PROCEDURE — 36415 COLL VENOUS BLD VENIPUNCTURE: CPT

## 2024-07-16 PROCEDURE — 80048 BASIC METABOLIC PNL TOTAL CA: CPT

## 2024-07-16 PROCEDURE — 85610 PROTHROMBIN TIME: CPT

## 2024-07-16 RX ORDER — DICLOFENAC SODIUM 75 MG/1
75 TABLET, DELAYED RELEASE ORAL 2 TIMES DAILY PRN
Status: ON HOLD | COMMUNITY
Start: 2024-07-03

## 2024-07-16 RX ORDER — IBUPROFEN 200 MG
200 TABLET ORAL EVERY 6 HOURS PRN
Status: ON HOLD | COMMUNITY

## 2024-07-16 RX ORDER — ESTRADIOL 0.07 MG/D
1 PATCH TRANSDERMAL 2 TIMES WEEKLY
Status: ON HOLD | COMMUNITY
Start: 2024-06-10

## 2024-07-16 NOTE — DISCHARGE INSTRUCTIONS
Take the following medications the morning of surgery:  INHALERS, PRIZTIQ, GABAPENTIN, ESOMEPRAZOLE    CALL DR. GROSSMAN REGARDING ELIQUIS AND UPCOMING SURGERY AND IF YOU NEED LOVENOX BRIDGE.    If you are on prescription narcotic pain medication to control your pain you may also take that medication the morning of surgery.    General Instructions:  Do not eat solid food after midnight the night before surgery.  You may drink clear liquids day of surgery but must stop at least one hour before your hospital arrival time.  It is beneficial for you to have a clear drink that contains carbohydrates the day of surgery.  We suggest a 12 to 20 ounce bottle of Gatorade or Powerade for non-diabetic patients or a 12 to 20 ounce bottle of G2 or Powerade Zero for diabetic patients. (Pediatric patients, are not advised to drink a 12 to 20 ounce carbohydrate drink)    Clear liquids are liquids you can see through.  Nothing red in color.     Plain water                               Sports drinks  Sodas                                   Gelatin (Jell-O)  Fruit juices without pulp such as white grape juice and apple juice  Popsicles that contain no fruit or yogurt  Tea or coffee (no cream or milk added)  Gatorade / Powerade  G2 / Powerade Zero    Infants may have breast milk up to four hours before surgery.  Infants drinking formula may drink formula up to six hours before surgery.   Patients who avoid smoking, chewing tobacco and alcohol for 4 weeks prior to surgery have a reduced risk of post-operative complications.  Quit smoking as many days before surgery as you can.  Do not smoke, use chewing tobacco or drink alcohol the day of surgery.   If applicable bring your C-PAP/ BI-PAP machine in with you to preop day of surgery.  Bring any papers given to you in the doctor’s office.  Wear clean comfortable clothes.  Do not wear contact lenses, false eyelashes or make-up.  Bring a case for your glasses.   Bring crutches or walker if  applicable.  Remove all piercings.  Leave jewelry and any other valuables at home.  Hair extensions with metal clips must be removed prior to surgery.  The Pre-Admission Testing nurse will instruct you to bring medications if unable to obtain an accurate list in Pre-Admission Testing.        If you were given a blood bank ID arm band remember to bring it with you the day of surgery.    Preventing a Surgical Site Infection:  For 2 to 3 days before surgery, avoid shaving with a razor because the razor can irritate skin and make it easier to develop an infection.    Any areas of open skin can increase the risk of a post-operative wound infection by allowing bacteria to enter and travel throughout the body.  Notify your surgeon if you have any skin wounds / rashes even if it is not near the expected surgical site.  The area will need assessed to determine if surgery should be delayed until it is healed.  The night prior to surgery shower using a fresh bar of anti-bacterial soap (such as Dial) and clean washcloth.  Sleep in a clean bed with clean clothing.  Do not allow pets to sleep with you.  Shower on the morning of surgery using a fresh bar of anti-bacterial soap (such as Dial) and clean washcloth.  Dry with a clean towel and dress in clean clothing.  Ask your surgeon if you will be receiving antibiotics prior to surgery.  Make sure you, your family, and all healthcare providers clean their hands with soap and water or an alcohol based hand  before caring for you or your wound.    Day of surgery:  Your arrival time is approximately two hours before your scheduled surgery time.  Upon arrival, a Pre-op nurse and Anesthesiologist will review your health history, obtain vital signs, and answer questions you may have.  The only belongings needed at this time will be a list of your home medications and if applicable your C-PAP/BI-PAP machine.  A Pre-op nurse will start an IV and you may receive medication in  preparation for surgery, including something to help you relax.     Please be aware that surgery does come with discomfort.  We want to make every effort to control your discomfort so please discuss any uncontrolled symptoms with your nurse.   Your doctor will most likely have prescribed pain medications.      If you are going home after surgery you will receive individualized written care instructions before being discharged.  A responsible adult must drive you to and from the hospital on the day of your surgery and ideally stay with you through the night.   .  Discharge prescriptions can be filled by the hospital pharmacy during regular pharmacy hours.  If you are having surgery late in the day/evening your prescription may be e-prescribed to your pharmacy.  Please verify your pharmacy hours or chose a 24 hour pharmacy to avoid not having access to your prescription because your pharmacy has closed for the day.    If you are staying overnight following surgery, you will be transported to your hospital room following the recovery period.  [facility] has all private rooms.    If you have any questions please call Pre-Admission Testing at [phone].  Deductibles and co-payments are collected on the day of service. Please be prepared to pay the required co-pay, deductible or deposit on the day of service as defined by your plan.    Call your surgeon immediately if you experience any of the following symptoms:  Sore Throat  Shortness of Breath or difficulty breathing  Cough  Chills  Body soreness or muscle pain  Headache  Fever  New loss of taste or smell  Do not arrive for your surgery ill.  Your procedure will need to be rescheduled to another time.  You will need to call your physician before the day of surgery to avoid any unnecessary exposure to hospital staff as well as other patients.    CHLORHEXIDINE CLOTH INSTRUCTIONS  The morning of surgery follow these instructions using the Chlorhexidine  cloths you've been given.  These steps reduce bacteria on the body.  Do not use the cloths near your eyes, ears mouth, genitalia or on open wounds.  Throw the cloths away after use but do not try to flush them down a toilet.      Open and remove one cloth at a time from the package.    Leave the cloth unfolded and begin the bathing.  Massage the skin with the cloths using gentle pressure to remove bacteria.  Do not scrub harshly.   Follow the steps below with one 2% CHG cloth per area (6 total cloths).  One cloth for neck, shoulders and chest.  One cloth for both arms, hands, fingers and underarms (do underarms last).  One cloth for the abdomen followed by groin.  One cloth for right leg and foot including between the toes.  One cloth for left leg and foot including between the toes.  The last cloth is to be used for the back of the neck, back and buttocks.    Allow the CHG to air dry 3 minutes on the skin which will give it time to work and decrease the chance of irritation.  The skin may feel sticky until it is dry.  Do not rinse with water or any other liquid or you will lose the beneficial effects of the CHG.  If mild skin irritation occurs, do rinse the skin to remove the CHG.  Report this to the nurse at time of admission.  Do not apply lotions, creams, ointments, deodorants or perfumes after using the clothes. Dress in clean clothes before coming to the hospital.

## 2024-07-17 ENCOUNTER — TELEPHONE (OUTPATIENT)
Dept: NEUROSURGERY | Facility: CLINIC | Age: 64
End: 2024-07-17
Payer: COMMERCIAL

## 2024-07-17 ENCOUNTER — TELEPHONE (OUTPATIENT)
Dept: CARDIOLOGY | Facility: CLINIC | Age: 64
End: 2024-07-17
Payer: COMMERCIAL

## 2024-07-17 ENCOUNTER — TELEPHONE (OUTPATIENT)
Dept: LAB | Facility: HOSPITAL | Age: 64
End: 2024-07-17
Payer: COMMERCIAL

## 2024-07-17 NOTE — TELEPHONE ENCOUNTER
Caller: Radha Lowe    Relationship: Self    Best call back number: [phone]    What is the best time to reach you: ANYTIME    Who are you requesting to speak with (clinical staff, provider,  specific staff member): CLINICAL     What was the call regarding: PATIENT IS HAVING SURGERY ON 7/22/2024. SHE IS ON ELIQUIS CAN YOU SEND A SCRIPT FOR A LOVENOX BRIDGE OR WILL SHE NEED IT?    SEND SCRIPT TO [facility] [address] [place] [address].    CALL TO DISCUSS

## 2024-07-17 NOTE — TELEPHONE ENCOUNTER
From a cardiac standpoint, she could have surgery.  Her Eliquis and anticoagulation is managed by hematology (was Dr. Dodson) and should be discussed with them.  There was discussion in the past of need for Lovenox bridge with procedures, but would defer to hematology for this.

## 2024-07-17 NOTE — TELEPHONE ENCOUNTER
Pt called in to triage.  Pt states that she is having a Laminectomy and fusion L2-3 with replacement of hardware per Dr. Rigo Mathur at [facility] on Monday 7/22/24.  Pt also states that she was recently in the hospital for about a week with back pain, and was off of eliquis while in the hospital (was on lovenox while off eliquis).  Pt states that she restarted eliquis last Wednesday.  Pt would like to know if she needs instructions for eliquis before surgery, and asks if she will need lovenox bridge?  She states that they did not say anything about cardiac clearance at her preop testing appointment, but is it possible to get this too?    Pt denies any CP/SOA or other cardiac related symptoms at this time.  Pt states that BP/HR had been doing very well prior to increased back pain, but has been running higher with the pain.     Recommendations?    Thanks so much,  Lianet Goddard, RN  Triage RN  07/17/24 11:09 EDT

## 2024-07-17 NOTE — TELEPHONE ENCOUNTER
Reviewed recommendations with patient, verbalized understanding, will call with any further questions or complaints.  Pt states that she will contact hematology for eliquis/possible lovenox bridge recommendations.    Lianet Goddard RN  Triage Nurse  07/17/24 11:45 EDT

## 2024-07-17 NOTE — TELEPHONE ENCOUNTER
Informed patient to reach out to Cardiology since they managed her Eliquis in the past. Patient was only seen in our office once in 2022 and since then Dr. Dodson has retired. The patient will call Cardiology again for direction.

## 2024-07-22 ENCOUNTER — HOSPITAL ENCOUNTER (INPATIENT)
Facility: HOSPITAL | Age: 64
LOS: 4 days | Discharge: HOME | End: 2024-07-26
Attending: NEUROLOGICAL SURGERY | Admitting: NEUROLOGICAL SURGERY
Payer: COMMERCIAL

## 2024-07-22 ENCOUNTER — APPOINTMENT (OUTPATIENT)
Dept: GENERAL RADIOLOGY | Facility: HOSPITAL | Age: 64
End: 2024-07-22
Payer: COMMERCIAL

## 2024-07-22 ENCOUNTER — ANESTHESIA EVENT (OUTPATIENT)
Dept: PERIOP | Facility: HOSPITAL | Age: 64
End: 2024-07-22
Payer: COMMERCIAL

## 2024-07-22 ENCOUNTER — READMISSION MANAGEMENT (OUTPATIENT)
Dept: CALL CENTER | Facility: HOSPITAL | Age: 64
End: 2024-07-22
Payer: COMMERCIAL

## 2024-07-22 ENCOUNTER — ANESTHESIA (OUTPATIENT)
Dept: PERIOP | Facility: HOSPITAL | Age: 64
End: 2024-07-22
Payer: COMMERCIAL

## 2024-07-22 DIAGNOSIS — M48.062 SPINAL STENOSIS OF LUMBAR REGION WITH NEUROGENIC CLAUDICATION: Primary | ICD-10-CM

## 2024-07-22 DIAGNOSIS — M51.16 NEURITIS OR RADICULITIS DUE TO RUPTURE OF LUMBAR INTERVERTEBRAL DISC: ICD-10-CM

## 2024-07-22 DIAGNOSIS — M54.41 ACUTE MIDLINE LOW BACK PAIN WITH RIGHT-SIDED SCIATICA: ICD-10-CM

## 2024-07-22 PROBLEM — M48.061 LUMBAR SPINAL STENOSIS: Status: ACTIVE | Noted: 2024-07-22

## 2024-07-22 PROCEDURE — C1713 ANCHOR/SCREW BN/BN,TIS/BN: HCPCS | Performed by: NEUROLOGICAL SURGERY

## 2024-07-22 PROCEDURE — 25010000002 HYDROMORPHONE 1 MG/ML SOLUTION: Performed by: NURSE ANESTHETIST, CERTIFIED REGISTERED

## 2024-07-22 PROCEDURE — 25010000002 MORPHINE PER 10 MG: Performed by: NEUROLOGICAL SURGERY

## 2024-07-22 PROCEDURE — 25810000003 SODIUM CHLORIDE 0.9 % SOLUTION

## 2024-07-22 PROCEDURE — 25010000002 ONDANSETRON PER 1 MG: Performed by: NEUROLOGICAL SURGERY

## 2024-07-22 PROCEDURE — 25010000002 PROPOFOL 200 MG/20ML EMULSION: Performed by: NURSE ANESTHETIST, CERTIFIED REGISTERED

## 2024-07-22 PROCEDURE — 25010000002 DEXAMETHASONE SODIUM PHOSPHATE 20 MG/5ML SOLUTION: Performed by: NURSE ANESTHETIST, CERTIFIED REGISTERED

## 2024-07-22 PROCEDURE — 0ST20ZZ RESECTION OF LUMBAR VERTEBRAL DISC, OPEN APPROACH: ICD-10-PCS | Performed by: NEUROLOGICAL SURGERY

## 2024-07-22 PROCEDURE — 25010000002 VANCOMYCIN 1 G RECONSTITUTED SOLUTION 1 EACH VIAL: Performed by: NEUROLOGICAL SURGERY

## 2024-07-22 PROCEDURE — 25010000002 SODIUM CHLORIDE 0.9 % WITH KCL 20 MEQ 20-0.9 MEQ/L-% SOLUTION: Performed by: NEUROLOGICAL SURGERY

## 2024-07-22 PROCEDURE — 0SG00AJ FUSION OF LUMBAR VERTEBRAL JOINT WITH INTERBODY FUSION DEVICE, POSTERIOR APPROACH, ANTERIOR COLUMN, OPEN APPROACH: ICD-10-PCS | Performed by: NEUROLOGICAL SURGERY

## 2024-07-22 PROCEDURE — 0QP004Z REMOVAL OF INTERNAL FIXATION DEVICE FROM LUMBAR VERTEBRA, OPEN APPROACH: ICD-10-PCS | Performed by: NEUROLOGICAL SURGERY

## 2024-07-22 PROCEDURE — 01NB0ZZ RELEASE LUMBAR NERVE, OPEN APPROACH: ICD-10-PCS | Performed by: NEUROLOGICAL SURGERY

## 2024-07-22 PROCEDURE — 25010000002 PROPOFOL 10 MG/ML EMULSION: Performed by: NURSE ANESTHETIST, CERTIFIED REGISTERED

## 2024-07-22 PROCEDURE — 25010000002 ONDANSETRON PER 1 MG: Performed by: NURSE ANESTHETIST, CERTIFIED REGISTERED

## 2024-07-22 PROCEDURE — 25810000003 LACTATED RINGERS PER 1000 ML: Performed by: NURSE ANESTHETIST, CERTIFIED REGISTERED

## 2024-07-22 PROCEDURE — 25010000002 SUGAMMADEX 200 MG/2ML SOLUTION: Performed by: NURSE ANESTHETIST, CERTIFIED REGISTERED

## 2024-07-22 PROCEDURE — 00NY0ZZ RELEASE LUMBAR SPINAL CORD, OPEN APPROACH: ICD-10-PCS | Performed by: NEUROLOGICAL SURGERY

## 2024-07-22 PROCEDURE — 25010000002 CEFAZOLIN PER 500 MG: Performed by: NEUROLOGICAL SURGERY

## 2024-07-22 PROCEDURE — 07DR3ZZ EXTRACTION OF ILIAC BONE MARROW, PERCUTANEOUS APPROACH: ICD-10-PCS | Performed by: NEUROLOGICAL SURGERY

## 2024-07-22 PROCEDURE — 25010000002 FENTANYL CITRATE (PF) 50 MCG/ML SOLUTION: Performed by: NURSE ANESTHETIST, CERTIFIED REGISTERED

## 2024-07-22 PROCEDURE — 25010000002 HYDROMORPHONE PER 4 MG: Performed by: NURSE ANESTHETIST, CERTIFIED REGISTERED

## 2024-07-22 PROCEDURE — 76000 FLUOROSCOPY <1 HR PHYS/QHP: CPT

## 2024-07-22 DEVICE — IMPLANTABLE DEVICE
Type: IMPLANTABLE DEVICE | Site: SPINE LUMBAR | Status: FUNCTIONAL
Brand: SURGIFOAM® ABSORBABLE GELATIN SPONGE, U.S.P.

## 2024-07-22 DEVICE — SSC BONE WAX
Type: IMPLANTABLE DEVICE | Site: SPINE LUMBAR | Status: FUNCTIONAL
Brand: SSC BONE WAX

## 2024-07-22 DEVICE — ALLOGRFT DBM GRAFTON DS INJ FIBR 6CC: Type: IMPLANTABLE DEVICE | Site: SPINE LUMBAR | Status: FUNCTIONAL

## 2024-07-22 RX ORDER — NALOXONE HCL 0.4 MG/ML
0.4 VIAL (ML) INJECTION
Status: DISCONTINUED | OUTPATIENT
Start: 2024-07-22 | End: 2024-07-26 | Stop reason: HOSPADM

## 2024-07-22 RX ORDER — PROMETHAZINE HYDROCHLORIDE 25 MG/1
25 TABLET ORAL ONCE AS NEEDED
Status: DISCONTINUED | OUTPATIENT
Start: 2024-07-22 | End: 2024-07-22 | Stop reason: HOSPADM

## 2024-07-22 RX ORDER — SODIUM CHLORIDE 9 MG/ML
40 INJECTION, SOLUTION INTRAVENOUS AS NEEDED
Status: DISCONTINUED | OUTPATIENT
Start: 2024-07-22 | End: 2024-07-26 | Stop reason: HOSPADM

## 2024-07-22 RX ORDER — HYDRALAZINE HYDROCHLORIDE 20 MG/ML
5 INJECTION INTRAMUSCULAR; INTRAVENOUS
Status: DISCONTINUED | OUTPATIENT
Start: 2024-07-22 | End: 2024-07-22 | Stop reason: HOSPADM

## 2024-07-22 RX ORDER — OXYCODONE AND ACETAMINOPHEN 7.5; 325 MG/1; MG/1
1 TABLET ORAL EVERY 4 HOURS PRN
Status: DISCONTINUED | OUTPATIENT
Start: 2024-07-22 | End: 2024-07-22 | Stop reason: HOSPADM

## 2024-07-22 RX ORDER — GUAIFENESIN 600 MG/1
1200 TABLET, EXTENDED RELEASE ORAL EVERY 12 HOURS SCHEDULED
Status: DISCONTINUED | OUTPATIENT
Start: 2024-07-22 | End: 2024-07-26 | Stop reason: HOSPADM

## 2024-07-22 RX ORDER — BISACODYL 5 MG/1
5 TABLET, DELAYED RELEASE ORAL DAILY PRN
Status: DISCONTINUED | OUTPATIENT
Start: 2024-07-22 | End: 2024-07-26 | Stop reason: HOSPADM

## 2024-07-22 RX ORDER — SODIUM CHLORIDE, SODIUM LACTATE, POTASSIUM CHLORIDE, CALCIUM CHLORIDE 600; 310; 30; 20 MG/100ML; MG/100ML; MG/100ML; MG/100ML
INJECTION, SOLUTION INTRAVENOUS CONTINUOUS PRN
Status: DISCONTINUED | OUTPATIENT
Start: 2024-07-22 | End: 2024-07-22 | Stop reason: SURG

## 2024-07-22 RX ORDER — POLYETHYLENE GLYCOL 3350 17 G/17G
17 POWDER, FOR SOLUTION ORAL DAILY PRN
Status: DISCONTINUED | OUTPATIENT
Start: 2024-07-22 | End: 2024-07-26 | Stop reason: HOSPADM

## 2024-07-22 RX ORDER — PANTOPRAZOLE SODIUM 40 MG/1
40 TABLET, DELAYED RELEASE ORAL
Status: DISCONTINUED | OUTPATIENT
Start: 2024-07-22 | End: 2024-07-26 | Stop reason: HOSPADM

## 2024-07-22 RX ORDER — METHOCARBAMOL 500 MG/1
500 TABLET, FILM COATED ORAL 3 TIMES DAILY PRN
Status: DISCONTINUED | OUTPATIENT
Start: 2024-07-22 | End: 2024-07-26 | Stop reason: HOSPADM

## 2024-07-22 RX ORDER — ONDANSETRON 2 MG/ML
4 INJECTION INTRAMUSCULAR; INTRAVENOUS EVERY 6 HOURS PRN
Status: DISCONTINUED | OUTPATIENT
Start: 2024-07-22 | End: 2024-07-26 | Stop reason: HOSPADM

## 2024-07-22 RX ORDER — DROPERIDOL 2.5 MG/ML
0.62 INJECTION, SOLUTION INTRAMUSCULAR; INTRAVENOUS
Status: DISCONTINUED | OUTPATIENT
Start: 2024-07-22 | End: 2024-07-22 | Stop reason: HOSPADM

## 2024-07-22 RX ORDER — ONDANSETRON 2 MG/ML
INJECTION INTRAMUSCULAR; INTRAVENOUS AS NEEDED
Status: DISCONTINUED | OUTPATIENT
Start: 2024-07-22 | End: 2024-07-22 | Stop reason: SURG

## 2024-07-22 RX ORDER — MORPHINE SULFATE 2 MG/ML
2 INJECTION, SOLUTION INTRAMUSCULAR; INTRAVENOUS
Status: DISCONTINUED | OUTPATIENT
Start: 2024-07-22 | End: 2024-07-26 | Stop reason: HOSPADM

## 2024-07-22 RX ORDER — EPHEDRINE SULFATE 50 MG/ML
5 INJECTION, SOLUTION INTRAVENOUS ONCE AS NEEDED
Status: DISCONTINUED | OUTPATIENT
Start: 2024-07-22 | End: 2024-07-22 | Stop reason: HOSPADM

## 2024-07-22 RX ORDER — ROCURONIUM BROMIDE 10 MG/ML
INJECTION, SOLUTION INTRAVENOUS AS NEEDED
Status: DISCONTINUED | OUTPATIENT
Start: 2024-07-22 | End: 2024-07-22 | Stop reason: SURG

## 2024-07-22 RX ORDER — LIDOCAINE HYDROCHLORIDE 20 MG/ML
INJECTION, SOLUTION INFILTRATION; PERINEURAL AS NEEDED
Status: DISCONTINUED | OUTPATIENT
Start: 2024-07-22 | End: 2024-07-22 | Stop reason: SURG

## 2024-07-22 RX ORDER — ACETAMINOPHEN 325 MG/1
650 TABLET ORAL EVERY 4 HOURS PRN
Status: DISCONTINUED | OUTPATIENT
Start: 2024-07-22 | End: 2024-07-26 | Stop reason: HOSPADM

## 2024-07-22 RX ORDER — FENTANYL CITRATE 50 UG/ML
50 INJECTION, SOLUTION INTRAMUSCULAR; INTRAVENOUS
Status: DISCONTINUED | OUTPATIENT
Start: 2024-07-22 | End: 2024-07-22 | Stop reason: HOSPADM

## 2024-07-22 RX ORDER — ONDANSETRON 2 MG/ML
4 INJECTION INTRAMUSCULAR; INTRAVENOUS ONCE AS NEEDED
Status: DISCONTINUED | OUTPATIENT
Start: 2024-07-22 | End: 2024-07-22 | Stop reason: HOSPADM

## 2024-07-22 RX ORDER — SODIUM CHLORIDE AND POTASSIUM CHLORIDE 150; 900 MG/100ML; MG/100ML
100 INJECTION, SOLUTION INTRAVENOUS CONTINUOUS
Status: DISCONTINUED | OUTPATIENT
Start: 2024-07-22 | End: 2024-07-23

## 2024-07-22 RX ORDER — ACETAMINOPHEN 160 MG/5ML
650 SOLUTION ORAL EVERY 4 HOURS PRN
Status: DISCONTINUED | OUTPATIENT
Start: 2024-07-22 | End: 2024-07-22

## 2024-07-22 RX ORDER — ONDANSETRON 4 MG/1
4 TABLET, ORALLY DISINTEGRATING ORAL EVERY 8 HOURS PRN
Status: DISCONTINUED | OUTPATIENT
Start: 2024-07-22 | End: 2024-07-26 | Stop reason: HOSPADM

## 2024-07-22 RX ORDER — OXYCODONE HYDROCHLORIDE 5 MG/1
5 TABLET ORAL EVERY 4 HOURS PRN
Status: DISCONTINUED | OUTPATIENT
Start: 2024-07-22 | End: 2024-07-26 | Stop reason: HOSPADM

## 2024-07-22 RX ORDER — HYDROCODONE BITARTRATE AND ACETAMINOPHEN 5; 325 MG/1; MG/1
1 TABLET ORAL ONCE AS NEEDED
Status: DISCONTINUED | OUTPATIENT
Start: 2024-07-22 | End: 2024-07-22 | Stop reason: HOSPADM

## 2024-07-22 RX ORDER — ACETAMINOPHEN 650 MG/1
650 SUPPOSITORY RECTAL EVERY 4 HOURS PRN
Status: DISCONTINUED | OUTPATIENT
Start: 2024-07-22 | End: 2024-07-22

## 2024-07-22 RX ORDER — PROPOFOL 10 MG/ML
INJECTION, EMULSION INTRAVENOUS AS NEEDED
Status: DISCONTINUED | OUTPATIENT
Start: 2024-07-22 | End: 2024-07-22 | Stop reason: SURG

## 2024-07-22 RX ORDER — ACETAMINOPHEN 500 MG
1000 TABLET ORAL EVERY 8 HOURS
Status: DISCONTINUED | OUTPATIENT
Start: 2024-07-22 | End: 2024-07-26 | Stop reason: HOSPADM

## 2024-07-22 RX ORDER — PROGESTERONE 200 MG/1
200 CAPSULE ORAL DAILY
Status: DISCONTINUED | OUTPATIENT
Start: 2024-07-22 | End: 2024-07-26 | Stop reason: HOSPADM

## 2024-07-22 RX ORDER — DIPHENHYDRAMINE HYDROCHLORIDE 50 MG/ML
12.5 INJECTION INTRAMUSCULAR; INTRAVENOUS
Status: DISCONTINUED | OUTPATIENT
Start: 2024-07-22 | End: 2024-07-22 | Stop reason: HOSPADM

## 2024-07-22 RX ORDER — GABAPENTIN 100 MG/1
100 CAPSULE ORAL 3 TIMES DAILY
Status: DISCONTINUED | OUTPATIENT
Start: 2024-07-22 | End: 2024-07-26 | Stop reason: HOSPADM

## 2024-07-22 RX ORDER — IPRATROPIUM BROMIDE AND ALBUTEROL SULFATE 2.5; .5 MG/3ML; MG/3ML
3 SOLUTION RESPIRATORY (INHALATION) ONCE AS NEEDED
Status: DISCONTINUED | OUTPATIENT
Start: 2024-07-22 | End: 2024-07-22 | Stop reason: HOSPADM

## 2024-07-22 RX ORDER — DILTIAZEM HYDROCHLORIDE 180 MG/1
180 CAPSULE, COATED, EXTENDED RELEASE ORAL EVERY EVENING
Status: DISCONTINUED | OUTPATIENT
Start: 2024-07-22 | End: 2024-07-26 | Stop reason: HOSPADM

## 2024-07-22 RX ORDER — DEXAMETHASONE SODIUM PHOSPHATE 4 MG/ML
INJECTION, SOLUTION INTRA-ARTICULAR; INTRALESIONAL; INTRAMUSCULAR; INTRAVENOUS; SOFT TISSUE AS NEEDED
Status: DISCONTINUED | OUTPATIENT
Start: 2024-07-22 | End: 2024-07-22 | Stop reason: SURG

## 2024-07-22 RX ORDER — EPHEDRINE SULFATE 50 MG/ML
INJECTION INTRAVENOUS AS NEEDED
Status: DISCONTINUED | OUTPATIENT
Start: 2024-07-22 | End: 2024-07-22 | Stop reason: SURG

## 2024-07-22 RX ORDER — LABETALOL HYDROCHLORIDE 5 MG/ML
5 INJECTION, SOLUTION INTRAVENOUS
Status: DISCONTINUED | OUTPATIENT
Start: 2024-07-22 | End: 2024-07-22 | Stop reason: HOSPADM

## 2024-07-22 RX ORDER — ONDANSETRON 4 MG/1
4 TABLET, ORALLY DISINTEGRATING ORAL EVERY 6 HOURS PRN
Status: DISCONTINUED | OUTPATIENT
Start: 2024-07-22 | End: 2024-07-26 | Stop reason: HOSPADM

## 2024-07-22 RX ORDER — NALOXONE HCL 0.4 MG/ML
0.2 VIAL (ML) INJECTION AS NEEDED
Status: DISCONTINUED | OUTPATIENT
Start: 2024-07-22 | End: 2024-07-22 | Stop reason: HOSPADM

## 2024-07-22 RX ORDER — AMOXICILLIN 250 MG
2 CAPSULE ORAL 2 TIMES DAILY PRN
Status: DISCONTINUED | OUTPATIENT
Start: 2024-07-22 | End: 2024-07-24

## 2024-07-22 RX ORDER — ALBUTEROL SULFATE 2.5 MG/3ML
2.5 SOLUTION RESPIRATORY (INHALATION) EVERY 4 HOURS PRN
Status: DISCONTINUED | OUTPATIENT
Start: 2024-07-22 | End: 2024-07-26 | Stop reason: HOSPADM

## 2024-07-22 RX ORDER — FENTANYL CITRATE 50 UG/ML
INJECTION, SOLUTION INTRAMUSCULAR; INTRAVENOUS AS NEEDED
Status: DISCONTINUED | OUTPATIENT
Start: 2024-07-22 | End: 2024-07-22 | Stop reason: SURG

## 2024-07-22 RX ORDER — SODIUM CHLORIDE 0.9 % (FLUSH) 0.9 %
3 SYRINGE (ML) INJECTION EVERY 12 HOURS SCHEDULED
Status: DISCONTINUED | OUTPATIENT
Start: 2024-07-22 | End: 2024-07-26 | Stop reason: HOSPADM

## 2024-07-22 RX ORDER — SODIUM CHLORIDE 0.9 % (FLUSH) 0.9 %
10 SYRINGE (ML) INJECTION AS NEEDED
Status: DISCONTINUED | OUTPATIENT
Start: 2024-07-22 | End: 2024-07-26 | Stop reason: HOSPADM

## 2024-07-22 RX ORDER — FLUMAZENIL 0.1 MG/ML
0.2 INJECTION INTRAVENOUS AS NEEDED
Status: DISCONTINUED | OUTPATIENT
Start: 2024-07-22 | End: 2024-07-22 | Stop reason: HOSPADM

## 2024-07-22 RX ORDER — HYDROMORPHONE HYDROCHLORIDE 1 MG/ML
0.5 INJECTION, SOLUTION INTRAMUSCULAR; INTRAVENOUS; SUBCUTANEOUS
Status: DISCONTINUED | OUTPATIENT
Start: 2024-07-22 | End: 2024-07-22 | Stop reason: HOSPADM

## 2024-07-22 RX ORDER — BISACODYL 10 MG
10 SUPPOSITORY, RECTAL RECTAL DAILY PRN
Status: DISCONTINUED | OUTPATIENT
Start: 2024-07-22 | End: 2024-07-26 | Stop reason: HOSPADM

## 2024-07-22 RX ORDER — MAGNESIUM HYDROXIDE 1200 MG/15ML
LIQUID ORAL AS NEEDED
Status: DISCONTINUED | OUTPATIENT
Start: 2024-07-22 | End: 2024-07-22 | Stop reason: HOSPADM

## 2024-07-22 RX ORDER — PROMETHAZINE HYDROCHLORIDE 25 MG/1
25 SUPPOSITORY RECTAL ONCE AS NEEDED
Status: DISCONTINUED | OUTPATIENT
Start: 2024-07-22 | End: 2024-07-22 | Stop reason: HOSPADM

## 2024-07-22 RX ADMIN — SODIUM CHLORIDE 2 G: 900 INJECTION INTRAVENOUS at 07:48

## 2024-07-22 RX ADMIN — PROPOFOL 150 MG: 10 INJECTION, EMULSION INTRAVENOUS at 08:06

## 2024-07-22 RX ADMIN — DEXAMETHASONE SODIUM PHOSPHATE 6 MG: 4 INJECTION, SOLUTION INTRAMUSCULAR; INTRAVENOUS at 08:15

## 2024-07-22 RX ADMIN — POTASSIUM CHLORIDE AND SODIUM CHLORIDE 100 ML/HR: 900; 150 INJECTION, SOLUTION INTRAVENOUS at 14:36

## 2024-07-22 RX ADMIN — Medication 3 ML: at 14:38

## 2024-07-22 RX ADMIN — SUGAMMADEX 200 MG: 100 INJECTION, SOLUTION INTRAVENOUS at 10:49

## 2024-07-22 RX ADMIN — OXYCODONE AND ACETAMINOPHEN 1 TABLET: 7.5; 325 TABLET ORAL at 11:46

## 2024-07-22 RX ADMIN — GUAIFENESIN 1200 MG: 600 TABLET, EXTENDED RELEASE ORAL at 20:14

## 2024-07-22 RX ADMIN — HYDROMORPHONE HYDROCHLORIDE 0.5 MG: 1 INJECTION, SOLUTION INTRAMUSCULAR; INTRAVENOUS; SUBCUTANEOUS at 11:30

## 2024-07-22 RX ADMIN — ACETAMINOPHEN 1000 MG: 500 TABLET ORAL at 14:38

## 2024-07-22 RX ADMIN — LIDOCAINE HYDROCHLORIDE 60 MG: 20 INJECTION, SOLUTION INFILTRATION; PERINEURAL at 08:06

## 2024-07-22 RX ADMIN — ONDANSETRON 4 MG: 2 INJECTION INTRAMUSCULAR; INTRAVENOUS at 20:24

## 2024-07-22 RX ADMIN — HYDROMORPHONE HYDROCHLORIDE 0.5 MG: 1 INJECTION, SOLUTION INTRAMUSCULAR; INTRAVENOUS; SUBCUTANEOUS at 11:14

## 2024-07-22 RX ADMIN — PROPOFOL 100 MCG/KG/MIN: 10 INJECTION, EMULSION INTRAVENOUS at 08:10

## 2024-07-22 RX ADMIN — METHOCARBAMOL TABLETS 500 MG: 500 TABLET, COATED ORAL at 20:14

## 2024-07-22 RX ADMIN — SODIUM CHLORIDE 2000 MG: 900 INJECTION INTRAVENOUS at 14:38

## 2024-07-22 RX ADMIN — FENTANYL CITRATE 50 MCG: 50 INJECTION, SOLUTION INTRAMUSCULAR; INTRAVENOUS at 08:06

## 2024-07-22 RX ADMIN — ONDANSETRON 4 MG: 2 INJECTION INTRAMUSCULAR; INTRAVENOUS at 10:24

## 2024-07-22 RX ADMIN — PROPOFOL 50 MG: 10 INJECTION, EMULSION INTRAVENOUS at 08:08

## 2024-07-22 RX ADMIN — Medication 3 ML: at 20:19

## 2024-07-22 RX ADMIN — PROGESTERONE 200 MG: 200 CAPSULE ORAL at 14:38

## 2024-07-22 RX ADMIN — GABAPENTIN 100 MG: 100 CAPSULE ORAL at 14:38

## 2024-07-22 RX ADMIN — GABAPENTIN 100 MG: 100 CAPSULE ORAL at 20:14

## 2024-07-22 RX ADMIN — ACETAMINOPHEN 1000 MG: 500 TABLET ORAL at 20:19

## 2024-07-22 RX ADMIN — MORPHINE SULFATE 2 MG: 2 INJECTION, SOLUTION INTRAMUSCULAR; INTRAVENOUS at 20:14

## 2024-07-22 RX ADMIN — PANTOPRAZOLE SODIUM 40 MG: 40 TABLET, DELAYED RELEASE ORAL at 17:44

## 2024-07-22 RX ADMIN — ROCURONIUM BROMIDE 10 MG: 10 INJECTION, SOLUTION INTRAVENOUS at 09:15

## 2024-07-22 RX ADMIN — ROCURONIUM BROMIDE 50 MG: 10 INJECTION, SOLUTION INTRAVENOUS at 08:06

## 2024-07-22 RX ADMIN — EPHEDRINE SULFATE 10 MG: 50 INJECTION INTRAVENOUS at 09:32

## 2024-07-22 RX ADMIN — SODIUM CHLORIDE, POTASSIUM CHLORIDE, SODIUM LACTATE AND CALCIUM CHLORIDE: 600; 310; 30; 20 INJECTION, SOLUTION INTRAVENOUS at 06:47

## 2024-07-22 RX ADMIN — HYDROMORPHONE HYDROCHLORIDE 0.5 MG: 1 INJECTION, SOLUTION INTRAMUSCULAR; INTRAVENOUS; SUBCUTANEOUS at 10:53

## 2024-07-22 RX ADMIN — SODIUM CHLORIDE 2000 MG: 900 INJECTION INTRAVENOUS at 22:58

## 2024-07-22 RX ADMIN — FENTANYL CITRATE 50 MCG: 50 INJECTION, SOLUTION INTRAMUSCULAR; INTRAVENOUS at 08:30

## 2024-07-22 RX ADMIN — DILTIAZEM HYDROCHLORIDE 180 MG: 180 CAPSULE, EXTENDED RELEASE ORAL at 17:44

## 2024-07-22 NOTE — ANESTHESIA PROCEDURE NOTES
Airway  Urgency: elective    Date/Time: 7/22/2024 8:09 AM  Airway not difficult    General Information and Staff    Patient location during procedure: OR  Anesthesiologist: Belén Fritz MD  CRNA/CAA: Gracy Pulido CRNA    Indications and Patient Condition  Indications for airway management: airway protection    Preoxygenated: yes  MILS not maintained throughout  Mask difficulty assessment: 1 - vent by mask    Final Airway Details  Final airway type: endotracheal airway      Successful airway: ETT  Cuffed: yes   Successful intubation technique: direct laryngoscopy  Facilitating devices/methods: intubating stylet  Endotracheal tube insertion site: oral  Blade: Tino  Blade size: 3  ETT size (mm): 7.0  Cormack-Lehane Classification: grade I - full view of glottis  Placement verified by: chest auscultation   Cuff volume (mL): 8  Measured from: lips  Number of attempts at approach: 1  Assessment: lips, teeth, and gum same as pre-op and atraumatic intubation    Additional Comments  PreO2 100% face mask, IV induction, easy mask, DVL x1, cords noted, tube through, cuff up, EBBSH, +etCO2, = chest movement, tube secured in place, atraumatic, teeth and lips intact as preop.

## 2024-07-22 NOTE — OUTREACH NOTE
Medical Week 2 Survey      Flowsheet Row Responses   [facility] [place], operated by [facility] patient discharged from? [facility]   Does the patient have one of the following disease processes/diagnoses(primary or secondary)? Other   Week 2 attempt successful? No   Unsuccessful attempts Attempt 1   Revoke Readmitted            Marina BARRERA - Registered Nurse

## 2024-07-22 NOTE — BRIEF OP NOTE
LUMBAR DISCECTOMY POSTERIOR WITH FUSION INSTRUMENTATION  Progress Note    Radha Romero Umair  7/22/2024    Pre-op Diagnosis:   Neuritis or radiculitis due to rupture of lumbar intervertebral disc [M51.16]       Post-Op Diagnosis Codes:     * Neuritis or radiculitis due to rupture of lumbar intervertebral disc [M51.16]    Procedure/CPT® Codes:        Procedure(s):  Lumbar 2 to lumbar 3 laminectomy with fusion and instrumentation and removal of previous hardware              Surgeon(s):  Rigo Mathur MD    Anesthesia: General    Staff:   Cell Saver : Delks, Rylee  Circulator: Helen Reyna RN; Raisa Dodson RN  Scrub Person: Qiana Olmos  Vendor Representative: Alon Padilla  Assistant: Raissa Degroot CSA  Assistant: Raissa Degroot CSA      Estimated Blood Loss: 350 mL    Urine Voided: 350 mL    Specimens:                None          Drains:   Closed/Suction Drain 1 Inferior;Midline Back Bulb 15 Fr. (Active)       Urethral Catheter Silicone 16 Fr. (Active)       Findings: Severe lateral recess and foraminal stenosis with marked instability        Complications: None      Rigo Mathur MD     Date: 7/22/2024  Time: 10:40 EDT

## 2024-07-22 NOTE — ANESTHESIA PREPROCEDURE EVALUATION
" Anesthesia Evaluation     no history of anesthetic complications:                Airway   Mallampati: I  TM distance: >3 FB  Dental      Comment: All caps and bridges    Pulmonary    (+) COPD,shortness of breath    ROS comment: \"COPD from alpha antitrypsin\" took trilogy  this am  Cardiovascular     (+) hypertension, valvular problems/murmurs MVP, dysrhythmias (Hx of A fib, s/p ablation), PVD, DVT  (-) angina      Neuro/Psych  (-) TIA, dizziness/light headedness, syncope  GI/Hepatic/Renal/Endo    (+) GERD    Musculoskeletal     Abdominal    Substance History      OB/GYN          Other   blood dyscrasia (hypercoagulable),                 Anesthesia Plan    ASA 3     general     intravenous induction     Anesthetic plan, risks, benefits, and alternatives have been provided, discussed and informed consent has been obtained with: patient.    CODE STATUS:         "

## 2024-07-22 NOTE — PLAN OF CARE
Goal Outcome Evaluation:POD0 L2-L3 lami and fusion with old hardware removal, CHERI drain in place draining bright red blood. Small amount of blood on top of dressing. VSS, afebrile, NS with 20KCL @100cc/hr.Ambulated from stretcher to bed upon arrival to unit with assist x1 and walker. Voiding well,

## 2024-07-22 NOTE — ANESTHESIA POSTPROCEDURE EVALUATION
Patient: Radha Block    Procedure Summary       Date: 07/22/24 Room / Location: [facility] OR [address] [place] [address] MAIN OR    Anesthesia Start: 0752 Anesthesia Stop: 1109    Procedure: Lumbar 2 to lumbar 3 laminectomy with fusion and instrumentation and removal of previous hardware (Spine Lumbar) Diagnosis:       Neuritis or radiculitis due to rupture of lumbar intervertebral disc      (Neuritis or radiculitis due to rupture of lumbar intervertebral disc [M51.16])    Surgeons: Rigo Mathur MD Provider: Belén Fritz MD    Anesthesia Type: general ASA Status: 3            Anesthesia Type: general    Vitals  Vitals Value Taken Time   /82 07/22/24 1130   Temp 36.7 °C (98.1 °F) 07/22/24 1106   Pulse 79 07/22/24 1147   Resp 16 07/22/24 1115   SpO2 97 % 07/22/24 1147   Vitals shown include unfiled device data.        Post Anesthesia Care and Evaluation    Pain management: adequate    Airway patency: patent  Anesthetic complications: No anesthetic complications    Cardiovascular status: acceptable  Respiratory status: acceptable  Hydration status: acceptable    Comments: /82   Pulse 73   Temp 36.7 °C (98.1 °F) (Oral)   Resp 16   SpO2 99%

## 2024-07-22 NOTE — CONSULTS
Patient Name:  Radha Block  YOB: 1960  MRN:  [id number]  Date of Admission:  7/22/2024  Date of Consult:  7/22/2024  Patient Care Team:  Bosler, James William III, MD as PCP - General (Internal Medicine)  Raisa Cummings MD as Consulting Physician (Obstetrics and Gynecology)  Bosler, James William III, MD as Referring Physician (Internal Medicine)  Ijeoma Auguste MD as Consulting Physician (Cardiology)    Inpatient Hospitalist Consult  Consult performed by: Broderick Cornejo MD  Consult ordered by: Rigo Mathur MD  Reason for consult: Evaluate status and make recommendations regarding treatment for COPD.        Subjective   History of Present Illness  Ms. Nick Block is a 64 y.o. female that has been admitted to [facility] following elective Lumbar 2 to lumbar 3 laminectomy with fusion and instrumentation and removal of previous hardware.  She has been admitted to an orthopedic floor following surgery and we were asked to see and assist with her medical problems, specifically relating to her COPD.   She has history of alpha 1 antitrypsin deficiency with COPD but gets regular infusions and does pretty well.  She still quite active.  Rarely has to use rescue inhaler.  After surgery she seems to be doing well with no nausea, chest pain or shortness of breath. At the time of my visit she denies any chest pain, SOA, nausea, vomiting or diarrhea.  She has tolerated a diet.  She does complain of expected postoperative discomfort.  She reports being in a normal state of health leading up to surgery.    Past Medical History:   Diagnosis Date    Alpha-1-antitrypsin deficiency     FOLLOWED BY DR. GATES    Arrhythmia     Arthritis     Clotting disorder     Factor 5 Leiden    COPD (chronic obstructive pulmonary disease)     Deep vein thrombosis 2012    Early cataract     Essential hypertension 08/24/2021    GERD (gastroesophageal reflux disease)     Low back pain      RADIATING DOWN RT LEG WITH TINGLING AND WEAKNESS, RUPTURED DISC    Mitral valve prolapse     On anticoagulant therapy     Osteopenia     PAD (peripheral artery disease)     PAF (paroxysmal atrial fibrillation) 11/25/2020    ABLATION 2022     Past Surgical History:   Procedure Laterality Date    BLADDER SURGERY  2013    E/O polyp    BREAST SURGERY Right     E/O benign neuroma    CARDIAC ELECTROPHYSIOLOGY PROCEDURE N/A 10/10/2022    Procedure: Ablation atrial fibrillation CRYO;  Surgeon: Venkatesh Iqbal MD;  Location:  [facility];  Service: Cardiovascular;  Laterality: N/A;    COLONOSCOPY  2016    EPIDURAL BLOCK      HAND SURGERY Right     Carpal metacarpal hand surgery     LUMBAR FUSION N/A 12/30/2022    Procedure: Lumbar 3 to lumbar 4 and lumbar 4 to lumbar 5 laminectomy with fusion and instrumentation;  Surgeon: Rigo Mathur MD;  Location: [facility] MAIN OR;  Service: Robotics - Neuro;  Laterality: N/A;    SINUS SURGERY  2017    TONSILLECTOMY       Family History   Adopted: Yes     Social History     Tobacco Use    Smoking status: Never    Smokeless tobacco: Never   Vaping Use    Vaping status: Never Used   Substance Use Topics    Alcohol use: Yes     Alcohol/week: 3.0 standard drinks of alcohol     Types: 3 Glasses of wine per week     Comment: 2 times a week     Drug use: Never     Medications Prior to Admission   Medication Sig Dispense Refill Last Dose    acetaminophen (TYLENOL) 500 MG tablet Take 2 tablets by mouth Every 8 (Eight) Hours. 180 tablet 0 7/22/2024    desvenlafaxine (PRISTIQ) 50 MG 24 hr tablet Take 12.5 mg by mouth Daily.   7/22/2024 at 0430    dilTIAZem CD (CARDIZEM CD) 180 MG 24 hr capsule Take 1 capsule by mouth Daily. (Patient taking differently: Take 1 capsule by mouth Every Evening.) 30 capsule 11 7/21/2024 at 1800    esomeprazole (nexIUM) 40 MG capsule Take 1 capsule by mouth Daily.   7/22/2024 at 0600    Fluticasone-Umeclidin-Vilant (Trelegy Ellipta) 100-62.5-25  MCG/INH aerosol powder  Inhale 1 puff Daily.   7/22/2024 at 0600    gabapentin (NEURONTIN) 100 MG capsule Take 1 capsule by mouth 3 times a day. (Patient taking differently: Take 1 capsule by mouth 3 times a day. PT STATES IS TAKING QID, BUT ORDERED TID) 90 capsule 0 7/22/2024 at 0430    polyethylene glycol (MIRALAX) 17 GM/SCOOP powder Mix 17 g (1 capful) in liquid and drink by mouth 2 (Two) Times a Day. (Patient taking differently: Take 17 g by mouth As Needed.) 238 g 0 7/21/2024    Progesterone (PROMETRIUM) 200 MG capsule Take 1 capsule by mouth Daily.   7/21/2024    albuterol sulfate  (90 Base) MCG/ACT inhaler Inhale 1 puff As Needed for Wheezing.   More than a month    alpha1-proteinase inhibitor (Zemaira) 1000 MG injection Infuse  into a venous catheter 1 (One) Time Per Week. Pt takes on Fridays at infusion center   7/19/2024    apixaban (Eliquis) 5 MG tablet tablet Take 1 tablet by mouth Every 12 (Twelve) Hours. resume 24 hours after epidural injection (Patient taking differently: Take 1 tablet by mouth Every 12 (Twelve) Hours. resume 24 hours after epidural injection  PT STATES IS CURRENTLY HOLDING, LAST DOSE WAS 7/14/24 (PT STOPPED SINCE TAKING IBUPROFEN) PT INSTRUCTED TO CALL DR. GROSSMAN REGARDING ELIQUIS AND UPCOMING SURGERY AND IF SHE NEEDS BRIDGE WITH LOVENOX)   7/16/2024    betamethasone, augmented, (DIPROLENE) 0.05 % cream Apply 1 Application topically to the appropriate area as directed 2 (Two) Times a Day As Needed.   More than a month    diclofenac (VOLTAREN) 75 MG EC tablet Take 1 tablet by mouth 2 (Two) Times a Day As Needed. HOLD PER MD INSTRUCTIONS   More than a month    EPINEPHrine (EPIPEN) 0.3 MG/0.3ML solution auto-injector injection As Needed.       estradiol (CLIMARA) 0.075 MG/24HR patch Place 1 patch on the skin as directed by provider 2 (Two) Times a Week.   7/19/2024    guaiFENesin (MUCINEX) 600 MG 12 hr tablet Take 1 tablet by mouth As Needed.   More than a month    ibuprofen  (ADVIL,MOTRIN) 200 MG tablet Take 1 tablet by mouth Every 6 (Six) Hours As Needed for Mild Pain. HOLD PER MD INSTRUCTIONS   More than a month    methocarbamol (ROBAXIN) 500 MG tablet Take 1 tablet by mouth Every 8 (Eight) Hours. (Patient taking differently: Take 1 tablet by mouth 3 (Three) Times a Day As Needed for Muscle Spasms.) 90 tablet 0 7/15/2024    naloxone (NARCAN) 4 MG/0.1ML nasal spray Call 911. Don't prime. Adel in 1 nostril for overdose. Repeat in 2-3 minutes in other nostril if no or minimal breathing/responsiveness. (Patient taking differently: As Needed. Call 911. Don't prime. Adel in 1 nostril for overdose. Repeat in 2-3 minutes in other nostril if no or minimal breathing/responsiveness.) 2 each 0 More than a month    ondansetron (ZOFRAN) 4 MG tablet Take 1 tablet by mouth Every 8 (Eight) Hours As Needed for Vomiting or Nausea.   7/15/2024    oxyCODONE (Roxicodone) 5 MG immediate release tablet Take 1 tablet by mouth Every 4 (Four) Hours As Needed for Moderate Pain. 10 tablet 0 7/8/2024     Allergies:  Patient has no known allergies.    Review of Systems    Objective      Vital Signs  Temp:  [97.2 °F (36.2 °C)-98.8 °F (37.1 °C)] 97.2 °F (36.2 °C)  Heart Rate:  [69-83] 80  Resp:  [14-18] 16  BP: (114-153)/(67-86) 129/86  There is no height or weight on file to calculate BMI.    Physical Exam  Vitals and nursing note reviewed.   Eyes:      General: No scleral icterus.  Cardiovascular:      Rate and Rhythm: Normal rate and regular rhythm.   Pulmonary:      Effort: Pulmonary effort is normal.      Breath sounds: Normal breath sounds.   Abdominal:      General: Bowel sounds are normal.      Palpations: Abdomen is soft.      Tenderness: There is no abdominal tenderness.   Musculoskeletal:         General: Tenderness present.   Skin:     General: Skin is warm and dry.      Findings: No rash.   Neurological:      Mental Status: She is alert. Mental status is at baseline.         Results Review:   I  reviewed the patient's new clinical results.  I reviewed the patient's new imaging results and agree with the interpretation.  I reviewed the patient's other test results and agree with the interpretation         Assessment/Plan     Active Hospital Problems    Diagnosis  POA    **Neuritis or radiculitis due to rupture of lumbar intervertebral disc [M51.16]  Yes    Lumbar spinal stenosis [M48.061]  Yes    Alpha-1-antitrypsin deficiency [E88.01]  Yes    Factor V Leiden mutation [D68.51]  Yes    COPD (chronic obstructive pulmonary disease) [J44.9]  Yes    Essential hypertension [I10]  Yes    PAD (peripheral artery disease) [I73.9]  Yes    PAF (paroxysmal atrial fibrillation) [I48.0]  Yes      Resolved Hospital Problems   No resolved problems to display.       Ms. Nick Block is a 64 y.o. female who is s/p Lumbar 2 to lumbar 3 laminectomy with fusion and instrumentation and removal of previous hardware.    She seems to be doing well thus far postoperatively. In regards to her COPD she seems stable.  She been taking Mucinex lately and will continue this while in the hospital.  She is requesting to use her Trelegy inhaler that she brought with her.  Will order for this.  Will continue her Mucinex and make available albuterol nebulizer as needed only.  Currently her respiratory status seems very stable.   Most recent BP is 129/86.  Holding parameters have been written for cardizem.   Continue IVFs as ordered.    Monitor renal function.  DVT prophylaxis per surgery.  Given history of afib/FVL should restart Eliquis ASAP (or Lovenox).  Defer timing to surgeons.  She was encouraged to use incentive spirometer as instructed.      Thank you very much for asking A to be involved in this patient's care. We will follow along with you.      Broderick Cornejo MD  [facility]  07/22/24  17:56 EDT

## 2024-07-22 NOTE — OP NOTE
Preoperative diagnosis: Lumbar stenosis with lumbar radiculopathy and neurogenic claudication L2-3    Postoperative diagnosis: same    Procedure performed: A bilateral L2-3 laminectomy, facetectomy and a transforaminal lumbar interbody fusion with instrumentation and removal of previous instrumentation    Spinal Surgery Levels Completed:1 Level     Surgeon: Rigo Mathur M.D.    Assistant: Raissa Degroot CFA who was instrumental in helping with hemostasis, visualization of neural structures, and retraction of neural structures.  Her skilled assistance was necessary for the success of this case    Indications for procedure: This patient has been having severe pain in the legs and the back.  Imaging shows severe stenosis with a grade 1 spondylolisthesis at L2-3.  There has been no improvement with nonsurgical treatment and the patient elected to proceed with surgery.    Operative summary: After induction of general anesthesia with intravenous agents patient was intubated and placed on the operating table in the prone position.  All pressure points were padded including peripheral points of entrapment.  The back was then prepped with ChloraPrep.  After a 3 minute drying time the back was draped with Ioban, towels, half sheets and a split sheet.    An incision was made in the posterior iliac crest ridge and a Jamshidi needle was placed into the marrow cavity and 15 mL of marrow were aspirated and placed over Mastergraft.     An incision was then made in the skin in the midline.  The dissection was carried down to the thoracolumbar fascia which was opened in the midline. The muscles were stripped off the L2 laminar arch and spinous process.  Following this the entire spinous process of L2 was removed.   Following this the laminar arch of L2 was removed by thinning it down with the Midas Jose Eduardo drill and cracking it directly back off the dura.  We then carried the dissection down along the lateral aspect of the spine  exposing the previous instrumentation.  The tops of the screws were removed and then the rods were removed.  I was able to test the movement between each of the levels.  There was no movement at all at L4-5.  As a result I elected to remove the screws at L5.  At L3-4 I thought it was pretty solid as well but there was a hint of movement there.  I really could not tell for sure because of the scar tissue.  Consequently I elected to leave the screws at L3 and L4 and attach at the very least for better immobilization at L2-3.  Once the dura was exposed at L2 the remainder of the operation was done under the high power magnification of the operating microscope using microsurgical technique and microsurgical instrumentation.  The Penfield 4 was used to dissect the dura and the nerve root off of the medial pedicle of L2 and L3 bilaterally.  A combination of the Kerrison's and the Midas Jose Eduardo were used to open the lateral recess out to the level of the medial pedicle.  This was both at the top and the lower decompressed level.  Once this was done the medial aspect of the facet was removed in order to open the superior foramen.  Following this each of the 4 nerve roots were checked to be sure they were decompressed.  Once the nerve roots were thoroughly decompressed the dural sac was then mobilized medially and the disc was exposed.  The disc was incised and disc material was removed from the disc space using the down-biting curettes and the pituitary ronguers.  Once the disc was emptied as much as possible tawny were inserted into the disc space to clear the cartilaginous endplate completely and make a space for the cages.  I was then able to take 2 Medtronic Catalyft cages measuring  23 mm in length and place them into the disc space.  The Mastergraft and marrow as well as locally harvested bone were also placed into the disc space.  The cages were then expanded until they were tight in the disc space and had produced a  lordosis.    Then 4.5 mm by 45 mm screws were placed into the pedicles of L2 bilaterally.  Those pedicles were extremely small.  I did breach the medial wall of the pedicle but the threads were not protruding into the spinal canal.  I felt this was the best possible outcome I could accomplish with a small pedicles.  I left them in place.    Finally intraoperative x-ray confirmed good placement of the hardware and the screws were then connected with a vanessa.  Following this the area was copiously irrigated with antibiotic solution and then another dose of Kefzol was given prior to closure.  The paraspinous musculature was injected with 100 mL of 1/8% Marcaine with 1:200,000 epinephrine solution.  A drain was placed in the epidural space and tunneled subcutaneously.  The incision was then closed in layers dressed, and the patient was taken to the recovery room in stable condition.  There were no apparent complications and sponge, instrument and needle counts were correct at the end of the procedure.

## 2024-07-23 ENCOUNTER — TELEPHONE (OUTPATIENT)
Dept: NEUROSURGERY | Facility: CLINIC | Age: 64
End: 2024-07-23
Payer: COMMERCIAL

## 2024-07-23 ENCOUNTER — APPOINTMENT (OUTPATIENT)
Dept: GENERAL RADIOLOGY | Facility: HOSPITAL | Age: 64
End: 2024-07-23
Payer: COMMERCIAL

## 2024-07-23 LAB
ANION GAP SERPL CALCULATED.3IONS-SCNC: 5 MMOL/L (ref 5–15)
BASOPHILS # BLD AUTO: 0.03 10*3/MM3 (ref 0–0.2)
BASOPHILS NFR BLD AUTO: 0.1 % (ref 0–1.5)
BUN SERPL-MCNC: 9 MG/DL (ref 8–23)
BUN/CREAT SERPL: 16.7 (ref 7–25)
CALCIUM SPEC-SCNC: 8.1 MG/DL (ref 8.6–10.5)
CHLORIDE SERPL-SCNC: 104 MMOL/L (ref 98–107)
CO2 SERPL-SCNC: 24 MMOL/L (ref 22–29)
CREAT SERPL-MCNC: 0.54 MG/DL (ref 0.57–1)
DEPRECATED RDW RBC AUTO: 45.9 FL (ref 37–54)
EGFRCR SERPLBLD CKD-EPI 2021: 101.7 ML/MIN/1.73
EOSINOPHIL # BLD AUTO: 0.03 10*3/MM3 (ref 0–0.4)
EOSINOPHIL NFR BLD AUTO: 0.1 % (ref 0.3–6.2)
ERYTHROCYTE [DISTWIDTH] IN BLOOD BY AUTOMATED COUNT: 12.3 % (ref 12.3–15.4)
GLUCOSE SERPL-MCNC: 107 MG/DL (ref 65–99)
HCT VFR BLD AUTO: 38.5 % (ref 34–46.6)
HGB BLD-MCNC: 12.8 G/DL (ref 12–15.9)
IMM GRANULOCYTES # BLD AUTO: 0.13 10*3/MM3 (ref 0–0.05)
IMM GRANULOCYTES NFR BLD AUTO: 0.6 % (ref 0–0.5)
LYMPHOCYTES # BLD AUTO: 1.3 10*3/MM3 (ref 0.7–3.1)
LYMPHOCYTES NFR BLD AUTO: 5.9 % (ref 19.6–45.3)
MCH RBC QN AUTO: 33.9 PG (ref 26.6–33)
MCHC RBC AUTO-ENTMCNC: 33.2 G/DL (ref 31.5–35.7)
MCV RBC AUTO: 101.9 FL (ref 79–97)
MONOCYTES # BLD AUTO: 1.71 10*3/MM3 (ref 0.1–0.9)
MONOCYTES NFR BLD AUTO: 7.8 % (ref 5–12)
NEUTROPHILS NFR BLD AUTO: 18.76 10*3/MM3 (ref 1.7–7)
NEUTROPHILS NFR BLD AUTO: 85.5 % (ref 42.7–76)
NRBC BLD AUTO-RTO: 0 /100 WBC (ref 0–0.2)
PLATELET # BLD AUTO: 214 10*3/MM3 (ref 140–450)
PMV BLD AUTO: 9.5 FL (ref 6–12)
POTASSIUM SERPL-SCNC: 4.2 MMOL/L (ref 3.5–5.2)
RBC # BLD AUTO: 3.78 10*6/MM3 (ref 3.77–5.28)
SODIUM SERPL-SCNC: 133 MMOL/L (ref 136–145)
WBC NRBC COR # BLD AUTO: 21.96 10*3/MM3 (ref 3.4–10.8)

## 2024-07-23 PROCEDURE — 25010000002 CEFAZOLIN PER 500 MG: Performed by: NEUROLOGICAL SURGERY

## 2024-07-23 PROCEDURE — 97161 PT EVAL LOW COMPLEX 20 MIN: CPT

## 2024-07-23 PROCEDURE — 72100 X-RAY EXAM L-S SPINE 2/3 VWS: CPT

## 2024-07-23 PROCEDURE — 94799 UNLISTED PULMONARY SVC/PX: CPT

## 2024-07-23 PROCEDURE — 80048 BASIC METABOLIC PNL TOTAL CA: CPT | Performed by: HOSPITALIST

## 2024-07-23 PROCEDURE — 85025 COMPLETE CBC W/AUTO DIFF WBC: CPT | Performed by: NEUROLOGICAL SURGERY

## 2024-07-23 PROCEDURE — 94664 DEMO&/EVAL PT USE INHALER: CPT

## 2024-07-23 PROCEDURE — 94760 N-INVAS EAR/PLS OXIMETRY 1: CPT

## 2024-07-23 PROCEDURE — 97530 THERAPEUTIC ACTIVITIES: CPT

## 2024-07-23 RX ORDER — SODIUM CHLORIDE 9 MG/ML
100 INJECTION, SOLUTION INTRAVENOUS CONTINUOUS
Status: DISCONTINUED | OUTPATIENT
Start: 2024-07-23 | End: 2024-07-24

## 2024-07-23 RX ADMIN — PROGESTERONE 200 MG: 200 CAPSULE ORAL at 09:11

## 2024-07-23 RX ADMIN — GABAPENTIN 100 MG: 100 CAPSULE ORAL at 20:49

## 2024-07-23 RX ADMIN — SODIUM CHLORIDE 2000 MG: 900 INJECTION INTRAVENOUS at 05:46

## 2024-07-23 RX ADMIN — OXYCODONE HYDROCHLORIDE 5 MG: 5 TABLET ORAL at 00:36

## 2024-07-23 RX ADMIN — OXYCODONE HYDROCHLORIDE 5 MG: 5 TABLET ORAL at 17:34

## 2024-07-23 RX ADMIN — GUAIFENESIN 1200 MG: 600 TABLET, EXTENDED RELEASE ORAL at 20:48

## 2024-07-23 RX ADMIN — OXYCODONE HYDROCHLORIDE 5 MG: 5 TABLET ORAL at 05:45

## 2024-07-23 RX ADMIN — GUAIFENESIN 1200 MG: 600 TABLET, EXTENDED RELEASE ORAL at 09:15

## 2024-07-23 RX ADMIN — Medication 3 ML: at 09:11

## 2024-07-23 RX ADMIN — GABAPENTIN 100 MG: 100 CAPSULE ORAL at 14:46

## 2024-07-23 RX ADMIN — ACETAMINOPHEN 1000 MG: 500 TABLET ORAL at 22:45

## 2024-07-23 RX ADMIN — ACETAMINOPHEN 1000 MG: 500 TABLET ORAL at 05:46

## 2024-07-23 RX ADMIN — PANTOPRAZOLE SODIUM 40 MG: 40 TABLET, DELAYED RELEASE ORAL at 17:34

## 2024-07-23 RX ADMIN — METHOCARBAMOL TABLETS 500 MG: 500 TABLET, COATED ORAL at 23:34

## 2024-07-23 RX ADMIN — ACETAMINOPHEN 1000 MG: 500 TABLET ORAL at 14:46

## 2024-07-23 RX ADMIN — SODIUM CHLORIDE 2000 MG: 900 INJECTION INTRAVENOUS at 22:46

## 2024-07-23 RX ADMIN — GABAPENTIN 100 MG: 100 CAPSULE ORAL at 09:15

## 2024-07-23 RX ADMIN — PANTOPRAZOLE SODIUM 40 MG: 40 TABLET, DELAYED RELEASE ORAL at 05:46

## 2024-07-23 RX ADMIN — OXYCODONE HYDROCHLORIDE 5 MG: 5 TABLET ORAL at 11:20

## 2024-07-23 RX ADMIN — Medication 3 ML: at 22:45

## 2024-07-23 RX ADMIN — SODIUM CHLORIDE 2000 MG: 900 INJECTION INTRAVENOUS at 14:46

## 2024-07-23 RX ADMIN — DILTIAZEM HYDROCHLORIDE 180 MG: 180 CAPSULE, EXTENDED RELEASE ORAL at 17:35

## 2024-07-23 NOTE — TELEPHONE ENCOUNTER
Caller: TORRI @ [employer]    Relationship:     Best call back number: [phone]     What was the call regarding: TORRI @ [facility] CALLED TO VERIFY PATIENTS DISABILITY CLAIM    CLAIM # [id number]    PLEASE ADVISE  THANK YOU

## 2024-07-23 NOTE — PLAN OF CARE
Goal Outcome Evaluation:  Plan of Care Reviewed With: patient           Outcome Evaluation: alert and oriented X4, up with stand by assist, NPO at this time, PT signed off, will cont to monitor                               Problem: Adult Inpatient Plan of Care  Goal: Plan of Care Review  Outcome: Ongoing, Progressing  Flowsheets (Taken 7/23/2024 1508)  Plan of Care Reviewed With: patient  Outcome Evaluation: alert and oriented X4, up with stand by assist, NPO at this time, PT signed off, will cont to monitor  Goal: Patient-Specific Goal (Individualized)  Outcome: Ongoing, Progressing  Goal: Absence of Hospital-Acquired Illness or Injury  Outcome: Ongoing, Progressing  Intervention: Identify and Manage Fall Risk  Recent Flowsheet Documentation  Taken 7/23/2024 1407 by Anahi Polanco, RN  Safety Promotion/Fall Prevention:   activity supervised   assistive device/personal items within reach   clutter free environment maintained   fall prevention program maintained   nonskid shoes/slippers when out of bed   room organization consistent   safety round/check completed  Taken 7/23/2024 1215 by Anahi Polanco, RN  Safety Promotion/Fall Prevention:   activity supervised   assistive device/personal items within reach   clutter free environment maintained   fall prevention program maintained   nonskid shoes/slippers when out of bed   room organization consistent   safety round/check completed  Taken 7/23/2024 1007 by Anahi Polanco, RN  Safety Promotion/Fall Prevention:   activity supervised   assistive device/personal items within reach   clutter free environment maintained   fall prevention program maintained   nonskid shoes/slippers when out of bed   room organization consistent   safety round/check completed  Taken 7/23/2024 0918 by Anahi Polanco, RN  Safety Promotion/Fall Prevention:   activity supervised   assistive device/personal items within reach   clutter free environment maintained   fall prevention program  maintained   nonskid shoes/slippers when out of bed   room organization consistent   safety round/check completed  Intervention: Prevent Skin Injury  Recent Flowsheet Documentation  Taken 7/23/2024 0918 by Anahi Polanco RN  Skin Protection: adhesive use limited  Intervention: Prevent and Manage VTE (Venous Thromboembolism) Risk  Recent Flowsheet Documentation  Taken 7/23/2024 1407 by Anahi Polanco RN  Activity Management: activity encouraged  Taken 7/23/2024 1215 by Anahi Polanco RN  Activity Management: activity encouraged  Taken 7/23/2024 1007 by Anahi Polanco RN  Activity Management: activity encouraged  Taken 7/23/2024 0918 by Anahi Polanco RN  Activity Management: activity encouraged  VTE Prevention/Management:   bilateral   sequential compression devices off  Intervention: Prevent Infection  Recent Flowsheet Documentation  Taken 7/23/2024 1407 by Anahi Polanco RN  Infection Prevention: single patient room provided  Taken 7/23/2024 1215 by Anahi Polanco RN  Infection Prevention: single patient room provided  Taken 7/23/2024 1007 by Anahi Polanco RN  Infection Prevention: single patient room provided  Taken 7/23/2024 0918 by Anahi Polanco RN  Infection Prevention: single patient room provided  Goal: Optimal Comfort and Wellbeing  Outcome: Ongoing, Progressing  Intervention: Provide Person-Centered Care  Recent Flowsheet Documentation  Taken 7/23/2024 0918 by Anahi Polanco RN  Trust Relationship/Rapport:   care explained   thoughts/feelings acknowledged  Goal: Readiness for Transition of Care  Outcome: Ongoing, Progressing

## 2024-07-23 NOTE — PAYOR COMM NOTE
"Radha Ferrer (64 y.o. Female)        NOTIFICATION OF ADMISSION:  REF#  [id number]      DEPT: [phone],  [phone]    [facility]: NPI [id number] [facility]# [id number]    PENNY MELGAR RN,[facility]         Date of Birth   1960    Social Security Number       Address   [address] [facility] [place]    Home Phone   [phone]    MRN   [id number]       Jainism   Church    Marital Status                               Admission Date   7/22/24    Admission Type   Elective    Admitting Provider   Rigo Mathur MD    Attending Provider   Rigo Mathur MD    Department, Room/Bed   [address], P794/1       Discharge Date       Discharge Disposition       Discharge Destination                                 Attending Provider: Rigo Mathur MD    Allergies: No Known Allergies    Isolation: None   Infection: None   Code Status: CPR    Ht: 170.2 cm (67\")   Wt: 56 kg (123 lb 6.4 oz)    Admission Cmt: None   Principal Problem: Neuritis or radiculitis due to rupture of lumbar intervertebral disc [M51.16]                   Active Insurance as of 7/22/2024       Primary Coverage       Payor Plan Insurance Group Employer/Plan Group    [facility] PPO [id number]       Payor Plan Address Payor Plan Phone Number Payor Plan Fax Number Effective Dates    [address] [phone]  1/1/2023 - None Entered    [place] [address]         Subscriber Name Subscriber Birth Date Member ID       RADHA FERRER 1960 [id number]                     Emergency Contacts        (Rel.) Home Phone Work Phone Mobile Phone    Nancy Hester (Relative) [phone] -- --    FRANCISCO WOODSON (Son) -- -- [phone]    Kelly Geiger (Friend) [phone] -- --                 History & Physical        Rigo Mathur MD at 07/22/24 0644            H&P reviewed. The patient was examined and there are no " changes to the H&P.          Electronically signed by Rigo Mathur MD at 07/22/24 6132   Source Note: H&P (View-Only)          Subjective  Patient ID: Radha Block is a 64 y.o. female is here today for PRE-OP follow-up.    Today patient states that her symptoms a re unchanged     History of Present Illness    This patient was last seen in the office in April.  She was recently in the hospital last week.  Her pain is increased to the point where she is having severe pain in her anterior thigh on the right.  It was so severe she could barely move last week but it has calm down after IV steroids.    The following portions of the patient's history were reviewed and updated as appropriate: allergies, current medications, past family history, past medical history, past social history, past surgical history, and problem list.    Review of Systems   Constitutional:  Negative for chills and fever.   HENT:  Negative for congestion.    Genitourinary:  Negative for difficulty urinating and dysuria.   Musculoskeletal:  Positive for back pain and myalgias.   Neurological:  Positive for weakness and numbness.       I reviewed the review of systems listed by the patient and discussed by my MA    Objective    There were no vitals filed for this visit.  There is no height or weight on file to calculate BMI.    Tobacco Use: Low Risk  (7/16/2024)    Patient History     Smoking Tobacco Use: Never     Smokeless Tobacco Use: Never     Passive Exposure: Not on file          Physical Exam  Eyes:      Extraocular Movements: EOM normal.      Pupils: Pupils are equal, round, and reactive to light.   Neurological:      Mental Status: She is oriented to person, place, and time.      Coordination: Finger-Nose-Finger Test and Heel to Shin Test normal.      Gait: Gait is intact.      Deep Tendon Reflexes:      Reflex Scores:       Tricep reflexes are 2+ on the right side and 2+ on the left side.       Bicep reflexes are 2+ on the  right side and 2+ on the left side.       Brachioradialis reflexes are 2+ on the right side and 2+ on the left side.       Patellar reflexes are 1+ on the right side and 2+ on the left side.       Achilles reflexes are 2+ on the right side and 2+ on the left side.  Psychiatric:         Speech: Speech normal.       Neurologic Exam     Mental Status   Oriented to person, place, and time.   Registration of memory: Good recent and remote memory.   Attention: normal. Concentration: normal.   Speech: speech is normal   Level of consciousness: alert  Knowledge: consistent with education.     Cranial Nerves     CN II   Visual fields full to confrontation.   Visual acuity: normal    CN III, IV, VI   Pupils are equal, round, and reactive to light.  Extraocular motions are normal.     CN V   Facial sensation intact.   Right corneal reflex: normal  Left corneal reflex: normal    CN VII   Facial expression full, symmetric.   Right facial weakness: none  Left facial weakness: none    CN VIII   Hearing: intact    CN IX, X   Palate: symmetric    CN XI   Right sternocleidomastoid strength: normal  Left sternocleidomastoid strength: normal    CN XII   Tongue: not atrophic  Tongue deviation: none    Motor Exam   Muscle bulk: normal  Right arm tone: normal  Left arm tone: normal  Right leg tone: normal  Left leg tone: normal    Strength   Strength 5/5 except as noted.     Sensory Exam   Light touch normal.   Sensory deficit distribution on right: L3    Gait, Coordination, and Reflexes     Gait  Gait: normal    Coordination   Finger to nose coordination: normal  Heel to shin coordination: normal    Reflexes   Right brachioradialis: 2+  Left brachioradialis: 2+  Right biceps: 2+  Left biceps: 2+  Right triceps: 2+  Left triceps: 2+  Right patellar: 1+  Left patellar: 2+  Right achilles: 2+  Left achilles: 2+  Right : 2+  Left : 2+          Assessment & Plan  Independent Review of Radiographic Studies:      I personally reviewed  the images from the following studies.    I reviewed her plain films, myelogram, and CT scan from April of this year.  This shows pretty severe stenosis at L3.  There appears to be a synovial cyst on the right.  I reviewed an MRI of the lumbar spine done earlier this month.  This also shows stenosis at  L2-3.  The screw on the left at L3 is somewhat lateral.    Medical Decision Making:      I told the patient I thought was appropriate to proceed at this point.  I told the patient about the risks, complications and expected outcome of the lumbar surgery.  I explained that there was an 80% chance of getting rid of the pain in the leg.  I explained that there would still be back pain after the surgery.  Initially this will be quite severe but will improve over time.  There is a 2 or 3% chance of infection, bleeding, CSF leak, damage to the nerve as a result of surgery, paralysis, as well as anesthetic risk.  There is a 10% chance of recurrent problems.  There is a 10% chance of nonunion or failure of the instrumentation.  We discussed the postoperative hospital and home course.  The patient does ask to proceed.    She will need to be scheduled for a: Lumbar 2 to lumbar 3 laminectomy with fusion and instrumentation and removal of previous hardware     Diagnoses and all orders for this visit:    1. Spinal stenosis of lumbar region with neurogenic claudication (Primary)      Return for 2-3 week post op.           Electronically signed by Rigo Mathur MD at 07/16/24 1400                 Rigo Mathur MD at 07/16/24 1345          Subjective  Patient ID: Radha Block is a 64 y.o. female is here today for PRE-OP follow-up.    Today patient states that her symptoms a re unchanged     History of Present Illness    This patient was last seen in the office in April.  She was recently in the hospital last week.  Her pain is increased to the point where she is having severe pain in her anterior thigh on the right.   It was so severe she could barely move last week but it has calm down after IV steroids.    The following portions of the patient's history were reviewed and updated as appropriate: allergies, current medications, past family history, past medical history, past social history, past surgical history, and problem list.    Review of Systems   Constitutional:  Negative for chills and fever.   HENT:  Negative for congestion.    Genitourinary:  Negative for difficulty urinating and dysuria.   Musculoskeletal:  Positive for back pain and myalgias.   Neurological:  Positive for weakness and numbness.       I reviewed the review of systems listed by the patient and discussed by my MA    Objective    There were no vitals filed for this visit.  There is no height or weight on file to calculate BMI.    Tobacco Use: Low Risk  (7/16/2024)    Patient History     Smoking Tobacco Use: Never     Smokeless Tobacco Use: Never     Passive Exposure: Not on file          Physical Exam  Eyes:      Extraocular Movements: EOM normal.      Pupils: Pupils are equal, round, and reactive to light.   Neurological:      Mental Status: She is oriented to person, place, and time.      Coordination: Finger-Nose-Finger Test and Heel to Shin Test normal.      Gait: Gait is intact.      Deep Tendon Reflexes:      Reflex Scores:       Tricep reflexes are 2+ on the right side and 2+ on the left side.       Bicep reflexes are 2+ on the right side and 2+ on the left side.       Brachioradialis reflexes are 2+ on the right side and 2+ on the left side.       Patellar reflexes are 1+ on the right side and 2+ on the left side.       Achilles reflexes are 2+ on the right side and 2+ on the left side.  Psychiatric:         Speech: Speech normal.       Neurologic Exam     Mental Status   Oriented to person, place, and time.   Registration of memory: Good recent and remote memory.   Attention: normal. Concentration: normal.   Speech: speech is normal   Level of  consciousness: alert  Knowledge: consistent with education.     Cranial Nerves     CN II   Visual fields full to confrontation.   Visual acuity: normal    CN III, IV, VI   Pupils are equal, round, and reactive to light.  Extraocular motions are normal.     CN V   Facial sensation intact.   Right corneal reflex: normal  Left corneal reflex: normal    CN VII   Facial expression full, symmetric.   Right facial weakness: none  Left facial weakness: none    CN VIII   Hearing: intact    CN IX, X   Palate: symmetric    CN XI   Right sternocleidomastoid strength: normal  Left sternocleidomastoid strength: normal    CN XII   Tongue: not atrophic  Tongue deviation: none    Motor Exam   Muscle bulk: normal  Right arm tone: normal  Left arm tone: normal  Right leg tone: normal  Left leg tone: normal    Strength   Strength 5/5 except as noted.     Sensory Exam   Light touch normal.   Sensory deficit distribution on right: L3    Gait, Coordination, and Reflexes     Gait  Gait: normal    Coordination   Finger to nose coordination: normal  Heel to shin coordination: normal    Reflexes   Right brachioradialis: 2+  Left brachioradialis: 2+  Right biceps: 2+  Left biceps: 2+  Right triceps: 2+  Left triceps: 2+  Right patellar: 1+  Left patellar: 2+  Right achilles: 2+  Left achilles: 2+  Right : 2+  Left : 2+          Assessment & Plan  Independent Review of Radiographic Studies:      I personally reviewed the images from the following studies.    I reviewed her plain films, myelogram, and CT scan from April of this year.  This shows pretty severe stenosis at L3.  There appears to be a synovial cyst on the right.  I reviewed an MRI of the lumbar spine done earlier this month.  This also shows stenosis at  L2-3.  The screw on the left at L3 is somewhat lateral.    Medical Decision Making:      I told the patient I thought was appropriate to proceed at this point.  I told the patient about the risks, complications and expected  outcome of the lumbar surgery.  I explained that there was an 80% chance of getting rid of the pain in the leg.  I explained that there would still be back pain after the surgery.  Initially this will be quite severe but will improve over time.  There is a 2 or 3% chance of infection, bleeding, CSF leak, damage to the nerve as a result of surgery, paralysis, as well as anesthetic risk.  There is a 10% chance of recurrent problems.  There is a 10% chance of nonunion or failure of the instrumentation.  We discussed the postoperative hospital and home course.  The patient does ask to proceed.    She will need to be scheduled for a: Lumbar 2 to lumbar 3 laminectomy with fusion and instrumentation and removal of previous hardware     Diagnoses and all orders for this visit:    1. Spinal stenosis of lumbar region with neurogenic claudication (Primary)      Return for 2-3 week post op.           Electronically signed by Rigo Mathur MD at 07/16/24 1400          Operative/Procedure Notes (all)        Rigo Mathur MD at 07/22/24 0824  Version 1 of 1         LUMBAR DISCECTOMY POSTERIOR WITH FUSION INSTRUMENTATION  Progress Note    Radha Block  7/22/2024    Pre-op Diagnosis:   Neuritis or radiculitis due to rupture of lumbar intervertebral disc [M51.16]       Post-Op Diagnosis Codes:     * Neuritis or radiculitis due to rupture of lumbar intervertebral disc [M51.16]    Procedure/CPT® Codes:        Procedure(s):  Lumbar 2 to lumbar 3 laminectomy with fusion and instrumentation and removal of previous hardware              Surgeon(s):  Rigo Mathur MD    Anesthesia: General    Staff:   Cell Saver : Delks, Rylee  Circulator: Helen Reyna RN; Raisa Dodson RN  Scrub Person: Qiana Olmos  Vendor Representative: Alon Padilla  Assistant: Raissa Degroot CSA  Assistant: Raissa Degroot CSA      Estimated Blood Loss: 350 mL    Urine Voided: 350 mL    Specimens:                None           Drains:   Closed/Suction Drain 1 Inferior;Midline Back Bulb 15 Fr. (Active)       Urethral Catheter Silicone 16 Fr. (Active)       Findings: Severe lateral recess and foraminal stenosis with marked instability        Complications: None      Rigo Mathur MD     Date: 7/22/2024  Time: 10:40 EDT          Electronically signed by Rigo Mathur MD at 07/22/24 1040       Rigo Mathur MD at 07/22/24 0863  Version 1 of 1         Preoperative diagnosis: Lumbar stenosis with lumbar radiculopathy and neurogenic claudication L2-3    Postoperative diagnosis: same    Procedure performed: A bilateral L2-3 laminectomy, facetectomy and a transforaminal lumbar interbody fusion with instrumentation and removal of previous instrumentation    Spinal Surgery Levels Completed:1 Level     Surgeon: Rigo Mathur M.D.    Assistant: Raissa Degroot CFA who was instrumental in helping with hemostasis, visualization of neural structures, and retraction of neural structures.  Her skilled assistance was necessary for the success of this case    Indications for procedure: This patient has been having severe pain in the legs and the back.  Imaging shows severe stenosis with a grade 1 spondylolisthesis at L2-3.  There has been no improvement with nonsurgical treatment and the patient elected to proceed with surgery.    Operative summary: After induction of general anesthesia with intravenous agents patient was intubated and placed on the operating table in the prone position.  All pressure points were padded including peripheral points of entrapment.  The back was then prepped with ChloraPrep.  After a 3 minute drying time the back was draped with Ioban, towels, half sheets and a split sheet.    An incision was made in the posterior iliac crest ridge and a Jamshidi needle was placed into the marrow cavity and 15 mL of marrow were aspirated and placed over Mastergraft.     An incision was then made in the skin in the midline.  The  dissection was carried down to the thoracolumbar fascia which was opened in the midline. The muscles were stripped off the L2 laminar arch and spinous process.  Following this the entire spinous process of L2 was removed.   Following this the laminar arch of L2 was removed by thinning it down with the Midas Jose Eduardo drill and cracking it directly back off the dura.  We then carried the dissection down along the lateral aspect of the spine exposing the previous instrumentation.  The tops of the screws were removed and then the rods were removed.  I was able to test the movement between each of the levels.  There was no movement at all at L4-5.  As a result I elected to remove the screws at L5.  At L3-4 I thought it was pretty solid as well but there was a hint of movement there.  I really could not tell for sure because of the scar tissue.  Consequently I elected to leave the screws at L3 and L4 and attach at the very least for better immobilization at L2-3.  Once the dura was exposed at L2 the remainder of the operation was done under the high power magnification of the operating microscope using microsurgical technique and microsurgical instrumentation.  The Penfield 4 was used to dissect the dura and the nerve root off of the medial pedicle of L2 and L3 bilaterally.  A combination of the Kerrison's and the Midas Jose Eduardo were used to open the lateral recess out to the level of the medial pedicle.  This was both at the top and the lower decompressed level.  Once this was done the medial aspect of the facet was removed in order to open the superior foramen.  Following this each of the 4 nerve roots were checked to be sure they were decompressed.  Once the nerve roots were thoroughly decompressed the dural sac was then mobilized medially and the disc was exposed.  The disc was incised and disc material was removed from the disc space using the down-biting curettes and the pituitary ronguers.  Once the disc was emptied as much  as possible tawny were inserted into the disc space to clear the cartilaginous endplate completely and make a space for the cages.  I was then able to take 2 Medtronic Catalyft cages measuring  23 mm in length and place them into the disc space.  The Mastergraft and marrow as well as locally harvested bone were also placed into the disc space.  The cages were then expanded until they were tight in the disc space and had produced a lordosis.    Then 4.5 mm by 45 mm screws were placed into the pedicles of L2 bilaterally.  Those pedicles were extremely small.  I did breach the medial wall of the pedicle but the threads were not protruding into the spinal canal.  I felt this was the best possible outcome I could accomplish with a small pedicles.  I left them in place.    Finally intraoperative x-ray confirmed good placement of the hardware and the screws were then connected with a vanessa.  Following this the area was copiously irrigated with antibiotic solution and then another dose of Kefzol was given prior to closure.  The paraspinous musculature was injected with 100 mL of 1/8% Marcaine with 1:200,000 epinephrine solution.  A drain was placed in the epidural space and tunneled subcutaneously.  The incision was then closed in layers dressed, and the patient was taken to the recovery room in stable condition.  There were no apparent complications and sponge, instrument and needle counts were correct at the end of the procedure.     Electronically signed by Rigo Mathur MD at 07/22/24 4275

## 2024-07-23 NOTE — PROGRESS NOTES
[facility] THORACIC/LUMBAR NEUROSURGERY POSTOP NOTE      CC: POD #1 status post L2-3 laminectomy with fusion and removal of previous hardware.      Subjective     Interval History: No acute issues overnight.  Has some very minimal abdominal discomfort.  Possible ileus forming but nothing definite at this moment.  Currently NPO.  Reports improvement in leg symptoms however has some right anterior thigh numbness.      Objective     Vital signs in last 24 hours:  Temp:  [97.1 °F (36.2 °C)-98.8 °F (37.1 °C)] 98.8 °F (37.1 °C)  Heart Rate:  [70-80] 72  Resp:  [16-18] 18  BP: (102-132)/(66-90) 115/66    Intake/Output this shift:  No intake/output data recorded.    CHERI 195 cc since placement    LABS:  .  Results from last 7 days   Lab Units 07/23/24  0545   WBC 10*3/mm3 21.96*   HEMOGLOBIN g/dL 12.8   HEMATOCRIT % 38.5   PLATELETS 10*3/mm3 214     .  Results from last 7 days   Lab Units 07/23/24  0545   SODIUM mmol/L 133*   POTASSIUM mmol/L 4.2   CHLORIDE mmol/L 104   CO2 mmol/L 24.0   BUN mg/dL 9   CREATININE mg/dL 0.54*   CALCIUM mg/dL 8.1*   GLUCOSE mg/dL 107*         IMAGING STUDIES:  X-ray lumbar spine 7/23/2024:  Shows interval placement of L2-3 interbody spacer and L2 bilateral pedicle screws.  Previously present at L3 and 4 or bilateral pedicle screws and L3-5 interbody spacers.  Now the rods extend from L2-L4.  The hardware is in good position evidence of malfunction.  Bowel gas noted.    I personally viewed and interpreted the patient's labs, imaging study, medications and chart.    Meds reviewed/changed: Yes    Current Facility-Administered Medications:     acetaminophen (TYLENOL) tablet 1,000 mg, 1,000 mg, Oral, Q8H, Rigo Mathur MD, 1,000 mg at 07/23/24 0546    acetaminophen (TYLENOL) tablet 650 mg, 650 mg, Oral, Q4H PRN **OR** [DISCONTINUED] acetaminophen (TYLENOL) 160 MG/5ML oral solution 650 mg, 650 mg, Oral, Q4H PRN **OR** [DISCONTINUED] acetaminophen (TYLENOL) suppository 650 mg, 650 mg, Rectal, Q4H PRN,  Rigo Mathur MD    albuterol (PROVENTIL) nebulizer solution 0.083% 2.5 mg/3mL, 2.5 mg, Nebulization, Q4H PRN, Rigo Mathur MD    sennosides-docusate (PERICOLACE) 8.6-50 MG per tablet 2 tablet, 2 tablet, Oral, BID PRN **AND** polyethylene glycol (MIRALAX) packet 17 g, 17 g, Oral, Daily PRN **AND** bisacodyl (DULCOLAX) EC tablet 5 mg, 5 mg, Oral, Daily PRN **AND** bisacodyl (DULCOLAX) suppository 10 mg, 10 mg, Rectal, Daily PRN, Rigo Mathur MD    ceFAZolin 2000 mg IVPB in 100 mL NS (MBP), 2,000 mg, Intravenous, Q8H, Rigo Mathur MD, 2,000 mg at 07/23/24 0546    dilTIAZem CD (CARDIZEM CD) 24 hr capsule 180 mg, 180 mg, Oral, Q PM, Rigo Mathur MD, 180 mg at 07/22/24 1744    gabapentin (NEURONTIN) capsule 100 mg, 100 mg, Oral, TID, Rigo Mathur MD, 100 mg at 07/23/24 0915    guaiFENesin (MUCINEX) 12 hr tablet 1,200 mg, 1,200 mg, Oral, Q12H, Broderick Cornejo MD, 1,200 mg at 07/23/24 0915    methocarbamol (ROBAXIN) tablet 500 mg, 500 mg, Oral, TID PRN, Rigo Mathur MD, 500 mg at 07/22/24 2014    morphine injection 2 mg, 2 mg, Intravenous, Q3H PRN, 2 mg at 07/22/24 2014 **AND** naloxone (NARCAN) injection 0.4 mg, 0.4 mg, Intravenous, Q5 Min PRN, Rigo Mathur MD    ondansetron ODT (ZOFRAN-ODT) disintegrating tablet 4 mg, 4 mg, Oral, Q6H PRN **OR** ondansetron (ZOFRAN) injection 4 mg, 4 mg, Intravenous, Q6H PRN, Rigo Mathur MD, 4 mg at 07/22/24 2024    ondansetron ODT (ZOFRAN-ODT) disintegrating tablet 4 mg, 4 mg, Translingual, Q8H PRN, Rigo Mathur MD    oxyCODONE (ROXICODONE) immediate release tablet 5 mg, 5 mg, Oral, Q4H PRN, Rigo Mathur MD, 5 mg at 07/23/24 1120    pantoprazole (PROTONIX) EC tablet 40 mg, 40 mg, Oral, BID AC, Rigo Mathur MD, 40 mg at 07/23/24 0546    Progesterone (PROMETRIUM) capsule 200 mg, 200 mg, Oral, Daily, Rigo Mathur MD, 200 mg at 07/23/24 0911    sodium chloride 0.9 % flush 10 mL, 10 mL, Intravenous, PRN, Rigo Mathur MD    sodium chloride  0.9 % flush 3 mL, 3 mL, Intravenous, Q12H, Rigo Mathur MD, 3 mL at 07/23/24 0911    sodium chloride 0.9 % infusion 40 mL, 40 mL, Intravenous, PRN, Rigo Mathur MD    sodium chloride 0.9 % infusion, 100 mL/hr, Intravenous, Continuous, Jw Talbot APRN    TRELEGY (Fluticasone-Umeclidin-Vilant)100-62.5-25 MCG/INH IN, 1 puff, Inhalation, Daily - RT, Broderick Cornejo MD, 1 puff at 07/23/24 1117      Physical Exam:    General:   Awake, alert.  Back:    SLR negative  Abdomen:   Only very mildly tender.  Not significantly distended   Motor:   Normal muscle strength, bulk and tone in lower extremities.  No fasciculations, rigidity, spasticity, or abnormal movements.  Reflexes:   2+ in the lower extremities. no clonus  Sensation:   Normal to light touch slava LEs except for mild right thigh numbness  Station and Gait:          Gait deferred  Extremities:   Wearing SCD      Assessment & Plan     ASSESSMENT:      Neuritis or radiculitis due to rupture of lumbar intervertebral disc    PAD (peripheral artery disease)    PAF (paroxysmal atrial fibrillation)    Essential hypertension    COPD (chronic obstructive pulmonary disease)    Factor V Leiden mutation    Alpha-1-antitrypsin deficiency    Lumbar spinal stenosis      Pleasant 64-year-old female day 1 status post L2-3 laminectomy with fusion and removal of previous hardware.  She denies any significant issues with regard to pain but does have mild right thigh numbness.  Some mild abdominal discomfort with very mild distention.  She has had postop abdominal issues in the past but there does not appear to be a definite ileus occurring at the moment.  We will continue to watch closely and keep her n.p.o. with IV fluids.  Observe for increasing abdominal pain/vomiting and notify neurosurgery if this occurs.    PLAN:     Keep CHERI drain  N.p.o. for now  Continue IV fluids-change to normal saline  Bowel regimen as needed  Mobilize with nursing and PT  Utilize  "ice  Utilize p.o. pain meds/muscle relaxers  Encourage use of incentive spirometer  SCD/DVT prophylaxis        I discussed the patient's findings and my recommendations with patient, family, and Dr. Mathur.     During patient visit, I utilized appropriate personal protective equipment including mask and gloves.  Mask used was standard procedure mask. Appropriate PPE was worn during the entire visit.  Hand hygiene was completed before and after.      LOS: 1 day       Jw Elie Talbot, APRN  7/23/2024  12:31 EDT    \"Dictated utilizing Dragon dictation\".    "

## 2024-07-23 NOTE — CASE MANAGEMENT/SOCIAL WORK
Discharge Planning Assessment  [facility]     Patient Name: Radha Block  MRN: [id number]  Today's Date: 7/23/2024    Admit Date: 7/22/2024    Plan: Home with family/friend support.   Discharge Needs Assessment       Row Name 07/23/24 1607       Living Environment    People in Home alone    Current Living Arrangements home    Primary Care Provided by self    Provides Primary Care For no one    Family Caregiver if Needed child(willy), adult;friend(s)    Quality of Family Relationships helpful;involved;supportive    Able to Return to Prior Arrangements yes       Resource/Environmental Concerns    Transportation Concerns none       Transition Planning    Patient/Family Anticipates Transition to home;home with family    Patient/Family Anticipated Services at Transition     Transportation Anticipated family or friend will provide       Discharge Needs Assessment    Readmission Within the Last 30 Days no previous admission in last 30 days    Equipment Currently Used at Home none                   Discharge Plan       Row Name 07/23/24 1607       Plan    Plan Home with family/friend support.    Patient/Family in Agreement with Plan yes    Plan Comments Spoke with the patient, verified current information and explained the role of the CCP. Patient said she lives alone and has family/friend support. She's IADL and has no history with DME/RH/HH. Patient plans to discharge home with family support and said her family will transport her home at discharge. CCP will follow for discharge needs.                  Continued Care and Services - Admitted Since 7/22/2024    No active coordination exists for this encounter.       Expected Discharge Date and Time       Expected Discharge Date Expected Discharge Time    Jul 26, 2024            Demographic Summary       Row Name 07/23/24 1607       General Information    Admission Type inpatient    Reason for Consult discharge planning    Preferred Language English        Contact Information    Permission Granted to Share Info With ;family/designee                   Functional Status       Row Name 07/23/24 1607       Functional Status    Usual Activity Tolerance good       Functional Status, IADL    Medications independent    Meal Preparation independent    Housekeeping independent    Laundry independent    Shopping independent       Mental Status    General Appearance WDL WDL       Mental Status Summary    Recent Changes in Mental Status/Cognitive Functioning no changes                   Psychosocial       Row Name 07/23/24 1607       Intellectual Performance WDL    Level of Consciousness Alert       Coping/Stress    Patient Personal Strengths able to adapt    Sources of Support adult child(willy)    Reaction to Health Status accepting    Understanding of Condition and Treatment adequate understanding of medical condition;adequate understanding of treatment       Developmental Stage (Eriksson's)    Developmental Stage Stage 8 (65 years-death/Late Adulthood) Integrity vs. Despair                    Raisa SANTIZO RN

## 2024-07-23 NOTE — PLAN OF CARE
Goal Outcome Evaluation:           Progress: improving  Outcome Evaluation: Vss, pain controlled with prn roxicodone, pt ambulated to bathroom multiple times, back dressing CDI, CHERI drain intact, IV fluids infusing per order.

## 2024-07-23 NOTE — PROGRESS NOTES
"    Name: Radha Block ADMIT: 2024   : 1960  PCP: Bosler, James William III, MD    MRN: [id number] LOS: 1 days   AGE/SEX: 64 y.o. female  ROOM: [facility]     Subjective   Subjective   Reports feeling well.  No new complaints.  Tolerating diet.  Breathing comfortably.     Objective   Objective   Vital Signs  Temp:  [97.1 °F (36.2 °C)-98.6 °F (37 °C)] 97.1 °F (36.2 °C)  Heart Rate:  [69-83] 70  Resp:  [14-18] 18  BP: (102-138)/(67-90) 119/68  SpO2:  [93 %-100 %] 95 %  on  Flow (L/min):  [2-4] 2;   Device (Oxygen Therapy): room air  There is no height or weight on file to calculate BMI.  Physical Exam  Vitals and nursing note reviewed.   Constitutional:       General: She is not in acute distress.  Cardiovascular:      Rate and Rhythm: Normal rate and regular rhythm.   Pulmonary:      Effort: Pulmonary effort is normal.      Breath sounds: Normal breath sounds.   Abdominal:      General: Bowel sounds are normal.      Palpations: Abdomen is soft.      Tenderness: There is no abdominal tenderness.   Musculoskeletal:         General: No swelling.   Skin:     General: Skin is warm and dry.   Neurological:      Mental Status: She is alert. Mental status is at baseline.         Results Review:       I reviewed the patient's new clinical results.  Results from last 7 days   Lab Units 24  0545 24  1146   WBC 10*3/mm3 21.96* 23.93*   HEMOGLOBIN g/dL 12.8 14.7   PLATELETS 10*3/mm3 214 286     Results from last 7 days   Lab Units 24  0545 24  1146   SODIUM mmol/L 133* 136   POTASSIUM mmol/L 4.2 4.0   CHLORIDE mmol/L 104 105   CO2 mmol/L 24.0 20.5*   BUN mg/dL 9 22   CREATININE mg/dL 0.54* 0.64   GLUCOSE mg/dL 107* 92   EGFR mL/min/1.73 101.7 97.6       Results from last 7 days   Lab Units 24  0545 24  1146   CALCIUM mg/dL 8.1* 8.5*       No results found for: \"HGBA1C\", \"POCGLU\"    I have personally reviewed all medications:  Scheduled Medications  acetaminophen, 1,000 mg, " Oral, Q8H  ceFAZolin, 2,000 mg, Intravenous, Q8H  dilTIAZem CD, 180 mg, Oral, Q PM  gabapentin, 100 mg, Oral, TID  guaiFENesin, 1,200 mg, Oral, Q12H  pantoprazole, 40 mg, Oral, BID AC  Progesterone, 200 mg, Oral, Daily  sodium chloride, 3 mL, Intravenous, Q12H  Fluticasone-Umeclidin-Vilant, 1 puff, Inhalation, Daily - RT    Infusions  sodium chloride 0.9 % with KCl 20 mEq, 100 mL/hr, Last Rate: 100 mL/hr (07/22/24 1436)    Diet  Diet: Regular/House; Fluid Consistency: Thin (IDDSI 0)    I have personally reviewed:  [x]  Laboratory   [x]  Microbiology   [x]  Radiology   []  EKG/Telemetry  []  Cardiology/Vascular   []  Pathology    []  Records       Assessment/Plan     Active Hospital Problems    Diagnosis  POA    **Neuritis or radiculitis due to rupture of lumbar intervertebral disc [M51.16]  Yes    Lumbar spinal stenosis [M48.061]  Yes    Alpha-1-antitrypsin deficiency [E88.01]  Yes    Factor V Leiden mutation [D68.51]  Yes    COPD (chronic obstructive pulmonary disease) [J44.9]  Yes    Essential hypertension [I10]  Yes    PAD (peripheral artery disease) [I73.9]  Yes    PAF (paroxysmal atrial fibrillation) [I48.0]  Yes      Resolved Hospital Problems   No resolved problems to display.       64 y.o. female who is s/p Lumbar 2 to lumbar 3 laminectomy with fusion and instrumentation and removal of previous hardware.    Medically seems stable since yesterday.  Respiratory status is stable.  Lungs clear.  Continue home inhaler therapy.  Renal function stable.  WBC is elevated and it seems has been elevated for a couple of years now.  Prior to admission she has been on steroids somewhat chronically but she reports being off of them now for a week.  Reviewed surgical records and does not seem she received any steroids during OR or since admission.  This likely is reactive but will continue to monitor.  Assuming she does not go home on steroids would like this rechecked in a week or 2 and consider referral to hematology if  remains elevated.  She has seen the [facility] in the past for history of factor V Leiden.  She should resume anticoagulation when neurosurgery feels safe to do so.     Update - It seems she got dexamethasone 6 mg IV yesterday morning per anesthesia.  This likely causing her leukocytosis.  Repeat WBC in a.m.      Broderick Cornejo MD  [facility]  07/23/24  08:49 EDT

## 2024-07-23 NOTE — THERAPY EVALUATION
Patient Name: Radha Block  : 1960    MRN: [id number]                              Today's Date: 2024       Admit Date: 2024    Visit Dx:     ICD-10-CM ICD-9-CM   1. Neuritis or radiculitis due to rupture of lumbar intervertebral disc  M51.16 722.10     Patient Active Problem List   Diagnosis    Shortness of breath    Chest pain    Palpitations    PAD (peripheral artery disease)    PAF (paroxysmal atrial fibrillation)    Essential hypertension    Spinal stenosis of lumbar region with neurogenic claudication    COPD (chronic obstructive pulmonary disease)    Factor V Leiden mutation    History of DVT of lower extremity    Neuritis or radiculitis due to rupture of lumbar intervertebral disc    Bulge of lumbar disc without myelopathy    Alpha-1-antitrypsin deficiency    Lumbar spinal stenosis     Past Medical History:   Diagnosis Date    Alpha-1-antitrypsin deficiency     FOLLOWED BY DR. GATES    Arrhythmia     Arthritis     Clotting disorder     Factor 5 Leiden    COPD (chronic obstructive pulmonary disease)     Deep vein thrombosis     Early cataract     Essential hypertension 2021    GERD (gastroesophageal reflux disease)     Low back pain     RADIATING DOWN RT LEG WITH TINGLING AND WEAKNESS, RUPTURED DISC    Mitral valve prolapse     On anticoagulant therapy     Osteopenia     PAD (peripheral artery disease)     PAF (paroxysmal atrial fibrillation) 2020    ABLATION      Past Surgical History:   Procedure Laterality Date    BLADDER SURGERY  2013    E/O polyp    BREAST SURGERY Right     E/O benign neuroma    CARDIAC ELECTROPHYSIOLOGY PROCEDURE N/A 10/10/2022    Procedure: Ablation atrial fibrillation CRYO;  Surgeon: Venkatesh Iqbal MD;  Location: [facility] INVASIVE LOCATION;  Service: Cardiovascular;  Laterality: N/A;    COLONOSCOPY  2016    EPIDURAL BLOCK      HAND SURGERY Right     Carpal metacarpal hand surgery     LUMBAR DISCECTOMY FUSION INSTRUMENTATION  N/A 7/22/2024    Procedure: Lumbar 2 to lumbar 3 laminectomy with fusion and instrumentation and removal of previous hardware;  Surgeon: Rigo Mathur MD;  Location: [facility];  Service: Neurosurgery;  Laterality: N/A;    LUMBAR FUSION N/A 12/30/2022    Procedure: Lumbar 3 to lumbar 4 and lumbar 4 to lumbar 5 laminectomy with fusion and instrumentation;  Surgeon: Rigo Mathur MD;  Location: [facility];  Service: Robotics - Neuro;  Laterality: N/A;    SINUS SURGERY  2017    TONSILLECTOMY        General Information       Row Name 07/23/24 1453          Physical Therapy Time and Intention    Document Type evaluation;discharge evaluation/summary  -     Mode of Treatment physical therapy;individual therapy  -       Row Name 07/23/24 1453          General Information    Patient Profile Reviewed yes  -SM     Prior Level of Function independent:  -     Existing Precautions/Restrictions spinal  -       Row Name 07/23/24 1453          Living Environment    People in Home alone  -       Row Name 07/23/24 1453          Cognition    Orientation Status (Cognition) oriented x 4  -       Row Name 07/23/24 1453          Safety Issues, Functional Mobility    Safety Issues Affecting Function (Mobility) impulsivity  -               User Key  (r) = Recorded By, (t) = Taken By, (c) = Cosigned By      Initials Name Provider Type     Ruthann Thompson PT Physical Therapist                   Mobility       Row Name 07/23/24 1453          Bed Mobility    Bed Mobility supine-sit;sit-supine  -     Supine-Sit [place] (Bed Mobility) modified independence  -     Sit-Supine [place] (Bed Mobility) modified independence  -     Assistive Device (Bed Mobility) bed rails  -       Row Name 07/23/24 1453          Sit-Stand Transfer    Sit-Stand [place] (Transfers) standby assist  -     Assistive Device (Sit-Stand Transfers) walker, front-wheeled  -       Row Name 07/23/24 1454           Gait/Stairs (Locomotion)    [place] Level (Gait) supervision  -     Assistive Device (Gait) walker, front-wheeled  -     Distance in Feet (Gait) 60  -     Comment, (Gait/Stairs) Gait steady with no overt LOB noted. Assist needed to line management only due to IV. Patient reported no pain with ambulation.  -               User Key  (r) = Recorded By, (t) = Taken By, (c) = Cosigned By      Initials Name Provider Type     Ruthann Thompson PT Physical Therapist                   Obj/Interventions       Row Name 07/23/24 1454          Range of Motion Comprehensive    General Range of Motion bilateral lower extremity ROM WFL  -       Row Name 07/23/24 1454          Strength Comprehensive (MMT)    General Manual Muscle Testing (MMT) Assessment lower extremity strength deficits identified  -     Comment, General Manual Muscle Testing (MMT) Assessment Generalized post op weakness  -       Row Name 07/23/24 1454          Balance    Balance Assessment sitting static balance;sitting dynamic balance;sit to stand dynamic balance;standing static balance;standing dynamic balance  -     Static Sitting Balance independent  -     Dynamic Sitting Balance modified independence  -     Position, Sitting Balance sitting edge of bed  -     Sit to Stand Dynamic Balance standby assist  -     Static Standing Balance supervision  -     Dynamic Standing Balance supervision  -     Position/Device Used, Standing Balance supported;walker, front-wheeled  -     Balance Interventions sitting;standing;sit to stand;supported;static;dynamic  -               User Key  (r) = Recorded By, (t) = Taken By, (c) = Cosigned By      Initials Name Provider Type     Ruthann Thompson PT Physical Therapist                   Goals/Plan    No documentation.                  Clinical Impression       Row Name 07/23/24 1459          Pain    Pre/Posttreatment Pain Comment Pain with transitional movements only  -     Pain  Intervention(s) Rest;Repositioned;Ambulation/increased activity  -       Row Name 07/23/24 1459          Plan of Care Review    Plan of Care Reviewed With patient  -     Outcome Evaluation Patient is a 64 y.o female who presents POD1 L2/3 laminectomy with fusion. Patient AOx4 supine in bed upon arrival. Patient reports she lives at home alone but her son will be assisting her at d/c. Patient reports she is independent at baseline and does not use an AD. Today patient completed all bed mobility mod(I). Patient performed STS from EOB with SBA. Patient ambulated in room with rwx and SV. Gait steady with no overt LOB noted. Assist needed to line management only due to IV. Patient reported no pain with ambulation. Patient reports pain with transitional movements only. Encouraged patient to continue ambulating with nsg in hallways several times per day. At end of session patient reported that they switched her diet to NPO and she was very upset about that. Patient had a bag of lays potatoe chips on her bedside table that she reports she has been eating. RN notified. No further skilled acute PT needs at this time. PT will sign off.  -       Row Name 07/23/24 1459          Therapy Assessment/Plan (PT)    Criteria for Skilled Interventions Met (PT) no;no problems identified which require skilled intervention  -     Therapy Frequency (PT) evaluation only  -       Row Name 07/23/24 1459          Vital Signs    Pre Patient Position Supine  -     Intra Patient Position Standing  -     Post Patient Position Supine  -       Row Name 07/23/24 1459          Positioning and Restraints    Pre-Treatment Position in bed  -     Post Treatment Position bed  -SM     In Bed notified nsg;call light within reach;encouraged to call for assist;exit alarm on;fowlers  -               User Key  (r) = Recorded By, (t) = Taken By, (c) = Cosigned By      Initials Name Provider Type     Ruthann Thompson, MOLLY Physical Therapist                    Outcome Measures       Row Name 07/23/24 1504 07/23/24 0918       How much help from another person do you currently need...    Turning from your back to your side while in flat bed without using bedrails? 4  -SM 3  -LT    Moving from lying on back to sitting on the side of a flat bed without bedrails? 4  -SM 3  -LT    Moving to and from a bed to a chair (including a wheelchair)? 4  -SM 3  -LT    Standing up from a chair using your arms (e.g., wheelchair, bedside chair)? 4  -SM 3  -LT    Climbing 3-5 steps with a railing? 3  -SM 2  -LT    To walk in hospital room? 4  -SM 3  -LT    AM-PAC 6 Clicks Score (PT) 23  - 17  -LT    Highest Level of Mobility Goal 7 --> Walk 25 feet or more  - 5 --> Static standing  -LT              User Key  (r) = Recorded By, (t) = Taken By, (c) = Cosigned By      Initials Name Provider Type    LT Anahi Polanco RN Registered Nurse     Ruthann Thompson, PT Physical Therapist                                 Physical Therapy Education       Title: PT OT SLP Therapies (Done)       Topic: Physical Therapy (Done)       Point: Mobility training (Done)       Learning Progress Summary             Patient Acceptance, E, VU by  at 7/23/2024 1505                         Point: Home exercise program (Done)       Learning Progress Summary             Patient Acceptance, E, VU by  at 7/23/2024 1505                         Point: Body mechanics (Done)       Learning Progress Summary             Patient Acceptance, E, VU by  at 7/23/2024 1505                         Point: Precautions (Done)       Learning Progress Summary             Patient Acceptance, E, VU by  at 7/23/2024 1505                                         User Key       Initials Effective Dates Name Provider Type Discipline     05/02/22 -  Ruthann Thompson, MOLLY Physical Therapist PT                  PT Recommendation and Plan     Plan of Care Reviewed With: patient  Outcome Evaluation: Patient is a 64  y.o female who presents POD1 L2/3 laminectomy with fusion. Patient AOx4 supine in bed upon arrival. Patient reports she lives at home alone but her son will be assisting her at d/c. Patient reports she is independent at baseline and does not use an AD. Today patient completed all bed mobility mod(I). Patient performed STS from EOB with SBA. Patient ambulated in room with rwx and SV. Gait steady with no overt LOB noted. Assist needed to line management only due to IV. Patient reported no pain with ambulation. Patient reports pain with transitional movements only. Encouraged patient to continue ambulating with nsg in hallways several times per day. At end of session patient reported that they switched her diet to NPO and she was very upset about that. Patient had a bag of lays potatoe chips on her bedside table that she reports she has been eating. RN notified. No further skilled acute PT needs at this time. PT will sign off.     Time Calculation:         PT Charges       Row Name 07/23/24 1506             Time Calculation    Start Time 1402  -SM      Stop Time 1414  -SM      Time Calculation (min) 12 min  -SM      PT Received On 07/23/24  -         Time Calculation- PT    Total Timed Code Minutes- PT 8 minute(s)  -SM         Timed Charges    27818 - PT Therapeutic Activity Minutes 8  -SM         Total Minutes    Timed Charges Total Minutes 8  -SM       Total Minutes 8  -SM                User Key  (r) = Recorded By, (t) = Taken By, (c) = Cosigned By      Initials Name Provider Type     Ruthann Thompson, PT Physical Therapist                  Therapy Charges for Today       Code Description Service Date Service Provider Modifiers Qty    09386831849  PT THERAPEUTIC ACT EA 15 MIN 7/23/2024 Ruthann Thompson, PT GP 1    [id number]  PT EVAL LOW COMPLEXITY 3 7/23/2024 Ruthann Thompson, PT GP 1            PT G-Codes  AM-PAC 6 Clicks Score (PT): 23  PT Discharge Summary  Anticipated Discharge Disposition (PT):  home with assist, home with outpatient therapy services    Ruthann Thompson, PT  7/23/2024

## 2024-07-23 NOTE — PLAN OF CARE
Goal Outcome Evaluation:  Plan of Care Reviewed With: patient           Outcome Evaluation: Patient is a 64 y.o female who presents POD1 L2/3 laminectomy with fusion. Patient AOx4 supine in bed upon arrival. Patient reports she lives at home alone but her son will be assisting her at d/c. Patient reports she is independent at baseline and does not use an AD. Today patient completed all bed mobility mod(I). Patient performed STS from EOB with SBA. Patient ambulated in room with rwx and SV. Gait steady with no overt LOB noted. Assist needed to line management only due to IV. Patient reported no pain with ambulation. Patient reports pain with transitional movements only. Encouraged patient to continue ambulating with nsg in hallways several times per day. At end of session patient reported that they switched her diet to NPO and she was very upset about that. Patient had a bag of lays potatoe chips on her bedside table that she reports she has been eating. RN notified. No further skilled acute PT needs at this time. PT will sign off.      Anticipated Discharge Disposition (PT): home with assist, home with outpatient therapy services

## 2024-07-24 LAB
BASOPHILS # BLD AUTO: 0.04 10*3/MM3 (ref 0–0.2)
BASOPHILS NFR BLD AUTO: 0.3 % (ref 0–1.5)
DEPRECATED RDW RBC AUTO: 45.7 FL (ref 37–54)
EOSINOPHIL # BLD AUTO: 0.14 10*3/MM3 (ref 0–0.4)
EOSINOPHIL NFR BLD AUTO: 1.1 % (ref 0.3–6.2)
ERYTHROCYTE [DISTWIDTH] IN BLOOD BY AUTOMATED COUNT: 12.3 % (ref 12.3–15.4)
HCT VFR BLD AUTO: 34.5 % (ref 34–46.6)
HGB BLD-MCNC: 11.5 G/DL (ref 12–15.9)
IMM GRANULOCYTES # BLD AUTO: 0.07 10*3/MM3 (ref 0–0.05)
IMM GRANULOCYTES NFR BLD AUTO: 0.5 % (ref 0–0.5)
LYMPHOCYTES # BLD AUTO: 1.52 10*3/MM3 (ref 0.7–3.1)
LYMPHOCYTES NFR BLD AUTO: 11.6 % (ref 19.6–45.3)
MCH RBC QN AUTO: 34 PG (ref 26.6–33)
MCHC RBC AUTO-ENTMCNC: 33.3 G/DL (ref 31.5–35.7)
MCV RBC AUTO: 102.1 FL (ref 79–97)
MONOCYTES # BLD AUTO: 1.34 10*3/MM3 (ref 0.1–0.9)
MONOCYTES NFR BLD AUTO: 10.2 % (ref 5–12)
NEUTROPHILS NFR BLD AUTO: 10.04 10*3/MM3 (ref 1.7–7)
NEUTROPHILS NFR BLD AUTO: 76.3 % (ref 42.7–76)
NRBC BLD AUTO-RTO: 0 /100 WBC (ref 0–0.2)
PLATELET # BLD AUTO: 176 10*3/MM3 (ref 140–450)
PMV BLD AUTO: 9.6 FL (ref 6–12)
RBC # BLD AUTO: 3.38 10*6/MM3 (ref 3.77–5.28)
WBC NRBC COR # BLD AUTO: 13.15 10*3/MM3 (ref 3.4–10.8)

## 2024-07-24 PROCEDURE — 25010000002 ONDANSETRON PER 1 MG: Performed by: NEUROLOGICAL SURGERY

## 2024-07-24 PROCEDURE — 85025 COMPLETE CBC W/AUTO DIFF WBC: CPT

## 2024-07-24 PROCEDURE — 63710000001 ONDANSETRON ODT 4 MG TABLET DISPERSIBLE: Performed by: NEUROLOGICAL SURGERY

## 2024-07-24 PROCEDURE — 25010000002 CEFAZOLIN PER 500 MG: Performed by: NEUROLOGICAL SURGERY

## 2024-07-24 RX ORDER — SENNOSIDES A AND B 8.6 MG/1
2 TABLET, FILM COATED ORAL 2 TIMES DAILY
Status: DISCONTINUED | OUTPATIENT
Start: 2024-07-24 | End: 2024-07-26 | Stop reason: HOSPADM

## 2024-07-24 RX ADMIN — GUAIFENESIN 1200 MG: 600 TABLET, EXTENDED RELEASE ORAL at 09:39

## 2024-07-24 RX ADMIN — ACETAMINOPHEN 1000 MG: 500 TABLET ORAL at 21:22

## 2024-07-24 RX ADMIN — OXYCODONE HYDROCHLORIDE 5 MG: 5 TABLET ORAL at 21:23

## 2024-07-24 RX ADMIN — ONDANSETRON 4 MG: 4 TABLET, ORALLY DISINTEGRATING ORAL at 21:23

## 2024-07-24 RX ADMIN — PANTOPRAZOLE SODIUM 40 MG: 40 TABLET, DELAYED RELEASE ORAL at 09:41

## 2024-07-24 RX ADMIN — GABAPENTIN 100 MG: 100 CAPSULE ORAL at 09:40

## 2024-07-24 RX ADMIN — DILTIAZEM HYDROCHLORIDE 180 MG: 180 CAPSULE, EXTENDED RELEASE ORAL at 18:29

## 2024-07-24 RX ADMIN — GABAPENTIN 100 MG: 100 CAPSULE ORAL at 14:06

## 2024-07-24 RX ADMIN — OXYCODONE HYDROCHLORIDE 5 MG: 5 TABLET ORAL at 02:46

## 2024-07-24 RX ADMIN — OXYCODONE HYDROCHLORIDE 5 MG: 5 TABLET ORAL at 12:17

## 2024-07-24 RX ADMIN — ACETAMINOPHEN 1000 MG: 500 TABLET ORAL at 09:40

## 2024-07-24 RX ADMIN — PROGESTERONE 200 MG: 200 CAPSULE ORAL at 09:40

## 2024-07-24 RX ADMIN — POLYETHYLENE GLYCOL 3350 17 G: 17 POWDER, FOR SOLUTION ORAL at 12:17

## 2024-07-24 RX ADMIN — Medication 3 ML: at 21:24

## 2024-07-24 RX ADMIN — METHOCARBAMOL TABLETS 500 MG: 500 TABLET, COATED ORAL at 22:17

## 2024-07-24 RX ADMIN — PANTOPRAZOLE SODIUM 40 MG: 40 TABLET, DELAYED RELEASE ORAL at 18:29

## 2024-07-24 RX ADMIN — GUAIFENESIN 1200 MG: 600 TABLET, EXTENDED RELEASE ORAL at 21:23

## 2024-07-24 RX ADMIN — SENNOSIDES 2 TABLET: 8.6 TABLET, FILM COATED ORAL at 21:23

## 2024-07-24 RX ADMIN — Medication 3 ML: at 09:41

## 2024-07-24 RX ADMIN — SODIUM CHLORIDE 2000 MG: 900 INJECTION INTRAVENOUS at 09:40

## 2024-07-24 RX ADMIN — ACETAMINOPHEN 1000 MG: 500 TABLET ORAL at 14:06

## 2024-07-24 RX ADMIN — ONDANSETRON 4 MG: 2 INJECTION INTRAMUSCULAR; INTRAVENOUS at 12:17

## 2024-07-24 RX ADMIN — GABAPENTIN 100 MG: 100 CAPSULE ORAL at 21:23

## 2024-07-24 RX ADMIN — ONDANSETRON 4 MG: 2 INJECTION INTRAMUSCULAR; INTRAVENOUS at 02:51

## 2024-07-24 NOTE — PROGRESS NOTES
"    Name: Radha Block ADMIT: 2024   : 1960  PCP: Bosler, James William III, MD    MRN: [id number] LOS: 2 days   AGE/SEX: 64 y.o. female  ROOM: [facility]     Subjective   Subjective   No new complaints.  Drain has been removed.  Tolerating a diet.         Objective   Objective   Vital Signs  Temp:  [98.2 °F (36.8 °C)-98.8 °F (37.1 °C)] 98.5 °F (36.9 °C)  Heart Rate:  [62-72] 64  Resp:  [16-18] 16  BP: (108-125)/(65-79) 125/68  SpO2:  [97 %-100 %] 98 %  on   ;   Device (Oxygen Therapy): room air  There is no height or weight on file to calculate BMI.  Physical Exam  Vitals and nursing note reviewed.   Constitutional:       General: She is not in acute distress.  Cardiovascular:      Rate and Rhythm: Normal rate and regular rhythm.   Pulmonary:      Effort: Pulmonary effort is normal.      Breath sounds: Normal breath sounds.   Abdominal:      General: Bowel sounds are normal.      Palpations: Abdomen is soft.      Tenderness: There is no abdominal tenderness.   Musculoskeletal:         General: No swelling.   Skin:     General: Skin is warm and dry.   Neurological:      Mental Status: She is alert. Mental status is at baseline.         Results Review:       I reviewed the patient's new clinical results.  Results from last 7 days   Lab Units 24  0545   WBC 10*3/mm3 21.96*   HEMOGLOBIN g/dL 12.8   PLATELETS 10*3/mm3 214     Results from last 7 days   Lab Units 24  0545   SODIUM mmol/L 133*   POTASSIUM mmol/L 4.2   CHLORIDE mmol/L 104   CO2 mmol/L 24.0   BUN mg/dL 9   CREATININE mg/dL 0.54*   GLUCOSE mg/dL 107*   EGFR mL/min/1.73 101.7       Results from last 7 days   Lab Units 24  0545   CALCIUM mg/dL 8.1*       No results found for: \"HGBA1C\", \"POCGLU\"    I have personally reviewed all medications:  Scheduled Medications  acetaminophen, 1,000 mg, Oral, Q8H  ceFAZolin, 2,000 mg, Intravenous, Q8H  dilTIAZem CD, 180 mg, Oral, Q PM  gabapentin, 100 mg, Oral, TID  guaiFENesin, 1,200 mg, " Oral, Q12H  pantoprazole, 40 mg, Oral, BID AC  Progesterone, 200 mg, Oral, Daily  sodium chloride, 3 mL, Intravenous, Q12H  Fluticasone-Umeclidin-Vilant, 1 puff, Inhalation, Daily - RT    Infusions  sodium chloride, 100 mL/hr, Last Rate: 100 mL/hr (07/23/24 1442)    Diet  NPO Diet NPO Type: Strict NPO, Sips with Meds    I have personally reviewed:  [x]  Laboratory   [x]  Microbiology   [x]  Radiology   []  EKG/Telemetry  []  Cardiology/Vascular   []  Pathology    []  Records       Assessment/Plan     Active Hospital Problems    Diagnosis  POA    **Neuritis or radiculitis due to rupture of lumbar intervertebral disc [M51.16]  Yes    Lumbar spinal stenosis [M48.061]  Yes    Alpha-1-antitrypsin deficiency [E88.01]  Yes    Factor V Leiden mutation [D68.51]  Yes    COPD (chronic obstructive pulmonary disease) [J44.9]  Yes    Essential hypertension [I10]  Yes    PAD (peripheral artery disease) [I73.9]  Yes    PAF (paroxysmal atrial fibrillation) [I48.0]  Yes      Resolved Hospital Problems   No resolved problems to display.       64 y.o. female who is s/p Lumbar 2 to lumbar 3 laminectomy with fusion and instrumentation and removal of previous hardware.    Respiratory status has been very stable postoperatively.  Continue home inhalants.  Her WBC is down significantly today.  Leukocytosis yesterday almost certainly due to dexamethasone and received per anesthesia.  Per patient request will recheck in the morning.    No objections to discharge to follow-up with PCP.       Broderick Cornejo MD  [facility]  07/24/24  08:29 EDT

## 2024-07-24 NOTE — NURSING NOTE
Pt resting in bed. Been NPO but now switched to full liquid diet. LBM 7/21,passing gas. Going to start taking some senna and mirilax. Pain meds make nauseous but seems to do a little better when given with zofran.     CHERI drain removed. Steri strips in place. Incision well approximated. Scant serosang drainage coming from CHERI insertion site. New bordered gauze placed.    Tolerating full liquid diet well. No issues.     Pt been having headache on and off today. Antione notified. Shelly Verde notified. Pt stated actually got better when standing so not too concerned at this time. Will continue to monitor.

## 2024-07-24 NOTE — PLAN OF CARE
Problem: Adult Inpatient Plan of Care  Goal: Plan of Care Review  Outcome: Ongoing, Progressing  Goal: Patient-Specific Goal (Individualized)  Outcome: Ongoing, Progressing  Goal: Absence of Hospital-Acquired Illness or Injury  Outcome: Ongoing, Progressing  Goal: Optimal Comfort and Wellbeing  Outcome: Ongoing, Progressing  Goal: Readiness for Transition of Care  Outcome: Ongoing, Progressing     Problem: Bleeding (Spinal Surgery)  Goal: Absence of Bleeding  Outcome: Ongoing, Progressing     Problem: Bowel Motility Impaired (Spinal Surgery)  Goal: Effective Bowel Elimination  Outcome: Ongoing, Progressing     Problem: Fluid and Electrolyte Imbalance (Spinal Surgery)  Goal: Fluid and Electrolyte Balance  Outcome: Ongoing, Progressing     Problem: Functional Ability Impaired (Spinal Surgery)  Goal: Optimal Functional Ability  Outcome: Ongoing, Progressing     Problem: Infection (Spinal Surgery)  Goal: Absence of Infection Signs and Symptoms  Outcome: Ongoing, Progressing     Problem: Neurologic Impairment (Spinal Surgery)  Goal: Optimal Neurologic Function  Outcome: Ongoing, Progressing     Problem: Pain (Spinal Surgery)  Goal: Acceptable Pain Control  Outcome: Ongoing, Progressing     Problem: Postoperative Nausea and Vomiting (Spinal Surgery)  Goal: Nausea and Vomiting Relief  Outcome: Ongoing, Progressing     Problem: Postoperative Urinary Retention (Spinal Surgery)  Goal: Effective Urinary Elimination  Outcome: Ongoing, Progressing     Problem: Respiratory Compromise (Spinal Surgery)  Goal: Effective Oxygenation and Ventilation  Outcome: Ongoing, Progressing     Problem: Fall Injury Risk  Goal: Absence of Fall and Fall-Related Injury  Outcome: Ongoing, Progressing   Goal Outcome Evaluation:

## 2024-07-24 NOTE — PROGRESS NOTES
[facility] THORACIC/LUMBAR NEUROSURGERY POSTOP NOTE      CC: POD #2 status post L2-3 laminectomy with fusion and removal of previous hardware.      Subjective     Interval History:   Acute issues overnight.  Mild discomfort improving.  Does not report any distention.  Passing gas.  No BM yet.  Plans to full liquid diet.  No new issues with leg symptoms.  Continues to have some mild right anterior thigh numbness.    Objective     Vital signs in last 24 hours:  Temp:  [98.2 °F (36.8 °C)-98.6 °F (37 °C)] 98.3 °F (36.8 °C)  Heart Rate:  [62-70] 63  Resp:  [16-18] 18  BP: (108-125)/(62-79) 117/62    Intake/Output this shift:  I/O this shift:  In: -   Out: 950 [Urine:900; Drains:50]    CHERI 90 cc in last 24 hours    LABS:  .  Results from last 7 days   Lab Units 07/24/24  0946 07/23/24  0545   WBC 10*3/mm3 13.15* 21.96*   HEMOGLOBIN g/dL 11.5* 12.8   HEMATOCRIT % 34.5 38.5   PLATELETS 10*3/mm3 176 214     .  Results from last 7 days   Lab Units 07/23/24  0545   SODIUM mmol/L 133*   POTASSIUM mmol/L 4.2   CHLORIDE mmol/L 104   CO2 mmol/L 24.0   BUN mg/dL 9   CREATININE mg/dL 0.54*   CALCIUM mg/dL 8.1*   GLUCOSE mg/dL 107*         IMAGING STUDIES:  No neuroimaging    I personally viewed and interpreted the patient's labs, medications and chart.    Meds reviewed/changed: Yes    Current Facility-Administered Medications:     acetaminophen (TYLENOL) tablet 1,000 mg, 1,000 mg, Oral, Q8H, Rigo Mathur MD, 1,000 mg at 07/24/24 1406    acetaminophen (TYLENOL) tablet 650 mg, 650 mg, Oral, Q4H PRN **OR** [DISCONTINUED] acetaminophen (TYLENOL) 160 MG/5ML oral solution 650 mg, 650 mg, Oral, Q4H PRN **OR** [DISCONTINUED] acetaminophen (TYLENOL) suppository 650 mg, 650 mg, Rectal, Q4H PRN, Rigo Mathur MD    albuterol (PROVENTIL) nebulizer solution 0.083% 2.5 mg/3mL, 2.5 mg, Nebulization, Q4H PRN, Rigo Mathur MD    [DISCONTINUED] sennosides-docusate (PERICOLACE) 8.6-50 MG per tablet 2 tablet, 2 tablet, Oral, BID PRN **AND**  polyethylene glycol (MIRALAX) packet 17 g, 17 g, Oral, Daily PRN, 17 g at 07/24/24 1217 **AND** bisacodyl (DULCOLAX) EC tablet 5 mg, 5 mg, Oral, Daily PRN **AND** bisacodyl (DULCOLAX) suppository 10 mg, 10 mg, Rectal, Daily PRN, Rigo Mathur MD    dilTIAZem CD (CARDIZEM CD) 24 hr capsule 180 mg, 180 mg, Oral, Q PM, Rigo Mathur MD, 180 mg at 07/23/24 1735    gabapentin (NEURONTIN) capsule 100 mg, 100 mg, Oral, TID, Rigo Mathur MD, 100 mg at 07/24/24 1406    guaiFENesin (MUCINEX) 12 hr tablet 1,200 mg, 1,200 mg, Oral, Q12H, Broderick Cornejo MD, 1,200 mg at 07/24/24 0939    methocarbamol (ROBAXIN) tablet 500 mg, 500 mg, Oral, TID PRN, Rigo Mathur MD, 500 mg at 07/23/24 2334    morphine injection 2 mg, 2 mg, Intravenous, Q3H PRN, 2 mg at 07/22/24 2014 **AND** naloxone (NARCAN) injection 0.4 mg, 0.4 mg, Intravenous, Q5 Min PRN, Rigo Mathur MD    ondansetron ODT (ZOFRAN-ODT) disintegrating tablet 4 mg, 4 mg, Oral, Q6H PRN **OR** ondansetron (ZOFRAN) injection 4 mg, 4 mg, Intravenous, Q6H PRN, Rigo Mathur MD, 4 mg at 07/24/24 1217    ondansetron ODT (ZOFRAN-ODT) disintegrating tablet 4 mg, 4 mg, Translingual, Q8H PRN, Rigo Mathur MD    oxyCODONE (ROXICODONE) immediate release tablet 5 mg, 5 mg, Oral, Q4H PRN, Rigo Mathur MD, 5 mg at 07/24/24 1217    pantoprazole (PROTONIX) EC tablet 40 mg, 40 mg, Oral, BID AC, Rigo Mathur MD, 40 mg at 07/24/24 0941    Progesterone (PROMETRIUM) capsule 200 mg, 200 mg, Oral, Daily, Rigo Mathur MD, 200 mg at 07/24/24 0940    senna (SENOKOT) tablet 2 tablet, 2 tablet, Oral, BID, Jw Talbot, APRN    sodium chloride 0.9 % flush 10 mL, 10 mL, Intravenous, PRN, Rigo Mathur MD    sodium chloride 0.9 % flush 3 mL, 3 mL, Intravenous, Q12H, Rigo Mathur MD, 3 mL at 07/24/24 0941    sodium chloride 0.9 % infusion 40 mL, 40 mL, Intravenous, PRN, Rigo Mathur MD    TRELEGY (Fluticasone-Umeclidin-Vilant)100-62.5-25 MCG/INH IN, 1 puff,  Inhalation, Daily - RT, Broderick Cornejo MD, 1 puff at 07/24/24 0852  Laterally utilizing any bowel agents.  Changed Senokot to scheduled    Oxycodone 5 mg every 4 hours as needed: 4 doses yesterday and 2 doses today as of 12:17 PM  Physical Exam:    General:   Awake, alert.  Back:    SLR negative  Abdomen:   Only very mildly tender.  Not significantly distended   Motor:   Normal muscle strength, bulk and tone in lower extremities.  No fasciculations, rigidity, spasticity, or abnormal movements.  Reflexes:   2+ in the lower extremities. no clonus  Sensation:   Normal to light touch slava LEs except for mild right thigh numbness  Station and Gait:          Gait deferred  Extremities:   Wearing SCD      Assessment & Plan     ASSESSMENT:      Neuritis or radiculitis due to rupture of lumbar intervertebral disc    PAD (peripheral artery disease)    PAF (paroxysmal atrial fibrillation)    Essential hypertension    COPD (chronic obstructive pulmonary disease)    Factor V Leiden mutation    Alpha-1-antitrypsin deficiency    Lumbar spinal stenosis      Pleasant 64-year-old female day 2 status post L2-3 laminectomy with fusion and removal of previous hardware.  Continues to report improved symptoms in lower extremities with some mild right thigh numbness as expected.       Abdominal pain improving and now almost resolved.  Abdomen is soft and nontender to palpation.  She is passing flatus and is eager to eat.  Will get her changed to full liquid diet.  Fluids.  Remove CHERI drain today.     Notify neurosurgery for abdominal pain, nausea, vomiting.    PLAN:     Remove CHERI drain  Full liquid diet  DC IV fluids  Bowel regimen as needed, Senokot scheduled  Mobilize with nursing and PT  Utilize ice  Utilize p.o. pain meds/muscle relaxers  Encourage use of incentive spirometer  SCD/DVT prophylaxis        I discussed the patient's findings and my recommendations with patient, family, and Dr. Mathur.     During patient visit, I utilized  "appropriate personal protective equipment including mask and gloves.  Mask used was standard procedure mask. Appropriate PPE was worn during the entire visit.  Hand hygiene was completed before and after.      LOS: 2 days       Jw Talbot, APRN  7/24/2024  14:12 EDT    \"Dictated utilizing Dragon dictation\".    "

## 2024-07-25 LAB
DEPRECATED RDW RBC AUTO: 44.2 FL (ref 37–54)
ERYTHROCYTE [DISTWIDTH] IN BLOOD BY AUTOMATED COUNT: 12 % (ref 12.3–15.4)
HCT VFR BLD AUTO: 34 % (ref 34–46.6)
HGB BLD-MCNC: 11.4 G/DL (ref 12–15.9)
MCH RBC QN AUTO: 34 PG (ref 26.6–33)
MCHC RBC AUTO-ENTMCNC: 33.5 G/DL (ref 31.5–35.7)
MCV RBC AUTO: 101.5 FL (ref 79–97)
PLATELET # BLD AUTO: 185 10*3/MM3 (ref 140–450)
PMV BLD AUTO: 9.8 FL (ref 6–12)
RBC # BLD AUTO: 3.35 10*6/MM3 (ref 3.77–5.28)
WBC NRBC COR # BLD AUTO: 13 10*3/MM3 (ref 3.4–10.8)

## 2024-07-25 PROCEDURE — 94664 DEMO&/EVAL PT USE INHALER: CPT

## 2024-07-25 PROCEDURE — 63710000001 ONDANSETRON ODT 4 MG TABLET DISPERSIBLE: Performed by: NEUROLOGICAL SURGERY

## 2024-07-25 PROCEDURE — 94799 UNLISTED PULMONARY SVC/PX: CPT

## 2024-07-25 PROCEDURE — 85027 COMPLETE CBC AUTOMATED: CPT | Performed by: HOSPITALIST

## 2024-07-25 RX ADMIN — ACETAMINOPHEN 1000 MG: 500 TABLET ORAL at 21:27

## 2024-07-25 RX ADMIN — BISACODYL 5 MG: 5 TABLET, COATED ORAL at 08:06

## 2024-07-25 RX ADMIN — SENNOSIDES 2 TABLET: 8.6 TABLET, FILM COATED ORAL at 21:27

## 2024-07-25 RX ADMIN — Medication 3 ML: at 21:28

## 2024-07-25 RX ADMIN — ONDANSETRON 4 MG: 4 TABLET, ORALLY DISINTEGRATING ORAL at 18:07

## 2024-07-25 RX ADMIN — GUAIFENESIN 1200 MG: 600 TABLET, EXTENDED RELEASE ORAL at 08:06

## 2024-07-25 RX ADMIN — OXYCODONE HYDROCHLORIDE 5 MG: 5 TABLET ORAL at 22:20

## 2024-07-25 RX ADMIN — SENNOSIDES 2 TABLET: 8.6 TABLET, FILM COATED ORAL at 08:06

## 2024-07-25 RX ADMIN — ACETAMINOPHEN 1000 MG: 500 TABLET ORAL at 06:08

## 2024-07-25 RX ADMIN — OXYCODONE HYDROCHLORIDE 5 MG: 5 TABLET ORAL at 02:19

## 2024-07-25 RX ADMIN — ACETAMINOPHEN 1000 MG: 500 TABLET ORAL at 13:01

## 2024-07-25 RX ADMIN — PANTOPRAZOLE SODIUM 40 MG: 40 TABLET, DELAYED RELEASE ORAL at 06:09

## 2024-07-25 RX ADMIN — Medication 3 ML: at 08:06

## 2024-07-25 RX ADMIN — ONDANSETRON 4 MG: 4 TABLET, ORALLY DISINTEGRATING ORAL at 06:08

## 2024-07-25 RX ADMIN — GABAPENTIN 100 MG: 100 CAPSULE ORAL at 21:27

## 2024-07-25 RX ADMIN — METHOCARBAMOL TABLETS 500 MG: 500 TABLET, COATED ORAL at 21:27

## 2024-07-25 RX ADMIN — OXYCODONE HYDROCHLORIDE 5 MG: 5 TABLET ORAL at 06:09

## 2024-07-25 RX ADMIN — PANTOPRAZOLE SODIUM 40 MG: 40 TABLET, DELAYED RELEASE ORAL at 18:07

## 2024-07-25 RX ADMIN — GUAIFENESIN 1200 MG: 600 TABLET, EXTENDED RELEASE ORAL at 21:27

## 2024-07-25 RX ADMIN — POLYETHYLENE GLYCOL 3350 17 G: 17 POWDER, FOR SOLUTION ORAL at 08:06

## 2024-07-25 RX ADMIN — GABAPENTIN 100 MG: 100 CAPSULE ORAL at 08:06

## 2024-07-25 RX ADMIN — OXYCODONE HYDROCHLORIDE 5 MG: 5 TABLET ORAL at 18:07

## 2024-07-25 RX ADMIN — PROGESTERONE 200 MG: 200 CAPSULE ORAL at 08:06

## 2024-07-25 RX ADMIN — GABAPENTIN 100 MG: 100 CAPSULE ORAL at 14:32

## 2024-07-25 RX ADMIN — DILTIAZEM HYDROCHLORIDE 180 MG: 180 CAPSULE, EXTENDED RELEASE ORAL at 18:08

## 2024-07-25 NOTE — PROGRESS NOTES
"    Name: Radha Block ADMIT: 2024   : 1960  PCP: Bosler, James William III, MD    MRN: [id number] LOS: 3 days   AGE/SEX: 64 y.o. female  ROOM: [facility]     Subjective   Subjective   Tolerating regular food.  No nausea or vomiting.  Breathing okay.       Objective   Objective   Vital Signs  Temp:  [97.5 °F (36.4 °C)-99.5 °F (37.5 °C)] 98.8 °F (37.1 °C)  Heart Rate:  [63-72] 72  Resp:  [17-20] 20  BP: (106-148)/(62-79) 126/64  SpO2:  [96 %-97 %] 97 %  on   ;   Device (Oxygen Therapy): room air  There is no height or weight on file to calculate BMI.  Physical Exam  Vitals and nursing note reviewed.   Constitutional:       General: She is not in acute distress.  Cardiovascular:      Rate and Rhythm: Normal rate and regular rhythm.   Pulmonary:      Effort: Pulmonary effort is normal.      Breath sounds: Normal breath sounds.   Abdominal:      General: Bowel sounds are normal.      Palpations: Abdomen is soft.      Tenderness: There is no abdominal tenderness.   Musculoskeletal:         General: No swelling.   Skin:     General: Skin is warm and dry.   Neurological:      Mental Status: She is alert. Mental status is at baseline.         Results Review:       I reviewed the patient's new clinical results.  Results from last 7 days   Lab Units 24  0531 24  0946 24  0545   WBC 10*3/mm3 13.00* 13.15* 21.96*   HEMOGLOBIN g/dL 11.4* 11.5* 12.8   PLATELETS 10*3/mm3 185 176 214     Results from last 7 days   Lab Units 24  0545   SODIUM mmol/L 133*   POTASSIUM mmol/L 4.2   CHLORIDE mmol/L 104   CO2 mmol/L 24.0   BUN mg/dL 9   CREATININE mg/dL 0.54*   GLUCOSE mg/dL 107*   EGFR mL/min/1.73 101.7       Results from last 7 days   Lab Units 24  0545   CALCIUM mg/dL 8.1*       No results found for: \"HGBA1C\", \"POCGLU\"    I have personally reviewed all medications:  Scheduled Medications  acetaminophen, 1,000 mg, Oral, Q8H  dilTIAZem CD, 180 mg, Oral, Q PM  gabapentin, 100 mg, Oral, " TID  guaiFENesin, 1,200 mg, Oral, Q12H  pantoprazole, 40 mg, Oral, BID AC  Progesterone, 200 mg, Oral, Daily  senna, 2 tablet, Oral, BID  sodium chloride, 3 mL, Intravenous, Q12H  Fluticasone-Umeclidin-Vilant, 1 puff, Inhalation, Daily - RT    Infusions     Diet  Diet: Regular/House; Fluid Consistency: Thin (IDDSI 0)    I have personally reviewed:  [x]  Laboratory   [x]  Microbiology   [x]  Radiology   []  EKG/Telemetry  []  Cardiology/Vascular   []  Pathology    []  Records       Assessment/Plan     Active Hospital Problems    Diagnosis  POA    **Neuritis or radiculitis due to rupture of lumbar intervertebral disc [M51.16]  Yes    Lumbar spinal stenosis [M48.061]  Yes    Alpha-1-antitrypsin deficiency [E88.01]  Yes    Factor V Leiden mutation [D68.51]  Yes    COPD (chronic obstructive pulmonary disease) [J44.9]  Yes    Essential hypertension [I10]  Yes    PAD (peripheral artery disease) [I73.9]  Yes    PAF (paroxysmal atrial fibrillation) [I48.0]  Yes      Resolved Hospital Problems   No resolved problems to display.       64 y.o. female who is s/p Lumbar 2 to lumbar 3 laminectomy with fusion and instrumentation and removal of previous hardware.    COPD and respiratory status remained stable.  She reports having gas pain yesterday improved with simethicone.  Still no bowel movement but no complaints of nausea and she is tolerating regular food.  Bowel regimen per surgery.    No objection to discharge from my perspective.  Will sign off.       Broderick Cornejo MD  [facility]  07/25/24  08:22 EDT

## 2024-07-25 NOTE — PLAN OF CARE
Goal Outcome Evaluation:  Plan of Care Reviewed With: patient        Progress: improving  Outcome Evaluation: vss, nvi, dressing c/d/i, ambulating SBA, increased diet to regular, still no BM-passing gas, plan to dc home tomorrow, educated on bp meds and monitoring

## 2024-07-25 NOTE — DISCHARGE SUMMARY
Radha OLE Block  1960    Patient Care Team:  Bosler, James William III, MD as PCP - General (Internal Medicine)  Raisa Cummings MD as Consulting Physician (Obstetrics and Gynecology)  Bosler, James William III, MD as Referring Physician (Internal Medicine)  Ijeoma Auguste MD as Consulting Physician (Cardiology)    Date of Admit: 7/22/2024    Date of Discharge:  7/26/2024    Discharge Diagnosis:  Neuritis or radiculitis due to rupture of lumbar intervertebral disc    PAD (peripheral artery disease)    PAF (paroxysmal atrial fibrillation)    Essential hypertension    COPD (chronic obstructive pulmonary disease)    Factor V Leiden mutation    Alpha-1-antitrypsin deficiency    Lumbar spinal stenosis      Procedures Performed  Procedure(s):  Lumbar 2 to lumbar 3 laminectomy with fusion and instrumentation and removal of previous hardware       Complications: None    Consultants:   Consults       Date and Time Order Name Status Description    7/22/2024 12:45 PM Inpatient Hospitalist Consult Completed     7/6/2024 12:36 AM Inpatient Neurosurgery Consult Completed             Condition on Discharge: stable    Discharge disposition: home    Pertinent Test Results:     Brief HPI: Patient evaluated in office for complaints of severe pain in the back and legs. Imaging revealed severe lumbar stenosis with grade 1 spondylolisthesis at L2-3. RBAs of treatment were discussed including the above procedure. Patient consented to above procedure.    Hospital Course: Patient admitted for above procedure. The procedure itself was without complication. The patient was transferred to [facility] following recovery.  Following surgery she had experienced near resolution of her lower extremity pain and only had some right anterior thigh numbness.  However, she did have some abdominal discomfort and there was concern for possible postoperative ileus.  Meanwhile PT signed off on POD #1.  She remained n.p.o. postop day 1 and started full  liquid diet on postop day #2.  Postop day #3 she advance to regular diet.  Today she is postop day 4 and has had a bowel movement this morning.  She does not have any significant abdominal distention or tenderness.  She is tolerating solids just fine.  We will get her discharged today.  She will let us know if she develops any signs or symptoms of infection.  She will also notify us if she develops any new issues in the back or legs, with gait instability or with bowel or bladder dysfunction.    She is experiencing nausea with taking oxycodone and states that she no longer has any pills left.  We will give her short 5-day supply.  She will also be supplied with some muscle relaxers oxycodone which we have asked her to wean over the next 5 days.  She will call the office on Monday to speak with Dr. Mathur to get prescription for lower dose narcotics such as hydrocodone.      Discharge Physical Exam:    Temp:  [97.7 °F (36.5 °C)-98.7 °F (37.1 °C)] 97.7 °F (36.5 °C)  Heart Rate:  [64-76] 69  Resp:  [16-18] 18  BP: (110-135)/(68-77) 117/71    Current labs:  Lab Results (last 24 hours)       Procedure Component Value Units Date/Time    CBC & Differential [[id number]]  (Abnormal) Collected: 07/26/24 0629    Specimen: Blood Updated: 07/26/24 0653    Narrative:      The following orders were created for panel order CBC & Differential.  Procedure                               Abnormality         Status                     ---------                               -----------         ------                     CBC Auto Differential[[id number]]        Abnormal            Final result                 Please view results for these tests on the individual orders.    CBC Auto Differential [[id number]]  (Abnormal) Collected: 07/26/24 0629    Specimen: Blood Updated: 07/26/24 0653     WBC 9.65 10*3/mm3      RBC 3.58 10*6/mm3      Hemoglobin 12.1 g/dL      Hematocrit 37.0 %      .4 fL      MCH 33.8 pg      MCHC 32.7 g/dL      RDW  12.3 %      RDW-SD 46.6 fl      MPV 9.5 fL      Platelets 209 10*3/mm3      Neutrophil % 67.1 %      Lymphocyte % 17.9 %      Monocyte % 11.2 %      Eosinophil % 3.2 %      Basophil % 0.3 %      Immature Grans % 0.3 %      Neutrophils, Absolute 6.47 10*3/mm3      Lymphocytes, Absolute 1.73 10*3/mm3      Monocytes, Absolute 1.08 10*3/mm3      Eosinophils, Absolute 0.31 10*3/mm3      Basophils, Absolute 0.03 10*3/mm3      Immature Grans, Absolute 0.03 10*3/mm3      nRBC 0.0 /100 WBC               General Appearance No acute distress   HEENT NC/AT;    Neurological Awake, Alert, and oriented x 3   Motor Strength 5/5 in BLE, tone normal   Sensory Intact to light touch throughout except for right thigh numbness unchanged since surgery   Reflexes 2+ DTRs, no clonus   Gait and station Ambulating independently without difficulty.  PT is already signed off.   Back Midline lumbar incision with Steri-Strips in place.  There is no erythema, swelling or drainage or any underlying fluid collection palpable.   Extremities Moves all extremities well, no edema, no cyanosis, no redness   Abdomen Nontender, nondistended.  No nausea, vomiting     Discharge Medications  Landon has been reviewed and narcotic consent is on file in the patient's chart.     Your medication list        START taking these medications        Instructions Last Dose Given Next Dose Due   sennosides-docusate 8.6-50 MG per tablet  Commonly known as: PERICOLACE      Take 1 tablet by mouth Daily for 10 days.              CHANGE how you take these medications        Instructions Last Dose Given Next Dose Due   dilTIAZem  MG 24 hr capsule  Commonly known as: CARDIZEM CD  What changed: when to take this      Take 1 capsule by mouth Daily.       methocarbamol 500 MG tablet  Commonly known as: ROBAXIN  What changed: Another medication with the same name was added. Make sure you understand how and when to take each.      Take 1 tablet by mouth Every 8 (Eight)  Hours.       methocarbamol 500 MG tablet  Commonly known as: ROBAXIN  What changed: You were already taking a medication with the same name, and this prescription was added. Make sure you understand how and when to take each.      Take 1 tablet by mouth 4 (Four) Times a Day As Needed for Muscle Spasms for up to 7 days.       naloxone 4 MG/0.1ML nasal spray  Commonly known as: NARCAN  What changed:   when to take this  reasons to take this      Call 911. Don't prime. Hiko in 1 nostril for overdose. Repeat in 2-3 minutes in other nostril if no or minimal breathing/responsiveness.       ondansetron 4 MG tablet  Commonly known as: ZOFRAN  What changed: Another medication with the same name was added. Make sure you understand how and when to take each.      Take 1 tablet by mouth Every 8 (Eight) Hours As Needed for Vomiting or Nausea.       ondansetron 4 MG tablet  Commonly known as: Zofran  What changed: You were already taking a medication with the same name, and this prescription was added. Make sure you understand how and when to take each.      Take 1 tablet by mouth Every 8 (Eight) Hours As Needed for Nausea or Vomiting for up to 5 days.       oxyCODONE 5 MG immediate release tablet  Commonly known as: Roxicodone  What changed:   when to take this  reasons to take this      Take 1 tablet by mouth Every 6 (Six) Hours As Needed for Severe Pain.              CONTINUE taking these medications        Instructions Last Dose Given Next Dose Due   acetaminophen 500 MG tablet  Commonly known as: TYLENOL      Take 2 tablets by mouth Every 8 (Eight) Hours.       albuterol sulfate  (90 Base) MCG/ACT inhaler  Commonly known as: PROVENTIL HFA;VENTOLIN HFA;PROAIR HFA      Inhale 1 puff As Needed for Wheezing.       apixaban 5 MG tablet tablet  Commonly known as: Eliquis      Take 1 tablet by mouth Every 12 (Twelve) Hours. resume 24 hours after epidural injection       betamethasone (augmented) 0.05 % cream  Commonly known  as: DIPROLENE      Apply 1 Application topically to the appropriate area as directed 2 (Two) Times a Day As Needed.       desvenlafaxine 50 MG 24 hr tablet  Commonly known as: PRISTIQ      Take 12.5 mg by mouth Daily.       EPINEPHrine 0.3 MG/0.3ML solution auto-injector injection  Commonly known as: EPIPEN      As Needed.       esomeprazole 40 MG capsule  Commonly known as: nexIUM      Take 1 capsule by mouth Daily.       estradiol 0.075 MG/24HR patch  Commonly known as: CLIMARA      Place 1 patch on the skin as directed by provider 2 (Two) Times a Week.       gabapentin 100 MG capsule  Commonly known as: NEURONTIN      Take 1 capsule by mouth 3 times a day.       guaiFENesin 600 MG 12 hr tablet  Commonly known as: MUCINEX      Take 1 tablet by mouth As Needed.       polyethylene glycol 17 GM/SCOOP powder  Commonly known as: MIRALAX      Mix 17 g (1 capful) in liquid and drink by mouth 2 (Two) Times a Day.       Progesterone 200 MG capsule  Commonly known as: PROMETRIUM      Take 1 capsule by mouth Daily.       Trelegy Ellipta 100-62.5-25 MCG/INH inhaler  Generic drug: Fluticasone-Umeclidin-Vilant      Inhale 1 puff Daily.       Zemaira 1000 MG injection  Generic drug: alpha1-proteinase inhibitor      Infuse  into a venous catheter 1 (One) Time Per Week. Pt takes on Fridays at infusion center              STOP taking these medications      diclofenac 75 MG EC tablet  Commonly known as: VOLTAREN        ibuprofen 200 MG tablet  Commonly known as: ADVIL,MOTRIN                  Where to Get Your Medications        These medications were sent to [facility] [place] [id number]      Hours: Monday to Friday 7 AM to 6 PM, Saturday & Sunday 8 AM to 4:30 PM (Closed 12 PM to 12:30 PM) Phone: [phone]   methocarbamol 500 MG tablet  ondansetron 4 MG tablet  sennosides-docusate 8.6-50 MG per tablet       These medications were sent to [id number]  "[address] - [phone]  - [phone] FX  [facility] [facility] [place] [address]      Phone: [phone]   oxyCODONE 5 MG immediate release tablet         Discharge Diet:     Diet Order   Procedures    Diet: Regular/House; Fluid Consistency: Thin (IDDSI 0)       Activity at Discharge:       Call for: questions or concerns    Follow-up Appointments  Future Appointments   Date Time Provider Department Center   8/6/2024 10:00 AM Rigo Mathur MD [facility]   9/3/2024 10:40 AM Ijeoma Auguste MD [facility]      Follow-up Information       Bosler, James William III, MD .    Specialty: Internal Medicine  Contact information:  [address] [facility]  [place] [address] [phone]                               Test Results Pending at Discharge     None    I discussed the discharge instructions with patient and Dr. Mathur.     Jw Talbot, APRN  07/26/24  11:48 EDT        \"Dictated utilizing Dragon dictation\".    "

## 2024-07-25 NOTE — PLAN OF CARE
Goal Outcome Evaluation:         A&O. VSS. Dressing CDI. Voiding per BRP. Pain managed w prn pain medication and muscle relaxer. Plan to d/c when medically stable.

## 2024-07-25 NOTE — PAYOR COMM NOTE
"Nick BlockRadha (64 y.o. Female)      CONTINUED STAY CLINICALS:  REF#  [id number]      UR DEPT: [phone],  [phone]     [facility]: NPI [id number] [facility]# [id number]     PENNY MELGAR RN,[facility]       Date of Birth   1960    Social Security Number       Address   [address] [facility] [place]    Home Phone   [phone]    MRN   [id number]       Restoration   Denominational    Marital Status                               Admission Date   7/22/24    Admission Type   Elective    Admitting Provider   Rigo Mathur MD    Attending Provider   Rigo Mathur MD    Department, Room/Bed   [address], P794/1       Discharge Date       Discharge Disposition       Discharge Destination                                 Attending Provider: Rigo Mathur MD    Allergies: No Known Allergies    Isolation: None   Infection: None   Code Status: CPR    Ht: 170.2 cm (67\")   Wt: 56.3 kg (124 lb 3.2 oz)    Admission Cmt: None   Principal Problem: Neuritis or radiculitis due to rupture of lumbar intervertebral disc [M51.16]                   Active Insurance as of 7/22/2024       Primary Coverage       Payor Plan Insurance Group Employer/Plan Group    [facility] [employer] [facility] [employer] [facility] PPO [id number]       Payor Plan Address Payor Plan Phone Number Payor Plan Fax Number Effective Dates    [address] [phone]  1/1/2023 - None Entered    [place] [address]         Subscriber Name Subscriber Birth Date Member ID       RADHA FERRER 1960 [id number]                     Emergency Contacts        (Rel.) Home Phone Work Phone Mobile Phone    KacieNancy (Relative) [phone] -- --    FRANCISCO BLOCK (Son) -- -- [phone]    Kelly Geiger (Friend) [phone] -- --            Vital Signs (last 2 days)       Date/Time Temp Temp src Pulse Resp BP Patient Position SpO2    07/25/24 1416 97.9 (36.6) Oral 70 16 " 113/68 Lying 95    07/25/24 1006 98.6 (37) Oral 66 18 111/68 Lying 94    07/25/24 0816 -- -- 72 20 -- -- 97    07/25/24 0545 98.8 (37.1) Oral 71 17 126/64 Lying 96    07/24/24 2133 97.5 (36.4) Oral 71 17 148/79 Lying 97    07/24/24 1800 98.8 (37.1) Oral 67 17 129/72 Lying --    07/24/24 1435 99.5 (37.5) Oral 68 17 106/66 Lying --    07/24/24 1025 98.3 (36.8) Oral 63 18 117/62 Lying --    07/24/24 0511 98.5 (36.9) Oral 64 16 125/68 Lying 98    07/24/24 0113 98.3 (36.8) Oral 66 16 120/65 Lying 97    07/23/24 2054 98.2 (36.8) Oral 70 16 116/70 Lying 98    07/23/24 1801 98.6 (37) Oral 69 16 108/79 Lying 97    07/23/24 1435 98.2 (36.8) Oral 62 16 112/65 Lying 100    07/23/24 1117 -- -- 72 18 -- -- 97    07/23/24 1009 98.8 (37.1) Oral 70 18 115/66 Lying 97    07/23/24 0521 97.1 (36.2) Oral 70 18 119/68 Lying 95    07/23/24 0010 97.1 (36.2) Oral 74 18 102/90 Lying 98          Oxygen Therapy (last 2 days)       Date/Time SpO2 Device (Oxygen Therapy) Flow (L/min) Oxygen Concentration (%) ETCO2 (mmHg)    07/25/24 1416 95 room air -- -- --    07/25/24 1006 94 room air -- -- --    07/25/24 0816 97 room air -- -- --    07/25/24 0545 96 room air -- -- --    07/24/24 2133 97 room air -- -- --    07/24/24 0800 -- room air -- -- --    07/24/24 0511 98 -- -- -- --    07/24/24 0113 97 -- -- -- --    07/23/24 2054 98 -- -- -- --    07/23/24 2045 -- room air -- -- --    07/23/24 1801 97 room air -- -- --    07/23/24 1435 100 room air -- -- --    07/23/24 1117 97 room air -- -- --    07/23/24 1009 97 room air -- -- --    07/23/24 0918 -- room air -- -- --    07/23/24 0521 95 room air -- -- --    07/23/24 0037 -- room air -- -- --    07/23/24 0010 98 room air -- -- --          Intake & Output (last 2 days)         07/23 0701 07/24 0700 07/24 0701 07/25 0700 07/25 0701 07/26 0700    P.O. 100 480 360    I.V. (mL/kg)       Blood       Total Intake(mL/kg) 100 480 360 (6.4)    Urine (mL/kg/hr) 4250 900     Drains 90 50     Blood       Total  Output 434 950     Net -4240 -470 +360           Urine Unmeasured Occurrence  3 x 3 x          Lines, Drains & Airways       Active LDAs       Name Placement date Placement time Site Days    Peripheral IV 07/23/24 0129 Left;Posterior Forearm 07/23/24  0129  Forearm  2                  Medication Administration Report for Radha Lowe as of 7/24/24 through 7/25/24     Legend:    Given Hold Not Given Due Canceled Entry Other Actions    Time Time (Time) Time Time-Action         Discontinued     Completed     Future     MAR Hold     Linked             Medications 07/24/24 07/25/24     acetaminophen (TYLENOL) tablet 1,000 mg  Dose: 1,000 mg  Freq: Every 8 Hours Route: PO  Start: 07/22/24 1400     Admin Instructions:   If given for fever, use fever parameter: fever greater than 100.4 °F  Based on patient request - if ordered for moderate or severe pain, provider allows for administration of a medication prescribed for a lower pain scale.    Do not exceed 4 grams of acetaminophen in a 24 hr period. Max dose of 2gm for AST/ALT greater than 120 units/L.    If given for pain, use the following pain scale:   Mild Pain = Pain Score of 1-3, CPOT 1-2  Moderate Pain = Pain Score of 4-6, CPOT 3-4  Severe Pain = Pain Score of 7-10, CPOT 5-8      0940-Given [C]     1406-Given     2122-Given          0608-Given     1301-Given     2200             acetaminophen (TYLENOL) tablet 650 mg  Dose: 650 mg  Freq: Every 4 Hours PRN Route: PO  PRN Reason: Mild Pain  Start: 07/22/24 1245     Admin Instructions:   If given for fever, use fever parameter: fever greater than 100.4 °F  Based on patient request - if ordered for moderate or severe pain, provider allows for administration of a medication prescribed for a lower pain scale.    Do not exceed 4 grams of acetaminophen in a 24 hr period. Max dose of 2gm for AST/ALT greater than 120 units/L.    If given for pain, use the following pain scale:   Mild Pain = Pain Score of 1-3, CPOT  1-2  Moderate Pain = Pain Score of 4-6, CPOT 3-4  Severe Pain = Pain Score of 7-10, CPOT 5-8          Or   acetaminophen (TYLENOL) 160 MG/5ML oral solution 650 mg  Dose: 650 mg  Freq: Every 4 Hours PRN Route: PO  PRN Reason: Mild Pain  Start: 07/22/24 1245 End: 07/22/24 1758     Admin Instructions:   If given for fever, use fever parameter: fever greater than 100.4 °F  Based on patient request - if ordered for moderate or severe pain, provider allows for administration of a medication prescribed for a lower pain scale.    Do not exceed 4 grams of acetaminophen in a 24 hr period. Max dose of 2gm for AST/ALT greater than 120 units/L.    If given for pain, use the following pain scale:   Mild Pain = Pain Score of 1-3, CPOT 1-2  Moderate Pain = Pain Score of 4-6, CPOT 3-4  Severe Pain = Pain Score of 7-10, CPOT 5-8          Or   acetaminophen (TYLENOL) suppository 650 mg  Dose: 650 mg  Freq: Every 4 Hours PRN Route: RE  PRN Reason: Mild Pain  Start: 07/22/24 1245 End: 07/22/24 1758     Admin Instructions:   If given for fever, use fever parameter: fever greater than 100.4 °F  Based on patient request - if ordered for moderate or severe pain, provider allows for administration of a medication prescribed for a lower pain scale.    Do not exceed 4 grams of acetaminophen in a 24 hr period. Max dose of 2gm for AST/ALT greater than 120 units/L.    If given for pain, use the following pain scale:   Mild Pain = Pain Score of 1-3, CPOT 1-2  Moderate Pain = Pain Score of 4-6, CPOT 3-4  Severe Pain = Pain Score of 7-10, CPOT 5-8          albuterol (PROVENTIL) nebulizer solution 0.083% 2.5 mg/3mL  Dose: 2.5 mg  Freq: Every 4 Hours PRN Route: NEBULIZATION  PRN Reason: Wheezing  Start: 07/22/24 1250     Admin Instructions:   Include Respiratory Treatment Education           sennosides-docusate (PERICOLACE) 8.6-50 MG per tablet 2 tablet  Dose: 2 tablet  Freq: 2 Times Daily PRN Route: PO  PRN Reason: Constipation  Start: 07/22/24 1243  End: 07/24/24 1130     Admin Instructions:   Start bowel management regimen if patient has not had a bowel movement after 12 hours.          And   polyethylene glycol (MIRALAX) packet 17 g  Dose: 17 g  Freq: Daily PRN Route: PO  PRN Reason: Constipation  PRN Comment: Use if senna-docusate is ineffective  Start: 07/22/24 1245     Admin Instructions:   Use if no bowel movement after 12 hours. Mix in 6-8 ounces of water.  Use 4-8 ounces of water, tea, or juice for each 17 gram dose.      1217-Given            0806-Given              And   bisacodyl (DULCOLAX) EC tablet 5 mg  Dose: 5 mg  Freq: Daily PRN Route: PO  PRN Reason: Constipation  PRN Comment: Use if polyethylene glycol is ineffective  Start: 07/22/24 1245     Admin Instructions:   Use if no bowel movement after 12 hours.  Swallow whole. Do not crush, split, or chew tablet.       0806-Given              And   bisacodyl (DULCOLAX) suppository 10 mg  Dose: 10 mg  Freq: Daily PRN Route: RE  PRN Reason: Constipation  PRN Comment: Use if bisacodyl oral is ineffective  Start: 07/22/24 1245     Admin Instructions:   Use if no bowel movement after 12 hours.  Hold for diarrhea          dilTIAZem CD (CARDIZEM CD) 24 hr capsule 180 mg  Dose: 180 mg  Freq: Every Evening Route: PO  Start: 07/22/24 1700     Admin Instructions:   Hold for SBP less than 100, DBP less than 60, or heart rate less than 50    Do not crush or chew the capsules or tablets. The drug may not work as designed if the capsule or tablet is crushed or chewed. Swallow whole.  Caution: Look alike/sound alike drug alert.   Swallow capsule whole. Do not crush, chew, or open capsule. Avoid grapefruit juice. Maximum simvastatin dose 10 mg while taking dilTIAZem.      1829-Given            1700              gabapentin (NEURONTIN) capsule 100 mg  Dose: 100 mg  Freq: 3 times daily Route: PO  Start: 07/22/24 1500     Admin Instructions:           0940-Given     1406-Given     2123-Given          0806-Given      1432-Given     2100            guaiFENesin (MUCINEX) 12 hr tablet 1,200 mg  Dose: 1,200 mg  Freq: Every 12 Hours Scheduled Route: PO  Start: 07/22/24 2100     Admin Instructions:   Caution: Look alike/sound alike drug alert               Do not crush, split, or chew.      0939-Given     2123-Given           0806-Given     2100             methocarbamol (ROBAXIN) tablet 500 mg  Dose: 500 mg  Freq: 3 Times Daily PRN Route: PO  PRN Reason: Muscle Spasms  Start: 07/22/24 1245      2217-Given                morphine injection 2 mg  Dose: 2 mg  Freq: Every 3 Hours PRN Route: IV  PRN Reason: Severe Pain  Start: 07/22/24 1245 End: 07/27/24 1244     Admin Instructions:   Based on patient request - if ordered for moderate or severe pain, provider allows for administration of a medication prescribed for a lower pain scale.  If given for pain, use the following pain scale:  Mild Pain = Pain Score of 1-3, CPOT 1-2  Moderate Pain = Pain Score of 4-6, CPOT 3-4  Severe Pain = Pain Score of 7-10, CPOT 5-8          And   naloxone (NARCAN) injection 0.4 mg  Dose: 0.4 mg  Freq: Every 5 Minutes PRN Route: IV  PRN Reason: Respiratory Depression  Start: 07/22/24 1245     Admin Instructions:   If respiratory rate is less than 8 breaths/minute or patient is difficult to arouse stop any narcotics and contact physician.   Administer slow IV push. Repeat as ordered until patient's respiratory rate is greater than 12 breaths/minute.           ondansetron ODT (ZOFRAN-ODT) disintegrating tablet 4 mg  Dose: 4 mg  Freq: Every 6 Hours PRN Route: PO  PRN Reasons: Nausea,Vomiting  Start: 07/22/24 1245     Admin Instructions:   If BOTH ondansetron (ZOFRAN) and promethazine (PHENERGAN) are ordered use ondansetron first and THEN promethazine IF ondansetron is ineffective.  Place on tongue and allow to dissolve.      0251-Not Given:  See Alt     1217-Not Given:  See Alt     2123-Given          0608-Given              Or   ondansetron (ZOFRAN) injection 4  mg  Dose: 4 mg  Freq: Every 6 Hours PRN Route: IV  PRN Reasons: Nausea,Vomiting  Start: 07/22/24 1245     Admin Instructions:   If BOTH ondansetron (ZOFRAN) and promethazine (PHENERGAN) are ordered use ondansetron first and THEN promethazine IF ondansetron is ineffective.      0251-Given     1217-Given     2123-Not Given:  See Alt          0608-Not Given:  See Alt              ondansetron ODT (ZOFRAN-ODT) disintegrating tablet 4 mg  Dose: 4 mg  Freq: Every 8 Hours PRN Route: TL  PRN Reason: Nausea  Start: 07/22/24 1245     Admin Instructions:   Place on tongue and allow to dissolve.          oxyCODONE (ROXICODONE) immediate release tablet 5 mg  Dose: 5 mg  Freq: Every 4 Hours PRN Route: PO  PRN Reason: Moderate Pain  Start: 07/22/24 1245 End: 07/27/24 1244     Admin Instructions:   Based on patient request - if ordered for moderate or severe pain, provider allows for administration of a medication prescribed for a lower pain scale.        If given for pain, use the following pain scale:  Mild Pain = Pain Score of 1-3, CPOT 1-2  Moderate Pain = Pain Score of 4-6, CPOT 3-4  Severe Pain = Pain Score of 7-10, CPOT 5-8      0246-Given     1217-Given     2123-Given          0219-Given     0609-Given             pantoprazole (PROTONIX) EC tablet 40 mg  Dose: 40 mg  Freq: 2 Times Daily Before Meals Route: PO  Start: 07/22/24 1730     Admin Instructions:   Swallow whole; do not crush, split, or chew.      0941-Given [C]     1829-Given           0609-Given     1730             Progesterone (PROMETRIUM) capsule 200 mg  Dose: 200 mg  Freq: Daily Route: PO  Start: 07/22/24 1500     Admin Instructions:   Group 1 (Yellow) Hazardous Drug - See Handling Guide      0940-Given            0806-Given              senna (SENOKOT) tablet 2 tablet  Dose: 2 tablet  Freq: 2 Times Daily Route: PO  Start: 07/24/24 1230      (1407)-Not Given [C]     2123-Given           0806-Given     2100             sodium chloride 0.9 % flush 10 mL  Dose: 10  mL  Freq: As Needed Route: IV  PRN Reason: Line Care  Start: 07/22/24 1245          sodium chloride 0.9 % flush 3 mL  Dose: 3 mL  Freq: Every 12 Hours Scheduled Route: IV  Start: 07/22/24 1345      0941-Given     2124-Given           0806-Given     2100             sodium chloride 0.9 % infusion 40 mL  Dose: 40 mL  Freq: As Needed Route: IV  PRN Reason: Line Care  Start: 07/22/24 1245     Admin Instructions:   Following administration of an IV intermittent medication, flush line with 40mL NS at 100mL/hr.          TRELEGY (Fluticasone-Umeclidin-Vilant)100-62.5-25 MCG/INH IN  Dose: 1 puff  Freq: Daily - RT Route: IN  Start: 07/22/24 1930      0852-Given by Other [C]            0816-Given              Completed Medications  Medications 07/24/24 07/25/24      ceFAZolin 2000 mg IVPB in 100 mL NS (MBP)  Dose: 2,000 mg  Freq: Every 8 Hours Route: IV  Indications of Use: PERIOPERATIVE PHARMACOPROPHYLAXIS  Start: 07/22/24 1500 End: 07/24/24 1010     Admin Instructions:   Time first dose from pre-op dose.  Caution: Look alike/sound alike drug alert      0940-New Bag               ceFAZolin 2000 mg IVPB in 100 mL NS (MBP)  Dose: 2 g  Freq: Once Route: IV  Indications of Use: PERIOPERATIVE PHARMACOPROPHYLAXIS  Start: 07/22/24 0626 End: 07/22/24 0818     Admin Instructions:   Administer within 1 hour of surgical incision. Redose 4 hours from pre-op dose if procedure ongoing or >1.5 L blood loss.  Caution: Look alike/sound alike drug alert          Discontinued Medications  Medications 07/24/24 07/25/24      diphenhydrAMINE (BENADRYL) injection 12.5 mg  Dose: 12.5 mg  Freq: Every 15 Minutes PRN Route: IV  PRN Reason: Itching  PRN Comment: May repeat x 1  Start: 07/22/24 1110 End: 07/22/24 1238     Admin Instructions:   Caution: Look alike/sound alike drug alert. This med may be ordered in other forms and routes. Before giving verify the last time the drug was given by any route/form.           droperidol (INAPSINE) injection  0.625 mg  Dose: 0.625 mg  Freq: Every 20 Minutes PRN Route: IV  PRN Reasons: Nausea,Vomiting  Indications of Use: NAUSEA AND VOMITING  Start: 07/22/24 1110 End: 07/22/24 1238     Admin Instructions:   Use ondansetron first; proceed to droperidol for nausea refractory to ondansetron.          Or   droperidol (INAPSINE) injection 0.625 mg  Dose: 0.625 mg  Freq: Every 20 Minutes PRN Route: IM  PRN Reasons: Nausea,Vomiting  Start: 07/22/24 1110 End: 07/22/24 1238     Admin Instructions:   Use ondansetron first; proceed to droperidol for nausea refractory to ondansetron.          ePHEDrine injection 5 mg  Dose: 5 mg  Freq: Once As Needed Route: IV  PRN Comment: symptomatic hypotension - Notify attending anesthesiologist if this needs to be given  Start: 07/22/24 1110 End: 07/22/24 1238     Admin Instructions:   Caution: Look alike/sound alike drug alert   Dilute with NS to 5-10 mg/mL.  Central line preferred, if unavailable use large bore IV access with frequent nurse monitoring of IV site.          fentaNYL citrate (PF) (SUBLIMAZE) injection 50 mcg  Dose: 50 mcg  Freq: Every 5 Minutes PRN Route: IV  PRN Reason: Severe Pain  Start: 07/22/24 1110 End: 07/22/24 1238     Admin Instructions:   Maximum total dose of fentanyl is 200 mcg.  If given for pain, use the following pain scale:  Mild Pain = Pain Score of 1-3, CPOT 1-2  Moderate Pain = Pain Score of 4-6, CPOT 3-4  Severe Pain = Pain Score of 7-10, CPOT 5-8          flumazenil (ROMAZICON) injection 0.2 mg  Dose: 0.2 mg  Freq: As Needed Route: IV  PRN Comment: for benzodiazepine induced unresponsiveness or sedation  Indications of Use: BENZODIAZEPINE-INDUCED SEDATION  Start: 07/22/24 1110 End: 07/22/24 1238     Admin Instructions:   Notify Anesthesia if given  ** give IV over 15-30 seconds **          hydrALAZINE (APRESOLINE) injection 5 mg  Dose: 5 mg  Freq: Every 10 Minutes PRN Route: IV  PRN Reason: High Blood Pressure  PRN Comment: for systolic blood pressure greater  than 180 mmHg or diastolic blood pressure greater than 105 mmHg  Start: 07/22/24 1110 End: 07/22/24 1238     Admin Instructions:   Up to 20 mg. If labetalol and hydralazine are both ordered, use labetalol first.  Caution: Look alike/sound alike drug alert          HYDROcodone-acetaminophen (NORCO) 5-325 MG per tablet 1 tablet  Dose: 1 tablet  Freq: Once As Needed Route: PO  PRN Reason: Moderate Pain  Start: 07/22/24 1110 End: 07/22/24 1238     Admin Instructions:   Based on patient request - if ordered for moderate or severe pain, provider allows for administration of a medication prescribed for a lower pain scale.  [ERIC]    Do not exceed 4 grams of acetaminophen in a 24 hr period. Max dose of 2gm for AST/ALT greater than 120 units/L        If given for pain, use the following pain scale:   Mild Pain = Pain Score of 1-3, CPOT 1-2  Moderate Pain = Pain Score of 4-6, CPOT 3-4  Severe Pain = Pain Score of 7-10, CPOT 5-8          HYDROmorphone (DILAUDID) injection 0.5 mg  Dose: 0.5 mg  Freq: Every 5 Minutes PRN Route: IV  PRN Reason: Moderate Pain  Start: 07/22/24 1110 End: 07/22/24 1238     Admin Instructions:   Maximum total dose of hydromorphone is 2 mg.  If given for pain, use the following pain scale:  Mild Pain = Pain Score of 1-3, CPOT 1-2  Moderate Pain = Pain Score of 4-6, CPOT 3-4  Severe Pain = Pain Score of 7-10, CPOT 5-8          ipratropium-albuterol (DUO-NEB) nebulizer solution 3 mL  Dose: 3 mL  Freq: Once As Needed Route: NEBULIZATION  PRN Reasons: Wheezing,Shortness of Air  PRN Comment: bronchospasm  Start: 07/22/24 1110 End: 07/22/24 1238     Admin Instructions:   Notify Anesthesia if minineb is given          labetalol (NORMODYNE,TRANDATE) injection 5 mg  Dose: 5 mg  Freq: Every 5 Minutes PRN Route: IV  PRN Reason: High Blood Pressure  PRN Comment: for systolic blood pressure greater than 180 mmHg or diastolic blood pressure greater than 105 mmHg  Start: 07/22/24 1110 End: 07/22/24 1238     Admin  Instructions:   Hold for heart rate less than 60.    If labetalol and hydralazine are both ordered, use labetalol first. Maximum dose of labetalol is 20mg IV while in PACU, notify anesthesiologist for further orders if needed.  Give IV Push over 2 minutes.          naloxone (NARCAN) injection 0.2 mg  Dose: 0.2 mg  Freq: As Needed Route: IV  PRN Reasons: Opioid Reversal,Respiratory Depression  PRN Comment: unresponsiveness, decrease oxygen saturation  Indications of Use: ACUTE RESPIRATORY FAILURE,OPIOID-INDUCED RESPIRATORY DEPRESSION  Start: 07/22/24 1110 End: 07/22/24 1238     Admin Instructions:   Notify Anesthesia if given          Non-Formulary / Patient Supplied Medication  Dose: 1 Inhalation  Freq: Daily Route: IN  Start: 07/22/24 1900 End: 07/22/24 1840     Order specific questions:   Can the patient use their own supply during their hospitalization? Yes  Name of Medication: Trelegy Ellipta 100-62.5-25 MCG/INH aerosol powder          ondansetron (ZOFRAN) injection 4 mg  Dose: 4 mg  Freq: Once As Needed Route: IV  PRN Reasons: Nausea,Vomiting  Indications of Use: POSTOPERATIVE NAUSEA AND VOMITING  Start: 07/22/24 1110 End: 07/22/24 1238     Admin Instructions:   If BOTH ondansetron (ZOFRAN) and promethazine (PHENERGAN) are ordered use ondansetron first and THEN promethazine IF ondansetron is ineffective.          oxyCODONE-acetaminophen (PERCOCET) 7.5-325 MG per tablet 1 tablet  Dose: 1 tablet  Freq: Every 4 Hours PRN Route: PO  PRN Reason: Severe Pain  Start: 07/22/24 1110 End: 07/22/24 1238     Admin Instructions:   [ERIC]    Do not exceed 4 grams of acetaminophen in a 24 hr period. Max dose of 2gm for AST/ALT greater than 120 units/L        If given for pain, use the following pain scale:   Mild Pain = Pain Score of 1-3, CPOT 1-2  Moderate Pain = Pain Score of 4-6, CPOT 3-4  Severe Pain = Pain Score of 7-10, CPOT 5-8           promethazine (PHENERGAN) suppository 25 mg  Dose: 25 mg  Freq: Once As Needed  Route: RE  PRN Reasons: Nausea,Vomiting  Start: 07/22/24 1110 End: 07/22/24 1238     Admin Instructions:   If BOTH ondansetron (ZOFRAN) and promethazine (PHENERGAN) are ordered use ondansetron first and THEN promethazine IF ondansetron is ineffective.          Or   promethazine (PHENERGAN) tablet 25 mg  Dose: 25 mg  Freq: Once As Needed Route: PO  PRN Reasons: Nausea,Vomiting  Start: 07/22/24 1110 End: 07/22/24 1238     Admin Instructions:   If BOTH ondansetron (ZOFRAN) and promethazine (PHENERGAN) are ordered use ondansetron first and THEN promethazine IF ondansetron is ineffective.              sodium chloride (NS) irrigation solution  Freq: As Needed  Start: 07/22/24 0832 End: 07/22/24 1101          sodium chloride 0.9 % infusion  Rate: 100 mL/hr Dose: 100 mL/hr  Freq: Continuous Route: IV  Start: 07/23/24 1330 End: 07/24/24 1125          sodium chloride 0.9 % with KCl 20 mEq/L infusion  Rate: 100 mL/hr Dose: 100 mL/hr  Freq: Continuous Route: IV  Start: 07/22/24 1345 End: 07/23/24 1237          sodium chloride 1,000 mL with vancomycin 1,000 mg irrigation  Freq: As Needed  Start: 07/22/24 0831 End: 07/22/24 1101          sodium chloride 50 mL, bupivacaine-EPINEPHrine PF 50 mL mixture  Freq: As Needed  Start: 07/22/24 0831 End: 07/22/24 1101          thrombin (THROMBIN-JMI) spray kit  Freq: As Needed  Start: 07/22/24 0832 End: 07/22/24 1101                 Lab Results (last 72 hours)       Procedure Component Value Units Date/Time    CBC (No Diff) [[id number]]  (Abnormal) Collected: 07/25/24 0531    Specimen: Blood Updated: 07/25/24 0605     WBC 13.00 10*3/mm3      RBC 3.35 10*6/mm3      Hemoglobin 11.4 g/dL      Hematocrit 34.0 %      .5 fL      MCH 34.0 pg      MCHC 33.5 g/dL      RDW 12.0 %      RDW-SD 44.2 fl      MPV 9.8 fL      Platelets 185 10*3/mm3     CBC & Differential [[id number]]  (Abnormal) Collected: 07/24/24 0946    Specimen: Blood Updated: 07/24/24 1016    Narrative:      The following orders  were created for panel order CBC & Differential.  Procedure                               Abnormality         Status                     ---------                               -----------         ------                     CBC Auto Differential[[id number]]        Abnormal            Final result                 Please view results for these tests on the individual orders.    CBC Auto Differential [[id number]]  (Abnormal) Collected: 07/24/24 0946    Specimen: Blood Updated: 07/24/24 1016     WBC 13.15 10*3/mm3      RBC 3.38 10*6/mm3      Hemoglobin 11.5 g/dL      Hematocrit 34.5 %      .1 fL      MCH 34.0 pg      MCHC 33.3 g/dL      RDW 12.3 %      RDW-SD 45.7 fl      MPV 9.6 fL      Platelets 176 10*3/mm3      Neutrophil % 76.3 %      Lymphocyte % 11.6 %      Monocyte % 10.2 %      Eosinophil % 1.1 %      Basophil % 0.3 %      Immature Grans % 0.5 %      Neutrophils, Absolute 10.04 10*3/mm3      Lymphocytes, Absolute 1.52 10*3/mm3      Monocytes, Absolute 1.34 10*3/mm3      Eosinophils, Absolute 0.14 10*3/mm3      Basophils, Absolute 0.04 10*3/mm3      Immature Grans, Absolute 0.07 10*3/mm3      nRBC 0.0 /100 WBC     Basic Metabolic Panel [[id number]]  (Abnormal) Collected: 07/23/24 0545    Specimen: Blood Updated: 07/23/24 0621     Glucose 107 mg/dL      BUN 9 mg/dL      Creatinine 0.54 mg/dL      Sodium 133 mmol/L      Potassium 4.2 mmol/L      Chloride 104 mmol/L      CO2 24.0 mmol/L      Calcium 8.1 mg/dL      BUN/Creatinine Ratio 16.7     Anion Gap 5.0 mmol/L      eGFR 101.7 mL/min/1.73     Narrative:      GFR Normal >60  Chronic Kidney Disease <60  Kidney Failure <15      CBC & Differential [[id number]]  (Abnormal) Collected: 07/23/24 0545    Specimen: Blood Updated: 07/23/24 0601    Narrative:      The following orders were created for panel order CBC & Differential.  Procedure                               Abnormality         Status                     ---------                               -----------          ------                     CBC Auto Differential[[id number]]        Abnormal            Final result                 Please view results for these tests on the individual orders.    CBC Auto Differential [[id number]]  (Abnormal) Collected: 07/23/24 0545    Specimen: Blood Updated: 07/23/24 0601     WBC 21.96 10*3/mm3      RBC 3.78 10*6/mm3      Hemoglobin 12.8 g/dL      Hematocrit 38.5 %      .9 fL      MCH 33.9 pg      MCHC 33.2 g/dL      RDW 12.3 %      RDW-SD 45.9 fl      MPV 9.5 fL      Platelets 214 10*3/mm3      Neutrophil % 85.5 %      Lymphocyte % 5.9 %      Monocyte % 7.8 %      Eosinophil % 0.1 %      Basophil % 0.1 %      Immature Grans % 0.6 %      Neutrophils, Absolute 18.76 10*3/mm3      Lymphocytes, Absolute 1.30 10*3/mm3      Monocytes, Absolute 1.71 10*3/mm3      Eosinophils, Absolute 0.03 10*3/mm3      Basophils, Absolute 0.03 10*3/mm3      Immature Grans, Absolute 0.13 10*3/mm3      nRBC 0.0 /100 WBC           Imaging Results (Last 72 Hours)       Procedure Component Value Units Date/Time    XR Spine Lumbar 2 or 3 View [[id number]] Collected: 07/23/24 0845     Updated: 07/23/24 0853    Narrative:      XR SPINE LUMBAR 2 OR 3 VW-     INDICATION: Postop     COMPARISON: 4/4/2024       Impression:      Interval placement of L2-L3 interbody disc spacer and L2  bilateral pedicle screws. Previously present L3 and L4 bilateral pedicle  screws and L3-L5 interbody disc spacers. Rods now extend from L2-L4.  Hardware is well-positioned and intact. Mild lumbar spine levocurvature.  No subluxation. Vertebral body heights are maintained. Preserved L5-S1  disc space. Mild lower lumbar facet arthropathy. Overlying bowel gas  partially limits evaluation.     This report was finalized on 7/23/2024 8:50 AM by Dr. Cuate Tran M.D on Workstation: [id number]                Operative/Procedure Notes (all)        Rigo Mathur MD at 07/22/24 0824  Version 1 of 1         LUMBAR DISCECTOMY POSTERIOR  WITH FUSION INSTRUMENTATION  Progress Note    Radha Block  7/22/2024    Pre-op Diagnosis:   Neuritis or radiculitis due to rupture of lumbar intervertebral disc [M51.16]       Post-Op Diagnosis Codes:     * Neuritis or radiculitis due to rupture of lumbar intervertebral disc [M51.16]    Procedure/CPT® Codes:        Procedure(s):  Lumbar 2 to lumbar 3 laminectomy with fusion and instrumentation and removal of previous hardware              Surgeon(s):  Rigo Mathur MD    Anesthesia: General    Staff:   Cell Saver : Delks, Rylee  Circulator: Helen Reyna RN; Raisa Dodson RN  Scrub Person: Qiana Olmos  Vendor Representative: Alon Padilla  Assistant: Raissa Degroot CSA  Assistant: Raissa Degroot CSA      Estimated Blood Loss: 350 mL    Urine Voided: 350 mL    Specimens:                None          Drains:   Closed/Suction Drain 1 Inferior;Midline Back Bulb 15 Fr. (Active)       Urethral Catheter Silicone 16 Fr. (Active)       Findings: Severe lateral recess and foraminal stenosis with marked instability        Complications: None      Rigo Mathur MD     Date: 7/22/2024  Time: 10:40 EDT          Electronically signed by Rigo Mathur MD at 07/22/24 1040       Rigo Mathur MD at 07/22/24 0824  Version 1 of 1         Preoperative diagnosis: Lumbar stenosis with lumbar radiculopathy and neurogenic claudication L2-3    Postoperative diagnosis: same    Procedure performed: A bilateral L2-3 laminectomy, facetectomy and a transforaminal lumbar interbody fusion with instrumentation and removal of previous instrumentation    Spinal Surgery Levels Completed:1 Level     Surgeon: Rigo Mathur M.D.    Assistant: Raissa Degroot CFA who was instrumental in helping with hemostasis, visualization of neural structures, and retraction of neural structures.  Her skilled assistance was necessary for the success of this case    Indications for procedure: This patient has been having  severe pain in the legs and the back.  Imaging shows severe stenosis with a grade 1 spondylolisthesis at L2-3.  There has been no improvement with nonsurgical treatment and the patient elected to proceed with surgery.    Operative summary: After induction of general anesthesia with intravenous agents patient was intubated and placed on the operating table in the prone position.  All pressure points were padded including peripheral points of entrapment.  The back was then prepped with ChloraPrep.  After a 3 minute drying time the back was draped with Ioban, towels, half sheets and a split sheet.    An incision was made in the posterior iliac crest ridge and a Jamshidi needle was placed into the marrow cavity and 15 mL of marrow were aspirated and placed over Mastergraft.     An incision was then made in the skin in the midline.  The dissection was carried down to the thoracolumbar fascia which was opened in the midline. The muscles were stripped off the L2 laminar arch and spinous process.  Following this the entire spinous process of L2 was removed.   Following this the laminar arch of L2 was removed by thinning it down with the Midas Jose Eduardo drill and cracking it directly back off the dura.  We then carried the dissection down along the lateral aspect of the spine exposing the previous instrumentation.  The tops of the screws were removed and then the rods were removed.  I was able to test the movement between each of the levels.  There was no movement at all at L4-5.  As a result I elected to remove the screws at L5.  At L3-4 I thought it was pretty solid as well but there was a hint of movement there.  I really could not tell for sure because of the scar tissue.  Consequently I elected to leave the screws at L3 and L4 and attach at the very least for better immobilization at L2-3.  Once the dura was exposed at L2 the remainder of the operation was done under the high power magnification of the operating microscope  using microsurgical technique and microsurgical instrumentation.  The Penfield 4 was used to dissect the dura and the nerve root off of the medial pedicle of L2 and L3 bilaterally.  A combination of the Kerrison's and the Midas Jose Eduardo were used to open the lateral recess out to the level of the medial pedicle.  This was both at the top and the lower decompressed level.  Once this was done the medial aspect of the facet was removed in order to open the superior foramen.  Following this each of the 4 nerve roots were checked to be sure they were decompressed.  Once the nerve roots were thoroughly decompressed the dural sac was then mobilized medially and the disc was exposed.  The disc was incised and disc material was removed from the disc space using the down-biting curettes and the pituitary ronguers.  Once the disc was emptied as much as possible tawny were inserted into the disc space to clear the cartilaginous endplate completely and make a space for the cages.  I was then able to take 2 Medtronic Catalyft cages measuring  23 mm in length and place them into the disc space.  The Mastergraft and marrow as well as locally harvested bone were also placed into the disc space.  The cages were then expanded until they were tight in the disc space and had produced a lordosis.    Then 4.5 mm by 45 mm screws were placed into the pedicles of L2 bilaterally.  Those pedicles were extremely small.  I did breach the medial wall of the pedicle but the threads were not protruding into the spinal canal.  I felt this was the best possible outcome I could accomplish with a small pedicles.  I left them in place.    Finally intraoperative x-ray confirmed good placement of the hardware and the screws were then connected with a vanessa.  Following this the area was copiously irrigated with antibiotic solution and then another dose of Kefzol was given prior to closure.  The paraspinous musculature was injected with 100 mL of 1/8% Marcaine  with 1:200,000 epinephrine solution.  A drain was placed in the epidural space and tunneled subcutaneously.  The incision was then closed in layers dressed, and the patient was taken to the recovery room in stable condition.  There were no apparent complications and sponge, instrument and needle counts were correct at the end of the procedure.     Electronically signed by Rigo Mathur MD at 07/22/24 1045          Physician Progress Notes (last 72 hours)        Jw Talbot APRN at 07/25/24 1030          [facility] THORACIC/LUMBAR NEUROSURGERY POSTOP NOTE      CC: POD #3 status post L2-3 laminectomy with fusion and removal of previous hardware.      Subjective     Interval History: No issues overnight.  She has been tolerating full liquid diet.  No significant abdominal pain or tenderness.  Passing flatus without bowel movement.      Objective     Vital signs in last 24 hours:  Temp:  [97.5 °F (36.4 °C)-99.5 °F (37.5 °C)] 98.6 °F (37 °C)  Heart Rate:  [66-72] 66  Resp:  [17-20] 18  BP: (106-148)/(64-79) 111/68    Intake/Output this shift:  I/O this shift:  In: 120 [P.O.:120]  Out: -         LABS:  .  Results from last 7 days   Lab Units 07/25/24  0531 07/24/24  0946 07/23/24  0545   WBC 10*3/mm3 13.00* 13.15* 21.96*   HEMOGLOBIN g/dL 11.4* 11.5* 12.8   HEMATOCRIT % 34.0 34.5 38.5   PLATELETS 10*3/mm3 185 176 214     .  Results from last 7 days   Lab Units 07/23/24  0545   SODIUM mmol/L 133*   POTASSIUM mmol/L 4.2   CHLORIDE mmol/L 104   CO2 mmol/L 24.0   BUN mg/dL 9   CREATININE mg/dL 0.54*   CALCIUM mg/dL 8.1*   GLUCOSE mg/dL 107*         IMAGING STUDIES:  No new neuroimaging    I personally viewed and interpreted the patient's labs, medications and chart.    Meds reviewed/changed: Yes    Current Facility-Administered Medications:     acetaminophen (TYLENOL) tablet 1,000 mg, 1,000 mg, Oral, Q8H, Rigo Mathur MD, 1,000 mg at 07/25/24 0608    acetaminophen (TYLENOL) tablet 650 mg, 650 mg, Oral, Q4H PRN  **OR** [DISCONTINUED] acetaminophen (TYLENOL) 160 MG/5ML oral solution 650 mg, 650 mg, Oral, Q4H PRN **OR** [DISCONTINUED] acetaminophen (TYLENOL) suppository 650 mg, 650 mg, Rectal, Q4H PRN, Rigo Mathur MD    albuterol (PROVENTIL) nebulizer solution 0.083% 2.5 mg/3mL, 2.5 mg, Nebulization, Q4H PRN, Rigo Mathur MD    [DISCONTINUED] sennosides-docusate (PERICOLACE) 8.6-50 MG per tablet 2 tablet, 2 tablet, Oral, BID PRN **AND** polyethylene glycol (MIRALAX) packet 17 g, 17 g, Oral, Daily PRN, 17 g at 07/25/24 0806 **AND** bisacodyl (DULCOLAX) EC tablet 5 mg, 5 mg, Oral, Daily PRN, 5 mg at 07/25/24 0806 **AND** bisacodyl (DULCOLAX) suppository 10 mg, 10 mg, Rectal, Daily PRN, Rigo Mathur MD    dilTIAZem CD (CARDIZEM CD) 24 hr capsule 180 mg, 180 mg, Oral, Q PM, Rigo Mathur MD, 180 mg at 07/24/24 1829    gabapentin (NEURONTIN) capsule 100 mg, 100 mg, Oral, TID, Rigo Mathur MD, 100 mg at 07/25/24 0806    guaiFENesin (MUCINEX) 12 hr tablet 1,200 mg, 1,200 mg, Oral, Q12H, Broderick Cornejo MD, 1,200 mg at 07/25/24 0806    methocarbamol (ROBAXIN) tablet 500 mg, 500 mg, Oral, TID PRN, Rigo Mathur MD, 500 mg at 07/24/24 2217    morphine injection 2 mg, 2 mg, Intravenous, Q3H PRN, 2 mg at 07/22/24 2014 **AND** naloxone (NARCAN) injection 0.4 mg, 0.4 mg, Intravenous, Q5 Min PRN, Rigo Mathur MD    ondansetron ODT (ZOFRAN-ODT) disintegrating tablet 4 mg, 4 mg, Oral, Q6H PRN, 4 mg at 07/25/24 0608 **OR** ondansetron (ZOFRAN) injection 4 mg, 4 mg, Intravenous, Q6H PRN, Rigo Mathur MD, 4 mg at 07/24/24 1217    ondansetron ODT (ZOFRAN-ODT) disintegrating tablet 4 mg, 4 mg, Translingual, Q8H PRN, Rigo Mathur MD    oxyCODONE (ROXICODONE) immediate release tablet 5 mg, 5 mg, Oral, Q4H PRN, Rigo Mathur MD, 5 mg at 07/25/24 0609    pantoprazole (PROTONIX) EC tablet 40 mg, 40 mg, Oral, BID AC, Rigo Mathur MD, 40 mg at 07/25/24 0609    Progesterone (PROMETRIUM) capsule 200 mg, 200 mg,  Oral, Daily, Rigo Mathur MD, 200 mg at 07/25/24 0806    senna (SENOKOT) tablet 2 tablet, 2 tablet, Oral, BID, Antione Jwkallie Delgadillo, KLARISSA, 2 tablet at 07/25/24 0806    sodium chloride 0.9 % flush 10 mL, 10 mL, Intravenous, PRN, Rigo Mathur MD    sodium chloride 0.9 % flush 3 mL, 3 mL, Intravenous, Q12H, Rigo Mathur MD, 3 mL at 07/25/24 0806    sodium chloride 0.9 % infusion 40 mL, 40 mL, Intravenous, PRN, Rigo Mathur MD    TRELEGY (Fluticasone-Umeclidin-Vilant)100-62.5-25 MCG/INH IN, 1 puff, Inhalation, Daily - RT, Broderick Cornejo MD, 1 puff at 07/25/24 0816  Laterally utilizing any bowel agents.  Changed Senokot to scheduled    Oxycodone 5 mg every 4 hours as needed: 3 doses yesterday, 2 doses today as of 6:09 AM   physical Exam:    General:   Awake, alert.  Back:    SLR negative  Abdomen:   Only very mildly tender.  Not significantly distended   Motor:   Normal muscle strength, bulk and tone in lower extremities.  No fasciculations, rigidity, spasticity, or abnormal movements.    Sensation:   Normal to light touch slava LEs except for mild right thigh numbness  Station and Gait:          Gait deferred  Extremities:   Wearing SCD      Assessment & Plan     ASSESSMENT:      Neuritis or radiculitis due to rupture of lumbar intervertebral disc    PAD (peripheral artery disease)    PAF (paroxysmal atrial fibrillation)    Essential hypertension    COPD (chronic obstructive pulmonary disease)    Factor V Leiden mutation    Alpha-1-antitrypsin deficiency    Lumbar spinal stenosis      Pleasant 64-year-old female day 3 status post L2-3 laminectomy with fusion and removal of previous hardware.  Continues to report improved symptoms in lower extremities with some mild right thigh numbness as expected.  No other significant acute issues.  Plan for possible discharge home tomorrow.      Abdominal pain  resolved.  No BM but continues to pass flatus.  She is eager to eat solid food at this point.  We will  "advance diet today.         Notify neurosurgery for abdominal pain, nausea, vomiting.    PLAN:       Advance to regular diet  Bowel regimen as needed, Senokot scheduled  Mobilize with nursing and PT  Utilize ice  Utilize p.o. pain meds/muscle relaxers  Encourage use of incentive spirometer  SCD/DVT prophylaxis  CBC in a.m.      I discussed the patient's findings and my recommendations with patient, family, and Dr. Mathur.     During patient visit, I utilized appropriate personal protective equipment including mask and gloves.  Mask used was standard procedure mask. Appropriate PPE was worn during the entire visit.  Hand hygiene was completed before and after.      LOS: 3 days       KLARISSA Beard  2024  12:15 EDT    \"Dictated utilizing Dragon dictation\".      Electronically signed by Jw Talbot APRN at 24 1220       Broderick Cornejo MD at 24 0822              Name: Radha Block ADMIT: 2024   : 1960  PCP: Bosler, James William III, MD    MRN: [id number] LOS: 3 days   AGE/SEX: 64 y.o. female  ROOM: Replaced by [facility]     Subjective   Subjective   Tolerating regular food.  No nausea or vomiting.  Breathing okay.      Objective   Objective   Vital Signs  Temp:  [97.5 °F (36.4 °C)-99.5 °F (37.5 °C)] 98.8 °F (37.1 °C)  Heart Rate:  [63-72] 72  Resp:  [17-20] 20  BP: (106-148)/(62-79) 126/64  SpO2:  [96 %-97 %] 97 %  on   ;   Device (Oxygen Therapy): room air  There is no height or weight on file to calculate BMI.  Physical Exam  Vitals and nursing note reviewed.   Constitutional:       General: She is not in acute distress.  Cardiovascular:      Rate and Rhythm: Normal rate and regular rhythm.   Pulmonary:      Effort: Pulmonary effort is normal.      Breath sounds: Normal breath sounds.   Abdominal:      General: Bowel sounds are normal.      Palpations: Abdomen is soft.      Tenderness: There is no abdominal tenderness.   Musculoskeletal:         General: No swelling. " "  Skin:     General: Skin is warm and dry.   Neurological:      Mental Status: She is alert. Mental status is at baseline.         Results Review:       I reviewed the patient's new clinical results.  Results from last 7 days   Lab Units 07/25/24  0531 07/24/24  0946 07/23/24  0545   WBC 10*3/mm3 13.00* 13.15* 21.96*   HEMOGLOBIN g/dL 11.4* 11.5* 12.8   PLATELETS 10*3/mm3 185 176 214     Results from last 7 days   Lab Units 07/23/24  0545   SODIUM mmol/L 133*   POTASSIUM mmol/L 4.2   CHLORIDE mmol/L 104   CO2 mmol/L 24.0   BUN mg/dL 9   CREATININE mg/dL 0.54*   GLUCOSE mg/dL 107*   EGFR mL/min/1.73 101.7       Results from last 7 days   Lab Units 07/23/24  0545   CALCIUM mg/dL 8.1*       No results found for: \"HGBA1C\", \"POCGLU\"    I have personally reviewed all medications:  Scheduled Medications  acetaminophen, 1,000 mg, Oral, Q8H  dilTIAZem CD, 180 mg, Oral, Q PM  gabapentin, 100 mg, Oral, TID  guaiFENesin, 1,200 mg, Oral, Q12H  pantoprazole, 40 mg, Oral, BID AC  Progesterone, 200 mg, Oral, Daily  senna, 2 tablet, Oral, BID  sodium chloride, 3 mL, Intravenous, Q12H  Fluticasone-Umeclidin-Vilant, 1 puff, Inhalation, Daily - RT    Infusions     Diet  Diet: Regular/House; Fluid Consistency: Thin (IDDSI 0)    I have personally reviewed:  [x]  Laboratory   [x]  Microbiology   [x]  Radiology   []  EKG/Telemetry  []  Cardiology/Vascular   []  Pathology    []  Records      Assessment/Plan     Active Hospital Problems    Diagnosis  POA    **Neuritis or radiculitis due to rupture of lumbar intervertebral disc [M51.16]  Yes    Lumbar spinal stenosis [M48.061]  Yes    Alpha-1-antitrypsin deficiency [E88.01]  Yes    Factor V Leiden mutation [D68.51]  Yes    COPD (chronic obstructive pulmonary disease) [J44.9]  Yes    Essential hypertension [I10]  Yes    PAD (peripheral artery disease) [I73.9]  Yes    PAF (paroxysmal atrial fibrillation) [I48.0]  Yes      Resolved Hospital Problems   No resolved problems to display.       64 " y.o. female who is s/p Lumbar 2 to lumbar 3 laminectomy with fusion and instrumentation and removal of previous hardware.    COPD and respiratory status remained stable.  She reports having gas pain yesterday improved with simethicone.  Still no bowel movement but no complaints of nausea and she is tolerating regular food.  Bowel regimen per surgery.    No objection to discharge from my perspective.  Will sign off.       Broderick Cornejo MD  [facility]  07/25/24  08:22 EDT      Electronically signed by Broderick Cornejo MD at 07/25/24 1310       ShoptawJw APRN at 07/24/24 1130       Attestation signed by Rigo Mathur MD at 07/24/24 1426    I have reviewed this documentation and agree.                  [facility] THORACIC/LUMBAR NEUROSURGERY POSTOP NOTE      CC: POD #2 status post L2-3 laminectomy with fusion and removal of previous hardware.      Subjective     Interval History:   Acute issues overnight.  Mild discomfort improving.  Does not report any distention.  Passing gas.  No BM yet.  Plans to full liquid diet.  No new issues with leg symptoms.  Continues to have some mild right anterior thigh numbness.    Objective     Vital signs in last 24 hours:  Temp:  [98.2 °F (36.8 °C)-98.6 °F (37 °C)] 98.3 °F (36.8 °C)  Heart Rate:  [62-70] 63  Resp:  [16-18] 18  BP: (108-125)/(62-79) 117/62    Intake/Output this shift:  I/O this shift:  In: -   Out: 950 [Urine:900; Drains:50]    CHERI 90 cc in last 24 hours    LABS:  .  Results from last 7 days   Lab Units 07/24/24  0946 07/23/24  0545   WBC 10*3/mm3 13.15* 21.96*   HEMOGLOBIN g/dL 11.5* 12.8   HEMATOCRIT % 34.5 38.5   PLATELETS 10*3/mm3 176 214     .  Results from last 7 days   Lab Units 07/23/24  0545   SODIUM mmol/L 133*   POTASSIUM mmol/L 4.2   CHLORIDE mmol/L 104   CO2 mmol/L 24.0   BUN mg/dL 9   CREATININE mg/dL 0.54*   CALCIUM mg/dL 8.1*   GLUCOSE mg/dL 107*         IMAGING STUDIES:  No neuroimaging    I personally viewed and  interpreted the patient's labs, medications and chart.    Meds reviewed/changed: Yes    Current Facility-Administered Medications:     acetaminophen (TYLENOL) tablet 1,000 mg, 1,000 mg, Oral, Q8H, Rigo Mathur MD, 1,000 mg at 07/24/24 1406    acetaminophen (TYLENOL) tablet 650 mg, 650 mg, Oral, Q4H PRN **OR** [DISCONTINUED] acetaminophen (TYLENOL) 160 MG/5ML oral solution 650 mg, 650 mg, Oral, Q4H PRN **OR** [DISCONTINUED] acetaminophen (TYLENOL) suppository 650 mg, 650 mg, Rectal, Q4H PRN, Rigo Mathur MD    albuterol (PROVENTIL) nebulizer solution 0.083% 2.5 mg/3mL, 2.5 mg, Nebulization, Q4H PRN, Rigo Mathur MD    [DISCONTINUED] sennosides-docusate (PERICOLACE) 8.6-50 MG per tablet 2 tablet, 2 tablet, Oral, BID PRN **AND** polyethylene glycol (MIRALAX) packet 17 g, 17 g, Oral, Daily PRN, 17 g at 07/24/24 1217 **AND** bisacodyl (DULCOLAX) EC tablet 5 mg, 5 mg, Oral, Daily PRN **AND** bisacodyl (DULCOLAX) suppository 10 mg, 10 mg, Rectal, Daily PRN, Rigo Mathur MD    dilTIAZem CD (CARDIZEM CD) 24 hr capsule 180 mg, 180 mg, Oral, Q PM, Rigo Mathur MD, 180 mg at 07/23/24 1735    gabapentin (NEURONTIN) capsule 100 mg, 100 mg, Oral, TID, Rigo Mathur MD, 100 mg at 07/24/24 1406    guaiFENesin (MUCINEX) 12 hr tablet 1,200 mg, 1,200 mg, Oral, Q12H, Broderick Cornejo MD, 1,200 mg at 07/24/24 0939    methocarbamol (ROBAXIN) tablet 500 mg, 500 mg, Oral, TID PRN, Rigo Mathur MD, 500 mg at 07/23/24 2334    morphine injection 2 mg, 2 mg, Intravenous, Q3H PRN, 2 mg at 07/22/24 2014 **AND** naloxone (NARCAN) injection 0.4 mg, 0.4 mg, Intravenous, Q5 Min PRN, Rigo Mathur MD    ondansetron ODT (ZOFRAN-ODT) disintegrating tablet 4 mg, 4 mg, Oral, Q6H PRN **OR** ondansetron (ZOFRAN) injection 4 mg, 4 mg, Intravenous, Q6H PRN, Rigo Mathur MD, 4 mg at 07/24/24 1217    ondansetron ODT (ZOFRAN-ODT) disintegrating tablet 4 mg, 4 mg, Translingual, Q8H PRN, Rigo Mathur MD    oxyCODONE (ROXICODONE)  immediate release tablet 5 mg, 5 mg, Oral, Q4H PRN, Rigo Mathur MD, 5 mg at 07/24/24 1217    pantoprazole (PROTONIX) EC tablet 40 mg, 40 mg, Oral, BID AC, Rigo Mathur MD, 40 mg at 07/24/24 0941    Progesterone (PROMETRIUM) capsule 200 mg, 200 mg, Oral, Daily, Rigo Mathur MD, 200 mg at 07/24/24 0940    senna (SENOKOT) tablet 2 tablet, 2 tablet, Oral, BID, Jw Talbot, APRN    sodium chloride 0.9 % flush 10 mL, 10 mL, Intravenous, PRN, Rigo Mathur MD    sodium chloride 0.9 % flush 3 mL, 3 mL, Intravenous, Q12H, Rigo Mathur MD, 3 mL at 07/24/24 0941    sodium chloride 0.9 % infusion 40 mL, 40 mL, Intravenous, PRN, Rigo Mathur MD    TRELEGY (Fluticasone-Umeclidin-Vilant)100-62.5-25 MCG/INH IN, 1 puff, Inhalation, Daily - RT, Broderick Cornejo MD, 1 puff at 07/24/24 0852  Laterally utilizing any bowel agents.  Changed Senokot to scheduled    Oxycodone 5 mg every 4 hours as needed: 4 doses yesterday and 2 doses today as of 12:17 PM  Physical Exam:    General:   Awake, alert.  Back:    SLR negative  Abdomen:   Only very mildly tender.  Not significantly distended   Motor:   Normal muscle strength, bulk and tone in lower extremities.  No fasciculations, rigidity, spasticity, or abnormal movements.  Reflexes:   2+ in the lower extremities. no clonus  Sensation:   Normal to light touch slava LEs except for mild right thigh numbness  Station and Gait:          Gait deferred  Extremities:   Wearing SCD      Assessment & Plan     ASSESSMENT:      Neuritis or radiculitis due to rupture of lumbar intervertebral disc    PAD (peripheral artery disease)    PAF (paroxysmal atrial fibrillation)    Essential hypertension    COPD (chronic obstructive pulmonary disease)    Factor V Leiden mutation    Alpha-1-antitrypsin deficiency    Lumbar spinal stenosis      Pleasant 64-year-old female day 2 status post L2-3 laminectomy with fusion and removal of previous hardware.  Continues to report improved  "symptoms in lower extremities with some mild right thigh numbness as expected.       Abdominal pain improving and now almost resolved.  Abdomen is soft and nontender to palpation.  She is passing flatus and is eager to eat.  Will get her changed to full liquid diet.  Fluids.  Remove CHERI drain today.     Notify neurosurgery for abdominal pain, nausea, vomiting.    PLAN:     Remove CHERI drain  Full liquid diet  DC IV fluids  Bowel regimen as needed, Senokot scheduled  Mobilize with nursing and PT  Utilize ice  Utilize p.o. pain meds/muscle relaxers  Encourage use of incentive spirometer  SCD/DVT prophylaxis        I discussed the patient's findings and my recommendations with patient, family, and Dr. Mathur.     During patient visit, I utilized appropriate personal protective equipment including mask and gloves.  Mask used was standard procedure mask. Appropriate PPE was worn during the entire visit.  Hand hygiene was completed before and after.      LOS: 2 days       [facility], APRN  2024  14:12 EDT    \"Dictated utilizing Dragon dictation\".      Electronically signed by Rigo Mathur MD at 24 1426       Broderick Cornejo MD at 24 0829              Name: Radha Block ADMIT: 2024   : 1960  PCP: Bosler, James William III, MD    MRN: [id number] LOS: 2 days   AGE/SEX: 64 y.o. female  ROOM: [facility]     Subjective   Subjective   No new complaints.  Drain has been removed.  Tolerating a diet.        Objective   Objective   Vital Signs  Temp:  [98.2 °F (36.8 °C)-98.8 °F (37.1 °C)] 98.5 °F (36.9 °C)  Heart Rate:  [62-72] 64  Resp:  [16-18] 16  BP: (108-125)/(65-79) 125/68  SpO2:  [97 %-100 %] 98 %  on   ;   Device (Oxygen Therapy): room air  There is no height or weight on file to calculate BMI.  Physical Exam  Vitals and nursing note reviewed.   Constitutional:       General: She is not in acute distress.  Cardiovascular:      Rate and Rhythm: Normal rate and regular rhythm. " "  Pulmonary:      Effort: Pulmonary effort is normal.      Breath sounds: Normal breath sounds.   Abdominal:      General: Bowel sounds are normal.      Palpations: Abdomen is soft.      Tenderness: There is no abdominal tenderness.   Musculoskeletal:         General: No swelling.   Skin:     General: Skin is warm and dry.   Neurological:      Mental Status: She is alert. Mental status is at baseline.         Results Review:       I reviewed the patient's new clinical results.  Results from last 7 days   Lab Units 07/23/24  0545   WBC 10*3/mm3 21.96*   HEMOGLOBIN g/dL 12.8   PLATELETS 10*3/mm3 214     Results from last 7 days   Lab Units 07/23/24  0545   SODIUM mmol/L 133*   POTASSIUM mmol/L 4.2   CHLORIDE mmol/L 104   CO2 mmol/L 24.0   BUN mg/dL 9   CREATININE mg/dL 0.54*   GLUCOSE mg/dL 107*   EGFR mL/min/1.73 101.7       Results from last 7 days   Lab Units 07/23/24  0545   CALCIUM mg/dL 8.1*       No results found for: \"HGBA1C\", \"POCGLU\"    I have personally reviewed all medications:  Scheduled Medications  acetaminophen, 1,000 mg, Oral, Q8H  ceFAZolin, 2,000 mg, Intravenous, Q8H  dilTIAZem CD, 180 mg, Oral, Q PM  gabapentin, 100 mg, Oral, TID  guaiFENesin, 1,200 mg, Oral, Q12H  pantoprazole, 40 mg, Oral, BID AC  Progesterone, 200 mg, Oral, Daily  sodium chloride, 3 mL, Intravenous, Q12H  Fluticasone-Umeclidin-Vilant, 1 puff, Inhalation, Daily - RT    Infusions  sodium chloride, 100 mL/hr, Last Rate: 100 mL/hr (07/23/24 1442)    Diet  NPO Diet NPO Type: Strict NPO, Sips with Meds    I have personally reviewed:  [x]  Laboratory   [x]  Microbiology   [x]  Radiology   []  EKG/Telemetry  []  Cardiology/Vascular   []  Pathology    []  Records      Assessment/Plan     Active Hospital Problems    Diagnosis  POA    **Neuritis or radiculitis due to rupture of lumbar intervertebral disc [M51.16]  Yes    Lumbar spinal stenosis [M48.061]  Yes    Alpha-1-antitrypsin deficiency [E88.01]  Yes    Factor V Leiden mutation " [D68.51]  Yes    COPD (chronic obstructive pulmonary disease) [J44.9]  Yes    Essential hypertension [I10]  Yes    PAD (peripheral artery disease) [I73.9]  Yes    PAF (paroxysmal atrial fibrillation) [I48.0]  Yes      Resolved Hospital Problems   No resolved problems to display.       64 y.o. female who is s/p Lumbar 2 to lumbar 3 laminectomy with fusion and instrumentation and removal of previous hardware.    Respiratory status has been very stable postoperatively.  Continue home inhalants.  Her WBC is down significantly today.  Leukocytosis yesterday almost certainly due to dexamethasone and received per anesthesia.  Per patient request will recheck in the morning.    No objections to discharge to follow-up with PCP.       Broderick Cornejo MD  [facility]  07/24/24  08:29 EDT      Electronically signed by Broderick Cornejo MD at 07/24/24 1455       Jw Talbot APRN at 07/23/24 1231       Attestation signed by Rigo Mathur MD at 07/23/24 1642    I have reviewed this documentation and agree.                  [facility] THORACIC/LUMBAR NEUROSURGERY POSTOP NOTE      CC: POD #1 status post L2-3 laminectomy with fusion and removal of previous hardware.      Subjective     Interval History: No acute issues overnight.  Has some very minimal abdominal discomfort.  Possible ileus forming but nothing definite at this moment.  Currently NPO.  Reports improvement in leg symptoms however has some right anterior thigh numbness.      Objective     Vital signs in last 24 hours:  Temp:  [97.1 °F (36.2 °C)-98.8 °F (37.1 °C)] 98.8 °F (37.1 °C)  Heart Rate:  [70-80] 72  Resp:  [16-18] 18  BP: (102-132)/(66-90) 115/66    Intake/Output this shift:  No intake/output data recorded.    CHERI 195 cc since placement    LABS:  .  Results from last 7 days   Lab Units 07/23/24  0545   WBC 10*3/mm3 21.96*   HEMOGLOBIN g/dL 12.8   HEMATOCRIT % 38.5   PLATELETS 10*3/mm3 214     .  Results from last 7 days   Lab Units  07/23/24  0545   SODIUM mmol/L 133*   POTASSIUM mmol/L 4.2   CHLORIDE mmol/L 104   CO2 mmol/L 24.0   BUN mg/dL 9   CREATININE mg/dL 0.54*   CALCIUM mg/dL 8.1*   GLUCOSE mg/dL 107*         IMAGING STUDIES:  X-ray lumbar spine 7/23/2024:  Shows interval placement of L2-3 interbody spacer and L2 bilateral pedicle screws.  Previously present at L3 and 4 or bilateral pedicle screws and L3-5 interbody spacers.  Now the rods extend from L2-L4.  The hardware is in good position evidence of malfunction.  Bowel gas noted.    I personally viewed and interpreted the patient's labs, imaging study, medications and chart.    Meds reviewed/changed: Yes    Current Facility-Administered Medications:     acetaminophen (TYLENOL) tablet 1,000 mg, 1,000 mg, Oral, Q8H, Rigo Mathur MD, 1,000 mg at 07/23/24 0546    acetaminophen (TYLENOL) tablet 650 mg, 650 mg, Oral, Q4H PRN **OR** [DISCONTINUED] acetaminophen (TYLENOL) 160 MG/5ML oral solution 650 mg, 650 mg, Oral, Q4H PRN **OR** [DISCONTINUED] acetaminophen (TYLENOL) suppository 650 mg, 650 mg, Rectal, Q4H PRN, Rigo Mathur MD    albuterol (PROVENTIL) nebulizer solution 0.083% 2.5 mg/3mL, 2.5 mg, Nebulization, Q4H PRN, Rigo Mathur MD    sennosides-docusate (PERICOLACE) 8.6-50 MG per tablet 2 tablet, 2 tablet, Oral, BID PRN **AND** polyethylene glycol (MIRALAX) packet 17 g, 17 g, Oral, Daily PRN **AND** bisacodyl (DULCOLAX) EC tablet 5 mg, 5 mg, Oral, Daily PRN **AND** bisacodyl (DULCOLAX) suppository 10 mg, 10 mg, Rectal, Daily PRN, Rigo Mathur MD    ceFAZolin 2000 mg IVPB in 100 mL NS (MBP), 2,000 mg, Intravenous, Q8H, Rigo Mathur MD, 2,000 mg at 07/23/24 0546    dilTIAZem CD (CARDIZEM CD) 24 hr capsule 180 mg, 180 mg, Oral, Q PM, Rigo Mathur MD, 180 mg at 07/22/24 1744    gabapentin (NEURONTIN) capsule 100 mg, 100 mg, Oral, TID, Rigo Mathur MD, 100 mg at 07/23/24 0915    guaiFENesin (MUCINEX) 12 hr tablet 1,200 mg, 1,200 mg, Oral, Q12H, Broderick Cornejo,  MD, 1,200 mg at 07/23/24 0915    methocarbamol (ROBAXIN) tablet 500 mg, 500 mg, Oral, TID PRN, Rigo Mathur MD, 500 mg at 07/22/24 2014    morphine injection 2 mg, 2 mg, Intravenous, Q3H PRN, 2 mg at 07/22/24 2014 **AND** naloxone (NARCAN) injection 0.4 mg, 0.4 mg, Intravenous, Q5 Min PRN, Rigo Mathur MD    ondansetron ODT (ZOFRAN-ODT) disintegrating tablet 4 mg, 4 mg, Oral, Q6H PRN **OR** ondansetron (ZOFRAN) injection 4 mg, 4 mg, Intravenous, Q6H PRN, Rigo Mathur MD, 4 mg at 07/22/24 2024    ondansetron ODT (ZOFRAN-ODT) disintegrating tablet 4 mg, 4 mg, Translingual, Q8H PRN, Rigo Mathur MD    oxyCODONE (ROXICODONE) immediate release tablet 5 mg, 5 mg, Oral, Q4H PRN, Rigo Mathur MD, 5 mg at 07/23/24 1120    pantoprazole (PROTONIX) EC tablet 40 mg, 40 mg, Oral, BID AC, Rigo Mathur MD, 40 mg at 07/23/24 0546    Progesterone (PROMETRIUM) capsule 200 mg, 200 mg, Oral, Daily, Rigo Mathur MD, 200 mg at 07/23/24 0911    sodium chloride 0.9 % flush 10 mL, 10 mL, Intravenous, PRN, Rigo Mathur MD    sodium chloride 0.9 % flush 3 mL, 3 mL, Intravenous, Q12H, Rigo Mathur MD, 3 mL at 07/23/24 0911    sodium chloride 0.9 % infusion 40 mL, 40 mL, Intravenous, PRN, Rigo Mathur MD    sodium chloride 0.9 % infusion, 100 mL/hr, Intravenous, Continuous, Jw Talbot APRN    TRELEGY (Fluticasone-Umeclidin-Vilant)100-62.5-25 MCG/INH IN, 1 puff, Inhalation, Daily - RT, Broderick Cornejo MD, 1 puff at 07/23/24 1117      Physical Exam:    General:   Awake, alert.  Back:    SLR negative  Abdomen:   Only very mildly tender.  Not significantly distended   Motor:   Normal muscle strength, bulk and tone in lower extremities.  No fasciculations, rigidity, spasticity, or abnormal movements.  Reflexes:   2+ in the lower extremities. no clonus  Sensation:   Normal to light touch slava LEs except for mild right thigh numbness  Station and Gait:          Gait deferred  Extremities:   Wearing  "SCD      Assessment & Plan     ASSESSMENT:      Neuritis or radiculitis due to rupture of lumbar intervertebral disc    PAD (peripheral artery disease)    PAF (paroxysmal atrial fibrillation)    Essential hypertension    COPD (chronic obstructive pulmonary disease)    Factor V Leiden mutation    Alpha-1-antitrypsin deficiency    Lumbar spinal stenosis      Pleasant 64-year-old female day 1 status post L2-3 laminectomy with fusion and removal of previous hardware.  She denies any significant issues with regard to pain but does have mild right thigh numbness.  Some mild abdominal discomfort with very mild distention.  She has had postop abdominal issues in the past but there does not appear to be a definite ileus occurring at the moment.  We will continue to watch closely and keep her n.p.o. with IV fluids.  Observe for increasing abdominal pain/vomiting and notify neurosurgery if this occurs.    PLAN:     Keep CHERI drain  N.p.o. for now  Continue IV fluids-change to normal saline  Bowel regimen as needed  Mobilize with nursing and PT  Utilize ice  Utilize p.o. pain meds/muscle relaxers  Encourage use of incentive spirometer  SCD/DVT prophylaxis        I discussed the patient's findings and my recommendations with patient, family, and Dr. Mathur.     During patient visit, I utilized appropriate personal protective equipment including mask and gloves.  Mask used was standard procedure mask. Appropriate PPE was worn during the entire visit.  Hand hygiene was completed before and after.      LOS: 1 day       [facility]w [facility], APRN  2024  12:31 EDT    \"Dictated utilizing Dragon dictation\".      Electronically signed by Rigo Mathur MD at 24 1642       Broderick Cornejo MD at 24 0849              Name: Radha HANDY Nick Block ADMIT: 2024   : 1960  PCP: Bosler, James William III, MD    MRN: [id number] LOS: 1 days   AGE/SEX: 64 y.o. female  ROOM: [facility]/     Subjective   Subjective   Reports " "feeling well.  No new complaints.  Tolerating diet.  Breathing comfortably.    Objective   Objective   Vital Signs  Temp:  [97.1 °F (36.2 °C)-98.6 °F (37 °C)] 97.1 °F (36.2 °C)  Heart Rate:  [69-83] 70  Resp:  [14-18] 18  BP: (102-138)/(67-90) 119/68  SpO2:  [93 %-100 %] 95 %  on  Flow (L/min):  [2-4] 2;   Device (Oxygen Therapy): room air  There is no height or weight on file to calculate BMI.  Physical Exam  Vitals and nursing note reviewed.   Constitutional:       General: She is not in acute distress.  Cardiovascular:      Rate and Rhythm: Normal rate and regular rhythm.   Pulmonary:      Effort: Pulmonary effort is normal.      Breath sounds: Normal breath sounds.   Abdominal:      General: Bowel sounds are normal.      Palpations: Abdomen is soft.      Tenderness: There is no abdominal tenderness.   Musculoskeletal:         General: No swelling.   Skin:     General: Skin is warm and dry.   Neurological:      Mental Status: She is alert. Mental status is at baseline.         Results Review:       I reviewed the patient's new clinical results.  Results from last 7 days   Lab Units 07/23/24  0545 07/16/24  1146   WBC 10*3/mm3 21.96* 23.93*   HEMOGLOBIN g/dL 12.8 14.7   PLATELETS 10*3/mm3 214 286     Results from last 7 days   Lab Units 07/23/24  0545 07/16/24  1146   SODIUM mmol/L 133* 136   POTASSIUM mmol/L 4.2 4.0   CHLORIDE mmol/L 104 105   CO2 mmol/L 24.0 20.5*   BUN mg/dL 9 22   CREATININE mg/dL 0.54* 0.64   GLUCOSE mg/dL 107* 92   EGFR mL/min/1.73 101.7 97.6       Results from last 7 days   Lab Units 07/23/24  0545 07/16/24  1146   CALCIUM mg/dL 8.1* 8.5*       No results found for: \"HGBA1C\", \"POCGLU\"    I have personally reviewed all medications:  Scheduled Medications  acetaminophen, 1,000 mg, Oral, Q8H  ceFAZolin, 2,000 mg, Intravenous, Q8H  dilTIAZem CD, 180 mg, Oral, Q PM  gabapentin, 100 mg, Oral, TID  guaiFENesin, 1,200 mg, Oral, Q12H  pantoprazole, 40 mg, Oral, BID AC  Progesterone, 200 mg, Oral, " Daily  sodium chloride, 3 mL, Intravenous, Q12H  Fluticasone-Umeclidin-Vilant, 1 puff, Inhalation, Daily - RT    Infusions  sodium chloride 0.9 % with KCl 20 mEq, 100 mL/hr, Last Rate: 100 mL/hr (07/22/24 1436)    Diet  Diet: Regular/House; Fluid Consistency: Thin (IDDSI 0)    I have personally reviewed:  [x]  Laboratory   [x]  Microbiology   [x]  Radiology   []  EKG/Telemetry  []  Cardiology/Vascular   []  Pathology    []  Records      Assessment/Plan     Active Hospital Problems    Diagnosis  POA    **Neuritis or radiculitis due to rupture of lumbar intervertebral disc [M51.16]  Yes    Lumbar spinal stenosis [M48.061]  Yes    Alpha-1-antitrypsin deficiency [E88.01]  Yes    Factor V Leiden mutation [D68.51]  Yes    COPD (chronic obstructive pulmonary disease) [J44.9]  Yes    Essential hypertension [I10]  Yes    PAD (peripheral artery disease) [I73.9]  Yes    PAF (paroxysmal atrial fibrillation) [I48.0]  Yes      Resolved Hospital Problems   No resolved problems to display.       64 y.o. female who is s/p Lumbar 2 to lumbar 3 laminectomy with fusion and instrumentation and removal of previous hardware.    Medically seems stable since yesterday.  Respiratory status is stable.  Lungs clear.  Continue home inhaler therapy.  Renal function stable.  WBC is elevated and it seems has been elevated for a couple of years now.  Prior to admission she has been on steroids somewhat chronically but she reports being off of them now for a week.  Reviewed surgical records and does not seem she received any steroids during OR or since admission.  This likely is reactive but will continue to monitor.  Assuming she does not go home on steroids would like this rechecked in a week or 2 and consider referral to hematology if remains elevated.  She has seen the [facility] in the past for history of factor V Leiden.  She should resume anticoagulation when neurosurgery feels safe to do so.     Update - It seems she got dexamethasone 6 mg IV  yesterday morning per anesthesia.  This likely causing her leukocytosis.  Repeat WBC in a.m.      Broderick Cornejo MD  [facility]  07/23/24  08:49 EDT      Electronically signed by Broderick Cornejo MD at 07/23/24 6217

## 2024-07-25 NOTE — PROGRESS NOTES
[facility] THORACIC/LUMBAR NEUROSURGERY POSTOP NOTE      CC: POD #3 status post L2-3 laminectomy with fusion and removal of previous hardware.      Subjective     Interval History: No issues overnight.  She has been tolerating full liquid diet.  No significant abdominal pain or tenderness.  Passing flatus without bowel movement.      Objective     Vital signs in last 24 hours:  Temp:  [97.5 °F (36.4 °C)-99.5 °F (37.5 °C)] 98.6 °F (37 °C)  Heart Rate:  [66-72] 66  Resp:  [17-20] 18  BP: (106-148)/(64-79) 111/68    Intake/Output this shift:  I/O this shift:  In: 120 [P.O.:120]  Out: -         LABS:  .  Results from last 7 days   Lab Units 07/25/24  0531 07/24/24  0946 07/23/24  0545   WBC 10*3/mm3 13.00* 13.15* 21.96*   HEMOGLOBIN g/dL 11.4* 11.5* 12.8   HEMATOCRIT % 34.0 34.5 38.5   PLATELETS 10*3/mm3 185 176 214     .  Results from last 7 days   Lab Units 07/23/24  0545   SODIUM mmol/L 133*   POTASSIUM mmol/L 4.2   CHLORIDE mmol/L 104   CO2 mmol/L 24.0   BUN mg/dL 9   CREATININE mg/dL 0.54*   CALCIUM mg/dL 8.1*   GLUCOSE mg/dL 107*         IMAGING STUDIES:  No new neuroimaging    I personally viewed and interpreted the patient's labs, medications and chart.    Meds reviewed/changed: Yes    Current Facility-Administered Medications:     acetaminophen (TYLENOL) tablet 1,000 mg, 1,000 mg, Oral, Q8H, Rigo Mathur MD, 1,000 mg at 07/25/24 0608    acetaminophen (TYLENOL) tablet 650 mg, 650 mg, Oral, Q4H PRN **OR** [DISCONTINUED] acetaminophen (TYLENOL) 160 MG/5ML oral solution 650 mg, 650 mg, Oral, Q4H PRN **OR** [DISCONTINUED] acetaminophen (TYLENOL) suppository 650 mg, 650 mg, Rectal, Q4H PRN, Rigo Mathur MD    albuterol (PROVENTIL) nebulizer solution 0.083% 2.5 mg/3mL, 2.5 mg, Nebulization, Q4H PRN, Rigo Mathur MD    [DISCONTINUED] sennosides-docusate (PERICOLACE) 8.6-50 MG per tablet 2 tablet, 2 tablet, Oral, BID PRN **AND** polyethylene glycol (MIRALAX) packet 17 g, 17 g, Oral, Daily PRN, 17 g at 07/25/24  0806 **AND** bisacodyl (DULCOLAX) EC tablet 5 mg, 5 mg, Oral, Daily PRN, 5 mg at 07/25/24 0806 **AND** bisacodyl (DULCOLAX) suppository 10 mg, 10 mg, Rectal, Daily PRN, Rigo Mathur MD    dilTIAZem CD (CARDIZEM CD) 24 hr capsule 180 mg, 180 mg, Oral, Q PM, Rigo Mathur MD, 180 mg at 07/24/24 1829    gabapentin (NEURONTIN) capsule 100 mg, 100 mg, Oral, TID, Rigo Mathur MD, 100 mg at 07/25/24 0806    guaiFENesin (MUCINEX) 12 hr tablet 1,200 mg, 1,200 mg, Oral, Q12H, Broderick Cornejo MD, 1,200 mg at 07/25/24 0806    methocarbamol (ROBAXIN) tablet 500 mg, 500 mg, Oral, TID PRN, Rigo Mathur MD, 500 mg at 07/24/24 2217    morphine injection 2 mg, 2 mg, Intravenous, Q3H PRN, 2 mg at 07/22/24 2014 **AND** naloxone (NARCAN) injection 0.4 mg, 0.4 mg, Intravenous, Q5 Min PRN, Rigo Mathur MD    ondansetron ODT (ZOFRAN-ODT) disintegrating tablet 4 mg, 4 mg, Oral, Q6H PRN, 4 mg at 07/25/24 0608 **OR** ondansetron (ZOFRAN) injection 4 mg, 4 mg, Intravenous, Q6H PRN, Rigo Mathur MD, 4 mg at 07/24/24 1217    ondansetron ODT (ZOFRAN-ODT) disintegrating tablet 4 mg, 4 mg, Translingual, Q8H PRN, Rigo Mathur MD    oxyCODONE (ROXICODONE) immediate release tablet 5 mg, 5 mg, Oral, Q4H PRN, Rigo Mathur MD, 5 mg at 07/25/24 0609    pantoprazole (PROTONIX) EC tablet 40 mg, 40 mg, Oral, BID AC, Rigo Mathur MD, 40 mg at 07/25/24 0609    Progesterone (PROMETRIUM) capsule 200 mg, 200 mg, Oral, Daily, Rigo Mathur MD, 200 mg at 07/25/24 0806    senna (SENOKOT) tablet 2 tablet, 2 tablet, Oral, BID, Jw Talbot, APRN, 2 tablet at 07/25/24 0806    sodium chloride 0.9 % flush 10 mL, 10 mL, Intravenous, PRN, Rigo Mathur MD    sodium chloride 0.9 % flush 3 mL, 3 mL, Intravenous, Q12H, Rigo Mathur MD, 3 mL at 07/25/24 0806    sodium chloride 0.9 % infusion 40 mL, 40 mL, Intravenous, PRN, Rigo Mathur MD    TRELEGY (Fluticasone-Umeclidin-Vilant)100-62.5-25 MCG/INH IN, 1 puff,  Inhalation, Daily - RT, Broderick Cornejo MD, 1 puff at 07/25/24 0816  Laterally utilizing any bowel agents.  Changed Senokot to scheduled    Oxycodone 5 mg every 4 hours as needed: 3 doses yesterday, 2 doses today as of 6:09 AM   physical Exam:    General:   Awake, alert.  Back:    SLR negative  Abdomen:   Only very mildly tender.  Not significantly distended   Motor:   Normal muscle strength, bulk and tone in lower extremities.  No fasciculations, rigidity, spasticity, or abnormal movements.    Sensation:   Normal to light touch slava LEs except for mild right thigh numbness  Station and Gait:          Gait deferred  Extremities:   Wearing SCD      Assessment & Plan     ASSESSMENT:      Neuritis or radiculitis due to rupture of lumbar intervertebral disc    PAD (peripheral artery disease)    PAF (paroxysmal atrial fibrillation)    Essential hypertension    COPD (chronic obstructive pulmonary disease)    Factor V Leiden mutation    Alpha-1-antitrypsin deficiency    Lumbar spinal stenosis      Pleasant 64-year-old female day 3 status post L2-3 laminectomy with fusion and removal of previous hardware.  Continues to report improved symptoms in lower extremities with some mild right thigh numbness as expected.  No other significant acute issues.  Plan for possible discharge home tomorrow.      Abdominal pain  resolved.  No BM but continues to pass flatus.  She is eager to eat solid food at this point.  We will advance diet today.         Notify neurosurgery for abdominal pain, nausea, vomiting.    PLAN:       Advance to regular diet  Bowel regimen as needed, Senokot scheduled  Mobilize with nursing and PT  Utilize ice  Utilize p.o. pain meds/muscle relaxers  Encourage use of incentive spirometer  SCD/DVT prophylaxis  CBC in a.m.    This part is from Dr. Adams: Patient seems to be tolerating diet just fine.  She is passing flatus with no problem.  She feels pretty good.  He should be ready for discharge tomorrow.  I did  "discuss all of this with her.    I discussed the patient's findings and my recommendations with patient, family, and Dr. Mathur.     During patient visit, I utilized appropriate personal protective equipment including mask and gloves.  Mask used was standard procedure mask. Appropriate PPE was worn during the entire visit.  Hand hygiene was completed before and after.      LOS: 3 days       [facility], APRN  7/25/2024  12:15 EDT    \"Dictated utilizing Dragon dictation\".    "

## 2024-07-26 ENCOUNTER — READMISSION MANAGEMENT (OUTPATIENT)
Dept: CALL CENTER | Facility: HOSPITAL | Age: 64
End: 2024-07-26
Payer: COMMERCIAL

## 2024-07-26 VITALS
WEIGHT: 124.2 LBS | TEMPERATURE: 97.7 F | BODY MASS INDEX: 19.49 KG/M2 | HEIGHT: 67 IN | RESPIRATION RATE: 18 BRPM | SYSTOLIC BLOOD PRESSURE: 117 MMHG | OXYGEN SATURATION: 98 % | HEART RATE: 69 BPM | DIASTOLIC BLOOD PRESSURE: 71 MMHG

## 2024-07-26 LAB
BASOPHILS # BLD AUTO: 0.03 10*3/MM3 (ref 0–0.2)
BASOPHILS NFR BLD AUTO: 0.3 % (ref 0–1.5)
DEPRECATED RDW RBC AUTO: 46.6 FL (ref 37–54)
EOSINOPHIL # BLD AUTO: 0.31 10*3/MM3 (ref 0–0.4)
EOSINOPHIL NFR BLD AUTO: 3.2 % (ref 0.3–6.2)
ERYTHROCYTE [DISTWIDTH] IN BLOOD BY AUTOMATED COUNT: 12.3 % (ref 12.3–15.4)
HCT VFR BLD AUTO: 37 % (ref 34–46.6)
HGB BLD-MCNC: 12.1 G/DL (ref 12–15.9)
IMM GRANULOCYTES # BLD AUTO: 0.03 10*3/MM3 (ref 0–0.05)
IMM GRANULOCYTES NFR BLD AUTO: 0.3 % (ref 0–0.5)
LYMPHOCYTES # BLD AUTO: 1.73 10*3/MM3 (ref 0.7–3.1)
LYMPHOCYTES NFR BLD AUTO: 17.9 % (ref 19.6–45.3)
MCH RBC QN AUTO: 33.8 PG (ref 26.6–33)
MCHC RBC AUTO-ENTMCNC: 32.7 G/DL (ref 31.5–35.7)
MCV RBC AUTO: 103.4 FL (ref 79–97)
MONOCYTES # BLD AUTO: 1.08 10*3/MM3 (ref 0.1–0.9)
MONOCYTES NFR BLD AUTO: 11.2 % (ref 5–12)
NEUTROPHILS NFR BLD AUTO: 6.47 10*3/MM3 (ref 1.7–7)
NEUTROPHILS NFR BLD AUTO: 67.1 % (ref 42.7–76)
NRBC BLD AUTO-RTO: 0 /100 WBC (ref 0–0.2)
PLATELET # BLD AUTO: 209 10*3/MM3 (ref 140–450)
PMV BLD AUTO: 9.5 FL (ref 6–12)
RBC # BLD AUTO: 3.58 10*6/MM3 (ref 3.77–5.28)
WBC NRBC COR # BLD AUTO: 9.65 10*3/MM3 (ref 3.4–10.8)

## 2024-07-26 PROCEDURE — 63710000001 ONDANSETRON ODT 4 MG TABLET DISPERSIBLE: Performed by: NEUROLOGICAL SURGERY

## 2024-07-26 PROCEDURE — 85025 COMPLETE CBC W/AUTO DIFF WBC: CPT

## 2024-07-26 PROCEDURE — 94664 DEMO&/EVAL PT USE INHALER: CPT

## 2024-07-26 PROCEDURE — 94799 UNLISTED PULMONARY SVC/PX: CPT

## 2024-07-26 RX ORDER — ONDANSETRON 4 MG/1
4 TABLET, FILM COATED ORAL EVERY 8 HOURS PRN
Qty: 15 TABLET | Refills: 0 | Status: SHIPPED | OUTPATIENT
Start: 2024-07-26 | End: 2024-07-31

## 2024-07-26 RX ORDER — OXYCODONE HYDROCHLORIDE 5 MG/1
5 TABLET ORAL EVERY 6 HOURS PRN
Qty: 15 TABLET | Refills: 0 | Status: SHIPPED | OUTPATIENT
Start: 2024-07-26 | End: 2024-07-30

## 2024-07-26 RX ORDER — AMOXICILLIN 250 MG
1 CAPSULE ORAL DAILY
Qty: 10 TABLET | Refills: 0 | Status: SHIPPED | OUTPATIENT
Start: 2024-07-26 | End: 2024-08-05

## 2024-07-26 RX ORDER — METHOCARBAMOL 500 MG/1
500 TABLET, FILM COATED ORAL 4 TIMES DAILY PRN
Qty: 28 TABLET | Refills: 0 | Status: SHIPPED | OUTPATIENT
Start: 2024-07-26 | End: 2024-08-02

## 2024-07-26 RX ORDER — OXYCODONE HYDROCHLORIDE 5 MG/1
5 TABLET ORAL EVERY 6 HOURS PRN
Qty: 15 TABLET | Refills: 0 | Status: SHIPPED | OUTPATIENT
Start: 2024-07-26

## 2024-07-26 RX ADMIN — Medication 3 ML: at 08:07

## 2024-07-26 RX ADMIN — GUAIFENESIN 1200 MG: 600 TABLET, EXTENDED RELEASE ORAL at 08:06

## 2024-07-26 RX ADMIN — PANTOPRAZOLE SODIUM 40 MG: 40 TABLET, DELAYED RELEASE ORAL at 06:02

## 2024-07-26 RX ADMIN — ONDANSETRON 4 MG: 4 TABLET, ORALLY DISINTEGRATING ORAL at 06:02

## 2024-07-26 RX ADMIN — SENNOSIDES 2 TABLET: 8.6 TABLET, FILM COATED ORAL at 08:07

## 2024-07-26 RX ADMIN — OXYCODONE HYDROCHLORIDE 5 MG: 5 TABLET ORAL at 12:57

## 2024-07-26 RX ADMIN — GABAPENTIN 100 MG: 100 CAPSULE ORAL at 08:06

## 2024-07-26 RX ADMIN — ACETAMINOPHEN 1000 MG: 500 TABLET ORAL at 06:02

## 2024-07-26 RX ADMIN — OXYCODONE HYDROCHLORIDE 5 MG: 5 TABLET ORAL at 06:02

## 2024-07-26 RX ADMIN — PROGESTERONE 200 MG: 200 CAPSULE ORAL at 08:07

## 2024-07-26 NOTE — DISCHARGE INSTRUCTIONS
[facility]    [address] [facility], [address]    [place] [address]    Phone: [phone]    Fax: [phone]    Rigo Mathur M.D., F.A.C.S.        CARE AND INSTRUCTIONS AFTER LUMBAR FUSION    1. You can sit but get up an move around if you feel any discomfort. Avoid sitting in one place for longer than 30-45 minutes. Get up an walk around, or lie down to change position. You may lie on a firm couch, regular mattress or in a recliner. AVOID waterbed. You may lie on your side. Do not lie on your stomach. You may use one pillow under your head when laying down and you may use pillows under or in between your knees for comfort.    2. No driving. You can ride short distances in a car in the front passenger’s seat that reclines, or you may lie down in the back seat.    3. Don’t lift anything heavier than a coffee cup or paperback book.    4. Gradually increase your activity each day. You should be out of bed every hour during the day. Walk outside as soon as you feel up to doing so. Walk short distances frequently rather than making a long trip. Your goal is to be walking 1 to 3 miles per day when you return for your post-operative office visit. (NEVER DO THIS IN ONE TRIP)    5. You may climb stairs BUT take them slowly.    6. Keep your incision clean and dry. If you notice any redness, swelling or drainage call the office @ [phone]. There will be glue over your incision. This will peel off on its own and should not be pulled off.    7. You may shower five (5) days after surgery. Do not let the water hit directly on the incision, gently pat dry.    8. You should have a post-operative appointment scheduled for 2 to 2 ½ weeks after surgery with our Physician Assistant or Nurse Practitioner. If you do not, please call the office when you return home from the hospital to schedule this appointment.    9. Your prescription for pain medication may be refilled for only half the original amount prior to  your return office visit. In order to have this medication refilled you must contact the office four days prior to the due date.    10. Don’t be alarmed if you continue to experience some of your pre-operative symptoms after going home. This is not un-common and usually improves within a few days but could last longer. If you have any questions please call our office at [phone].

## 2024-07-26 NOTE — OUTREACH NOTE
Prep Survey      Flowsheet Row Responses   [facility] facility patient discharged from? [place]   Is LACE score < 7 ? No   Eligibility Readm Mgmt   Discharge diagnosis Neuritis or radiculitis due to rupture of lumbar intervertebral disc   Does the patient have one of the following disease processes/diagnoses(primary or secondary)? Other   Prep survey completed? Yes            Kassie HICKMAN - Registered Nurse

## 2024-07-26 NOTE — PAYOR COMM NOTE
"Radha Ferrer (64 y.o. Female)      PATIENT DISCHARGED 07/26/24:  REF#  [id number]     PLEASE REPLY WITH ALL APPROVED DAYS     [facility] DEPT: [phone],  [phone]     [facility]: NPI [id number] TIN# [id number]     PENNY MELGAR RN,[facility]       Date of Birth   1960    Social Security Number       Address   [address] [place] [address]    Home Phone   [phone]    MRN   [id number]       Druze   Buddhist    Marital Status                               Admission Date   7/22/24    Admission Type   Elective    Admitting Provider   Riog Mathur MD    Attending Provider       Department, Room/Bed   [address], P794/1       Discharge Date   7/26/2024    Discharge Disposition   Home or Self Care    Discharge Destination                                 Attending Provider: (none)   Allergies: No Known Allergies    Isolation: None   Infection: None   Code Status: CPR    Ht: 170.2 cm (67\")   Wt: 56.3 kg (124 lb 3.2 oz)    Admission Cmt: None   Principal Problem: Neuritis or radiculitis due to rupture of lumbar intervertebral disc [M51.16]                   Active Insurance as of 7/22/2024       Primary Coverage       Payor Plan Insurance Group Employer/Plan Group    [facility] [employer] [facility] [employer] BLUE [facility] PPO [id number]       Payor Plan Address Payor Plan Phone Number Payor Plan Fax Number Effective Dates    [address] [phone]  1/1/2023 - None Entered    [place] [address]         Subscriber Name Subscriber Birth Date Member ID       RADHA FERRER 1960 [id number]                     Emergency Contacts        (Rel.) Home Phone Work Phone Mobile Phone    Nancy Hester (Relative) [phone] -- --    FRANCISCO WOODSON (Son) -- -- [phone]    Kelly Geiger (Friend) [phone] -- --                 Operative/Procedure Notes (all)        Rigo Mathur MD at 07/22/24 0824  Version 1 of 1       "   LUMBAR DISCECTOMY POSTERIOR WITH FUSION INSTRUMENTATION  Progress Note    Radha Romero Umair  7/22/2024    Pre-op Diagnosis:   Neuritis or radiculitis due to rupture of lumbar intervertebral disc [M51.16]       Post-Op Diagnosis Codes:     * Neuritis or radiculitis due to rupture of lumbar intervertebral disc [M51.16]    Procedure/CPT® Codes:        Procedure(s):  Lumbar 2 to lumbar 3 laminectomy with fusion and instrumentation and removal of previous hardware              Surgeon(s):  Rigo Mathur MD    Anesthesia: General    Staff:   Cell Saver : Delks, Rylee  Circulator: Helen Reyna RN; Raisa Dodson RN  Scrub Person: Qiana Olmos  Vendor Representative: Alon Padilla  Assistant: Raissa Degroot CSA  Assistant: Raissa Degroot CSA      Estimated Blood Loss: 350 mL    Urine Voided: 350 mL    Specimens:                None          Drains:   Closed/Suction Drain 1 Inferior;Midline Back Bulb 15 Fr. (Active)       Urethral Catheter Silicone 16 Fr. (Active)       Findings: Severe lateral recess and foraminal stenosis with marked instability        Complications: None      Rigo Mathur MD     Date: 7/22/2024  Time: 10:40 EDT          Electronically signed by Rigo Mathur MD at 07/22/24 1040       Rigo Mathur MD at 07/22/24 0824  Version 1 of 1         Preoperative diagnosis: Lumbar stenosis with lumbar radiculopathy and neurogenic claudication L2-3    Postoperative diagnosis: same    Procedure performed: A bilateral L2-3 laminectomy, facetectomy and a transforaminal lumbar interbody fusion with instrumentation and removal of previous instrumentation    Spinal Surgery Levels Completed:1 Level     Surgeon: Rigo Mathur M.D.    Assistant: Rasisa Degroot CFA who was instrumental in helping with hemostasis, visualization of neural structures, and retraction of neural structures.  Her skilled assistance was necessary for the success of this case    Indications for procedure:  This patient has been having severe pain in the legs and the back.  Imaging shows severe stenosis with a grade 1 spondylolisthesis at L2-3.  There has been no improvement with nonsurgical treatment and the patient elected to proceed with surgery.    Operative summary: After induction of general anesthesia with intravenous agents patient was intubated and placed on the operating table in the prone position.  All pressure points were padded including peripheral points of entrapment.  The back was then prepped with ChloraPrep.  After a 3 minute drying time the back was draped with Ioban, towels, half sheets and a split sheet.    An incision was made in the posterior iliac crest ridge and a Jamshidi needle was placed into the marrow cavity and 15 mL of marrow were aspirated and placed over Mastergraft.     An incision was then made in the skin in the midline.  The dissection was carried down to the thoracolumbar fascia which was opened in the midline. The muscles were stripped off the L2 laminar arch and spinous process.  Following this the entire spinous process of L2 was removed.   Following this the laminar arch of L2 was removed by thinning it down with the Midas Jose Eduardo drill and cracking it directly back off the dura.  We then carried the dissection down along the lateral aspect of the spine exposing the previous instrumentation.  The tops of the screws were removed and then the rods were removed.  I was able to test the movement between each of the levels.  There was no movement at all at L4-5.  As a result I elected to remove the screws at L5.  At L3-4 I thought it was pretty solid as well but there was a hint of movement there.  I really could not tell for sure because of the scar tissue.  Consequently I elected to leave the screws at L3 and L4 and attach at the very least for better immobilization at L2-3.  Once the dura was exposed at L2 the remainder of the operation was done under the high power magnification  of the operating microscope using microsurgical technique and microsurgical instrumentation.  The Penfield 4 was used to dissect the dura and the nerve root off of the medial pedicle of L2 and L3 bilaterally.  A combination of the Kerrison's and the Midas Jose Eduardo were used to open the lateral recess out to the level of the medial pedicle.  This was both at the top and the lower decompressed level.  Once this was done the medial aspect of the facet was removed in order to open the superior foramen.  Following this each of the 4 nerve roots were checked to be sure they were decompressed.  Once the nerve roots were thoroughly decompressed the dural sac was then mobilized medially and the disc was exposed.  The disc was incised and disc material was removed from the disc space using the down-biting curettes and the pituitary ronguers.  Once the disc was emptied as much as possible tawny were inserted into the disc space to clear the cartilaginous endplate completely and make a space for the cages.  I was then able to take 2 Medtronic Catalyft cages measuring  23 mm in length and place them into the disc space.  The Mastergraft and marrow as well as locally harvested bone were also placed into the disc space.  The cages were then expanded until they were tight in the disc space and had produced a lordosis.    Then 4.5 mm by 45 mm screws were placed into the pedicles of L2 bilaterally.  Those pedicles were extremely small.  I did breach the medial wall of the pedicle but the threads were not protruding into the spinal canal.  I felt this was the best possible outcome I could accomplish with a small pedicles.  I left them in place.    Finally intraoperative x-ray confirmed good placement of the hardware and the screws were then connected with a vanessa.  Following this the area was copiously irrigated with antibiotic solution and then another dose of Kefzol was given prior to closure.  The paraspinous musculature was injected  with 100 mL of 1/8% Marcaine with 1:200,000 epinephrine solution.  A drain was placed in the epidural space and tunneled subcutaneously.  The incision was then closed in layers dressed, and the patient was taken to the recovery room in stable condition.  There were no apparent complications and sponge, instrument and needle counts were correct at the end of the procedure.     Electronically signed by Rigo Mathur MD at 07/22/24 1045          Discharge Summary        Jw Talbot APRN at 07/26/24 1130       Attestation signed by Rigo Mathur MD at 07/26/24 1300    I have reviewed this documentation and agree.                  Radha Block  1960    Patient Care Team:  Bosler, James William III, MD as PCP - General (Internal Medicine)  Raisa Cummings MD as Consulting Physician (Obstetrics and Gynecology)  Bosler, James William III, MD as Referring Physician (Internal Medicine)  Ijeoma Auguste MD as Consulting Physician (Cardiology)    Date of Admit: 7/22/2024    Date of Discharge:  7/26/2024    Discharge Diagnosis:  Neuritis or radiculitis due to rupture of lumbar intervertebral disc    PAD (peripheral artery disease)    PAF (paroxysmal atrial fibrillation)    Essential hypertension    COPD (chronic obstructive pulmonary disease)    Factor V Leiden mutation    Alpha-1-antitrypsin deficiency    Lumbar spinal stenosis      Procedures Performed  Procedure(s):  Lumbar 2 to lumbar 3 laminectomy with fusion and instrumentation and removal of previous hardware       Complications: None    Consultants:   Consults       Date and Time Order Name Status Description    7/22/2024 12:45 PM Inpatient Hospitalist Consult Completed     7/6/2024 12:36 AM Inpatient Neurosurgery Consult Completed             Condition on Discharge: stable    Discharge disposition: home    Pertinent Test Results:     Brief HPI: Patient evaluated in office for complaints of severe pain in the back and legs. Imaging revealed  severe lumbar stenosis with grade 1 spondylolisthesis at L2-3. RBAs of treatment were discussed including the above procedure. Patient consented to above procedure.    Hospital Course: Patient admitted for above procedure. The procedure itself was without complication. The patient was transferred to \[facility]\"" following recovery.  Following surgery she had experienced near resolution of her lower extremity pain and only had some right anterior thigh numbness.  However, she did have some abdominal discomfort and there was concern for possible postoperative ileus.  Meanwhile PT signed off on POD #1.  She remained n.p.o. postop day 1 and started full liquid diet on postop day #2.  Postop day #3 she advance to regular diet.  Today she is postop day 4 and has had a bowel movement this morning.  She does not have any significant abdominal distention or tenderness.  She is tolerating solids just fine.  We will get her discharged today.  She will let us know if she develops any signs or symptoms of infection.  She will also notify us if she develops any new issues in the back or legs, with gait instability or with bowel or bladder dysfunction.    She is experiencing nausea with taking oxycodone and states that she no longer has any pills left.  We will give her short 5-day supply.  She will also be supplied with some muscle relaxers oxycodone which we have asked her to wean over the next 5 days.  She will call the office on Monday to speak with Dr. Mathur to get prescription for lower dose narcotics such as hydrocodone.      Discharge Physical Exam:    Temp:  [97.7 °F (36.5 °C)-98.7 °F (37.1 °C)] 97.7 °F (36.5 °C)  Heart Rate:  [64-76] 69  Resp:  [16-18] 18  BP: (110-135)/(68-77) 117/71    Current labs:  Lab Results (last 24 hours)       Procedure Component Value Units Date/Time    CBC & Differential [[id number]]  (Abnormal) Collected: 07/26/24 0629    Specimen: Blood Updated: 07/26/24 0653    Narrative:      The following orders  were created for panel order CBC & Differential.  Procedure                               Abnormality         Status                     ---------                               -----------         ------                     CBC Auto Differential[[id number]]        Abnormal            Final result                 Please view results for these tests on the individual orders.    CBC Auto Differential [[id number]]  (Abnormal) Collected: 07/26/24 0629    Specimen: Blood Updated: 07/26/24 0653     WBC 9.65 10*3/mm3      RBC 3.58 10*6/mm3      Hemoglobin 12.1 g/dL      Hematocrit 37.0 %      .4 fL      MCH 33.8 pg      MCHC 32.7 g/dL      RDW 12.3 %      RDW-SD 46.6 fl      MPV 9.5 fL      Platelets 209 10*3/mm3      Neutrophil % 67.1 %      Lymphocyte % 17.9 %      Monocyte % 11.2 %      Eosinophil % 3.2 %      Basophil % 0.3 %      Immature Grans % 0.3 %      Neutrophils, Absolute 6.47 10*3/mm3      Lymphocytes, Absolute 1.73 10*3/mm3      Monocytes, Absolute 1.08 10*3/mm3      Eosinophils, Absolute 0.31 10*3/mm3      Basophils, Absolute 0.03 10*3/mm3      Immature Grans, Absolute 0.03 10*3/mm3      nRBC 0.0 /100 WBC               General Appearance No acute distress   HEENT NC/AT;    Neurological Awake, Alert, and oriented x 3   Motor Strength 5/5 in BLE, tone normal   Sensory Intact to light touch throughout except for right thigh numbness unchanged since surgery   Reflexes 2+ DTRs, no clonus   Gait and station Ambulating independently without difficulty.  PT is already signed off.   Back Midline lumbar incision with Steri-Strips in place.  There is no erythema, swelling or drainage or any underlying fluid collection palpable.   Extremities Moves all extremities well, no edema, no cyanosis, no redness   Abdomen Nontender, nondistended.  No nausea, vomiting     Discharge Medications  Landon has been reviewed and narcotic consent is on file in the patient's chart.     Your medication list        START taking these  medications        Instructions Last Dose Given Next Dose Due   sennosides-docusate 8.6-50 MG per tablet  Commonly known as: PERICOLACE      Take 1 tablet by mouth Daily for 10 days.              CHANGE how you take these medications        Instructions Last Dose Given Next Dose Due   dilTIAZem  MG 24 hr capsule  Commonly known as: CARDIZEM CD  What changed: when to take this      Take 1 capsule by mouth Daily.       methocarbamol 500 MG tablet  Commonly known as: ROBAXIN  What changed: Another medication with the same name was added. Make sure you understand how and when to take each.      Take 1 tablet by mouth Every 8 (Eight) Hours.       methocarbamol 500 MG tablet  Commonly known as: ROBAXIN  What changed: You were already taking a medication with the same name, and this prescription was added. Make sure you understand how and when to take each.      Take 1 tablet by mouth 4 (Four) Times a Day As Needed for Muscle Spasms for up to 7 days.       naloxone 4 MG/0.1ML nasal spray  Commonly known as: NARCAN  What changed:   when to take this  reasons to take this      Call 911. Don't prime. [place] in 1 nostril for overdose. Repeat in 2-3 minutes in other nostril if no or minimal breathing/responsiveness.       ondansetron 4 MG tablet  Commonly known as: ZOFRAN  What changed: Another medication with the same name was added. Make sure you understand how and when to take each.      Take 1 tablet by mouth Every 8 (Eight) Hours As Needed for Vomiting or Nausea.       ondansetron 4 MG tablet  Commonly known as: Zofran  What changed: You were already taking a medication with the same name, and this prescription was added. Make sure you understand how and when to take each.      Take 1 tablet by mouth Every 8 (Eight) Hours As Needed for Nausea or Vomiting for up to 5 days.       oxyCODONE 5 MG immediate release tablet  Commonly known as: Roxicodone  What changed:   when to take this  reasons to take this      Take 1  tablet by mouth Every 6 (Six) Hours As Needed for Severe Pain.              CONTINUE taking these medications        Instructions Last Dose Given Next Dose Due   acetaminophen 500 MG tablet  Commonly known as: TYLENOL      Take 2 tablets by mouth Every 8 (Eight) Hours.       albuterol sulfate  (90 Base) MCG/ACT inhaler  Commonly known as: PROVENTIL HFA;VENTOLIN HFA;PROAIR HFA      Inhale 1 puff As Needed for Wheezing.       apixaban 5 MG tablet tablet  Commonly known as: Eliquis      Take 1 tablet by mouth Every 12 (Twelve) Hours. resume 24 hours after epidural injection       betamethasone (augmented) 0.05 % cream  Commonly known as: DIPROLENE      Apply 1 Application topically to the appropriate area as directed 2 (Two) Times a Day As Needed.       desvenlafaxine 50 MG 24 hr tablet  Commonly known as: PRISTIQ      Take 12.5 mg by mouth Daily.       EPINEPHrine 0.3 MG/0.3ML solution auto-injector injection  Commonly known as: EPIPEN      As Needed.       esomeprazole 40 MG capsule  Commonly known as: nexIUM      Take 1 capsule by mouth Daily.       estradiol 0.075 MG/24HR patch  Commonly known as: CLIMARA      Place 1 patch on the skin as directed by provider 2 (Two) Times a Week.       gabapentin 100 MG capsule  Commonly known as: NEURONTIN      Take 1 capsule by mouth 3 times a day.       guaiFENesin 600 MG 12 hr tablet  Commonly known as: MUCINEX      Take 1 tablet by mouth As Needed.       polyethylene glycol 17 GM/SCOOP powder  Commonly known as: MIRALAX      Mix 17 g (1 capful) in liquid and drink by mouth 2 (Two) Times a Day.       Progesterone 200 MG capsule  Commonly known as: PROMETRIUM      Take 1 capsule by mouth Daily.       Trelegy Ellipta 100-62.5-25 MCG/INH inhaler  Generic drug: Fluticasone-Umeclidin-Vilant      Inhale 1 puff Daily.       Zemaira 1000 MG injection  Generic drug: alpha1-proteinase inhibitor      Infuse  into a venous catheter 1 (One) Time Per Week. Pt takes on Fridays at  "infusion center              STOP taking these medications      diclofenac 75 MG EC tablet  Commonly known as: VOLTAREN        ibuprofen 200 MG tablet  Commonly known as: ADVIL,MOTRIN                  Where to Get Your Medications        [facility] [place] [address] [facility] [address]      Hours: Monday to Friday 7 AM to 6 PM, Saturday & Sunday 8 AM to 4:30 PM (Closed 12 PM to 12:30 PM) Phone: [phone]   methocarbamol 500 MG tablet  ondansetron 4 MG tablet  sennosides-docusate 8.6-50 MG per tablet       These medications were sent to [facility] [id number] [place], [place] - [address] [facility] [phone] [facility] [phone] [address] [place] [address]      Phone: [phone]   oxyCODONE 5 MG immediate release tablet         Discharge Diet:     Diet Order   Procedures    Diet: Regular/House; Fluid Consistency: Thin (IDDSI 0)       Activity at Discharge:       Call for: questions or concerns    Follow-up Appointments  Future Appointments   Date Time Provider Department Center   8/6/2024 10:00 AM Rigo Mathur MD [facility]   9/3/2024 10:40 AM Ijeoma Auguste MD [facility]      Follow-up Information       Bosler, James William III, MD .    Specialty: Internal Medicine  Contact information:  [address] [address] [address] [phone]                               Test Results Pending at Discharge     None    I discussed the discharge instructions with patient and Dr. Mathur.     wJ Talbot, KLARISSA  07/26/24  11:48 EDT        \"Dictated utilizing Dragon dictation\".      Electronically signed by Rigo Mathur MD at 07/26/24 1300       Discharge Order (From admission, onward)       Start     Ordered    07/26/24 1137  Discharge patient  Once        Expected Discharge Date: 07/26/24   Discharge Disposition: Home or Self Care   Physician of Record for Attribution - Please select from " Treatment Team: SHERICE PARIKH [[id number]]   Review needed by CMO to determine Physician of Record: No      Question Answer Comment   Physician of Record for Attribution - Please select from Treatment Team SHERICE PARIKH    Review needed by CMO to determine Physician of Record No        07/26/24 8037

## 2024-07-26 NOTE — PLAN OF CARE
Goal Outcome Evaluation:           Progress: improving      A&O. VSS. Dressing CDI. Voiding per BRP. Pain managed w prn pain medication and muscle relaxer. Plan to d/c when medically stable.  Last BM 7/21.

## 2024-07-26 NOTE — PROGRESS NOTES
Case Management Discharge Note      Final Note: Discharged home. Nancy Ball RN         Selected Continued Care - Discharged on 7/26/2024 Admission date: 7/22/2024 - Discharge disposition: Home or Self Care         Transportation Services  Private: Car    Final Discharge Disposition Code: 01 - home or self-care

## 2024-07-26 NOTE — PLAN OF CARE
Problem: Adult Inpatient Plan of Care  Goal: Plan of Care Review  Outcome: Ongoing, Progressing     Problem: Adult Inpatient Plan of Care  Goal: Absence of Hospital-Acquired Illness or Injury  Intervention: Identify and Manage Fall Risk  Recent Flowsheet Documentation  Taken 7/26/2024 0754 by Aretha Jensen RN  Safety Promotion/Fall Prevention:   safety round/check completed   fall prevention program maintained     Problem: Ongoing Anesthesia Effects (Spinal Surgery)  Goal: Anesthesia/Sedation Recovery  Intervention: Optimize Anesthesia Recovery  Recent Flowsheet Documentation  Taken 7/26/2024 0754 by Aretha Jensen RN  Safety Promotion/Fall Prevention:   safety round/check completed   fall prevention program maintained     Problem: Respiratory Compromise (Spinal Surgery)  Goal: Effective Oxygenation and Ventilation  Intervention: Optimize Oxygenation and Ventilation  Recent Flowsheet Documentation  Taken 7/26/2024 0754 by Aretha Jensen RN  Head of Bed (HOB) Positioning: HOB elevated     Problem: Fall Injury Risk  Goal: Absence of Fall and Fall-Related Injury  Intervention: Promote Injury-Free Environment  Recent Flowsheet Documentation  Taken 7/26/2024 0754 by Aretha Jensen RN  Safety Promotion/Fall Prevention:   safety round/check completed   fall prevention program maintained   Goal Outcome Evaluation:

## 2024-07-31 ENCOUNTER — READMISSION MANAGEMENT (OUTPATIENT)
Dept: CALL CENTER | Facility: HOSPITAL | Age: 64
End: 2024-07-31
Payer: COMMERCIAL

## 2024-07-31 ENCOUNTER — TELEPHONE (OUTPATIENT)
Dept: NEUROSURGERY | Facility: CLINIC | Age: 64
End: 2024-07-31
Payer: COMMERCIAL

## 2024-07-31 NOTE — OUTREACH NOTE
Medical Week 1 Survey      Flowsheet Row Responses   [facility] patient discharged from? [place]   Does the patient have one of the following disease processes/diagnoses(primary or secondary)? Other   Week 1 attempt successful? Yes   Call start time 1049   Call end time 1050   Discharge diagnosis Neuritis or radiculitis due to rupture of lumbar intervertebral disc   Does the patient have all medications ordered at discharge? Yes   Is the patient taking all medications as directed (includes completed medication regime)? Yes   Comments regarding appointments will follow up with surgeon next week   Does the patient have a primary care provider?  Yes   Comments regarding PCP has been in contact with PCP   Has the patient kept scheduled appointments due by today? N/A   Has home health visited the patient within 72 hours of discharge? N/A   Psychosocial issues? No   Did the patient receive a copy of their discharge instructions? Yes   Nursing interventions Reviewed instructions with patient   What is the patient's perception of their health status since discharge? Improving   Is the patient/caregiver able to teach back signs and symptoms related to disease process for when to call PCP? Yes   Is the patient/caregiver able to teach back signs and symptoms related to disease process for when to call 911? Yes   Is the patient/caregiver able to teach back the hierarchy of who to call/visit for symptoms/problems? PCP, Specialist, Home health nurse, Urgent Care, ED, 911 Yes   Week 1 call completed? Yes   Graduated Yes   Did the patient feel the follow up calls were helpful during their recovery period? Yes   Was the number of calls appropriate? Yes   Would this patient benefit from a Referral to Amb Social Work? No   Is the patient interested in additional calls from an ambulatory ? No   Graduated/Revoked comments Doing well, no questions.   Call end time 1050            Patricia SHAW - Registered Nurse

## 2024-08-05 NOTE — PROGRESS NOTES
Subjective   Patient ID: Radha Block is a 64 y.o. female is here today for 2 week PO follow-up. Patient had a Lumbar 2 to lumbar 3 laminectomy with fusion and instrumentation and removal of previous hardware on 7/22/2024    Today patient states that she has some back pain, patient's incision looks healthy, no redness, no drainage    History of Present Illness    This patient returns today.  She is doing fairly well.  She has some pain in her back as expected and a little aching in her right hip and some numbness in her anterior thigh on the right but no severe pain anywhere.  Her incision looks great.  She has been walking.    The following portions of the patient's history were reviewed and updated as appropriate: allergies, current medications, past family history, past medical history, past social history, past surgical history, and problem list.    Review of Systems   Constitutional:  Negative for chills and fever.   HENT:  Negative for congestion.    Genitourinary:  Negative for difficulty urinating and dysuria.   Musculoskeletal:  Positive for back pain. Negative for gait problem and myalgias.   Neurological:  Positive for numbness. Negative for weakness.       I reviewed the review of systems listed by the patient and discussed by my MA    Objective     There were no vitals filed for this visit.  There is no height or weight on file to calculate BMI.    Tobacco Use: Low Risk  (7/23/2024)    Patient History     Smoking Tobacco Use: Never     Smokeless Tobacco Use: Never     Passive Exposure: Not on file          Physical Exam  Neurological:      Mental Status: She is alert and oriented to person, place, and time.       Neurologic Exam     Mental Status   Oriented to person, place, and time.           Assessment & Plan   Independent Review of Radiographic Studies:      I personally reviewed the images from the following studies.    There is no new imaging to review.  I did review her x-rays that  were done the day after surgery.  This shows good alignment of the construct at L23.    Medical Decision Making:      I told the patient that it is too early to start any physical therapy for lifting and bending.  I told her to keep up with the walking.  We will see her back in about a month with an x-ray.  If that looks okay we should be able to start physical therapy at that time.    Diagnoses and all orders for this visit:    1. Follow-up examination following surgery (Primary)  -     XR Spine Lumbar 2 or 3 View; Future      Return in about 4 weeks (around 9/3/2024).

## 2024-08-06 ENCOUNTER — OFFICE VISIT (OUTPATIENT)
Dept: NEUROSURGERY | Facility: CLINIC | Age: 64
End: 2024-08-06
Payer: COMMERCIAL

## 2024-08-06 DIAGNOSIS — Z09 FOLLOW-UP EXAMINATION FOLLOWING SURGERY: Primary | ICD-10-CM

## 2024-08-06 PROCEDURE — 99024 POSTOP FOLLOW-UP VISIT: CPT | Performed by: NEUROLOGICAL SURGERY

## 2024-08-22 RX ORDER — APIXABAN 5 MG/1
5 TABLET, FILM COATED ORAL 2 TIMES DAILY
Qty: 180 TABLET | Refills: 1 | Status: SHIPPED | OUTPATIENT
Start: 2024-08-22

## 2024-08-26 ENCOUNTER — TELEPHONE (OUTPATIENT)
Dept: CARDIOLOGY | Facility: CLINIC | Age: 64
End: 2024-08-26
Payer: COMMERCIAL

## 2024-08-26 RX ORDER — DILTIAZEM HYDROCHLORIDE 120 MG/1
120 CAPSULE, COATED, EXTENDED RELEASE ORAL DAILY
Qty: 90 CAPSULE | Refills: 3 | Status: SHIPPED | OUTPATIENT
Start: 2024-08-26

## 2024-08-26 NOTE — TELEPHONE ENCOUNTER
I spoke with Radha Block and gave them message from the provider.  They verbalized understanding & have no further questions at this time.    Thank you,    Velma CHOWDHURY , RN  Triage [facility]  08/26/24 10:30 EDT

## 2024-08-26 NOTE — TELEPHONE ENCOUNTER
"Pt is s/p back surgery 7/22/24.  She says that ever since the surgery, whenever she's doing a form of exercise (she will go walking, around 1-3 miles), her HR will jump around.  It will drop down to 45 or 47, then go back up to the 80s.  Other times, it will be 125 one minute, then the next minute be 55.  She endorses a fluttering whenever this happens, saying \"I just don't feel right, I might feel a little dizzy or lightheaded\".  She also notes she's been occasionally having some R ankle edema.      Pt had been on diltiazem 180 mg QHS.  Starting 8/23, she dropped this down to 120 mg as she had some old capsules at home, thinking she might be a little overmedicated.  Since making this change, she hasn't had any further episodes with her HR.  She says her resting HR has been 60-70s.  BP was 128/90 she thinks.  She does have an appt with Dr. Auguste on 9/3/24, however, she doesn't have any more of the 120 mg capsules to take.  Her preferred pharmacy is the [facility] on her chart.  She's still been taking apixaban as prescribed.    Do you have any recommendations for this patient?    Thank you,    Velma CHOWDHURY RN  [facility] Triage  08/26/24  10:03 EDT    "

## 2024-08-26 NOTE — TELEPHONE ENCOUNTER
Okay to continue 120 mg of diltiazem daily.  Continue to monitor blood pressure and heart rate and bring a log with her to appointment with Dr. Auguste next week.  I sent a new prescription to her pharmacy.

## 2024-08-27 ENCOUNTER — TELEPHONE (OUTPATIENT)
Dept: CARDIOLOGY | Facility: CLINIC | Age: 64
End: 2024-08-27
Payer: COMMERCIAL

## 2024-08-27 NOTE — TELEPHONE ENCOUNTER
Is this already scheduled? She has an appointment with Dr. Auguste next week and can discuss then if not urgent.

## 2024-08-27 NOTE — TELEPHONE ENCOUNTER
Received a fax from [facility] re: Cardiac Clearance needed for Coloscopy and to hold Eliquis 1-2 days prior.      Pt was last seen 3/1/2024.      Spoke with pt.  No CP  No palpations  No SOA

## 2024-09-03 ENCOUNTER — TELEPHONE (OUTPATIENT)
Dept: CARDIOLOGY | Facility: CLINIC | Age: 64
End: 2024-09-03
Payer: COMMERCIAL

## 2024-09-03 ENCOUNTER — HOSPITAL ENCOUNTER (OUTPATIENT)
Dept: CARDIOLOGY | Facility: HOSPITAL | Age: 64
Discharge: HOME | End: 2024-09-03
Admitting: INTERNAL MEDICINE
Payer: COMMERCIAL

## 2024-09-03 ENCOUNTER — OFFICE VISIT (OUTPATIENT)
Dept: CARDIOLOGY | Facility: CLINIC | Age: 64
End: 2024-09-03
Payer: COMMERCIAL

## 2024-09-03 VITALS
DIASTOLIC BLOOD PRESSURE: 76 MMHG | WEIGHT: 129 LBS | BODY MASS INDEX: 20.25 KG/M2 | SYSTOLIC BLOOD PRESSURE: 120 MMHG | HEIGHT: 67 IN | HEART RATE: 59 BPM

## 2024-09-03 DIAGNOSIS — I48.0 PAF (PAROXYSMAL ATRIAL FIBRILLATION): Primary | ICD-10-CM

## 2024-09-03 DIAGNOSIS — E87.1 ACUTE HYPONATREMIA: ICD-10-CM

## 2024-09-03 DIAGNOSIS — R00.2 PALPITATIONS: ICD-10-CM

## 2024-09-03 LAB
ANION GAP SERPL CALCULATED.3IONS-SCNC: 16.4 MMOL/L (ref 5–15)
BUN SERPL-MCNC: 13 MG/DL (ref 8–23)
BUN/CREAT SERPL: 17.3 (ref 7–25)
CALCIUM SPEC-SCNC: 9.1 MG/DL (ref 8.6–10.5)
CHLORIDE SERPL-SCNC: 104 MMOL/L (ref 98–107)
CO2 SERPL-SCNC: 16.6 MMOL/L (ref 22–29)
CREAT SERPL-MCNC: 0.75 MG/DL (ref 0.57–1)
EGFRCR SERPLBLD CKD-EPI 2021: 87.9 ML/MIN/1.73
GLUCOSE SERPL-MCNC: 91 MG/DL (ref 65–99)
MAGNESIUM SERPL-MCNC: 2.1 MG/DL (ref 1.6–2.4)
POTASSIUM SERPL-SCNC: 4.3 MMOL/L (ref 3.5–5.2)
SODIUM SERPL-SCNC: 137 MMOL/L (ref 136–145)

## 2024-09-03 PROCEDURE — 36415 COLL VENOUS BLD VENIPUNCTURE: CPT

## 2024-09-03 PROCEDURE — 80048 BASIC METABOLIC PNL TOTAL CA: CPT | Performed by: INTERNAL MEDICINE

## 2024-09-03 PROCEDURE — 93000 ELECTROCARDIOGRAM COMPLETE: CPT | Performed by: INTERNAL MEDICINE

## 2024-09-03 PROCEDURE — 83735 ASSAY OF MAGNESIUM: CPT | Performed by: INTERNAL MEDICINE

## 2024-09-03 PROCEDURE — 99214 OFFICE O/P EST MOD 30 MIN: CPT | Performed by: INTERNAL MEDICINE

## 2024-09-03 RX ORDER — ESOMEPRAZOLE MAGNESIUM 40 MG/1
40 CAPSULE, DELAYED RELEASE ORAL
COMMUNITY
Start: 1996-04-05

## 2024-09-03 RX ORDER — VENLAFAXINE 50 MG/1
TABLET ORAL
COMMUNITY
Start: 2024-07-25 | End: 2024-09-03

## 2024-09-03 RX ORDER — MONTELUKAST SODIUM 4 MG/1
TABLET, CHEWABLE ORAL
COMMUNITY
Start: 2024-09-02

## 2024-09-03 NOTE — PROGRESS NOTES
Date of Office Visit: 2024  Encounter Provider: Ijeoma Auguste MD  Place of Service: [facility] CARDIOLOGY  Patient Name: Radha Block  :1960    Chief complaint  Paroxysmal atrial fibrillation    History of Present Illness  Patient is a 64-year-old female with history of COPD, of arterial disease, deep venous thrombosis, Leiden 5 deficiency, alpha protein deficiency and atrial fibrillation.  And on  she was noted to have palpitations with Apple Watch indicating episodes of atrial fibrillation.  She decreased the modest amounts of caffeinated use.  She had a stress echocardiogram that showed normal systolic function hyperdynamic left ventricle trivial valve regurgitation normal right-sided pressures.  There was no ischemia.  She was started on aspirin and diltiazem.  Subsequent Holter showed 2 episodes of atrial fibrillation totaling 24 minutes.  She was placed on Eliquis and aspirin was discontinued.  Follow-up in 2021 metoprolol was decreased due to fatigue.  On 10/10/2022 she underwent atrial fibrillation by Dr. Iqbal.  Echo performed on 2022 showed size systolic function aortic valve calcification without stenosis or regurgitation.  There is no significant pulmonary hypertension.  She saw Dr. Iqbal on 2022 and he recommended stay on Eliquis indefinitely given history of prior DVT and Leiden 5 deficiency.  I will follow-up on 3/2023 with EP service patient again brought up the issue of stopping anticoagulant therapy and was again instructed that the benefits were outweighing the risk.  Of note patient had been previously instructed by Dr. Dodson to stop estradiol due to thrombotic risk however she continues to take this    Patient called office recently stating that after back surgery 2024 she has had intermittent tachycardia as well as bradycardia rates from 48 to 125 bpm.  Occasionally has fluttering.  Occasional has dizziness.  Patient took  some older pills of 120 mg of Cardizem instead of the baseline dose of 180 mg nightly.  She is also considering colonoscopy.  Patient states that now she is having no further palpitations.  She is also had no chest pain shortness of breath syncope near syncope or edema.      Past Medical History:   Diagnosis Date    Alpha-1-antitrypsin deficiency     FOLLOWED BY DR. GATES    Arrhythmia     Arthritis     Clotting disorder     Factor 5 Leiden    COPD (chronic obstructive pulmonary disease)     Deep vein thrombosis 2012    Early cataract     Essential hypertension 08/24/2021    GERD (gastroesophageal reflux disease)     Low back pain     RADIATING DOWN RT LEG WITH TINGLING AND WEAKNESS, RUPTURED DISC    Mitral valve prolapse     On anticoagulant therapy     Osteopenia     PAD (peripheral artery disease)     PAF (paroxysmal atrial fibrillation) 11/25/2020    ABLATION 2022     Past Surgical History:   Procedure Laterality Date    BLADDER SURGERY  2013    E/O polyp    BREAST SURGERY Right     E/O benign neuroma    CARDIAC ELECTROPHYSIOLOGY PROCEDURE N/A 10/10/2022    Procedure: Ablation atrial fibrillation CRYO;  Surgeon: Venkatesh Iqbal MD;  Location: [facility] INVASIVE LOCATION;  Service: Cardiovascular;  Laterality: N/A;    COLONOSCOPY  2016    EPIDURAL BLOCK      HAND SURGERY Right     Carpal metacarpal hand surgery     LUMBAR DISCECTOMY FUSION INSTRUMENTATION N/A 7/22/2024    Procedure: Lumbar 2 to lumbar 3 laminectomy with fusion and instrumentation and removal of previous hardware;  Surgeon: Rigo Mathur MD;  Location: [facility] OR;  Service: Neurosurgery;  Laterality: N/A;    LUMBAR FUSION N/A 12/30/2022    Procedure: Lumbar 3 to lumbar 4 and lumbar 4 to lumbar 5 laminectomy with fusion and instrumentation;  Surgeon: Rigo Mathur MD;  Location: [facility] OR;  Service: Robotics - Neuro;  Laterality: N/A;    SINUS SURGERY  2017    TONSILLECTOMY       Outpatient Medications Prior to Visit    Medication Sig Dispense Refill    acetaminophen (TYLENOL) 500 MG tablet Take 2 tablets by mouth Every 8 (Eight) Hours. 180 tablet 0    albuterol sulfate  (90 Base) MCG/ACT inhaler Inhale 1 puff As Needed for Wheezing.      alpha1-proteinase inhibitor (Zemaira) 1000 MG injection Infuse  into a venous catheter 1 (One) Time Per Week. Pt takes on Fridays at infusion center      apixaban (Eliquis) 5 MG tablet tablet TAKE 1 TABLET BY MOUTH TWICE  DAILY 180 tablet 1    betamethasone, augmented, (DIPROLENE) 0.05 % cream Apply 1 Application topically to the appropriate area as directed 2 (Two) Times a Day As Needed.      colestipol (COLESTID) 1 g tablet       desvenlafaxine (PRISTIQ) 50 MG 24 hr tablet Take 12.5 mg by mouth Daily.      dilTIAZem CD (CARDIZEM CD) 120 MG 24 hr capsule Take 1 capsule by mouth Daily. 90 capsule 3    EPINEPHrine (EPIPEN) 0.3 MG/0.3ML solution auto-injector injection As Needed.      esomeprazole (nexIUM) 40 MG capsule Take 1 capsule by mouth Every Morning Before Breakfast.      estradiol (CLIMARA) 0.075 MG/24HR patch Place 1 patch on the skin as directed by provider 2 (Two) Times a Week.      Fluticasone-Umeclidin-Vilant (Trelegy Ellipta) 100-62.5-25 MCG/INH aerosol powder  Inhale 1 puff Daily.      gabapentin (NEURONTIN) 100 MG capsule Take 1 capsule by mouth 3 times a day. (Patient taking differently: Take 1 capsule by mouth As Needed.) 90 capsule 0    guaiFENesin (MUCINEX) 600 MG 12 hr tablet Take 1 tablet by mouth As Needed.      methocarbamol (ROBAXIN) 500 MG tablet Take 1 tablet by mouth Every 8 (Eight) Hours. (Patient taking differently: Take 1 tablet by mouth 3 (Three) Times a Day As Needed for Muscle Spasms.) 90 tablet 0    ondansetron (ZOFRAN) 4 MG tablet Take 1 tablet by mouth Every 8 (Eight) Hours As Needed for Vomiting or Nausea.      Progesterone (PROMETRIUM) 200 MG capsule Take 1 capsule by mouth Daily.      esomeprazole (nexIUM) 40 MG capsule Take 1 capsule by mouth Daily.       naloxone (NARCAN) 4 MG/0.1ML nasal spray Call 911. Don't prime. [place] in 1 nostril for overdose. Repeat in 2-3 minutes in other nostril if no or minimal breathing/responsiveness. (Patient not taking: Reported on 9/3/2024) 2 each 0    oxyCODONE (Roxicodone) 5 MG immediate release tablet Take 1 tablet by mouth Every 6 (Six) Hours As Needed for Severe Pain. (Patient not taking: Reported on 9/3/2024) 15 tablet 0    polyethylene glycol (MIRALAX) 17 GM/SCOOP powder Mix 17 g (1 capful) in liquid and drink by mouth 2 (Two) Times a Day. (Patient not taking: Reported on 9/3/2024) 238 g 0    venlafaxine (EFFEXOR) 50 MG tablet  (Patient not taking: Reported on 9/3/2024)       No facility-administered medications prior to visit.       Allergies as of 09/03/2024    (No Known Allergies)     Social History     Socioeconomic History    Marital status:     Number of children: 1   Tobacco Use    Smoking status: Never    Smokeless tobacco: Never   Vaping Use    Vaping status: Never Used   Substance and Sexual Activity    Alcohol use: Yes     Alcohol/week: 3.0 standard drinks of alcohol     Types: 3 Glasses of wine per week     Comment: 2 times a week     Drug use: Never    Sexual activity: Defer     Family History   Adopted: Yes     Review of Systems   Constitutional: Negative for chills, fever, weight gain and weight loss.   Cardiovascular:  Negative for leg swelling.   Respiratory:  Negative for cough, snoring and wheezing.    Hematologic/Lymphatic: Negative for bleeding problem. Does not bruise/bleed easily.   Skin:  Negative for color change.   Musculoskeletal:  Negative for falls, joint pain and myalgias.   Gastrointestinal:  Negative for melena.   Genitourinary:  Negative for hematuria.   Neurological:  Negative for excessive daytime sleepiness.   Psychiatric/Behavioral:  Negative for depression. The patient is not nervous/anxious.         Objective:     Vitals:    09/03/24 1028   BP: 120/76   Pulse: 59   Weight: 58.5  "kg (129 lb)   Height: 170.2 cm (67\")     Body mass index is 20.2 kg/m².    Vitals reviewed.   Constitutional:       Appearance: Well-developed.   Eyes:      General: No scleral icterus.        Right eye: No discharge.      Conjunctiva/sclera: Conjunctivae normal.      Pupils: Pupils are equal, round, and reactive to light.   HENT:      Head: Normocephalic.      Nose: Nose normal.   Neck:      Thyroid: No thyromegaly.      Vascular: No JVD.   Pulmonary:      Effort: Pulmonary effort is normal. No respiratory distress.      Breath sounds: Normal breath sounds. No wheezing. No rales.   Cardiovascular:      Normal rate. Regular rhythm. Normal S1. Normal S2.       Murmurs: There is no murmur.      No gallop.    Pulses:     Intact distal pulses.      Carotid: 2+ bilaterally.     Radial: 2+ bilaterally.     Femoral: 2+ bilaterally.     Popliteal: 2+ bilaterally.     Dorsalis pedis: 2+ bilaterally.     Posterior tibial: 2+ bilaterally.  Edema:     Peripheral edema absent.   Abdominal:      General: Bowel sounds are normal. There is no distension.      Palpations: Abdomen is soft.      Tenderness: There is no abdominal tenderness. There is no rebound.   Musculoskeletal: Normal range of motion.         General: No tenderness.      Cervical back: Normal range of motion and neck supple. Skin:     General: Skin is warm and dry.      Findings: No erythema or rash.   Neurological:      Mental Status: Alert and oriented to person, place, and time.   Psychiatric:         Behavior: Behavior normal.         Thought Content: Thought content normal.         Judgment: Judgment normal.       Lab Review:   Lab Results - Last 18 Months   Lab Units 07/26/24  0629 07/25/24  0531 07/24/24  0946   WBC 10*3/mm3 9.65 13.00* 13.15*   RBC 10*6/mm3 3.58* 3.35* 3.38*   HEMOGLOBIN g/dL 12.1 11.4* 11.5*   HEMATOCRIT % 37.0 34.0 34.5   MCV fL 103.4* 101.5* 102.1*   MCH pg 33.8* 34.0* 34.0*   MCHC g/dL 32.7 33.5 33.3   RDW % 12.3 12.0* 12.3   PLATELETS " 10*3/mm3 209 185 176   NEUTROPHIL % % 67.1  --  76.3*   LYMPHOCYTE % % 17.9*  --  11.6*   MONOCYTES % % 11.2  --  10.2   EOSINOPHIL % % 3.2  --  1.1   BASOPHIL % % 0.3  --  0.3   NEUTROS ABS 10*3/mm3 6.47  --  10.04*   LYMPHS ABS 10*3/mm3 1.73  --  1.52   MONOS ABS 10*3/mm3 1.08*  --  1.34*   EOS ABS 10*3/mm3 0.31  --  0.14   BASOS ABS 10*3/mm3 0.03  --  0.04   RDW-SD fl 46.6 44.2 45.7   MPV fL 9.5 9.8 9.6       Lab Results - Last 18 Months   Lab Units 09/03/24  1125 07/23/24  0545 07/07/24  0722 07/05/24  1555   GLUCOSE mg/dL 91 107*   < > 103*   BUN mg/dL 13 9   < > 18   CREATININE mg/dL 0.75 0.54*   < > 0.67   SODIUM mmol/L 137 133*   < > 146*   POTASSIUM mmol/L 4.3 4.2   < > 3.8   CHLORIDE mmol/L 104 104   < > 103   CO2 mmol/L 16.6* 24.0   < > 22.0   CALCIUM mg/dL 9.1 8.1*   < > 9.1   TOTAL PROTEIN g/dL  --   --   --  6.6   ALBUMIN g/dL  --   --   --  4.3   ALT (SGPT) U/L  --   --   --  27   AST (SGOT) U/L  --   --   --  17   ALK PHOS U/L  --   --   --  43   BILIRUBIN mg/dL  --   --   --  1.1   GLOBULIN gm/dL  --   --   --  2.3   A/G RATIO g/dL  --   --   --  1.9   BUN / CREAT RATIO  17.3 16.7   < > 26.9*   ANION GAP mmol/L 16.4* 5.0   < > 21.0*   EGFR mL/min/1.73 87.9 101.7   < > 96.5    < > = values in this interval not displayed.       Lab Results - Last 18 Months   Lab Units 07/16/24  1146 07/05/24  1555   PROTIME Seconds 12.8 12.9   APTT seconds  --  <20.0*           ECG 12 Lead    Date/Time: 9/3/2024 10:46 AM  Performed by: Ijeoma Auguste MD    Authorized by: Ijeoma Auguste MD  Comparison: compared with previous ECG   Comparison to previous ECG: PAC is present.  Rhythm: sinus rhythm  Ectopy: atrial premature contractions    Clinical impression: normal ECG            Diagnosis Plan   1. PAF (paroxysmal atrial fibrillation)  ECG 12 Lead      2. Palpitations  ECG 12 Lead    Magnesium    Basic Metabolic Panel      3. Acute hyponatremia  ECG 12 Lead    Magnesium    Basic Metabolic Panel        Plan:       1.   Paroxysmal atrial fibrillation, s/p A-fib ablation.  Maintaining sinus rhythm with recommendation by EP service to stay on Eliquis long-term.  [id number] of 4 with underlying history of DVT factor V Leiden.  Suspect recent exacerbation of palpitations due to anesthesia.  Reviewed with her that she may have recurrent palpitations attributed PACs or possibly atrial fibrillation again with upcoming colonoscopy.  With this in mind recommended she avoid all stimulants.  Fortunately she is in sinus rhythm.  Okay to hold Eliquis 2 days prior to procedure and hopefully resume the day following procedure.  Of note she had mild bradycardia noted on the higher dose of Cardizem 180 mg day.  This seems to have improved back on the 120 mg of Cardizem daily.  2.  Hypertension, controlled.  3.  History of DVT with Leiden 5 mutation.  She was seen by Dr. Dodson in 9/2022.  He did not feel she needed long-term anticoagulation for hypercoagulable state may need thromboprophylaxis for surgical procedures (at that time back surgery).   5.  Alpha antitrypsin antibody deficiency,followed by Dr Ceja  6.  History of asthma   7.  Leukocytosis.  She will follow-up with hematology regarding this.      Time Spent: I spent 35 minutes caring for Radha on this date of service. This time includes time spent by me in the following activities: preparing for the visit, reviewing tests, obtaining and/or reviewing a separately obtained history, performing a medically appropriate examination and/or evaluation, counseling and educating the patient/family/caregiver, ordering medications, tests, or procedures, documenting information in the medical record, and independently interpreting results and communicating that information with the patient/family/caregiver.   I spent 1 minutes on the separately reported service of ECG. This time is not included in the time used to support the E/M service also reported today.        Your medication list             Accurate as of September 3, 2024 11:59 PM. If you have any questions, ask your nurse or doctor.                CHANGE how you take these medications        Instructions Last Dose Given Next Dose Due   gabapentin 100 MG capsule  Commonly known as: NEURONTIN  What changed:   when to take this  reasons to take this      Take 1 capsule by mouth 3 times a day.       methocarbamol 500 MG tablet  Commonly known as: ROBAXIN  What changed:   when to take this  reasons to take this      Take 1 tablet by mouth Every 8 (Eight) Hours.              CONTINUE taking these medications        Instructions Last Dose Given Next Dose Due   acetaminophen 500 MG tablet  Commonly known as: TYLENOL      Take 2 tablets by mouth Every 8 (Eight) Hours.       albuterol sulfate  (90 Base) MCG/ACT inhaler  Commonly known as: PROVENTIL HFA;VENTOLIN HFA;PROAIR HFA      Inhale 1 puff As Needed for Wheezing.       betamethasone (augmented) 0.05 % cream  Commonly known as: DIPROLENE      Apply 1 Application topically to the appropriate area as directed 2 (Two) Times a Day As Needed.       colestipol 1 g tablet  Commonly known as: COLESTID           desvenlafaxine 50 MG 24 hr tablet  Commonly known as: PRISTIQ      Take 12.5 mg by mouth Daily.       dilTIAZem  MG 24 hr capsule  Commonly known as: CARDIZEM CD      Take 1 capsule by mouth Daily.       Eliquis 5 MG tablet tablet  Generic drug: apixaban      TAKE 1 TABLET BY MOUTH TWICE  DAILY       EPINEPHrine 0.3 MG/0.3ML solution auto-injector injection  Commonly known as: EPIPEN      As Needed.       estradiol 0.075 MG/24HR patch  Commonly known as: CLIMARA      Place 1 patch on the skin as directed by provider 2 (Two) Times a Week.       guaiFENesin 600 MG 12 hr tablet  Commonly known as: MUCINEX      Take 1 tablet by mouth As Needed.       nexIUM 40 MG capsule  Generic drug: esomeprazole      Take 1 capsule by mouth Every Morning Before Breakfast.       ondansetron 4 MG  tablet  Commonly known as: ZOFRAN      Take 1 tablet by mouth Every 8 (Eight) Hours As Needed for Vomiting or Nausea.       Progesterone 200 MG capsule  Commonly known as: PROMETRIUM      Take 1 capsule by mouth Daily.       Trelegy Ellipta 100-62.5-25 MCG/INH inhaler  Generic drug: Fluticasone-Umeclidin-Vilant      Inhale 1 puff Daily.       Zemaira 1000 MG injection  Generic drug: alpha1-proteinase inhibitor      Infuse  into a venous catheter 1 (One) Time Per Week. Pt takes on Fridays at infusion center                Patient is no longer taking -.  I corrected the med list to reflect this.  I did not stop these medications.      Dictated utilizing Dragon dictation

## 2024-09-03 NOTE — TELEPHONE ENCOUNTER
Please let her know that kidney function, electrolytes, and magnesium level are all normal.  Would continue current medications and keep currently scheduled follow-up appointments

## 2024-09-03 NOTE — TELEPHONE ENCOUNTER
Called and left VM. Will continue to try to reach patient. HUB transfer call to triage.     Joanna Walker RN  [place] [facility]

## 2024-09-04 NOTE — TELEPHONE ENCOUNTER
Notified patient of results/recommendations. Patient verbalized understanding.    Joanna Walker RN  Triage [facility]

## 2024-09-09 NOTE — PROGRESS NOTES
Subjective   Patient ID: Radha Block is a 64 y.o. female is here today for 1 month PO follow-up with a new XR Lumbar done today. Patient had a Lumbar 2 to lumbar 3 laminectomy with fusion and instrumentation and removal of previous hardware on 7/22/2024    Today patient is having R leg pain @ night     History of Present Illness    This patient is doing pretty well.  She has some occasional pain in her leg at night.  It is sharp and shooting when it occurs but it is not there all the time.    The following portions of the patient's history were reviewed and updated as appropriate: allergies, current medications, past family history, past medical history, past social history, past surgical history, and problem list.    Review of Systems   Constitutional:  Negative for chills and fever.   HENT:  Negative for congestion.    Genitourinary:  Negative for difficulty urinating and dysuria.   Musculoskeletal:  Positive for back pain and myalgias.   Neurological:  Negative for weakness and numbness.       I reviewed the review of systems listed by the patient and discussed by my MA    Objective     There were no vitals filed for this visit.  There is no height or weight on file to calculate BMI.    Tobacco Use: Low Risk  (9/10/2024)    Patient History     Smoking Tobacco Use: Never     Smokeless Tobacco Use: Never     Passive Exposure: Not on file          Physical Exam  Neurological:      Mental Status: She is alert and oriented to person, place, and time.       Neurological Exam  Mental Status  Alert. Oriented to person, place, and time.          Assessment & Plan   Independent Review of Radiographic Studies:      I personally reviewed the images from the following studies.    I reviewed her x-rays done today.  This has not yet been  read by radiologist.  These are lumbar spine films.  They were done as follow-up for her surgery.  It shows good alignment of the construct at L2-3 which is the new level.  The  other levels at L3-4 and L4-5 are obviously stable as they are old.  I looked at previous films done in July and this shows no change.    Medical Decision Making:      I told the patient about the imaging.  I told her that from my point of view we can go ahead and start some physical therapy.  I will see her back in about 6 weeks with another x-ray.    Diagnoses and all orders for this visit:    1. Follow-up examination following surgery (Primary)  -     Ambulatory Referral to Physical Therapy for Evaluation & Treatment  -     XR Spine Lumbar Complete With Flex & Ext; Future      Return in about 6 weeks (around 10/22/2024).

## 2024-09-10 ENCOUNTER — OFFICE VISIT (OUTPATIENT)
Dept: NEUROSURGERY | Facility: CLINIC | Age: 64
End: 2024-09-10
Payer: COMMERCIAL

## 2024-09-10 DIAGNOSIS — Z09 FOLLOW-UP EXAMINATION FOLLOWING SURGERY: Primary | ICD-10-CM

## 2024-09-10 PROCEDURE — 99024 POSTOP FOLLOW-UP VISIT: CPT | Performed by: NEUROLOGICAL SURGERY

## 2024-09-23 ENCOUNTER — TELEPHONE (OUTPATIENT)
Dept: NEUROSURGERY | Facility: CLINIC | Age: 64
End: 2024-09-23

## 2024-09-23 NOTE — TELEPHONE ENCOUNTER
Provider: DR PARIKH    Caller: RAVIN WOODSON    Relationship to Patient: SELF      Phone Number: [phone]    Reason for Call: PT STATES THAT SHE RECEIVED NOTIFICATION FROM [facility] THAT SHE IS TO RETURN TO WORK ON 9-30-24, SHE IS WANTING TO BE OFF UNTIL  AROUND 10/16/24.  SHE SEES DR PARIKH 10-15-24, AND SHE DOESN'T START PHYSICAL THERAPY UNTIL 9-30-24.  PLEASE LET PATIENT KNOW.    When was the patient last seen: 9-10-24

## 2024-10-14 NOTE — PROGRESS NOTES
Subjective   Patient ID: Radha Block is a 64 y.o. female is here today for 6 week PO follow-up with a new XR Lumbar done today. Patient had a Lumbar 2 to lumbar 3 laminectomy with fusion and instrumentation and removal of previous hardware on 7/22/2024      History of Present Illness    This patient returns today.  She is doing really well.  She has almost no complaints at all.  She is doing therapy and that is increasing her activity level.    The following portions of the patient's history were reviewed and updated as appropriate: allergies, current medications, past family history, past medical history, past social history, past surgical history, and problem list.    Review of Systems   Genitourinary:  Negative for difficulty urinating and dysuria.   Musculoskeletal:  Positive for myalgias. Negative for back pain.   Neurological:  Positive for weakness. Negative for numbness.       I reviewed the review of systems listed by the patient and discussed by my MA    Objective     There were no vitals filed for this visit.  There is no height or weight on file to calculate BMI.    Tobacco Use: Low Risk  (10/15/2024)    Patient History     Smoking Tobacco Use: Never     Smokeless Tobacco Use: Never     Passive Exposure: Not on file          Physical Exam  Neurological:      Mental Status: She is alert and oriented to person, place, and time.             Assessment & Plan   Independent Review of Radiographic Studies:      I personally reviewed the images from the following studies.    I reviewed her x-rays which were done today not yet been reviewed by radiologist.  I compared them to x-rays done about a month ago.  They look about the same.  These were done for postoperative reasons.  The images show good alignment of the construct at all the levels of the lumbar spine.  One of the cages at L2-3 on the left side is protruding backwards a little bit but it is not really compressing the nerve.    Medical  Decision Making:      I told the patient that she can gradually return to normal activities.  I will check her again in about 6 months with an x-ray.    Diagnoses and all orders for this visit:    1. Follow-up examination following surgery (Primary)  -     XR Spine Lumbar Complete With Flex & Ext; Future      Return in about 6 months (around 4/15/2025).

## 2024-10-15 ENCOUNTER — OFFICE VISIT (OUTPATIENT)
Dept: NEUROSURGERY | Facility: CLINIC | Age: 64
End: 2024-10-15
Payer: COMMERCIAL

## 2024-10-15 DIAGNOSIS — Z09 FOLLOW-UP EXAMINATION FOLLOWING SURGERY: Primary | ICD-10-CM

## 2024-10-15 PROCEDURE — 99024 POSTOP FOLLOW-UP VISIT: CPT | Performed by: NEUROLOGICAL SURGERY

## 2025-02-18 RX ORDER — APIXABAN 5 MG/1
5 TABLET, FILM COATED ORAL 2 TIMES DAILY
Qty: 180 TABLET | Refills: 1 | Status: SHIPPED | OUTPATIENT
Start: 2025-02-18

## 2025-03-03 NOTE — PROGRESS NOTES
Date of Office Visit: 2025  Encounter Provider: KLARISSA Rodríguez  Place of Service: [facility]  Patient Name: Radha Block  :1960    Chief complaint  Paroxysmal atrial fibrillation     History of Present Illness  Patient is a 64 y.o. year old female  patient of Dr. Auguste. Past medical history includes COPD, of arterial disease, deep venous thrombosis, Leiden 5 deficiency, alpha protein deficiency and atrial fibrillation. And on  she was noted to have palpitations with Apple Watch indicating episodes of atrial fibrillation. She decreased the modest amounts of caffeinated use. She had a stress echocardiogram that showed normal systolic function hyperdynamic left ventricle trivial valve regurgitation normal right-sided pressures. There was no ischemia. She was started on aspirin and diltiazem. Subsequent Holter showed 2 episodes of atrial fibrillation totaling 24 minutes. She was placed on Eliquis and aspirin was discontinued. Follow-up in 2021 metoprolol was decreased due to fatigue. On 10/10/2022 she underwent atrial fibrillation ablation by Dr. Iqbal. Echo performed on 2022 showed size systolic function aortic valve calcification without stenosis or regurgitation. There is no significant pulmonary hypertension. She saw Dr. Iqbal on 2022 and he recommended stay on Eliquis indefinitely given history of prior DVT and Leiden 5 deficiency. At follow-up on 3/2023 with EP service patient again brought up the issue of stopping anticoagulant therapy and was again instructed that the benefits were outweighing the risk. Of note patient had been previously instructed by Dr. Dodson to stop estradiol due to thrombotic risk however she continues to take this. Patient called office in 2024 stating that after back surgery 2024 she had intermittent tachycardia as well as bradycardia rates from 48 to 125 bpm.      Interval  history  Patient presents today for routine follow-up.  I will visit with her today and have reviewed her medical record.  Since last visit she has been doing well. She denies palpitations, shortness of breath, edema, dizziness, chest pain or chest pressure, syncope or presyncope.  She has noticed more fatigue as she recovers from back surgery.  She is very active walking 4 to 5 miles per day and riding a stationary bike for 40 minutes on days she does not walk.  Blood pressure today is well-controlled and she reports similar readings at home and other providers.  She is tolerating Eliquis well with no falls or abnormal bleeding.    Past Medical History:   Diagnosis Date    Alpha-1-antitrypsin deficiency     FOLLOWED BY DR. GATES    Arrhythmia     Arthritis     Clotting disorder     Factor 5 Leiden    COPD (chronic obstructive pulmonary disease)     Deep vein thrombosis 2012    Early cataract     Essential hypertension 08/24/2021    GERD (gastroesophageal reflux disease)     Low back pain     RADIATING DOWN RT LEG WITH TINGLING AND WEAKNESS, RUPTURED DISC    Mitral valve prolapse     On anticoagulant therapy     Osteopenia     PAD (peripheral artery disease)     PAF (paroxysmal atrial fibrillation) 11/25/2020    ABLATION 2022     Past Surgical History:   Procedure Laterality Date    BLADDER SURGERY  2013    E/O polyp    BREAST SURGERY Right     E/O benign neuroma    CARDIAC ELECTROPHYSIOLOGY PROCEDURE N/A 10/10/2022    Procedure: Ablation atrial fibrillation CRYO;  Surgeon: Venkatesh Iqbal MD;  Location: [facility] INVASIVE LOCATION;  Service: Cardiovascular;  Laterality: N/A;    COLONOSCOPY  2016    EPIDURAL BLOCK      HAND SURGERY Right     Carpal metacarpal hand surgery     LUMBAR DISCECTOMY FUSION INSTRUMENTATION N/A 7/22/2024    Procedure: Lumbar 2 to lumbar 3 laminectomy with fusion and instrumentation and removal of previous hardware;  Surgeon: Rigo Mathur MD;  Location: [facility] OR;   Service: Neurosurgery;  Laterality: N/A;    LUMBAR FUSION N/A 12/30/2022    Procedure: Lumbar 3 to lumbar 4 and lumbar 4 to lumbar 5 laminectomy with fusion and instrumentation;  Surgeon: Rigo Mathur MD;  Location: [facility] OR;  Service: Robotics - Neuro;  Laterality: N/A;    SINUS SURGERY  2017    TONSILLECTOMY       Outpatient Medications Prior to Visit   Medication Sig Dispense Refill    acetaminophen (TYLENOL) 500 MG tablet Take 2 tablets by mouth Every 8 (Eight) Hours. 180 tablet 0    albuterol sulfate  (90 Base) MCG/ACT inhaler Inhale 1 puff As Needed for Wheezing.      alpha1-proteinase inhibitor (Zemaira) 1000 MG injection Infuse  into a venous catheter 1 (One) Time Per Week. Pt takes on Fridays at infusion center      apixaban (Eliquis) 5 MG tablet tablet TAKE 1 TABLET BY MOUTH TWICE  DAILY 180 tablet 1    betamethasone, augmented, (DIPROLENE) 0.05 % cream Apply 1 Application topically to the appropriate area as directed 2 (Two) Times a Day As Needed.      colestipol (COLESTID) 1 g tablet       desvenlafaxine (PRISTIQ) 50 MG 24 hr tablet Take 12.5 mg by mouth Daily.      dilTIAZem CD (CARDIZEM CD) 120 MG 24 hr capsule Take 1 capsule by mouth Daily. 90 capsule 3    EPINEPHrine (EPIPEN) 0.3 MG/0.3ML solution auto-injector injection As Needed.      esomeprazole (nexIUM) 40 MG capsule Take 1 capsule by mouth Every Morning Before Breakfast.      estradiol (CLIMARA) 0.075 MG/24HR patch Place 1 patch on the skin as directed by provider 2 (Two) Times a Week.      Fluticasone-Umeclidin-Vilant (Trelegy Ellipta) 100-62.5-25 MCG/INH aerosol powder  Inhale 1 puff Daily.      gabapentin (NEURONTIN) 100 MG capsule Take 1 capsule by mouth 3 times a day. (Patient taking differently: Take 1 capsule by mouth As Needed.) 90 capsule 0    guaiFENesin (MUCINEX) 600 MG 12 hr tablet Take 1 tablet by mouth As Needed.      methocarbamol (ROBAXIN) 500 MG tablet Take 1 tablet by mouth Every 8 (Eight) Hours. (Patient  "taking differently: Take 1 tablet by mouth 3 (Three) Times a Day As Needed for Muscle Spasms.) 90 tablet 0    ondansetron (ZOFRAN) 4 MG tablet Take 1 tablet by mouth Every 8 (Eight) Hours As Needed for Vomiting or Nausea.      Progesterone (PROMETRIUM) 200 MG capsule Take 1 capsule by mouth Daily.       No facility-administered medications prior to visit.       Allergies as of 03/05/2025    (No Known Allergies)     Social History     Socioeconomic History    Marital status:     Number of children: 1   Tobacco Use    Smoking status: Never    Smokeless tobacco: Never   Vaping Use    Vaping status: Never Used   Substance and Sexual Activity    Alcohol use: Yes     Alcohol/week: 3.0 standard drinks of alcohol     Types: 3 Glasses of wine per week     Comment: 2 times a week     Drug use: Never    Sexual activity: Defer     Family History   Adopted: Yes     Review of Systems   Constitutional: Positive for malaise/fatigue.   Cardiovascular:  Negative for chest pain, claudication, dyspnea on exertion, leg swelling, near-syncope, orthopnea, palpitations, paroxysmal nocturnal dyspnea and syncope.   Respiratory:  Negative for shortness of breath.    Neurological:  Negative for brief paralysis, dizziness, headaches and light-headedness.   All other systems reviewed and are negative.       Objective:     Vitals:    03/05/25 1441   BP: 122/72   BP Location: Right arm   Patient Position: Sitting   Cuff Size: Small Adult   Pulse: 64   Weight: 59.9 kg (132 lb)   Height: 170.2 cm (67\")     Body mass index is 20.67 kg/m².    Vitals reviewed.   Constitutional:       General: Not in acute distress.     Appearance: Well-developed and not in distress. Not diaphoretic.   HENT:      Head: Normocephalic.   Pulmonary:      Effort: Pulmonary effort is normal. No respiratory distress.      Breath sounds: Normal breath sounds. No wheezing. No rhonchi. No rales.   Cardiovascular:      Normal rate. Regular rhythm.      Murmurs: There is no " murmur.   Pulses:     Radial: 2+ bilaterally.  Edema:     Peripheral edema absent.   Skin:     General: Skin is warm and dry. There is no cyanosis.      Findings: No rash.   Neurological:      Mental Status: Alert and oriented to person, place, and time.   Psychiatric:         Behavior: Behavior normal. Behavior is cooperative.         Thought Content: Thought content normal.         Judgment: Judgment normal.       Lab Review:     Lab Results   Component Value Date     09/03/2024     (L) 07/23/2024    K 4.3 09/03/2024    K 4.2 07/23/2024     09/03/2024     07/23/2024    CO2 16.6 (L) 09/03/2024    CO2 24.0 07/23/2024    BUN 13 09/03/2024    BUN 9 07/23/2024    CREATININE 0.75 09/03/2024    CREATININE 0.54 (L) 07/23/2024    EGFRIFNONA 68 11/25/2020    GLUCOSE 91 09/03/2024    GLUCOSE 107 (H) 07/23/2024    CALCIUM 9.1 09/03/2024    CALCIUM 8.1 (L) 07/23/2024    ALBUMIN 4.3 07/05/2024    ALBUMIN 4.80 11/25/2020    BILITOT 1.1 07/05/2024    BILITOT 0.7 11/25/2020    AST 17 07/05/2024    AST 28 11/25/2020    ALT 27 07/05/2024    ALT 26 11/25/2020     Lab Results   Component Value Date    WBC 9.65 07/26/2024    WBC 13.00 (H) 07/25/2024    HGB 12.1 07/26/2024    HGB 11.4 (L) 07/25/2024    HCT 37.0 07/26/2024    HCT 34.0 07/25/2024    .4 (H) 07/26/2024    .5 (H) 07/25/2024     07/26/2024     07/25/2024     Lab Results   Component Value Date    PROBNP 478.5 11/25/2020     Lab Results   Component Value Date    TROPONINT <0.010 11/25/2020     Lab Results   Component Value Date    TSH 3.930 11/25/2020             ECG 12 Lead    Date/Time: 3/5/2025 3:41 PM  Performed by: Carey Esteves APRN    Authorized by: Carey Esteves APRN  Comparison: compared with previous ECG   Similar to previous ECG  Rhythm: sinus rhythm  Ectopy: atrial premature contractions  Rate: normal  BPM: 64  QRS axis: normal  Comments: Similar to prior        Assessment:       Diagnosis Plan   1. PAF  (paroxysmal atrial fibrillation)        2. Factor V Leiden        3. Essential hypertension        4. Palpitations        5. Alpha-1-antitrypsin deficiency        6. History of DVT of lower extremity        7. Chronic obstructive pulmonary disease, unspecified COPD type          Plan:       1.  Paroxysmal atrial fibrillation, s/p A-fib ablation.  Maintaining sinus rhythm with recommendation by EP service to stay on Eliquis long-term.  [id number] of 4 with underlying history of DVT and factor V Leiden.  Maintaining sinus on diltiazem and tolerating Eliquis.   2.  Hypertension, controlled on current regimen.   3.  History of DVT with Leiden 5 mutation.  She was seen by Dr. Dodson in 9/2022.  He did not feel she needed long-term anticoagulation for hypercoagulable state but may need thromboprophylaxis for surgical procedures (at that time back surgery). Continue Eliquis as above.  5.  Alpha antitrypsin antibody deficiency,followed by Dr Ceja  6.  History of asthma   7.  Leukocytosis.  Not present on most recent CBC in September 2024      Time Spent: I spent 30 minutes caring for Radha on this date of service. This time includes time spent by me in the following activities: preparing for the visit, reviewing tests, performing a medically appropriate examination and/or evaluations, counseling and educating the patient/family/caregiver, ordering medications, tests, or procedures, documenting information in the medical record, and independently interpreting results and communicating that information with the patient/family/caregiver.   I spent 1 minutes on the separately reported service of ECG. This time is not included in the time used to support the E/M service also reported today.        Your medication list            Accurate as of March 5, 2025  3:43 PM. If you have any questions, ask your nurse or doctor.                CHANGE how you take these medications        Instructions Last Dose Given Next Dose Due    gabapentin 100 MG capsule  Commonly known as: NEURONTIN  What changed:   when to take this  reasons to take this      Take 1 capsule by mouth 3 times a day.       methocarbamol 500 MG tablet  Commonly known as: ROBAXIN  What changed:   when to take this  reasons to take this      Take 1 tablet by mouth Every 8 (Eight) Hours.              CONTINUE taking these medications        Instructions Last Dose Given Next Dose Due   acetaminophen 500 MG tablet  Commonly known as: TYLENOL      Take 2 tablets by mouth Every 8 (Eight) Hours.       albuterol sulfate  (90 Base) MCG/ACT inhaler  Commonly known as: PROVENTIL HFA;VENTOLIN HFA;PROAIR HFA      Inhale 1 puff As Needed for Wheezing.       betamethasone (augmented) 0.05 % cream  Commonly known as: DIPROLENE      Apply 1 Application topically to the appropriate area as directed 2 (Two) Times a Day As Needed.       colestipol 1 g tablet  Commonly known as: COLESTID           desvenlafaxine 50 MG 24 hr tablet  Commonly known as: PRISTIQ      Take 12.5 mg by mouth Daily.       dilTIAZem  MG 24 hr capsule  Commonly known as: CARDIZEM CD      Take 1 capsule by mouth Daily.       Eliquis 5 MG tablet tablet  Generic drug: apixaban      TAKE 1 TABLET BY MOUTH TWICE  DAILY       EPINEPHrine 0.3 MG/0.3ML solution auto-injector injection  Commonly known as: EPIPEN      As Needed.       estradiol 0.075 MG/24HR patch  Commonly known as: CLIMARA      Place 1 patch on the skin as directed by provider 2 (Two) Times a Week.       guaiFENesin 600 MG 12 hr tablet  Commonly known as: MUCINEX      Take 1 tablet by mouth As Needed.       nexIUM 40 MG capsule  Generic drug: esomeprazole      Take 1 capsule by mouth Every Morning Before Breakfast.       ondansetron 4 MG tablet  Commonly known as: ZOFRAN      Take 1 tablet by mouth Every 8 (Eight) Hours As Needed for Vomiting or Nausea.       Progesterone 200 MG capsule  Commonly known as: PROMETRIUM      Take 1 capsule by mouth  Daily.       Kamlearie Ellipta 100-62.5-25 MCG/INH inhaler  Generic drug: Fluticasone-Umeclidin-Vilant      Inhale 1 puff Daily.       Zemaira 1000 MG injection  Generic drug: alpha1-proteinase inhibitor      Infuse  into a venous catheter 1 (One) Time Per Week. Pt takes on Fridays at infusion center                Patient is no longer taking -.  I corrected the med list to reflect this.  I did not stop these medications.    Return in about 6 months (around 9/5/2025) for with Dr. Auguste.      Dictated utilizing Dragon dictation

## 2025-03-05 ENCOUNTER — OFFICE VISIT (OUTPATIENT)
Age: 65
End: 2025-03-05
Payer: COMMERCIAL

## 2025-03-05 VITALS
DIASTOLIC BLOOD PRESSURE: 72 MMHG | SYSTOLIC BLOOD PRESSURE: 122 MMHG | HEIGHT: 67 IN | HEART RATE: 64 BPM | WEIGHT: 132 LBS | BODY MASS INDEX: 20.72 KG/M2

## 2025-03-05 DIAGNOSIS — I48.0 PAF (PAROXYSMAL ATRIAL FIBRILLATION): Primary | ICD-10-CM

## 2025-03-05 DIAGNOSIS — J44.9 CHRONIC OBSTRUCTIVE PULMONARY DISEASE, UNSPECIFIED COPD TYPE: ICD-10-CM

## 2025-03-05 DIAGNOSIS — E88.01 ALPHA-1-ANTITRYPSIN DEFICIENCY: ICD-10-CM

## 2025-03-05 DIAGNOSIS — R00.2 PALPITATIONS: ICD-10-CM

## 2025-03-05 DIAGNOSIS — I10 ESSENTIAL HYPERTENSION: ICD-10-CM

## 2025-03-05 DIAGNOSIS — Z86.718 HISTORY OF DVT OF LOWER EXTREMITY: ICD-10-CM

## 2025-03-05 DIAGNOSIS — D68.51 FACTOR V LEIDEN: ICD-10-CM

## 2025-03-05 PROCEDURE — 99214 OFFICE O/P EST MOD 30 MIN: CPT | Performed by: NURSE PRACTITIONER

## 2025-03-05 PROCEDURE — 93000 ELECTROCARDIOGRAM COMPLETE: CPT | Performed by: NURSE PRACTITIONER

## 2025-04-14 NOTE — PROGRESS NOTES
Subjective   Patient ID: Radha Block is a 64 y.o. female is here today for 6 month follow-up.    Today patient states that she has L sided low back aching     History of Present Illness    This patient returns today.  She is doing well.  She has a little aching in her back but nothing severe at all.    The following portions of the patient's history were reviewed and updated as appropriate: allergies, current medications, past family history, past medical history, past social history, past surgical history, and problem list.      Objective     There were no vitals filed for this visit.  There is no height or weight on file to calculate BMI.    Tobacco Use: Low Risk  (4/15/2025)    Patient History     Smoking Tobacco Use: Never     Smokeless Tobacco Use: Never     Passive Exposure: Not on file          Physical Exam    Neurological:      Mental Status: He is alert and oriented to person, place, and time.       Neurological Exam    Mental Status  Alert. Oriented to person, place, and time.            Assessment & Plan   Independent Review of Radiographic Studies:      I personally reviewed the images from the following studies.    I reviewed her x-rays from last October.  This shows good alignment of the construct at L2-3 and L3-4.  1 cage has posteriorly displaced a couple of millimeters but no significance.    Medical Decision Making:      I told the patient that from my point of view we will check her 1 more time in about 6 months with an x-ray.  If that looks okay then I think we can return her to normal activities.    Diagnoses and all orders for this visit:    1. Spinal stenosis of lumbar region without neurogenic claudication (Primary)  -     XR Spine Lumbar Complete With Flex & Ext; Future      Return in about 6 months (around 10/15/2025).

## 2025-04-15 ENCOUNTER — OFFICE VISIT (OUTPATIENT)
Dept: NEUROSURGERY | Facility: CLINIC | Age: 65
End: 2025-04-15
Payer: COMMERCIAL

## 2025-04-15 DIAGNOSIS — M48.061 SPINAL STENOSIS OF LUMBAR REGION WITHOUT NEUROGENIC CLAUDICATION: Primary | ICD-10-CM

## 2025-04-15 PROCEDURE — 99213 OFFICE O/P EST LOW 20 MIN: CPT | Performed by: NEUROLOGICAL SURGERY

## 2025-05-15 RX ORDER — DILTIAZEM HYDROCHLORIDE 180 MG/1
180 CAPSULE, COATED, EXTENDED RELEASE ORAL EVERY EVENING
Qty: 90 CAPSULE | Refills: 3 | Status: SHIPPED | OUTPATIENT
Start: 2025-05-15

## 2025-05-16 RX ORDER — DILTIAZEM HYDROCHLORIDE 180 MG/1
180 CAPSULE, COATED, EXTENDED RELEASE ORAL DAILY
Qty: 30 CAPSULE | OUTPATIENT
Start: 2025-05-16

## 2025-05-16 RX ORDER — DILTIAZEM HYDROCHLORIDE 180 MG/1
180 CAPSULE, COATED, EXTENDED RELEASE ORAL EVERY EVENING
Qty: 90 CAPSULE | Refills: 3 | Status: CANCELLED | OUTPATIENT
Start: 2025-05-16

## 2025-05-16 NOTE — TELEPHONE ENCOUNTER
Permission for the hub to transfer this call directly to the nurse triage line at [facility].    Attempted to call Radha Romero Umair, no answer.  Left a voicemail for patient to call back and ask to speak with the triage nurses.  Will continue to try to reach patient.    Triage: please confirm her diltiazem dose.  If it is 180 mg daily, that was send to [facility] in [place] yesterday and she would need to call the pharmacy.    Edith Degroot RN  [facility] Triage  05/16/25 12:32 EDT

## 2025-05-19 NOTE — TELEPHONE ENCOUNTER
I called and spoke with pt.  She is on 180 mg and she has already picked up the refill from her pharmacy.  She denies any further needs.    Edith Degroot RN  [facility] Triage  05/19/25 14:33 EDT

## 2025-08-18 RX ORDER — APIXABAN 5 MG/1
5 TABLET, FILM COATED ORAL 2 TIMES DAILY
Qty: 180 TABLET | Refills: 3 | Status: SHIPPED | OUTPATIENT
Start: 2025-08-18

## (undated) DEVICE — SYR LUERLOK 30CC

## (undated) DEVICE — PK NEURO SPINE 40

## (undated) DEVICE — PINNACLE INTRODUCER SHEATH: Brand: PINNACLE

## (undated) DEVICE — Device: Brand: REFERENCE PATCH CARTO 3

## (undated) DEVICE — CABL CONN CATH EP UMB 48IN

## (undated) DEVICE — SPONGE,LAP,12"X12",XR,ST,5/PK,40PK/CS: Brand: MEDLINE

## (undated) DEVICE — KT SPINE MAZORX DISP

## (undated) DEVICE — CATH CRYO/ABL ARCTICFRONTADVANCE MAP 12F 28MM

## (undated) DEVICE — PATIENT RETURN ELECTRODE, SINGLE-USE, CONTACT QUALITY MONITORING, ADULT, WITH 9FT CORD, FOR PATIENTS WEIGING OVER 33LBS. (15KG): Brand: MEGADYNE

## (undated) DEVICE — SMOKE EVACUATION TUBING WITH 7/8 IN TO 1/4 IN REDUCER: Brand: BUFFALO FILTER

## (undated) DEVICE — COVER,C-ARM,41X74: Brand: MEDLINE

## (undated) DEVICE — 1 X VERSACROSS LARGE ACCESS TRANSSEPTAL DILATOR (INCLUDING 1 X J-TIP MECHANICAL GUIDEWIRE); 1 X VERSACROSS RF WIRE (INCLUDING 1 X CONNECTOR CABLE (SINGLE USE)); 1 X DISPERSIVE ELECTRODE: Brand: VERSACROSS LARGE ACCESS SOLUTION

## (undated) DEVICE — ANTIBACTERIAL UNDYED BRAIDED (POLYGLACTIN 910), SYNTHETIC ABSORBABLE SUTURE: Brand: COATED VICRYL

## (undated) DEVICE — TOOL MR8-15BA50T MR8 15CM BAL SYMTRI 5MM: Brand: MIDAS REX MR8

## (undated) DEVICE — KT MANIFLD CARDIAC

## (undated) DEVICE — GLV SURG BIOGEL LTX PF 7

## (undated) DEVICE — Device: Brand: DECANAV

## (undated) DEVICE — NDL BIOP BONE MARRW JAMSHIDI 11G 101MM

## (undated) DEVICE — Device: Brand: SOUNDSTAR

## (undated) DEVICE — 3M™ STERI-STRIP™ ANTIMICROBIAL SKIN CLOSURES 1/2 INCH X 4 INCHES 50/CARTON 4 CARTONS/CASE A1847: Brand: 3M™ STERI-STRIP™

## (undated) DEVICE — INTRO SHEATH STEER BIDIR W/GW

## (undated) DEVICE — CATH ABL ACHIEVE MP 3.3F20MM 165CM

## (undated) DEVICE — BG TRANSF W/COUPLER SPK 600ML

## (undated) DEVICE — PENCL ES MEGADINE EZ/CLEAN BUTN W/HOLSTR 10FT

## (undated) DEVICE — NEEDLE, QUINCKE, 20GX3.5": Brand: MEDLINE

## (undated) DEVICE — SPONGE,NEURO,.75"X.75",XR,STRL,LF,10/PK: Brand: MEDLINE

## (undated) DEVICE — DISPOSABLE IRRIGATION BIPOLAR CORD, M1000 TYPE: Brand: KIRWAN

## (undated) DEVICE — DRN WND JP RND W TROC SIL 10F 1/8IN

## (undated) DEVICE — DRP C/ARM 41X74IN

## (undated) DEVICE — APPL CHLORAPREP HI/LITE 26ML ORNG

## (undated) DEVICE — DRAPE,REIN 53X77,STERILE: Brand: MEDLINE

## (undated) DEVICE — TOTAL TRAY, 16FR 10ML SIL FOLEY, URN: Brand: MEDLINE

## (undated) DEVICE — Device

## (undated) DEVICE — DRP MICROSCOPE 4 BINOCULAR CV 54X150IN

## (undated) DEVICE — INTENDED FOR TISSUE SEPARATION, AND OTHER PROCEDURES THAT REQUIRE A SHARP SURGICAL BLADE TO PUNCTURE OR CUT.: Brand: BARD-PARKER ® STAINLESS STEEL BLADES

## (undated) DEVICE — CABL CATH ABLAT ACHIEVE 196CM 1P/U

## (undated) DEVICE — TRAP FLD MINIVAC MEGADYNE 100ML

## (undated) DEVICE — SUT SILK 2/0 FS BLK 18IN 685G

## (undated) DEVICE — TOOL MR8-15MH30 MR8 15CM MATCH 3MM: Brand: MIDAS REX MR8

## (undated) DEVICE — CONN TBG Y 5 IN 1 LF STRL

## (undated) DEVICE — DRP MICROSCP LEICA 137X381CM

## (undated) DEVICE — SPHR MARKR STEALTH STATION

## (undated) DEVICE — CLEARSIGHT FINGER CUFF SMALL MULTI PACK: Brand: CLEARSIGHT

## (undated) DEVICE — PK ATS CUST W CARDIOTOMY RESEVOIR

## (undated) DEVICE — LABEL SHEET CUSTOM 2X2 YELLOW: Brand: MEDLINE INDUSTRIES, INC.

## (undated) DEVICE — PENCL SMOKE/EVAC MEGADYNE TELESCP 10FT

## (undated) DEVICE — LOU EP: Brand: MEDLINE INDUSTRIES, INC.

## (undated) DEVICE — OCTA,PERSEID,2-2-2-2-2,F-CURVE: Brand: OCTARAY MAPPING CATHETER

## (undated) DEVICE — INTENDED FOR TISSUE SEPARATION, AND OTHER PROCEDURES THAT REQUIRE A SHARP SURGICAL BLADE TO PUNCTURE OR CUT.: Brand: BARD-PARKER ® CARBON RIB-BACK BLADES

## (undated) DEVICE — SYR CONTRL PRESS/LO FIX/M/LL W/THMB/RNG 10ML

## (undated) DEVICE — ADHS LIQ MASTISOL 2/3ML

## (undated) DEVICE — BLANKT WARM UNDER/BDY FUL/ACC A/ 90X206CM

## (undated) DEVICE — COPILOT BLEEDBACK CONTROL VALVE: Brand: COPILOT

## (undated) DEVICE — SYRINGE,CONTROL,LL,FINGER,GRIP: Brand: MEDLINE INDUSTRIES, INC.

## (undated) DEVICE — TOOL MR8-31TD3030 MR8 TWST DRIL 3MMX30MM: Brand: MIDAS REX

## (undated) DEVICE — JACKSON-PRATT 100CC BULB RESERVOIR: Brand: CARDINAL HEALTH

## (undated) DEVICE — CABL CONN CATH EP COAXL UMB 72IN

## (undated) DEVICE — GLV SURG SENSICARE W/ALOE PF LF 7.5 STRL

## (undated) DEVICE — TBG PENCL TELESCP MEGADYNE SMOKE EVAC 10FT

## (undated) DEVICE — PERCLOSE™ PROSTYLE™ SUTURE-MEDIATED CLOSURE AND REPAIR SYSTEM: Brand: PERCLOSE™ PROSTYLE™